# Patient Record
Sex: FEMALE | Race: WHITE | Employment: OTHER
[De-identification: names, ages, dates, MRNs, and addresses within clinical notes are randomized per-mention and may not be internally consistent; named-entity substitution may affect disease eponyms.]

---

## 2019-06-11 ENCOUNTER — RX ONLY (RX ONLY)
Age: 79
End: 2019-06-11

## 2019-06-11 ENCOUNTER — DOCTOR'S OFFICE (OUTPATIENT)
Dept: URBAN - NONMETROPOLITAN AREA CLINIC 1 | Facility: CLINIC | Age: 79
Setting detail: OPHTHALMOLOGY
End: 2019-06-11
Payer: COMMERCIAL

## 2019-06-11 DIAGNOSIS — Z79.891: ICD-10-CM

## 2019-06-11 DIAGNOSIS — H25.21: ICD-10-CM

## 2019-06-11 DIAGNOSIS — H25.042: ICD-10-CM

## 2019-06-11 PROCEDURE — 99204 OFFICE O/P NEW MOD 45 MIN: CPT | Performed by: OPHTHALMOLOGY

## 2019-06-11 PROCEDURE — 76512 OPH US DX B-SCAN: CPT | Performed by: OPHTHALMOLOGY

## 2019-06-11 PROCEDURE — 92134 CPTRZ OPH DX IMG PST SGM RTA: CPT | Performed by: OPHTHALMOLOGY

## 2019-06-11 ASSESSMENT — REFRACTION_CURRENTRX
OD_AXIS: 115
OD_VPRISM_DIRECTION: TRF
OD_SPHERE: PLANO
OS_OVR_VA: 20/
OS_OVR_VA: 20/
OD_OVR_VA: 20/
OD_OVR_VA: 20/
OS_AXIS: 102
OS_CYLINDER: -1.00
OS_ADD: +2.50
OS_SPHERE: +1.25
OD_ADD: +3.50
OS_VPRISM_DIRECTION: TRF
OD_OVR_VA: 20/
OD_CYLINDER: -1.00
OS_OVR_VA: 20/

## 2019-06-11 ASSESSMENT — REFRACTION_MANIFEST
OU_VA: 20/
OU_VA: 20/
OS_VA3: 20/
OS_VA2: 20/
OS_VA1: 20/
OD_VA2: 20/
OS_VA2: 20/
OD_VA1: 20/
OD_VA2: 20/
OS_VA1: 20/
OD_VA3: 20/
OD_VA3: 20/
OD_VA1: 20/
OS_VA3: 20/

## 2019-06-11 ASSESSMENT — DRY EYES - PHYSICIAN NOTES
OD_GENERALCOMMENTS: KSICCA
OS_GENERALCOMMENTS: KSICCA

## 2019-06-11 ASSESSMENT — SPHEQUIV_DERIVED: OS_SPHEQUIV: -0.125

## 2019-06-11 ASSESSMENT — REFRACTION_AUTOREFRACTION
OS_AXIS: 104
OS_SPHERE: +0.50
OS_CYLINDER: -1.25
OD_SPHERE: ERROR OD

## 2019-06-11 ASSESSMENT — CONFRONTATIONAL VISUAL FIELD TEST (CVF)
OD_FINDINGS: FULL
OS_FINDINGS: FULL

## 2019-06-11 ASSESSMENT — VISUAL ACUITY
OD_BCVA: 20/50-1
OS_BCVA: 20/CFX1'

## 2019-07-24 ENCOUNTER — DOCTOR'S OFFICE (OUTPATIENT)
Dept: URBAN - NONMETROPOLITAN AREA CLINIC 1 | Facility: CLINIC | Age: 79
Setting detail: OPHTHALMOLOGY
End: 2019-07-24
Payer: COMMERCIAL

## 2019-07-24 DIAGNOSIS — H25.21: ICD-10-CM

## 2019-07-24 PROCEDURE — 92136 OPHTHALMIC BIOMETRY: CPT | Performed by: OPHTHALMOLOGY

## 2019-08-01 ENCOUNTER — AMBUL SURGICAL CARE (OUTPATIENT)
Dept: URBAN - NONMETROPOLITAN AREA SURGERY 1 | Facility: SURGERY | Age: 79
Setting detail: OPHTHALMOLOGY
End: 2019-08-01
Payer: COMMERCIAL

## 2019-08-01 DIAGNOSIS — H25.21: ICD-10-CM

## 2019-08-01 DIAGNOSIS — H25.031: ICD-10-CM

## 2019-08-01 DIAGNOSIS — H25.041: ICD-10-CM

## 2019-08-01 PROCEDURE — 66982 XCAPSL CTRC RMVL CPLX WO ECP: CPT | Performed by: OPHTHALMOLOGY

## 2019-08-01 PROCEDURE — G8907 PT DOC NO EVENTS ON DISCHARG: HCPCS | Performed by: OPHTHALMOLOGY

## 2019-08-01 PROCEDURE — G8918 PT W/O PREOP ORDER IV AB PRO: HCPCS | Performed by: OPHTHALMOLOGY

## 2019-08-02 ENCOUNTER — RX ONLY (RX ONLY)
Age: 79
End: 2019-08-02

## 2019-08-02 ENCOUNTER — DOCTOR'S OFFICE (OUTPATIENT)
Dept: URBAN - NONMETROPOLITAN AREA CLINIC 1 | Facility: CLINIC | Age: 79
Setting detail: OPHTHALMOLOGY
End: 2019-08-02
Payer: COMMERCIAL

## 2019-08-02 DIAGNOSIS — Z96.1: ICD-10-CM

## 2019-08-02 DIAGNOSIS — H25.042: ICD-10-CM

## 2019-08-02 PROCEDURE — 92136 OPHTHALMIC BIOMETRY: CPT | Performed by: OPHTHALMOLOGY

## 2019-08-02 PROCEDURE — 99024 POSTOP FOLLOW-UP VISIT: CPT | Performed by: OPHTHALMOLOGY

## 2019-08-02 ASSESSMENT — REFRACTION_CURRENTRX
OD_ADD: +3.50
OD_SPHERE: PLANO
OS_ADD: +2.50
OD_OVR_VA: 20/
OD_AXIS: 115
OS_OVR_VA: 20/
OS_AXIS: 102
OS_SPHERE: +1.25
OD_OVR_VA: 20/
OD_OVR_VA: 20/
OD_CYLINDER: -1.00
OD_VPRISM_DIRECTION: TRF
OS_VPRISM_DIRECTION: TRF
OS_CYLINDER: -1.00
OS_OVR_VA: 20/
OS_OVR_VA: 20/

## 2019-08-02 ASSESSMENT — REFRACTION_MANIFEST
OS_VA3: 20/
OS_VA1: 20/
OS_VA1: 20/
OD_VA3: 20/
OU_VA: 20/
OD_VA2: 20/
OD_VA1: 20/
OD_VA3: 20/
OU_VA: 20/
OD_VA1: 20/
OD_VA2: 20/
OS_VA2: 20/
OS_VA2: 20/
OS_VA3: 20/

## 2019-08-02 ASSESSMENT — VISUAL ACUITY
OD_BCVA: 20/80-1
OS_BCVA: 20/60-2

## 2019-08-02 ASSESSMENT — REFRACTION_AUTOREFRACTION
OS_CYLINDER: -1.00
OD_AXIS: 24
OS_SPHERE: 0.00
OD_SPHERE: +0.25
OS_AXIS: 082
OD_CYLINDER: -2.50

## 2019-08-02 ASSESSMENT — SPHEQUIV_DERIVED
OS_SPHEQUIV: -0.5
OD_SPHEQUIV: -1

## 2019-08-02 ASSESSMENT — DRY EYES - PHYSICIAN NOTES
OD_GENERALCOMMENTS: KSICCA
OS_GENERALCOMMENTS: KSICCA

## 2019-08-15 ENCOUNTER — AMBUL SURGICAL CARE (OUTPATIENT)
Dept: URBAN - NONMETROPOLITAN AREA SURGERY 1 | Facility: SURGERY | Age: 79
Setting detail: OPHTHALMOLOGY
End: 2019-08-15
Payer: COMMERCIAL

## 2019-08-15 DIAGNOSIS — H25.12: ICD-10-CM

## 2019-08-15 DIAGNOSIS — H25.012: ICD-10-CM

## 2019-08-15 DIAGNOSIS — H25.042: ICD-10-CM

## 2019-08-15 DIAGNOSIS — H25.032: ICD-10-CM

## 2019-08-15 PROCEDURE — G8918 PT W/O PREOP ORDER IV AB PRO: HCPCS | Performed by: OPHTHALMOLOGY

## 2019-08-15 PROCEDURE — G8907 PT DOC NO EVENTS ON DISCHARG: HCPCS | Performed by: OPHTHALMOLOGY

## 2019-08-15 PROCEDURE — 66984 XCAPSL CTRC RMVL W/O ECP: CPT | Performed by: OPHTHALMOLOGY

## 2019-08-16 ENCOUNTER — DOCTOR'S OFFICE (OUTPATIENT)
Dept: URBAN - NONMETROPOLITAN AREA CLINIC 1 | Facility: CLINIC | Age: 79
Setting detail: OPHTHALMOLOGY
End: 2019-08-16
Payer: COMMERCIAL

## 2019-08-16 DIAGNOSIS — Z96.1: ICD-10-CM

## 2019-08-16 PROCEDURE — 99024 POSTOP FOLLOW-UP VISIT: CPT | Performed by: PHYSICIAN ASSISTANT

## 2019-08-16 ASSESSMENT — DRY EYES - PHYSICIAN NOTES
OS_GENERALCOMMENTS: KSICCA
OD_GENERALCOMMENTS: KSICCA

## 2019-08-19 ASSESSMENT — REFRACTION_AUTOREFRACTION
OS_SPHERE: +1.25
OD_AXIS: 033
OD_SPHERE: +1.00
OS_CYLINDER: -1.00
OD_CYLINDER: -1.25
OS_AXIS: 165

## 2019-08-19 ASSESSMENT — REFRACTION_MANIFEST
OD_VA3: 20/
OD_VA1: 20/
OD_VA3: 20/
OS_VA3: 20/
OD_VA1: 20/
OD_VA2: 20/
OS_VA3: 20/
OU_VA: 20/
OS_VA1: 20/
OU_VA: 20/
OD_VA2: 20/
OS_VA2: 20/
OS_VA2: 20/
OS_VA1: 20/

## 2019-08-19 ASSESSMENT — REFRACTION_CURRENTRX
OS_VPRISM_DIRECTION: TRF
OS_SPHERE: +1.25
OD_AXIS: 115
OS_CYLINDER: -1.00
OD_VPRISM_DIRECTION: TRF
OD_ADD: +3.50
OD_SPHERE: PLANO
OD_CYLINDER: -1.00
OS_OVR_VA: 20/
OD_OVR_VA: 20/
OS_AXIS: 102
OS_ADD: +2.50
OS_OVR_VA: 20/
OD_OVR_VA: 20/
OS_OVR_VA: 20/
OD_OVR_VA: 20/

## 2019-08-19 ASSESSMENT — SPHEQUIV_DERIVED
OS_SPHEQUIV: 0.75
OD_SPHEQUIV: 0.375

## 2019-08-19 ASSESSMENT — VISUAL ACUITY
OD_BCVA: 20/30-2
OS_BCVA: 20/50-2

## 2019-08-23 ENCOUNTER — DOCTOR'S OFFICE (OUTPATIENT)
Dept: URBAN - NONMETROPOLITAN AREA CLINIC 1 | Facility: CLINIC | Age: 79
Setting detail: OPHTHALMOLOGY
End: 2019-08-23
Payer: COMMERCIAL

## 2019-08-23 DIAGNOSIS — Z96.1: ICD-10-CM

## 2019-08-23 PROBLEM — H25.21 CATARACT HYPERMATURE; RIGHT EYE: Status: RESOLVED | Noted: 2019-06-11 | Resolved: 2019-08-23

## 2019-08-23 PROBLEM — H25.042 CATARACT POST SUBCAPSULAR; LEFT EYE: Status: RESOLVED | Noted: 2019-06-11 | Resolved: 2019-08-23

## 2019-08-23 PROCEDURE — 99024 POSTOP FOLLOW-UP VISIT: CPT | Performed by: OPHTHALMOLOGY

## 2019-08-23 ASSESSMENT — VISUAL ACUITY
OD_BCVA: 20/60+1
OS_BCVA: 20/50-2

## 2019-08-23 ASSESSMENT — REFRACTION_MANIFEST
OS_VA1: 20/
OD_VA1: 20/
OS_VA1: 20/
OS_VA2: 20/
OD_VA2: 20/
OD_VA2: 20/
OD_VA3: 20/
OS_VA3: 20/
OU_VA: 20/
OS_VA3: 20/
OD_VA3: 20/
OS_VA2: 20/
OU_VA: 20/
OD_VA1: 20/

## 2019-08-23 ASSESSMENT — REFRACTION_CURRENTRX
OS_OVR_VA: 20/
OS_ADD: +2.50
OD_ADD: +3.50
OS_VPRISM_DIRECTION: TRF
OD_OVR_VA: 20/
OS_AXIS: 102
OD_OVR_VA: 20/
OS_SPHERE: +1.25
OS_CYLINDER: -1.00
OD_CYLINDER: -1.00
OS_OVR_VA: 20/
OD_SPHERE: PLANO
OD_OVR_VA: 20/
OS_OVR_VA: 20/
OD_AXIS: 115
OD_VPRISM_DIRECTION: TRF

## 2019-08-23 ASSESSMENT — REFRACTION_AUTOREFRACTION
OS_SPHERE: 0.00
OD_SPHERE: 0.00
OD_CYLINDER: -2.50
OS_AXIS: 167
OS_CYLINDER: -0.75
OD_AXIS: 013

## 2019-08-23 ASSESSMENT — DRY EYES - PHYSICIAN NOTES
OS_GENERALCOMMENTS: KSICCA
OD_GENERALCOMMENTS: KSICCA

## 2019-08-23 ASSESSMENT — SPHEQUIV_DERIVED
OD_SPHEQUIV: -1.25
OS_SPHEQUIV: -0.375

## 2019-09-17 ENCOUNTER — DOCTOR'S OFFICE (OUTPATIENT)
Dept: URBAN - NONMETROPOLITAN AREA CLINIC 1 | Facility: CLINIC | Age: 79
Setting detail: OPHTHALMOLOGY
End: 2019-09-17

## 2019-09-17 ENCOUNTER — RX ONLY (RX ONLY)
Age: 79
End: 2019-09-17

## 2019-09-17 DIAGNOSIS — H52.4: ICD-10-CM

## 2019-09-17 DIAGNOSIS — Z96.1: ICD-10-CM

## 2019-09-17 PROCEDURE — 92015 DETERMINE REFRACTIVE STATE: CPT | Performed by: OPTOMETRIST

## 2019-09-17 ASSESSMENT — REFRACTION_CURRENTRX
OS_ADD: +2.50
OD_OVR_VA: 20/
OD_ADD: +3.50
OD_CYLINDER: -1.00
OD_OVR_VA: 20/
OD_VPRISM_DIRECTION: TRF
OD_OVR_VA: 20/
OD_AXIS: 115
OS_CYLINDER: -1.00
OS_OVR_VA: 20/
OS_OVR_VA: 20/
OD_SPHERE: PLANO
OS_SPHERE: +1.25
OS_AXIS: 102
OS_OVR_VA: 20/
OS_VPRISM_DIRECTION: TRF

## 2019-09-17 ASSESSMENT — SPHEQUIV_DERIVED
OS_SPHEQUIV: 0.875
OS_SPHEQUIV: 0.75
OD_SPHEQUIV: -0.25
OD_SPHEQUIV: 0.125

## 2019-09-17 ASSESSMENT — REFRACTION_MANIFEST
OU_VA: 20/
OD_ADD: +2.50
OS_VA3: 20/
OD_VA2: 20/30+2
OS_VA2: 20/25-2
OD_AXIS: 020
OS_CYLINDER: -1.50
OD_VA1: 20/
OS_VA1: 20/
OS_SPHERE: +1.50
OS_VA1: 20/25-2
OD_VA3: 20/
OS_AXIS: 160
OU_VA: 20/
OD_VA3: 20/
OS_VA2: 20/
OD_CYLINDER: -2.50
OD_SPHERE: +1.00
OD_VA2: 20/
OD_VA1: 20/30+2
OS_ADD: +2.50
OS_VA3: 20/

## 2019-09-17 ASSESSMENT — VISUAL ACUITY
OS_BCVA: 20/80+1
OD_BCVA: 20/40-2

## 2019-09-17 ASSESSMENT — REFRACTION_AUTOREFRACTION
OS_CYLINDER: -1.25
OD_AXIS: 020
OS_SPHERE: +1.50
OD_SPHERE: +1.50
OS_AXIS: 157
OD_CYLINDER: -2.75

## 2019-09-26 ENCOUNTER — OPTICAL (OUTPATIENT)
Dept: URBAN - NONMETROPOLITAN AREA CLINIC 6 | Facility: CLINIC | Age: 79
Setting detail: OPHTHALMOLOGY
End: 2019-09-26
Payer: COMMERCIAL

## 2019-09-26 DIAGNOSIS — Z96.1: ICD-10-CM

## 2019-09-26 PROCEDURE — V2744 TINT PHOTOCHROMATIC LENS/ES: HCPCS | Performed by: OPTOMETRIST

## 2019-09-26 PROCEDURE — V2750 ANTI-REFLECTIVE COATING: HCPCS | Performed by: OPTOMETRIST

## 2019-09-26 PROCEDURE — V2303 LENS SPHCY TRIFOCAL 4.0/.12-: HCPCS | Performed by: OPTOMETRIST

## 2019-09-26 PROCEDURE — V2304 LENS SPHCY TRIFOCAL 4.0/2.25: HCPCS | Performed by: OPTOMETRIST

## 2019-09-26 PROCEDURE — V2020 VISION SVCS FRAMES PURCHASES: HCPCS | Performed by: OPTOMETRIST

## 2020-06-26 ENCOUNTER — DOCTOR'S OFFICE (OUTPATIENT)
Dept: URBAN - NONMETROPOLITAN AREA CLINIC 1 | Facility: CLINIC | Age: 80
Setting detail: OPHTHALMOLOGY
End: 2020-06-26
Payer: COMMERCIAL

## 2020-06-26 DIAGNOSIS — H35.3231: ICD-10-CM

## 2020-06-26 DIAGNOSIS — Z96.1: ICD-10-CM

## 2020-06-26 DIAGNOSIS — Z79.891: ICD-10-CM

## 2020-06-26 DIAGNOSIS — M06.9: ICD-10-CM

## 2020-06-26 PROCEDURE — 92134 CPTRZ OPH DX IMG PST SGM RTA: CPT | Performed by: OPHTHALMOLOGY

## 2020-06-26 PROCEDURE — 92014 COMPRE OPH EXAM EST PT 1/>: CPT | Performed by: OPHTHALMOLOGY

## 2020-06-26 PROCEDURE — 92250 FUNDUS PHOTOGRAPHY W/I&R: CPT | Performed by: OPHTHALMOLOGY

## 2020-06-26 ASSESSMENT — REFRACTION_CURRENTRX
OS_OVR_VA: 20/
OS_ADD: +2.50
OD_AXIS: 019
OD_ADD: +2.50
OS_CYLINDER: -1.50
OD_SPHERE: +1.00
OS_SPHERE: +1.50
OS_AXIS: 161
OS_VPRISM_DIRECTION: TRF
OD_CYLINDER: -2.50
OD_OVR_VA: 20/
OD_VPRISM_DIRECTION: TRF

## 2020-06-26 ASSESSMENT — REFRACTION_MANIFEST
OS_VA1: 20/25-2
OD_SPHERE: +1.00
OS_VA2: 20/25-2
OD_AXIS: 020
OD_CYLINDER: -2.50
OS_CYLINDER: -1.50
OD_VA1: 20/30+2
OS_AXIS: 160
OD_ADD: +2.50
OD_VA2: 20/30+2
OS_SPHERE: +1.50
OS_ADD: +2.50

## 2020-06-26 ASSESSMENT — SPHEQUIV_DERIVED
OS_SPHEQUIV: 0.625
OS_SPHEQUIV: 0.75
OD_SPHEQUIV: -0.25
OD_SPHEQUIV: 0.625

## 2020-06-26 ASSESSMENT — VISUAL ACUITY
OD_BCVA: 20/50+2
OS_BCVA: 20/70+2

## 2020-06-26 ASSESSMENT — CONFRONTATIONAL VISUAL FIELD TEST (CVF)
OD_FINDINGS: FULL
OS_FINDINGS: FULL

## 2020-06-26 ASSESSMENT — DRY EYES - PHYSICIAN NOTES
OS_GENERALCOMMENTS: KSICCA
OD_GENERALCOMMENTS: KSICCA

## 2020-06-26 ASSESSMENT — REFRACTION_AUTOREFRACTION
OS_SPHERE: +1.00
OD_SPHERE: +1.25
OD_AXIS: 045
OS_AXIS: 148
OS_CYLINDER: -0.75
OD_CYLINDER: -1.25

## 2020-07-08 ENCOUNTER — DOCTOR'S OFFICE (OUTPATIENT)
Dept: URBAN - NONMETROPOLITAN AREA CLINIC 1 | Facility: CLINIC | Age: 80
Setting detail: OPHTHALMOLOGY
End: 2020-07-08
Payer: COMMERCIAL

## 2020-07-08 DIAGNOSIS — H35.3211: ICD-10-CM

## 2020-07-08 PROCEDURE — 67028 INJECTION EYE DRUG: CPT | Performed by: OPHTHALMOLOGY

## 2020-07-08 ASSESSMENT — REFRACTION_AUTOREFRACTION
OD_SPHERE: +1.25
OS_CYLINDER: -0.75
OD_CYLINDER: -1.25
OD_AXIS: 045
OS_AXIS: 148
OS_SPHERE: +1.00

## 2020-07-08 ASSESSMENT — VISUAL ACUITY
OD_BCVA: 20/50+1
OS_BCVA: 20/70+2

## 2020-07-08 ASSESSMENT — SPHEQUIV_DERIVED
OD_SPHEQUIV: 0.625
OS_SPHEQUIV: 0.625

## 2020-07-21 ENCOUNTER — DOCTOR'S OFFICE (OUTPATIENT)
Dept: URBAN - NONMETROPOLITAN AREA CLINIC 1 | Facility: CLINIC | Age: 80
Setting detail: OPHTHALMOLOGY
End: 2020-07-21
Payer: COMMERCIAL

## 2020-07-21 DIAGNOSIS — H35.3221: ICD-10-CM

## 2020-07-21 PROCEDURE — 67028 INJECTION EYE DRUG: CPT | Performed by: OPHTHALMOLOGY

## 2020-07-21 ASSESSMENT — REFRACTION_AUTOREFRACTION
OS_AXIS: 148
OD_AXIS: 045
OD_SPHERE: +1.25
OS_SPHERE: +1.00
OD_CYLINDER: -1.25
OS_CYLINDER: -0.75

## 2020-07-21 ASSESSMENT — VISUAL ACUITY
OD_BCVA: 20/50-1
OS_BCVA: 20/70+1

## 2020-07-21 ASSESSMENT — SPHEQUIV_DERIVED
OS_SPHEQUIV: 0.625
OD_SPHEQUIV: 0.625

## 2020-07-22 ENCOUNTER — DOCTOR'S OFFICE (OUTPATIENT)
Dept: URBAN - NONMETROPOLITAN AREA CLINIC 1 | Facility: CLINIC | Age: 80
Setting detail: OPHTHALMOLOGY
End: 2020-07-22
Payer: COMMERCIAL

## 2020-07-22 DIAGNOSIS — H35.3211: ICD-10-CM

## 2020-07-22 DIAGNOSIS — H35.3221: ICD-10-CM

## 2020-07-22 PROCEDURE — 99212 OFFICE O/P EST SF 10 MIN: CPT | Performed by: OPHTHALMOLOGY

## 2020-07-22 ASSESSMENT — DRY EYES - PHYSICIAN NOTES
OS_GENERALCOMMENTS: KSICCA
OD_GENERALCOMMENTS: KSICCA

## 2020-07-22 ASSESSMENT — SPHEQUIV_DERIVED
OS_SPHEQUIV: 0.625
OD_SPHEQUIV: 0.625

## 2020-07-22 ASSESSMENT — REFRACTION_AUTOREFRACTION
OS_AXIS: 148
OS_CYLINDER: -0.75
OD_AXIS: 045
OD_SPHERE: +1.25
OD_CYLINDER: -1.25
OS_SPHERE: +1.00

## 2020-07-22 ASSESSMENT — VISUAL ACUITY
OD_BCVA: 20/60-1
OS_BCVA: 20/70+1

## 2020-07-29 ENCOUNTER — DOCTOR'S OFFICE (OUTPATIENT)
Dept: URBAN - NONMETROPOLITAN AREA CLINIC 1 | Facility: CLINIC | Age: 80
Setting detail: OPHTHALMOLOGY
End: 2020-07-29
Payer: COMMERCIAL

## 2020-07-29 DIAGNOSIS — Z79.891: ICD-10-CM

## 2020-07-29 DIAGNOSIS — H35.3211: ICD-10-CM

## 2020-07-29 DIAGNOSIS — M06.9: ICD-10-CM

## 2020-07-29 DIAGNOSIS — H52.4: ICD-10-CM

## 2020-07-29 DIAGNOSIS — Z96.1: ICD-10-CM

## 2020-07-29 DIAGNOSIS — H35.3221: ICD-10-CM

## 2020-07-29 PROCEDURE — 92014 COMPRE OPH EXAM EST PT 1/>: CPT | Performed by: OPHTHALMOLOGY

## 2020-07-29 PROCEDURE — 92134 CPTRZ OPH DX IMG PST SGM RTA: CPT | Performed by: OPHTHALMOLOGY

## 2020-07-29 PROCEDURE — 92015 DETERMINE REFRACTIVE STATE: CPT | Performed by: OPHTHALMOLOGY

## 2020-07-29 PROCEDURE — 92083 EXTENDED VISUAL FIELD XM: CPT | Performed by: OPHTHALMOLOGY

## 2020-07-29 PROCEDURE — 92025 CPTRIZED CORNEAL TOPOGRAPHY: CPT | Performed by: OPHTHALMOLOGY

## 2020-07-29 ASSESSMENT — REFRACTION_MANIFEST
OS_SPHERE: +1.00
OD_VA2: 20/40-2
OD_SPHERE: +1.50
OD_CYLINDER: -2.50
OS_CYLINDER: -1.00
OD_AXIS: 045
OD_ADD: +2.75
OS_ADD: +2.75
OS_AXIS: 160
OD_VA1: 20/40-2
OS_VA2: 20/30-2
OS_VA1: 20/30-2

## 2020-07-29 ASSESSMENT — SPHEQUIV_DERIVED
OS_SPHEQUIV: 0.125
OS_SPHEQUIV: 0.5
OD_SPHEQUIV: -0.125
OD_SPHEQUIV: 0.25

## 2020-07-29 ASSESSMENT — REFRACTION_CURRENTRX
OS_OVR_VA: 20/
OD_SPHERE: +1.00
OD_VPRISM_DIRECTION: TRF
OD_OVR_VA: 20/
OS_CYLINDER: -1.50
OS_AXIS: 160
OS_VPRISM_DIRECTION: TRF
OD_AXIS: 23
OD_CYLINDER: -2.50
OS_ADD: +2.75
OS_SPHERE: +1.50
OD_ADD: +2.75

## 2020-07-29 ASSESSMENT — VISUAL ACUITY
OD_BCVA: 20/40
OS_BCVA: 20/50-2

## 2020-07-29 ASSESSMENT — REFRACTION_AUTOREFRACTION
OS_SPHERE: +0.25
OD_CYLINDER: -1.75
OS_CYLINDER: -0.25
OD_SPHERE: +0.75
OS_AXIS: 158
OD_AXIS: 38

## 2020-07-29 ASSESSMENT — DRY EYES - PHYSICIAN NOTES
OD_GENERALCOMMENTS: KSICCA
OS_GENERALCOMMENTS: KSICCA

## 2020-07-29 ASSESSMENT — CONFRONTATIONAL VISUAL FIELD TEST (CVF)
OS_FINDINGS: FULL
OD_FINDINGS: FULL

## 2020-08-19 ENCOUNTER — DOCTOR'S OFFICE (OUTPATIENT)
Dept: URBAN - NONMETROPOLITAN AREA CLINIC 1 | Facility: CLINIC | Age: 80
Setting detail: OPHTHALMOLOGY
End: 2020-08-19
Payer: COMMERCIAL

## 2020-08-19 DIAGNOSIS — H35.3231: ICD-10-CM

## 2020-08-19 DIAGNOSIS — H02.012: ICD-10-CM

## 2020-08-19 PROCEDURE — 67820 REVISE EYELASHES: CPT | Performed by: OPHTHALMOLOGY

## 2020-08-19 PROCEDURE — 99212 OFFICE O/P EST SF 10 MIN: CPT | Performed by: OPHTHALMOLOGY

## 2020-08-19 ASSESSMENT — CONFRONTATIONAL VISUAL FIELD TEST (CVF)
OS_FINDINGS: FULL
OD_FINDINGS: FULL

## 2020-08-19 ASSESSMENT — REFRACTION_CURRENTRX
OD_VPRISM_DIRECTION: TRF
OD_AXIS: 23
OD_ADD: +2.75
OS_AXIS: 160
OD_OVR_VA: 20/
OS_SPHERE: +1.50
OD_SPHERE: +1.00
OD_CYLINDER: -2.50
OS_OVR_VA: 20/
OS_CYLINDER: -1.50
OS_VPRISM_DIRECTION: TRF
OS_ADD: +2.75

## 2020-08-19 ASSESSMENT — REFRACTION_AUTOREFRACTION
OS_CYLINDER: -0.25
OS_AXIS: 158
OD_AXIS: 38
OD_CYLINDER: -1.75
OS_SPHERE: +0.25
OD_SPHERE: +0.75

## 2020-08-19 ASSESSMENT — REFRACTION_MANIFEST
OD_SPHERE: +1.50
OD_VA2: 20/40-2
OS_VA1: 20/30-2
OD_AXIS: 045
OD_CYLINDER: -2.50
OS_VA2: 20/30-2
OS_SPHERE: +1.00
OD_ADD: +2.75
OS_ADD: +2.75
OD_VA1: 20/40-2
OS_AXIS: 160
OS_CYLINDER: -1.00

## 2020-08-19 ASSESSMENT — SPHEQUIV_DERIVED
OS_SPHEQUIV: 0.125
OD_SPHEQUIV: 0.25
OD_SPHEQUIV: -0.125
OS_SPHEQUIV: 0.5

## 2020-08-19 ASSESSMENT — DRY EYES - PHYSICIAN NOTES
OD_GENERALCOMMENTS: KSICCA
OS_GENERALCOMMENTS: KSICCA

## 2020-08-19 ASSESSMENT — VISUAL ACUITY
OS_BCVA: 20/60-1
OD_BCVA: 20/40-1

## 2020-08-19 ASSESSMENT — LID EXAM ASSESSMENTS
OS_BLEPHARITIS: LLL LUL T
OD_BLEPHARITIS: RLL RUL T
OD_TRICHIASIS: RLL

## 2020-08-21 ENCOUNTER — DOCTOR'S OFFICE (OUTPATIENT)
Dept: URBAN - NONMETROPOLITAN AREA CLINIC 2 | Facility: CLINIC | Age: 80
Setting detail: OPHTHALMOLOGY
End: 2020-08-21
Payer: COMMERCIAL

## 2020-08-21 DIAGNOSIS — H35.3211: ICD-10-CM

## 2020-08-21 PROCEDURE — 67028 INJECTION EYE DRUG: CPT | Performed by: OPHTHALMOLOGY

## 2020-08-21 ASSESSMENT — REFRACTION_CURRENTRX
OD_CYLINDER: -2.50
OS_OVR_VA: 20/
OD_SPHERE: +1.00
OD_OVR_VA: 20/
OD_ADD: +2.75
OS_SPHERE: +1.50
OS_AXIS: 160
OS_ADD: +2.75
OS_CYLINDER: -1.50
OD_AXIS: 23
OD_VPRISM_DIRECTION: TRF
OS_VPRISM_DIRECTION: TRF

## 2020-08-21 ASSESSMENT — REFRACTION_MANIFEST
OD_CYLINDER: -2.50
OD_VA1: 20/40-2
OS_SPHERE: +1.00
OS_AXIS: 160
OS_VA2: 20/30-2
OS_VA1: 20/30-2
OD_AXIS: 045
OS_ADD: +2.75
OD_ADD: +2.75
OS_CYLINDER: -1.00
OD_VA2: 20/40-2
OD_SPHERE: +1.50

## 2020-08-21 ASSESSMENT — REFRACTION_AUTOREFRACTION
OS_CYLINDER: -0.25
OD_SPHERE: +0.75
OS_AXIS: 158
OD_AXIS: 38
OD_CYLINDER: -1.75
OS_SPHERE: +0.25

## 2020-08-21 ASSESSMENT — SPHEQUIV_DERIVED
OS_SPHEQUIV: 0.5
OD_SPHEQUIV: -0.125
OD_SPHEQUIV: 0.25
OS_SPHEQUIV: 0.125

## 2020-08-21 ASSESSMENT — VISUAL ACUITY
OS_BCVA: 20/70+1
OD_BCVA: 20/40-2

## 2020-08-26 ENCOUNTER — DOCTOR'S OFFICE (OUTPATIENT)
Dept: URBAN - NONMETROPOLITAN AREA CLINIC 1 | Facility: CLINIC | Age: 80
Setting detail: OPHTHALMOLOGY
End: 2020-08-26
Payer: COMMERCIAL

## 2020-08-26 DIAGNOSIS — H35.3221: ICD-10-CM

## 2020-08-26 PROCEDURE — 67028 INJECTION EYE DRUG: CPT | Performed by: OPHTHALMOLOGY

## 2020-08-26 ASSESSMENT — REFRACTION_AUTOREFRACTION
OD_CYLINDER: -1.75
OS_SPHERE: +0.25
OS_CYLINDER: -0.25
OS_AXIS: 158
OD_SPHERE: +0.75
OD_AXIS: 38

## 2020-08-26 ASSESSMENT — SPHEQUIV_DERIVED
OD_SPHEQUIV: -0.125
OS_SPHEQUIV: 0.125

## 2020-08-26 ASSESSMENT — VISUAL ACUITY
OS_BCVA: 20/70+2
OD_BCVA: 20/40-1

## 2020-10-07 ENCOUNTER — DOCTOR'S OFFICE (OUTPATIENT)
Dept: URBAN - NONMETROPOLITAN AREA CLINIC 1 | Facility: CLINIC | Age: 80
Setting detail: OPHTHALMOLOGY
End: 2020-10-07
Payer: COMMERCIAL

## 2020-10-07 DIAGNOSIS — H35.3221: ICD-10-CM

## 2020-10-07 DIAGNOSIS — H26.491: ICD-10-CM

## 2020-10-07 DIAGNOSIS — H26.492: ICD-10-CM

## 2020-10-07 DIAGNOSIS — H35.3211: ICD-10-CM

## 2020-10-07 PROCEDURE — 92014 COMPRE OPH EXAM EST PT 1/>: CPT | Performed by: OPHTHALMOLOGY

## 2020-10-07 PROCEDURE — 92134 CPTRZ OPH DX IMG PST SGM RTA: CPT | Performed by: OPHTHALMOLOGY

## 2020-10-07 ASSESSMENT — REFRACTION_AUTOREFRACTION
OS_SPHERE: +1.00
OS_AXIS: 078
OD_AXIS: 040
OD_CYLINDER: -1.00
OD_SPHERE: +1.00
OS_CYLINDER: -0.25

## 2020-10-07 ASSESSMENT — SPHEQUIV_DERIVED
OD_SPHEQUIV: 0.5
OS_SPHEQUIV: 0.875

## 2020-10-07 ASSESSMENT — DRY EYES - PHYSICIAN NOTES
OS_GENERALCOMMENTS: KSICCA
OD_GENERALCOMMENTS: KSICCA

## 2020-10-07 ASSESSMENT — CONFRONTATIONAL VISUAL FIELD TEST (CVF)
OD_FINDINGS: FULL
OS_FINDINGS: FULL

## 2020-10-07 ASSESSMENT — VISUAL ACUITY
OD_BCVA: 20/60-1
OS_BCVA: 20/80-1

## 2020-11-03 ENCOUNTER — AMBUL SURGICAL CARE (OUTPATIENT)
Dept: URBAN - NONMETROPOLITAN AREA SURGERY 1 | Facility: SURGERY | Age: 80
Setting detail: OPHTHALMOLOGY
End: 2020-11-03
Payer: COMMERCIAL

## 2020-11-03 DIAGNOSIS — H26.491: ICD-10-CM

## 2020-11-03 PROCEDURE — G8907 PT DOC NO EVENTS ON DISCHARG: HCPCS | Performed by: CLINIC/CENTER

## 2020-11-03 PROCEDURE — 66821 AFTER CATARACT LASER SURGERY: CPT | Performed by: CLINIC/CENTER

## 2020-11-03 PROCEDURE — G8918 PT W/O PREOP ORDER IV AB PRO: HCPCS | Performed by: CLINIC/CENTER

## 2020-11-03 PROCEDURE — G8918 PT W/O PREOP ORDER IV AB PRO: HCPCS | Performed by: OPHTHALMOLOGY

## 2020-11-03 PROCEDURE — 66821 AFTER CATARACT LASER SURGERY: CPT | Performed by: OPHTHALMOLOGY

## 2020-11-03 PROCEDURE — G8907 PT DOC NO EVENTS ON DISCHARG: HCPCS | Performed by: OPHTHALMOLOGY

## 2020-11-17 ENCOUNTER — AMBUL SURGICAL CARE (OUTPATIENT)
Dept: URBAN - NONMETROPOLITAN AREA SURGERY 1 | Facility: SURGERY | Age: 80
Setting detail: OPHTHALMOLOGY
End: 2020-11-17
Payer: COMMERCIAL

## 2020-11-17 DIAGNOSIS — H26.492: ICD-10-CM

## 2020-11-17 PROCEDURE — G8907 PT DOC NO EVENTS ON DISCHARG: HCPCS | Performed by: OPHTHALMOLOGY

## 2020-11-17 PROCEDURE — G8907 PT DOC NO EVENTS ON DISCHARG: HCPCS | Performed by: CLINIC/CENTER

## 2020-11-17 PROCEDURE — G8918 PT W/O PREOP ORDER IV AB PRO: HCPCS | Performed by: OPHTHALMOLOGY

## 2020-11-17 PROCEDURE — 66821 AFTER CATARACT LASER SURGERY: CPT | Performed by: CLINIC/CENTER

## 2020-11-17 PROCEDURE — G8918 PT W/O PREOP ORDER IV AB PRO: HCPCS | Performed by: CLINIC/CENTER

## 2020-11-17 PROCEDURE — 66821 AFTER CATARACT LASER SURGERY: CPT | Performed by: OPHTHALMOLOGY

## 2020-12-02 ENCOUNTER — DOCTOR'S OFFICE (OUTPATIENT)
Dept: URBAN - NONMETROPOLITAN AREA CLINIC 1 | Facility: CLINIC | Age: 80
Setting detail: OPHTHALMOLOGY
End: 2020-12-02
Payer: COMMERCIAL

## 2020-12-02 DIAGNOSIS — H52.223: ICD-10-CM

## 2020-12-02 DIAGNOSIS — H52.4: ICD-10-CM

## 2020-12-02 PROBLEM — H26.491 POSTERIOR CAPSULE OPACIFIED; RIGHT EYE, LEFT EYE: Status: RESOLVED | Noted: 2020-10-07 | Resolved: 2020-12-02

## 2020-12-02 PROBLEM — H26.492 POSTERIOR CAPSULE OPACIFIED; RIGHT EYE, LEFT EYE: Status: RESOLVED | Noted: 2020-10-07 | Resolved: 2020-12-02

## 2020-12-02 PROCEDURE — 92015 DETERMINE REFRACTIVE STATE: CPT | Performed by: OPTOMETRIST

## 2020-12-02 ASSESSMENT — VISUAL ACUITY
OS_BCVA: 20/60-2
OD_BCVA: 20/60+2

## 2020-12-02 ASSESSMENT — REFRACTION_MANIFEST
OD_VA1: 20/60-2
OD_ADD: +2.75
OS_ADD: +2.75
OS_AXIS: 140
OD_AXIS: 045
OD_SPHERE: +0.50
OD_VA2: 20/60-2
OD_CYLINDER: -0.75
OS_SPHERE: +0.75
OS_CYLINDER: -0.25
OS_VA1: 20/40-2
OU_VA: 20/40+2
OS_VA2: 20/40-2

## 2020-12-02 ASSESSMENT — SPHEQUIV_DERIVED
OS_SPHEQUIV: 0.625
OD_SPHEQUIV: 0.125
OS_SPHEQUIV: 0.625

## 2020-12-02 ASSESSMENT — REFRACTION_AUTOREFRACTION
OS_SPHERE: +0.75
OS_CYLINDER: -0.25
OS_AXIS: 141

## 2020-12-23 ENCOUNTER — DOCTOR'S OFFICE (OUTPATIENT)
Dept: URBAN - NONMETROPOLITAN AREA CLINIC 1 | Facility: CLINIC | Age: 80
Setting detail: OPHTHALMOLOGY
End: 2020-12-23
Payer: COMMERCIAL

## 2020-12-23 DIAGNOSIS — H35.3211: ICD-10-CM

## 2020-12-23 DIAGNOSIS — H35.3221: ICD-10-CM

## 2020-12-23 PROCEDURE — 99024 POSTOP FOLLOW-UP VISIT: CPT | Performed by: OPHTHALMOLOGY

## 2020-12-23 PROCEDURE — 92134 CPTRZ OPH DX IMG PST SGM RTA: CPT | Performed by: OPHTHALMOLOGY

## 2020-12-23 ASSESSMENT — REFRACTION_MANIFEST
OD_SPHERE: +0.50
OS_VA2: 20/40-2
OD_VA2: 20/60-2
OD_ADD: +2.75
OU_VA: 20/40+2
OS_AXIS: 140
OD_AXIS: 045
OD_VA1: 20/60-2
OS_VA1: 20/40-2
OD_CYLINDER: -0.75
OS_CYLINDER: -0.25
OS_ADD: +2.75
OS_SPHERE: +0.75

## 2020-12-23 ASSESSMENT — CONFRONTATIONAL VISUAL FIELD TEST (CVF)
OD_FINDINGS: FULL
OS_FINDINGS: FULL

## 2020-12-23 ASSESSMENT — REFRACTION_CURRENTRX
OS_ADD: +2.75
OD_SPHERE: +1.00
OD_OVR_VA: 20/
OD_CYLINDER: -2.50
OS_AXIS: 159
OD_VPRISM_DIRECTION: TRF
OS_SPHERE: +1.50
OS_OVR_VA: 20/
OD_AXIS: 024
OD_ADD: +2.75
OS_VPRISM_DIRECTION: TRF
OS_CYLINDER: -1.50

## 2020-12-23 ASSESSMENT — SPHEQUIV_DERIVED
OD_SPHEQUIV: 0.625
OS_SPHEQUIV: 1.125
OD_SPHEQUIV: 0.125
OS_SPHEQUIV: 0.625

## 2020-12-23 ASSESSMENT — MACULA - DRUSEN
OS_DRUSEN: 3+
OD_DRUSEN: 3+

## 2020-12-23 ASSESSMENT — DRY EYES - PHYSICIAN NOTES
OD_GENERALCOMMENTS: KSICCA
OS_GENERALCOMMENTS: KSICCA

## 2020-12-23 ASSESSMENT — REFRACTION_AUTOREFRACTION
OD_CYLINDER: -1.75
OD_AXIS: 033
OS_SPHERE: +1.25
OS_AXIS: 070
OS_CYLINDER: -0.25
OD_SPHERE: +1.50

## 2020-12-23 ASSESSMENT — VISUAL ACUITY
OS_BCVA: 20/70-1
OD_BCVA: 20/60+2

## 2020-12-23 ASSESSMENT — MACULA - RETINAL PIGMENT EPITHELIAL DETACHMENT
OD_RPED: PRESENT
OS_RPED: PRESENT

## 2021-05-08 ENCOUNTER — APPOINTMENT (EMERGENCY)
Dept: CT IMAGING | Facility: HOSPITAL | Age: 81
End: 2021-05-08
Payer: MEDICARE

## 2021-05-08 ENCOUNTER — APPOINTMENT (EMERGENCY)
Dept: RADIOLOGY | Facility: HOSPITAL | Age: 81
End: 2021-05-08
Payer: MEDICARE

## 2021-05-08 ENCOUNTER — HOSPITAL ENCOUNTER (EMERGENCY)
Facility: HOSPITAL | Age: 81
Discharge: HOME | End: 2021-05-08
Attending: EMERGENCY MEDICINE | Admitting: EMERGENCY MEDICINE
Payer: MEDICARE

## 2021-05-08 VITALS
OXYGEN SATURATION: 92 % | HEART RATE: 62 BPM | WEIGHT: 141.09 LBS | TEMPERATURE: 97.6 F | SYSTOLIC BLOOD PRESSURE: 127 MMHG | RESPIRATION RATE: 20 BRPM | DIASTOLIC BLOOD PRESSURE: 68 MMHG

## 2021-05-08 DIAGNOSIS — S60.221A CONTUSION OF RIGHT HAND, INITIAL ENCOUNTER: Primary | ICD-10-CM

## 2021-05-08 PROCEDURE — 99285 EMERGENCY DEPT VISIT HI MDM: CPT | Performed by: EMERGENCY MEDICINE

## 2021-05-08 PROCEDURE — 73130 X-RAY EXAM OF HAND: CPT

## 2021-05-08 PROCEDURE — 70450 CT HEAD/BRAIN W/O DYE: CPT

## 2021-05-08 PROCEDURE — 72125 CT NECK SPINE W/O DYE: CPT

## 2021-05-08 PROCEDURE — 99284 EMERGENCY DEPT VISIT MOD MDM: CPT

## 2021-05-08 RX ORDER — BUSPIRONE HYDROCHLORIDE 5 MG/1
5 TABLET ORAL 3 TIMES DAILY
COMMUNITY
End: 2022-07-19

## 2021-05-08 RX ORDER — ASPIRIN 325 MG
325 TABLET ORAL DAILY
COMMUNITY

## 2021-05-08 RX ORDER — FOLIC ACID 1 MG/1
TABLET ORAL DAILY
COMMUNITY

## 2021-05-08 RX ORDER — ATORVASTATIN CALCIUM 20 MG/1
20 TABLET, FILM COATED ORAL DAILY
COMMUNITY

## 2021-05-08 RX ORDER — ACETAMINOPHEN AND CODEINE PHOSPHATE 300; 30 MG/1; MG/1
1 TABLET ORAL EVERY 4 HOURS PRN
COMMUNITY
End: 2022-07-19

## 2021-05-08 RX ORDER — LANOLIN ALCOHOL/MO/W.PET/CERES
1 CREAM (GRAM) TOPICAL 2 TIMES DAILY
COMMUNITY

## 2021-05-08 RX ORDER — LISINOPRIL 10 MG/1
10 TABLET ORAL DAILY
COMMUNITY
End: 2022-08-01

## 2021-05-08 RX ORDER — MECLIZINE HCL 12.5 MG/1
TABLET ORAL 3 TIMES DAILY PRN
COMMUNITY
End: 2022-08-01

## 2021-05-08 RX ORDER — DOCUSATE SODIUM 100 MG/1
100 CAPSULE, LIQUID FILLED ORAL 2 TIMES DAILY
COMMUNITY

## 2021-05-08 RX ORDER — DULOXETIN HYDROCHLORIDE 30 MG/1
30 CAPSULE, DELAYED RELEASE ORAL DAILY
COMMUNITY

## 2021-05-09 NOTE — ED PROVIDER NOTES
Emergency Department Trauma Note  Zoltan Pham 68 y o  female MRN: [id number]  Unit/Bed#: ED 05/ED 05 Encounter: [id number]      Trauma Alert: Trauma Acuity: Trauma Evaluation  Model of Arrival:   via    Trauma Team: Current Providers  Attending Provider: Lisy Acosta MD  Registered Nurse: Loida Gleason RN  Consultants: None      History of Present Illness     Chief Complaint:   Chief Complaint   Patient presents with    Fall     slipped on water in restroom this morning at 2130, struck head, no LOC  denies pain  family made her come get checked out  HPI:  Zoltan Pham is a 68 y o  female who presents with fall  Mechanism:Details of Incident: slip on water after showering this morning Injury Date: 05/08/21 Injury Time: 0930      Patient slipped and fell on a wet floor  She struck the back of her head  She did not have any syncope or nausea or vomiting  She complains of minimal right hand pain  No other complaints or injuries        History provided by:  Patient   used: No    Fall  Mechanism of injury: fall    Injury location:  Head/neck and hand  Head/neck injury location:  Head  Hand injury location:  R hand  Incident location:  Home  Time since incident:  2 hours  Fall:     Fall occurred:  Standing    Point of impact:  Head    Entrapped after fall: no    Suspicion of alcohol use: no    Suspicion of drug use: no    Prior to arrival data:     Patient ambulatory at scene: yes      Blood loss:  None    Responsiveness at scene:  Alert    Orientation at scene:  Person, place, situation and time    Loss of consciousness: no      Amnesic to event: no      Airway condition since incident:  Stable    Breathing condition since incident:  Stable    Circulation condition since incident:  Stable    Mental status condition since incident:  Stable    Disability condition since incident:  Stable  Associated symptoms: no abdominal pain, no back pain, no chest pain, no difficulty breathing, no headaches, no hearing loss, no loss of consciousness, no nausea, no neck pain, no seizures and no vomiting      Review of Systems   Constitutional: Negative for chills and fever  HENT: Negative for ear pain, hearing loss, sore throat, trouble swallowing and voice change  Eyes: Negative for pain and discharge  Respiratory: Negative for cough, shortness of breath and wheezing  Cardiovascular: Negative for chest pain and palpitations  Gastrointestinal: Negative for abdominal pain, blood in stool, constipation, diarrhea, nausea and vomiting  Genitourinary: Negative for dysuria, flank pain, frequency and hematuria  Musculoskeletal: Negative for back pain, joint swelling, neck pain and neck stiffness  Skin: Negative for rash and wound  Neurological: Negative for dizziness, seizures, loss of consciousness, syncope, facial asymmetry and headaches  Psychiatric/Behavioral: Negative for hallucinations, self-injury and suicidal ideas  All other systems reviewed and are negative  Historical Information     Immunizations: There is no immunization history on file for this patient  History reviewed  No pertinent past medical history  History reviewed  No pertinent family history  History reviewed  No pertinent surgical history  Social History     Tobacco Use    Smoking status: Former Smoker    Smokeless tobacco: Never Used   Substance Use Topics    Alcohol use: Not Currently    Drug use: Not Currently     E-Cigarette/Vaping    E-Cigarette Use Never User      E-Cigarette/Vaping Substances    Nicotine No     THC No     CBD No     Flavoring No     Other No     Unknown No        Family History: History reviewed  No pertinent family history  Meds/Allergies   Prior to Admission Medications   Prescriptions Last Dose Informant Patient Reported? Taking?    DULoxetine (CYMBALTA) 30 mg delayed release capsule   Yes Yes   Sig: Take 30 mg by mouth daily acetaminophen-codeine (TYLENOL/CODEINE #3) 300-30 MG per tablet   Yes Yes   Sig: Take 1 tablet by mouth every 4 (four) hours as needed for moderate pain   aspirin 325 mg tablet   Yes Yes   Sig: Take 325 mg by mouth daily   atorvastatin (LIPITOR) 20 mg tablet   Yes Yes   Sig: Take 20 mg by mouth daily   busPIRone (BUSPAR) 5 mg tablet   Yes Yes   Sig: Take 5 mg by mouth 3 (three) times a day   calcium citrate-vitamin D (CITRACAL+D) 315-200 MG-UNIT per tablet   Yes Yes   Sig: Take 1 tablet by mouth 2 (two) times a day   docusate sodium (COLACE) 100 mg capsule   Yes Yes   Sig: Take 100 mg by mouth 2 (two) times a day   folic acid (FOLVITE) 1 mg tablet   Yes Yes   Sig: Take by mouth daily   lisinopril (ZESTRIL) 10 mg tablet   Yes Yes   Sig: Take 10 mg by mouth daily   meclizine (ANTIVERT) 12 5 MG tablet   Yes Yes   Sig: Take by mouth 3 (three) times a day as needed for dizziness      Facility-Administered Medications: None       No Known Allergies    PHYSICAL EXAM    PE limited by: nothing    Objective   Vitals:   First set: Temperature: 97 6 °F (36 4 °C) (05/08/21 2151)  Pulse: 69 (05/08/21 2150)  Respirations: 18 (05/08/21 2150)  Blood Pressure: 134/69 (05/08/21 2150)  SpO2: 95 % (05/08/21 2150)    Primary Survey:   (A) Airway:  Intact  (B) Breathing:  Intact  (C) Circulation: Pulses:   normal  (D) Disabliity:  GCS Total:  15  (E) Expose:  Completed    Secondary Survey: (Click on Physical Exam tab above)  Physical Exam  Vitals signs and nursing note reviewed  Constitutional:       General: She is not in acute distress  Appearance: She is well-developed  HENT:      Head: Normocephalic and atraumatic  Right Ear: External ear normal       Left Ear: External ear normal    Eyes:      Conjunctiva/sclera: Conjunctivae normal       Pupils: Pupils are equal, round, and reactive to light  Neck:      Musculoskeletal: Normal range of motion        Comments: Cervical collar in place  Cardiovascular:      Rate and Rhythm: Normal rate and regular rhythm  Heart sounds: Normal heart sounds  No murmur  Pulmonary:      Effort: Pulmonary effort is normal       Breath sounds: Normal breath sounds  No wheezing or rales  Abdominal:      General: Bowel sounds are normal  There is no distension  Palpations: Abdomen is soft  Tenderness: There is no abdominal tenderness  There is no guarding or rebound  Musculoskeletal: Normal range of motion  General: No tenderness or deformity  Comments: No shoulder, elbow, hand, wrist, hip, knee, ankle deformity or tenderness  Full range of motion in all these areas  Chest wall is nontender without crepitus  Pelvis is stable  Back is nontender  T/L spines are nontender  Skin:     General: Skin is warm and dry  Findings: No rash  Neurological:      General: No focal deficit present  Mental Status: She is alert and oriented to person, place, and time  Cranial Nerves: No cranial nerve deficit  Psychiatric:         Behavior: Behavior normal          Cervical spine cleared by clinical criteria? No (imaging required)      Invasive Devices     None                 Lab Results:   Results Reviewed     None                 Imaging Studies:   Direct to CT: Yes  XR hand 3+ views RIGHT   ED Interpretation by Shon Paulson MD (05/08 7896)   Arthritis, no fractures        TRAUMA - CT spine cervical wo contrast   Final Result by Marlys Zavala MD (05/08 7748)      No cervical spine fracture or traumatic malalignment  Workstation performed: [id number]         TRAUMA - CT head wo contrast   Final Result by Marlys Zavala MD (05/08 5637)      1  No acute intracranial abnormality  Microangiopathic changes  2   Near complete opacification and frothy secretions in the left maxillary suspicious for acute sinusitis  Clinical correlation is recommended  The study was marked in [facility] for immediate notification  Workstation performed: [id number]               Procedures  Procedures         ED Course  ED Course as of May 09 0413   Sat May 08, 2021   7159 CXR was not obtained as patient did not have any chest trauma and had normal chest examination without tenderness or crepitus  Pelvic plain film was not obtained as patient did not have any pelvic injury or trauma  No pelvic pain  Normal examination of the hips and no tenderness in the pelvic area  Patient deemed stable to proceed to any necessary CT imaging  Cervical spine was cleared following cervical spine imaging which was negative  MDM  Number of Diagnoses or Management Options  Contusion of right hand, initial encounter:      Amount and/or Complexity of Data Reviewed  Tests in the radiology section of CPT®: ordered and reviewed            Disposition  [facility] Transfer: No  Final diagnoses:   Contusion of right hand, initial encounter     Time reflects when diagnosis was documented in both MDM as applicable and the Disposition within this note     Time User Action Codes Description Comment    5/8/2021 10:58 PM King Aidan Add [O95 799L] Contusion of right hand, initial encounter       ED Disposition     ED Disposition Condition Date/Time Comment    Discharge Stable Sat May 8, 2021 10:58 PM [address] [place] [address] discharge to home/self care              Follow-up Information     Follow up With Specialties Details Why Contact Info    Courtney Cheatham, DO General Practice  As needed [place] [address]  [phone]          Discharge Medication List as of 5/8/2021 10:59 PM      CONTINUE these medications which have NOT CHANGED    Details   acetaminophen-codeine (TYLENOL/CODEINE #3) 300-30 MG per tablet Take 1 tablet by mouth every 4 (four) hours as needed for moderate pain, Historical Med      aspirin 325 mg tablet Take 325 mg by mouth daily, Historical Med      atorvastatin (LIPITOR) 20 mg tablet Take 20 mg by mouth daily, Historical Med      busPIRone (BUSPAR) 5 mg tablet Take 5 mg by mouth 3 (three) times a day, Historical Med      calcium citrate-vitamin D (CITRACAL+D) 315-200 MG-UNIT per tablet Take 1 tablet by mouth 2 (two) times a day, Historical Med      docusate sodium (COLACE) 100 mg capsule Take 100 mg by mouth 2 (two) times a day, Historical Med      DULoxetine (CYMBALTA) 30 mg delayed release capsule Take 30 mg by mouth daily, Historical Med      folic acid (FOLVITE) 1 mg tablet Take by mouth daily, Historical Med      lisinopril (ZESTRIL) 10 mg tablet Take 10 mg by mouth daily, Historical Med      meclizine (ANTIVERT) 12 5 MG tablet Take by mouth 3 (three) times a day as needed for dizziness, Historical Med           No discharge procedures on file      PDMP Review     None          ED Provider  Electronically Signed by         Danny Aleman MD  05/09/21 5847

## 2021-07-14 ENCOUNTER — DOCTOR'S OFFICE (OUTPATIENT)
Dept: URBAN - NONMETROPOLITAN AREA CLINIC 1 | Facility: CLINIC | Age: 81
Setting detail: OPHTHALMOLOGY
End: 2021-07-14
Payer: COMMERCIAL

## 2021-07-14 DIAGNOSIS — H35.3211: ICD-10-CM

## 2021-07-14 DIAGNOSIS — M06.9: ICD-10-CM

## 2021-07-14 DIAGNOSIS — Z96.1: ICD-10-CM

## 2021-07-14 DIAGNOSIS — H35.3221: ICD-10-CM

## 2021-07-14 DIAGNOSIS — Z79.891: ICD-10-CM

## 2021-07-14 PROCEDURE — 92014 COMPRE OPH EXAM EST PT 1/>: CPT | Performed by: OPHTHALMOLOGY

## 2021-07-14 PROCEDURE — 92134 CPTRZ OPH DX IMG PST SGM RTA: CPT | Performed by: OPHTHALMOLOGY

## 2021-07-14 ASSESSMENT — REFRACTION_MANIFEST
OS_AXIS: 140
OD_CYLINDER: -0.75
OD_SPHERE: +0.50
OS_VA1: 20/40-2
OU_VA: 20/40+2
OD_AXIS: 045
OS_CYLINDER: -0.25
OD_VA1: 20/60-2
OS_ADD: +2.75
OD_VA2: 20/60-2
OD_ADD: +2.75
OS_VA2: 20/40-2
OS_SPHERE: +0.75

## 2021-07-14 ASSESSMENT — SPHEQUIV_DERIVED
OD_SPHEQUIV: 0.125
OS_SPHEQUIV: 0.625
OD_SPHEQUIV: -0.625
OS_SPHEQUIV: -0.125

## 2021-07-14 ASSESSMENT — MACULA - DRUSEN
OS_DRUSEN: 3+
OD_DRUSEN: 3+

## 2021-07-14 ASSESSMENT — REFRACTION_CURRENTRX
OD_VPRISM_DIRECTION: TRF
OS_CYLINDER: -1.50
OD_CYLINDER: -2.50
OD_AXIS: 024
OD_ADD: +2.75
OS_AXIS: 159
OS_OVR_VA: 20/
OD_OVR_VA: 20/
OD_SPHERE: +1.00
OS_VPRISM_DIRECTION: TRF
OS_SPHERE: +1.50
OS_ADD: +2.75

## 2021-07-14 ASSESSMENT — CONFRONTATIONAL VISUAL FIELD TEST (CVF)
OD_FINDINGS: FULL
OS_FINDINGS: FULL

## 2021-07-14 ASSESSMENT — REFRACTION_AUTOREFRACTION
OS_AXIS: 167
OD_CYLINDER: -1.25
OD_AXIS: 042
OS_CYLINDER: -0.75
OS_SPHERE: +0.25
OD_SPHERE: 0.00

## 2021-07-14 ASSESSMENT — DRY EYES - PHYSICIAN NOTES
OS_GENERALCOMMENTS: KSICCA
OD_GENERALCOMMENTS: KSICCA

## 2021-07-14 ASSESSMENT — MACULA - RETINAL PIGMENT EPITHELIAL DETACHMENT
OD_RPED: PRESENT
OS_RPED: PRESENT

## 2021-07-14 ASSESSMENT — VISUAL ACUITY
OD_BCVA: 20/60-2
OS_BCVA: 20/200

## 2021-08-04 ENCOUNTER — DOCTOR'S OFFICE (OUTPATIENT)
Dept: URBAN - NONMETROPOLITAN AREA CLINIC 1 | Facility: CLINIC | Age: 81
Setting detail: OPHTHALMOLOGY
End: 2021-08-04
Payer: COMMERCIAL

## 2021-08-04 DIAGNOSIS — H35.3211: ICD-10-CM

## 2021-08-04 PROCEDURE — 67028 INJECTION EYE DRUG: CPT | Performed by: OPHTHALMOLOGY

## 2021-08-04 ASSESSMENT — REFRACTION_AUTOREFRACTION
OS_AXIS: 167
OD_AXIS: 042
OD_SPHERE: 0.00
OS_CYLINDER: -0.75
OD_CYLINDER: -1.25
OS_SPHERE: +0.25

## 2021-08-04 ASSESSMENT — SPHEQUIV_DERIVED
OD_SPHEQUIV: -0.625
OS_SPHEQUIV: -0.125

## 2021-08-04 ASSESSMENT — VISUAL ACUITY
OS_BCVA: 20/150-1
OD_BCVA: 20/60-2

## 2021-08-11 ENCOUNTER — DOCTOR'S OFFICE (OUTPATIENT)
Dept: URBAN - NONMETROPOLITAN AREA CLINIC 1 | Facility: CLINIC | Age: 81
Setting detail: OPHTHALMOLOGY
End: 2021-08-11
Payer: COMMERCIAL

## 2021-08-11 DIAGNOSIS — H35.3221: ICD-10-CM

## 2021-08-11 PROCEDURE — 67028 INJECTION EYE DRUG: CPT | Performed by: OPHTHALMOLOGY

## 2021-08-11 ASSESSMENT — REFRACTION_AUTOREFRACTION
OD_AXIS: 042
OS_CYLINDER: -0.75
OD_CYLINDER: -1.25
OS_AXIS: 167
OD_SPHERE: 0.00
OS_SPHERE: +0.25

## 2021-08-11 ASSESSMENT — VISUAL ACUITY
OD_BCVA: 20/60-2
OS_BCVA: 20/150-1

## 2021-08-11 ASSESSMENT — SPHEQUIV_DERIVED
OD_SPHEQUIV: -0.625
OS_SPHEQUIV: -0.125

## 2021-08-18 ENCOUNTER — DOCTOR'S OFFICE (OUTPATIENT)
Dept: URBAN - NONMETROPOLITAN AREA CLINIC 1 | Facility: CLINIC | Age: 81
Setting detail: OPHTHALMOLOGY
End: 2021-08-18
Payer: COMMERCIAL

## 2021-08-18 DIAGNOSIS — Z79.899: ICD-10-CM

## 2021-08-18 DIAGNOSIS — Z96.1: ICD-10-CM

## 2021-08-18 DIAGNOSIS — H35.3221: ICD-10-CM

## 2021-08-18 DIAGNOSIS — H35.3211: ICD-10-CM

## 2021-08-18 DIAGNOSIS — H02.012: ICD-10-CM

## 2021-08-18 PROCEDURE — 92014 COMPRE OPH EXAM EST PT 1/>: CPT | Performed by: OPHTHALMOLOGY

## 2021-08-18 PROCEDURE — 92134 CPTRZ OPH DX IMG PST SGM RTA: CPT | Performed by: OPHTHALMOLOGY

## 2021-08-18 ASSESSMENT — DRY EYES - PHYSICIAN NOTES
OS_GENERALCOMMENTS: KSICCA
OD_GENERALCOMMENTS: KSICCA

## 2021-08-18 ASSESSMENT — MACULA - RETINAL PIGMENT EPITHELIAL DETACHMENT
OS_RPED: PRESENT
OD_RPED: PRESENT

## 2021-08-18 ASSESSMENT — REFRACTION_AUTOREFRACTION
OD_SPHERE: +0.75
OS_SPHERE: +1.00
OS_AXIS: 013
OD_CYLINDER: -2.50
OS_CYLINDER: -1.50
OD_AXIS: 030

## 2021-08-18 ASSESSMENT — VISUAL ACUITY
OS_BCVA: 20/100
OD_BCVA: 20/70

## 2021-08-18 ASSESSMENT — CONFRONTATIONAL VISUAL FIELD TEST (CVF)
OS_FINDINGS: FULL
OD_FINDINGS: FULL

## 2021-08-18 ASSESSMENT — SPHEQUIV_DERIVED
OS_SPHEQUIV: 0.25
OD_SPHEQUIV: -0.5

## 2021-08-18 ASSESSMENT — MACULA - DRUSEN
OS_DRUSEN: 3+
OD_DRUSEN: 3+

## 2021-08-20 ENCOUNTER — OPTICAL OFFICE (OUTPATIENT)
Dept: URBAN - NONMETROPOLITAN AREA CLINIC 4 | Facility: CLINIC | Age: 81
Setting detail: OPHTHALMOLOGY
End: 2021-08-20

## 2021-08-20 ENCOUNTER — DOCTOR'S OFFICE (OUTPATIENT)
Dept: URBAN - NONMETROPOLITAN AREA CLINIC 1 | Facility: CLINIC | Age: 81
Setting detail: OPHTHALMOLOGY
End: 2021-08-20

## 2021-08-20 DIAGNOSIS — H52.4: ICD-10-CM

## 2021-08-20 DIAGNOSIS — H52.223: ICD-10-CM

## 2021-08-20 PROCEDURE — 92015 DETERMINE REFRACTIVE STATE: CPT | Performed by: OPTOMETRIST

## 2021-08-20 PROCEDURE — V2744 TINT PHOTOCHROMATIC LENS/ES: HCPCS | Performed by: OPTOMETRIST

## 2021-08-20 PROCEDURE — V2020 VISION SVCS FRAMES PURCHASES: HCPCS | Performed by: OPTOMETRIST

## 2021-08-20 PROCEDURE — V2203 LENS SPHCYL BIFOCAL 4.00D/.1: HCPCS | Performed by: OPTOMETRIST

## 2021-08-20 ASSESSMENT — REFRACTION_MANIFEST
OS_AXIS: 165
OD_VA1: 20/80+2
OD_VA2: 20/80+2
OS_SPHERE: +1.00
OS_CYLINDER: -1.00
OD_AXIS: 025
OS_VA2: 20/50-2
OD_ADD: +2.75
OD_CYLINDER: -1.00
OS_ADD: +2.75
OS_VA1: 20/50-2
OD_SPHERE: +0.75

## 2021-08-20 ASSESSMENT — REFRACTION_AUTOREFRACTION
OS_SPHERE: +0.75
OD_CYLINDER: -0.75
OS_CYLINDER: -1.25
OD_AXIS: 23
OS_AXIS: 163
OD_SPHERE: +1.00

## 2021-08-20 ASSESSMENT — SPHEQUIV_DERIVED
OD_SPHEQUIV: 0.625
OD_SPHEQUIV: 0.25
OS_SPHEQUIV: 0.125
OS_SPHEQUIV: 0.5

## 2021-08-20 ASSESSMENT — VISUAL ACUITY
OS_BCVA: 20/80
OD_BCVA: 20/80

## 2021-09-07 ENCOUNTER — RX ONLY (RX ONLY)
Age: 81
End: 2021-09-07

## 2021-09-07 ENCOUNTER — DOCTOR'S OFFICE (OUTPATIENT)
Dept: URBAN - NONMETROPOLITAN AREA CLINIC 1 | Facility: CLINIC | Age: 81
Setting detail: OPHTHALMOLOGY
End: 2021-09-07
Payer: COMMERCIAL

## 2021-09-07 DIAGNOSIS — H35.3211: ICD-10-CM

## 2021-09-07 PROCEDURE — 67028 INJECTION EYE DRUG: CPT | Performed by: OPHTHALMOLOGY

## 2021-09-07 ASSESSMENT — REFRACTION_MANIFEST
OD_AXIS: 025
OS_VA1: 20/50-2
OS_AXIS: 165
OD_VA1: 20/80+2
OD_CYLINDER: -1.00
OS_SPHERE: +1.00
OS_CYLINDER: -1.00
OS_ADD: +2.75
OD_ADD: +2.75
OD_VA2: 20/80+2
OS_VA2: 20/50-2
OD_SPHERE: +0.75

## 2021-09-07 ASSESSMENT — REFRACTION_AUTOREFRACTION
OD_AXIS: 23
OD_SPHERE: +1.00
OS_SPHERE: +0.75
OS_CYLINDER: -1.25
OS_AXIS: 163
OD_CYLINDER: -0.75

## 2021-09-07 ASSESSMENT — SPHEQUIV_DERIVED
OS_SPHEQUIV: 0.5
OD_SPHEQUIV: 0.25
OS_SPHEQUIV: 0.125
OD_SPHEQUIV: 0.625

## 2021-09-07 ASSESSMENT — VISUAL ACUITY
OS_BCVA: 20/150+1
OD_BCVA: 20/70+2

## 2021-09-07 ASSESSMENT — CONFRONTATIONAL VISUAL FIELD TEST (CVF)
OD_FINDINGS: FULL
OS_FINDINGS: FULL

## 2021-09-10 ENCOUNTER — RX ONLY (RX ONLY)
Age: 81
End: 2021-09-10

## 2021-09-10 ENCOUNTER — DOCTOR'S OFFICE (OUTPATIENT)
Dept: URBAN - NONMETROPOLITAN AREA CLINIC 1 | Facility: CLINIC | Age: 81
Setting detail: OPHTHALMOLOGY
End: 2021-09-10
Payer: COMMERCIAL

## 2021-09-10 DIAGNOSIS — H35.3221: ICD-10-CM

## 2021-09-10 PROCEDURE — 67028 INJECTION EYE DRUG: CPT | Performed by: OPHTHALMOLOGY

## 2021-09-10 ASSESSMENT — REFRACTION_CURRENTRX
OD_VPRISM_DIRECTION: BF
OD_ADD: +2.75
OD_AXIS: 24
OS_AXIS: 157
OS_VPRISM_DIRECTION: BF
OD_CYLINDER: -1.00
OS_SPHERE: +1.00
OS_CYLINDER: -1.00
OS_OVR_VA: 20/
OS_ADD: +2.75
OD_OVR_VA: 20/
OD_SPHERE: +0.75

## 2021-09-10 ASSESSMENT — REFRACTION_AUTOREFRACTION
OS_CYLINDER: -1.25
OS_AXIS: 163
OS_SPHERE: +0.75
OD_AXIS: 23
OD_SPHERE: +1.00
OD_CYLINDER: -0.75

## 2021-09-10 ASSESSMENT — REFRACTION_MANIFEST
OD_VA2: 20/80+2
OD_AXIS: 025
OD_CYLINDER: -1.00
OD_VA1: 20/80+2
OS_ADD: +2.75
OS_VA2: 20/50-2
OS_VA1: 20/50-2
OS_SPHERE: +1.00
OS_AXIS: 165
OS_CYLINDER: -1.00
OD_ADD: +2.75
OD_SPHERE: +0.75

## 2021-09-10 ASSESSMENT — SPHEQUIV_DERIVED
OS_SPHEQUIV: 0.5
OS_SPHEQUIV: 0.125
OD_SPHEQUIV: 0.25
OD_SPHEQUIV: 0.625

## 2021-09-10 ASSESSMENT — VISUAL ACUITY
OS_BCVA: 20/100-1
OD_BCVA: 20/70

## 2022-04-08 ENCOUNTER — DOCTOR'S OFFICE (OUTPATIENT)
Dept: URBAN - NONMETROPOLITAN AREA CLINIC 1 | Facility: CLINIC | Age: 82
Setting detail: OPHTHALMOLOGY
End: 2022-04-08
Payer: COMMERCIAL

## 2022-04-08 DIAGNOSIS — Z96.1: ICD-10-CM

## 2022-04-08 DIAGNOSIS — H35.3211: ICD-10-CM

## 2022-04-08 DIAGNOSIS — H35.3221: ICD-10-CM

## 2022-04-08 PROBLEM — H02.012 TRICHIASIS; RIGHT LOWER LID: Status: RESOLVED | Noted: 2020-08-19 | Resolved: 2022-04-08

## 2022-04-08 PROCEDURE — 92134 CPTRZ OPH DX IMG PST SGM RTA: CPT | Performed by: OPHTHALMOLOGY

## 2022-04-08 PROCEDURE — 99214 OFFICE O/P EST MOD 30 MIN: CPT | Performed by: OPHTHALMOLOGY

## 2022-04-08 ASSESSMENT — SPHEQUIV_DERIVED
OS_SPHEQUIV: 0.125
OD_SPHEQUIV: 0.625
OS_SPHEQUIV: 0.5
OD_SPHEQUIV: 0.25

## 2022-04-08 ASSESSMENT — REFRACTION_CURRENTRX
OS_OVR_VA: 20/
OD_OVR_VA: 20/
OS_AXIS: 157
OS_SPHERE: +1.00
OS_VPRISM_DIRECTION: BF
OD_SPHERE: +0.75
OS_ADD: +2.75
OD_CYLINDER: -1.00
OD_AXIS: 24
OD_ADD: +2.75
OS_CYLINDER: -1.00
OD_VPRISM_DIRECTION: BF

## 2022-04-08 ASSESSMENT — REFRACTION_MANIFEST
OS_VA2: 20/50-2
OS_AXIS: 165
OD_SPHERE: +0.75
OS_CYLINDER: -1.00
OS_ADD: +2.75
OS_VA1: 20/50-2
OD_CYLINDER: -1.00
OD_VA1: 20/80+2
OD_AXIS: 025
OD_ADD: +2.75
OD_VA2: 20/80+2
OS_SPHERE: +1.00

## 2022-04-08 ASSESSMENT — REFRACTION_AUTOREFRACTION
OS_AXIS: 163
OS_SPHERE: +0.75
OS_CYLINDER: -1.25
OD_SPHERE: +1.00
OD_AXIS: 23
OD_CYLINDER: -0.75

## 2022-04-08 ASSESSMENT — VISUAL ACUITY
OS_BCVA: 20/200-1
OD_BCVA: 20/150-1

## 2022-04-08 ASSESSMENT — CONFRONTATIONAL VISUAL FIELD TEST (CVF)
OS_FINDINGS: FULL
OD_FINDINGS: FULL

## 2022-04-08 ASSESSMENT — DRY EYES - PHYSICIAN NOTES
OS_GENERALCOMMENTS: KSICCA
OD_GENERALCOMMENTS: KSICCA

## 2022-04-14 ENCOUNTER — APPOINTMENT (EMERGENCY)
Dept: RADIOLOGY | Facility: HOSPITAL | Age: 82
End: 2022-04-14
Payer: MEDICARE

## 2022-04-14 ENCOUNTER — HOSPITAL ENCOUNTER (EMERGENCY)
Facility: HOSPITAL | Age: 82
Discharge: HOME | End: 2022-04-14
Attending: EMERGENCY MEDICINE | Admitting: EMERGENCY MEDICINE
Payer: MEDICARE

## 2022-04-14 VITALS
HEART RATE: 68 BPM | TEMPERATURE: 98.1 F | DIASTOLIC BLOOD PRESSURE: 68 MMHG | OXYGEN SATURATION: 95 % | WEIGHT: 135.8 LBS | RESPIRATION RATE: 18 BRPM | SYSTOLIC BLOOD PRESSURE: 145 MMHG

## 2022-04-14 DIAGNOSIS — S62.609A FINGER FRACTURE: Primary | ICD-10-CM

## 2022-04-14 PROCEDURE — 99283 EMERGENCY DEPT VISIT LOW MDM: CPT

## 2022-04-14 PROCEDURE — 99284 EMERGENCY DEPT VISIT MOD MDM: CPT | Performed by: PHYSICIAN ASSISTANT

## 2022-04-14 PROCEDURE — 73140 X-RAY EXAM OF FINGER(S): CPT

## 2022-04-14 RX ORDER — ACETAMINOPHEN 325 MG/1
650 TABLET ORAL ONCE
Status: COMPLETED | OUTPATIENT
Start: 2022-04-14 | End: 2022-04-14

## 2022-04-14 RX ADMIN — ACETAMINOPHEN 325MG 650 MG: 325 TABLET ORAL at 17:30

## 2022-04-14 NOTE — ED PROVIDER NOTES
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 Taking?    DULoxetine (CYMBALTA) 30 mg delayed release capsule   Yes No   Sig: Take 30 mg by mouth daily   acetaminophen-codeine (TYLENOL/CODEINE #3) 300-30 MG per tablet   Yes No   Sig: Take 1 tablet by mouth every 4 (four) hours as needed for moderate pain   aspirin 325 mg tablet   Yes No   Sig: Take 325 mg by mouth daily   atorvastatin (LIPITOR) 20 mg tablet   Yes No   Sig: Take 20 mg by mouth daily   busPIRone (BUSPAR) 5 mg tablet   Yes No   Sig: Take 5 mg by mouth 3 (three) times a day   calcium citrate-vitamin D (CITRACAL+D) 315-200 MG-UNIT per tablet   Yes No   Sig: Take 1 tablet by mouth 2 (two) times a day   docusate sodium (COLACE) 100 mg capsule   Yes No   Sig: Take 100 mg by mouth 2 (two) times a day   folic acid (FOLVITE) 1 mg tablet   Yes No   Sig: Take by mouth daily   lisinopril (ZESTRIL) 10 mg tablet   Yes No   Sig: Take 10 mg by mouth daily   meclizine (ANTIVERT) 12 5 MG tablet   Yes No   Sig: Take by mouth 3 (three) times a day as needed for dizziness      Facility-Administered Medications: None       Past Medical History:   Diagnosis Date    Arthritis     Asthma     Diabetes mellitus ([address] )     Hypertension     MI, old        Past Surgical History:   Procedure Laterality Date    BACK SURGERY      CARDIAC SURGERY      HYSTERECTOMY         History reviewed  No pertinent family history  I have reviewed and agree with the history as documented  E-Cigarette/Vaping    E-Cigarette Use Never User      E-Cigarette/Vaping Substances    Nicotine No     THC No     CBD No     Flavoring No     Other No     Unknown No      Social History     Tobacco Use    Smoking status: Former Smoker    Smokeless tobacco: Never Used   Vaping Use    Vaping Use: Never used   Substance Use Topics    Alcohol use: Not Currently    Drug use: Not Currently       Review of Systems   Constitutional: Negative  Respiratory: Negative  Cardiovascular: Negative  Genitourinary: Negative  Musculoskeletal: Positive for arthralgias  Skin: Positive for color change and wound  Neurological: Negative  All other systems reviewed and are negative  Physical Exam  Physical Exam  Vitals and nursing note reviewed  Constitutional:       General: She is not in acute distress  Appearance: Normal appearance  She is normal weight  She is not ill-appearing, toxic-appearing or diaphoretic  HENT:      Head: Normocephalic  Eyes:      Conjunctiva/sclera: Conjunctivae normal       Pupils: Pupils are equal, round, and reactive to light  Pulmonary:      Effort: Pulmonary effort is normal    Musculoskeletal:         General: Tenderness (distal right thumb) present  No swelling  Normal range of motion  Comments: Right thumb does appear deformed similar to a several other fingers, patient reports this is her baseline due to arthritis   Skin:     General: Skin is warm and dry  Capillary Refill: Capillary refill takes less than 2 seconds  Findings: Bruising (right distal thumb) present  No erythema or rash        Comments: Small abrasion at base of right thumb nail   Neurological:      General: No focal deficit present  Mental Status: She is alert and oriented to person, place, and time  Sensory: No sensory deficit  Psychiatric:         Mood and Affect: Mood normal          Behavior: Behavior normal          Vital Signs  ED Triage Vitals   Temperature Pulse Respirations Blood Pressure SpO2   04/14/22 1706 04/14/22 1706 04/14/22 1706 04/14/22 1720 04/14/22 1706   98 1 °F (36 7 °C) 68 18 145/68 95 %      Temp Source Heart Rate Source Patient Position - Orthostatic VS BP Location FiO2 (%)   04/14/22 1706 04/14/22 1706 -- -- --   Temporal Monitor         Pain Score       04/14/22 1706       3           Vitals:    04/14/22 1706 04/14/22 1720   BP:  145/68   Pulse: 68          Visual Acuity      ED Medications  Medications   acetaminophen (TYLENOL) tablet 650 mg (650 mg Oral Given 4/14/22 1730)       Diagnostic Studies  Results Reviewed     None                 XR thumb first digit-min 2 views RIGHT    (Results Pending)              Procedures  Splint application    Date/Time: 4/14/2022 5:24 PM  Performed by: Eric Langston PA-C  Authorized by: Eric Langston PA-C   Universal Protocol:  Consent given by: patient  Time out: Immediately prior to procedure a "time out" was called to verify the correct patient, procedure, equipment, support staff and site/side marked as required  Timeout called at: 4/14/2022 5:24 PM   Patient understanding: patient states understanding of the procedure being performed  Patient consent: the patient's understanding of the procedure matches consent given  Radiology Images displayed and confirmed  If images not available, report reviewed: imaging studies available  Patient identity confirmed: verbally with patient and arm band      Pre-procedure details:     Sensation:  Normal  Procedure details:     Splint type:  Finger splint, static  Post-procedure details:     Pain:  Improved    Sensation:  Normal    Patient tolerance of procedure:   Tolerated well, no immediate complications ED Course  ED Course as of 04/14/22 1812   u Apr 14, 2022   1735 ED interpretation of x-ray concerning for fracture of the proximal aspect of the distal right thumb  Splint applied               MDM  Number of Diagnoses or Management Options  Finger fracture: new and requires workup  Diagnosis management comments: 81 yo female presents to the ED for evaluation of right thumb injury, pinched in door  Vitals and medical record reviewed  Patient with pain and bruising at the distal end of right thumb  There is a small superficial abrasion at the base of the thumbnail  Area was cleaned and bandage applied  X-ray was concerning for a fracture of the proximal distal aspect of the thumb  Thumb splint was a placed  Orthopedic referral was placed  Patient was educated on symptomatic treatment at home and strict return precautions which she verbalized understanding  She agreed to this treatment plan and was discharged home  She remained clinically and hemodynamically stable for discharge  Amount and/or Complexity of Data Reviewed  Tests in the radiology section of CPT®: ordered and reviewed  Review and summarize past medical records: yes  Independent visualization of images, tracings, or specimens: yes        Disposition  Final diagnoses:   Finger fracture     Time reflects when diagnosis was documented in both MDM as applicable and the Disposition within this note     Time User Action Codes Description Comment    4/14/2022  5:25 PM Britta Brand Add [M65 725L] Finger fracture       ED Disposition     ED Disposition Condition Date/Time Comment    Discharge Stable Thu Apr 14, 2022  5:24 PM [address] [place] [address] discharge to home/self care              Follow-up Information     Follow up With Specialties Details Why [facility],  General Practice   [facility] [address]  [phone]            Discharge Medication List as of 4/14/2022  5:26 PM      CONTINUE these medications which have NOT CHANGED    Details   acetaminophen-codeine (TYLENOL/CODEINE #3) 300-30 MG per tablet Take 1 tablet by mouth every 4 (four) hours as needed for moderate pain, Historical Med      aspirin 325 mg tablet Take 325 mg by mouth daily, Historical Med      atorvastatin (LIPITOR) 20 mg tablet Take 20 mg by mouth daily, Historical Med      busPIRone (BUSPAR) 5 mg tablet Take 5 mg by mouth 3 (three) times a day, Historical Med      calcium citrate-vitamin D (CITRACAL+D) 315-200 MG-UNIT per tablet Take 1 tablet by mouth 2 (two) times a day, Historical Med      docusate sodium (COLACE) 100 mg capsule Take 100 mg by mouth 2 (two) times a day, Historical Med      DULoxetine (CYMBALTA) 30 mg delayed release capsule Take 30 mg by mouth daily, Historical Med      folic acid (FOLVITE) 1 mg tablet Take by mouth daily, Historical Med      lisinopril (ZESTRIL) 10 mg tablet Take 10 mg by mouth daily, Historical Med      meclizine (ANTIVERT) 12 5 MG tablet Take by mouth 3 (three) times a day as needed for dizziness, Historical Med                 PDMP Review     None          ED Provider  Electronically Signed by           Marie Malloy PA-C  04/14/22 9774

## 2022-04-14 NOTE — DISCHARGE INSTRUCTIONS
Please keep finger protected in splint  Please follow up with PCP  Return with new or worsening symptoms

## 2022-04-21 ENCOUNTER — DOCTOR'S OFFICE (OUTPATIENT)
Dept: URBAN - NONMETROPOLITAN AREA CLINIC 1 | Facility: CLINIC | Age: 82
Setting detail: OPHTHALMOLOGY
End: 2022-04-21
Payer: COMMERCIAL

## 2022-04-21 DIAGNOSIS — H35.3231: ICD-10-CM

## 2022-04-21 PROCEDURE — 92012 INTRM OPH EXAM EST PATIENT: CPT | Performed by: OPHTHALMOLOGY

## 2022-04-21 ASSESSMENT — CONFRONTATIONAL VISUAL FIELD TEST (CVF)
OD_FINDINGS: FULL
OS_FINDINGS: FULL

## 2022-04-21 ASSESSMENT — REFRACTION_CURRENTRX
OD_CYLINDER: -1.00
OD_ADD: +2.75
OD_AXIS: 24
OS_VPRISM_DIRECTION: BF
OD_OVR_VA: 20/
OS_AXIS: 157
OS_OVR_VA: 20/
OD_VPRISM_DIRECTION: BF
OD_SPHERE: +0.75
OS_ADD: +2.75
OS_CYLINDER: -1.00
OS_SPHERE: +1.00

## 2022-04-21 ASSESSMENT — REFRACTION_MANIFEST
OS_VA2: 20/50-2
OS_AXIS: 165
OD_CYLINDER: -1.00
OS_VA1: 20/50-2
OD_VA1: 20/80+2
OD_SPHERE: +0.75
OD_ADD: +2.75
OS_CYLINDER: -1.00
OS_SPHERE: +1.00
OD_VA2: 20/80+2
OS_ADD: +2.75
OD_AXIS: 025

## 2022-04-21 ASSESSMENT — SPHEQUIV_DERIVED
OS_SPHEQUIV: 0.5
OD_SPHEQUIV: 0.625
OS_SPHEQUIV: 0.125
OD_SPHEQUIV: 0.25

## 2022-04-21 ASSESSMENT — REFRACTION_AUTOREFRACTION
OS_AXIS: 163
OS_SPHERE: +0.75
OD_CYLINDER: -0.75
OD_SPHERE: +1.00
OS_CYLINDER: -1.25
OD_AXIS: 23

## 2022-04-21 ASSESSMENT — VISUAL ACUITY
OS_BCVA: 20/200-1
OD_BCVA: 20/150-1

## 2022-04-21 ASSESSMENT — DRY EYES - PHYSICIAN NOTES
OD_GENERALCOMMENTS: KSICCA
OS_GENERALCOMMENTS: KSICCA

## 2022-04-28 ENCOUNTER — RX ONLY (RX ONLY)
Age: 82
End: 2022-04-28

## 2022-04-28 ENCOUNTER — DOCTOR'S OFFICE (OUTPATIENT)
Dept: URBAN - NONMETROPOLITAN AREA CLINIC 1 | Facility: CLINIC | Age: 82
Setting detail: OPHTHALMOLOGY
End: 2022-04-28
Payer: COMMERCIAL

## 2022-04-28 DIAGNOSIS — H35.3211: ICD-10-CM

## 2022-04-28 DIAGNOSIS — H35.3221: ICD-10-CM

## 2022-04-28 DIAGNOSIS — H53.123: ICD-10-CM

## 2022-04-28 PROBLEM — Z96.1: Status: ACTIVE | Noted: 2019-08-02
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
## 2022-04-28 PROBLEM — H52.223 ASTIGMATISM, REGULAR; BOTH EYES: Status: ACTIVE | Noted: 2020-06-26

## 2022-04-28 PROBLEM — Z79.891 PLAQUENIL: Status: ACTIVE | Noted: 2019-06-11

## 2022-04-28 PROBLEM — M06.9 RHEUMATOID ARTHRITIS: Status: ACTIVE | Noted: 2020-06-26

## 2022-04-28 PROCEDURE — 99213 OFFICE O/P EST LOW 20 MIN: CPT | Performed by: OPHTHALMOLOGY

## 2022-04-28 PROCEDURE — 92250 FUNDUS PHOTOGRAPHY W/I&R: CPT | Performed by: OPHTHALMOLOGY

## 2022-04-28 PROCEDURE — 92235 FLUORESCEIN ANGRPH MLTIFRAME: CPT | Performed by: OPHTHALMOLOGY

## 2022-04-28 ASSESSMENT — DRY EYES - PHYSICIAN NOTES
OS_GENERALCOMMENTS: KSICCA
OD_GENERALCOMMENTS: KSICCA

## 2022-04-28 ASSESSMENT — REFRACTION_CURRENTRX
OD_OVR_VA: 20/
OS_OVR_VA: 20/
OD_VPRISM_DIRECTION: BF
OD_ADD: +2.75
OS_VPRISM_DIRECTION: BF
OS_AXIS: 157
OD_CYLINDER: -1.00
OS_ADD: +2.75
OD_SPHERE: +0.75
OS_CYLINDER: -1.00
OD_AXIS: 24
OS_SPHERE: +1.00

## 2022-04-28 ASSESSMENT — CONFRONTATIONAL VISUAL FIELD TEST (CVF)
OD_FINDINGS: FULL
OS_FINDINGS: FULL

## 2022-04-28 ASSESSMENT — REFRACTION_MANIFEST
OS_ADD: +2.75
OS_VA2: 20/50-2
OS_SPHERE: +1.00
OD_ADD: +2.75
OD_SPHERE: +0.75
OD_VA1: 20/80+2
OS_VA1: 20/50-2
OS_AXIS: 165
OD_VA2: 20/80+2
OD_CYLINDER: -1.00
OD_AXIS: 025
OS_CYLINDER: -1.00

## 2022-04-28 ASSESSMENT — REFRACTION_AUTOREFRACTION
OD_CYLINDER: -0.75
OS_AXIS: 163
OS_SPHERE: +0.75
OS_CYLINDER: -1.25
OD_SPHERE: +1.00
OD_AXIS: 23

## 2022-04-28 ASSESSMENT — SPHEQUIV_DERIVED
OD_SPHEQUIV: 0.25
OS_SPHEQUIV: 0.5
OS_SPHEQUIV: 0.125
OD_SPHEQUIV: 0.625

## 2022-04-28 ASSESSMENT — VISUAL ACUITY
OD_BCVA: 20/400
OS_BCVA: 20/400

## 2022-07-18 ENCOUNTER — APPOINTMENT (EMERGENCY)
Dept: RADIOLOGY | Facility: HOSPITAL | Age: 82
DRG: 178 | End: 2022-07-18
Payer: MEDICARE

## 2022-07-18 ENCOUNTER — HOSPITAL ENCOUNTER (INPATIENT)
Facility: HOSPITAL | Age: 82
LOS: 1 days | Discharge: HOME HEALTH | DRG: 178 | End: 2022-07-19
Attending: EMERGENCY MEDICINE | Admitting: FAMILY MEDICINE
Payer: MEDICARE

## 2022-07-18 DIAGNOSIS — R53.1 WEAKNESS: ICD-10-CM

## 2022-07-18 DIAGNOSIS — U07.1 COVID: Primary | ICD-10-CM

## 2022-07-18 PROBLEM — K21.9 GERD (GASTROESOPHAGEAL REFLUX DISEASE): Status: ACTIVE | Noted: 2022-07-18

## 2022-07-18 PROBLEM — K76.0 FATTY LIVER: Status: ACTIVE | Noted: 2022-07-18

## 2022-07-18 PROBLEM — J44.9 COPD (CHRONIC OBSTRUCTIVE PULMONARY DISEASE) (HCC): Status: ACTIVE | Noted: 2022-07-18

## 2022-07-18 PROBLEM — I10 HYPERTENSION: Status: ACTIVE | Noted: 2022-07-18

## 2022-07-18 PROBLEM — R94.31 PROLONGED Q-T INTERVAL ON ECG: Status: ACTIVE | Noted: 2022-07-18

## 2022-07-18 PROBLEM — N18.32 STAGE 3B CHRONIC KIDNEY DISEASE (HCC): Status: ACTIVE | Noted: 2022-07-18

## 2022-07-18 PROBLEM — M19.90 ARTHRITIS: Status: ACTIVE | Noted: 2022-07-18

## 2022-07-18 LAB
ALBUMIN SERPL BCP-MCNC: 3.3 G/DL (ref 3.5–5)
ALP SERPL-CCNC: 103 U/L (ref 46–116)
ALT SERPL W P-5'-P-CCNC: 35 U/L (ref 12–78)
ANION GAP SERPL CALCULATED.3IONS-SCNC: 9 MMOL/L (ref 4–13)
AST SERPL W P-5'-P-CCNC: 40 U/L (ref 5–45)
ATRIAL RATE: 60 BPM
BASOPHILS # BLD AUTO: 0.05 THOUSANDS/ΜL (ref 0–0.1)
BASOPHILS NFR BLD AUTO: 1 % (ref 0–1)
BILIRUB DIRECT SERPL-MCNC: 0.17 MG/DL (ref 0–0.2)
BILIRUB SERPL-MCNC: 0.67 MG/DL (ref 0.2–1)
BUN SERPL-MCNC: 20 MG/DL (ref 5–25)
CALCIUM SERPL-MCNC: 9 MG/DL (ref 8.3–10.1)
CHLORIDE SERPL-SCNC: 101 MMOL/L (ref 96–108)
CO2 SERPL-SCNC: 28 MMOL/L (ref 21–32)
CREAT SERPL-MCNC: 1.39 MG/DL (ref 0.6–1.3)
CRP SERPL QL: 3.8 MG/L
EOSINOPHIL # BLD AUTO: 0.07 THOUSAND/ΜL (ref 0–0.61)
EOSINOPHIL NFR BLD AUTO: 1 % (ref 0–6)
ERYTHROCYTE [DISTWIDTH] IN BLOOD BY AUTOMATED COUNT: 12.9 % (ref 11.6–15.1)
ERYTHROCYTE [SEDIMENTATION RATE] IN BLOOD: 17 MM/HOUR (ref 0–29)
FLUAV RNA RESP QL NAA+PROBE: NEGATIVE
FLUBV RNA RESP QL NAA+PROBE: NEGATIVE
GFR SERPL CREATININE-BSD FRML MDRD: 36 ML/MIN/1.73SQ M
GLUCOSE SERPL-MCNC: 108 MG/DL (ref 65–140)
HCT VFR BLD AUTO: 42.2 % (ref 34.8–46.1)
HGB BLD-MCNC: 13.2 G/DL (ref 11.5–15.4)
IMM GRANULOCYTES # BLD AUTO: 0.03 THOUSAND/UL (ref 0–0.2)
IMM GRANULOCYTES NFR BLD AUTO: 1 % (ref 0–2)
LYMPHOCYTES # BLD AUTO: 0.43 THOUSANDS/ΜL (ref 0.6–4.47)
LYMPHOCYTES NFR BLD AUTO: 8 % (ref 14–44)
MAGNESIUM SERPL-MCNC: 1.7 MG/DL (ref 1.6–2.6)
MCH RBC QN AUTO: 32.5 PG (ref 26.8–34.3)
MCHC RBC AUTO-ENTMCNC: 31.3 G/DL (ref 31.4–37.4)
MCV RBC AUTO: 104 FL (ref 82–98)
MONOCYTES # BLD AUTO: 0.47 THOUSAND/ΜL (ref 0.17–1.22)
MONOCYTES NFR BLD AUTO: 9 % (ref 4–12)
NEUTROPHILS # BLD AUTO: 4.38 THOUSANDS/ΜL (ref 1.85–7.62)
NEUTS SEG NFR BLD AUTO: 80 % (ref 43–75)
NRBC BLD AUTO-RTO: 0 /100 WBCS
P AXIS: 55 DEGREES
PLATELET # BLD AUTO: 238 THOUSANDS/UL (ref 149–390)
PMV BLD AUTO: 9.2 FL (ref 8.9–12.7)
POTASSIUM SERPL-SCNC: 4.4 MMOL/L (ref 3.5–5.3)
PR INTERVAL: 194 MS
PROT SERPL-MCNC: 6.5 G/DL (ref 6.4–8.4)
QRS AXIS: -52 DEGREES
QRSD INTERVAL: 116 MS
QT INTERVAL: 460 MS
QTC INTERVAL: 460 MS
RBC # BLD AUTO: 4.06 MILLION/UL (ref 3.81–5.12)
RSV RNA RESP QL NAA+PROBE: NEGATIVE
SARS-COV-2 RNA RESP QL NAA+PROBE: POSITIVE
SODIUM SERPL-SCNC: 138 MMOL/L (ref 135–147)
T WAVE AXIS: 111 DEGREES
TSH SERPL DL<=0.05 MIU/L-ACNC: 3.99 UIU/ML (ref 0.45–4.5)
VENTRICULAR RATE: 60 BPM
WBC # BLD AUTO: 5.43 THOUSAND/UL (ref 4.31–10.16)

## 2022-07-18 PROCEDURE — 99285 EMERGENCY DEPT VISIT HI MDM: CPT

## 2022-07-18 PROCEDURE — 71045 X-RAY EXAM CHEST 1 VIEW: CPT

## 2022-07-18 PROCEDURE — 99285 EMERGENCY DEPT VISIT HI MDM: CPT | Performed by: EMERGENCY MEDICINE

## 2022-07-18 PROCEDURE — 36415 COLL VENOUS BLD VENIPUNCTURE: CPT | Performed by: EMERGENCY MEDICINE

## 2022-07-18 PROCEDURE — 86140 C-REACTIVE PROTEIN: CPT | Performed by: FAMILY MEDICINE

## 2022-07-18 PROCEDURE — 93010 ELECTROCARDIOGRAM REPORT: CPT | Performed by: INTERNAL MEDICINE

## 2022-07-18 PROCEDURE — 85025 COMPLETE CBC W/AUTO DIFF WBC: CPT | Performed by: EMERGENCY MEDICINE

## 2022-07-18 PROCEDURE — 83735 ASSAY OF MAGNESIUM: CPT

## 2022-07-18 PROCEDURE — 80076 HEPATIC FUNCTION PANEL: CPT

## 2022-07-18 PROCEDURE — 84443 ASSAY THYROID STIM HORMONE: CPT

## 2022-07-18 PROCEDURE — 96360 HYDRATION IV INFUSION INIT: CPT

## 2022-07-18 PROCEDURE — 0241U HB NFCT DS VIR RESP RNA 4 TRGT: CPT | Performed by: EMERGENCY MEDICINE

## 2022-07-18 PROCEDURE — 85652 RBC SED RATE AUTOMATED: CPT | Performed by: FAMILY MEDICINE

## 2022-07-18 PROCEDURE — 99223 1ST HOSP IP/OBS HIGH 75: CPT | Performed by: FAMILY MEDICINE

## 2022-07-18 PROCEDURE — 80048 BASIC METABOLIC PNL TOTAL CA: CPT | Performed by: EMERGENCY MEDICINE

## 2022-07-18 PROCEDURE — 93005 ELECTROCARDIOGRAM TRACING: CPT

## 2022-07-18 RX ORDER — FOLIC ACID 1 MG/1
1 TABLET ORAL DAILY
Status: DISCONTINUED | OUTPATIENT
Start: 2022-07-19 | End: 2022-07-19 | Stop reason: HOSPADM

## 2022-07-18 RX ORDER — ACETAMINOPHEN 325 MG/1
650 TABLET ORAL ONCE
Status: COMPLETED | OUTPATIENT
Start: 2022-07-18 | End: 2022-07-18

## 2022-07-18 RX ORDER — BISOPROLOL FUMARATE 5 MG/1
2.5 TABLET, FILM COATED ORAL DAILY
Status: DISCONTINUED | OUTPATIENT
Start: 2022-07-19 | End: 2022-07-19 | Stop reason: HOSPADM

## 2022-07-18 RX ORDER — DOCUSATE SODIUM 100 MG/1
100 CAPSULE, LIQUID FILLED ORAL 2 TIMES DAILY
Status: DISCONTINUED | OUTPATIENT
Start: 2022-07-18 | End: 2022-07-19 | Stop reason: HOSPADM

## 2022-07-18 RX ORDER — ACETAMINOPHEN 325 MG/1
650 TABLET ORAL EVERY 6 HOURS PRN
Status: DISCONTINUED | OUTPATIENT
Start: 2022-07-18 | End: 2022-07-19 | Stop reason: HOSPADM

## 2022-07-18 RX ORDER — BISOPROLOL FUMARATE AND HYDROCHLOROTHIAZIDE 2.5; 6.25 MG/1; MG/1
1 TABLET ORAL DAILY
COMMUNITY

## 2022-07-18 RX ORDER — IPRATROPIUM BROMIDE AND ALBUTEROL SULFATE 2.5; .5 MG/3ML; MG/3ML
3 SOLUTION RESPIRATORY (INHALATION) EVERY 6 HOURS PRN
Status: DISCONTINUED | OUTPATIENT
Start: 2022-07-18 | End: 2022-07-19 | Stop reason: HOSPADM

## 2022-07-18 RX ORDER — HYDROXYCHLOROQUINE SULFATE 200 MG/1
200 TABLET, FILM COATED ORAL 2 TIMES DAILY WITH MEALS
COMMUNITY

## 2022-07-18 RX ORDER — OXYBUTYNIN CHLORIDE 5 MG/1
10 TABLET, EXTENDED RELEASE ORAL DAILY
Status: DISCONTINUED | OUTPATIENT
Start: 2022-07-19 | End: 2022-07-19 | Stop reason: HOSPADM

## 2022-07-18 RX ORDER — ASPIRIN 325 MG
325 TABLET ORAL DAILY
Status: DISCONTINUED | OUTPATIENT
Start: 2022-07-19 | End: 2022-07-19 | Stop reason: HOSPADM

## 2022-07-18 RX ORDER — TOLTERODINE 4 MG/1
4 CAPSULE, EXTENDED RELEASE ORAL DAILY
COMMUNITY

## 2022-07-18 RX ORDER — DEXAMETHASONE SODIUM PHOSPHATE 4 MG/ML
6 INJECTION, SOLUTION INTRA-ARTICULAR; INTRALESIONAL; INTRAMUSCULAR; INTRAVENOUS; SOFT TISSUE ONCE
Status: COMPLETED | OUTPATIENT
Start: 2022-07-18 | End: 2022-07-18

## 2022-07-18 RX ORDER — LORATADINE 10 MG/1
10 TABLET ORAL DAILY
Status: DISCONTINUED | OUTPATIENT
Start: 2022-07-19 | End: 2022-07-19 | Stop reason: HOSPADM

## 2022-07-18 RX ORDER — ATORVASTATIN CALCIUM 20 MG/1
20 TABLET, FILM COATED ORAL DAILY
Status: DISCONTINUED | OUTPATIENT
Start: 2022-07-18 | End: 2022-07-19 | Stop reason: HOSPADM

## 2022-07-18 RX ORDER — LISINOPRIL 10 MG/1
10 TABLET ORAL DAILY
Status: DISCONTINUED | OUTPATIENT
Start: 2022-07-19 | End: 2022-07-18

## 2022-07-18 RX ORDER — LANOLIN ALCOHOL/MO/W.PET/CERES
3 CREAM (GRAM) TOPICAL
Status: DISCONTINUED | OUTPATIENT
Start: 2022-07-18 | End: 2022-07-19 | Stop reason: HOSPADM

## 2022-07-18 RX ORDER — HYDROCHLOROTHIAZIDE 12.5 MG/1
6.25 TABLET ORAL DAILY
Status: DISCONTINUED | OUTPATIENT
Start: 2022-07-19 | End: 2022-07-18

## 2022-07-18 RX ORDER — LORATADINE 10 MG/1
10 TABLET ORAL DAILY
COMMUNITY

## 2022-07-18 RX ORDER — HEPARIN SODIUM 5000 [USP'U]/ML
5000 INJECTION, SOLUTION INTRAVENOUS; SUBCUTANEOUS EVERY 8 HOURS SCHEDULED
Status: DISCONTINUED | OUTPATIENT
Start: 2022-07-18 | End: 2022-07-19 | Stop reason: HOSPADM

## 2022-07-18 RX ORDER — DULOXETIN HYDROCHLORIDE 30 MG/1
30 CAPSULE, DELAYED RELEASE ORAL DAILY
Status: DISCONTINUED | OUTPATIENT
Start: 2022-07-19 | End: 2022-07-19 | Stop reason: HOSPADM

## 2022-07-18 RX ORDER — PANTOPRAZOLE SODIUM 40 MG/1
40 TABLET, DELAYED RELEASE ORAL DAILY
COMMUNITY

## 2022-07-18 RX ORDER — PANTOPRAZOLE SODIUM 40 MG/1
40 TABLET, DELAYED RELEASE ORAL DAILY
Status: DISCONTINUED | OUTPATIENT
Start: 2022-07-19 | End: 2022-07-19 | Stop reason: HOSPADM

## 2022-07-18 RX ORDER — GUAIFENESIN 600 MG/1
600 TABLET, EXTENDED RELEASE ORAL EVERY 12 HOURS SCHEDULED
Status: DISCONTINUED | OUTPATIENT
Start: 2022-07-18 | End: 2022-07-19 | Stop reason: HOSPADM

## 2022-07-18 RX ADMIN — HEPARIN SODIUM 5000 UNITS: 5000 INJECTION INTRAVENOUS; SUBCUTANEOUS at 23:01

## 2022-07-18 RX ADMIN — ACETAMINOPHEN 650 MG: 325 TABLET ORAL at 12:47

## 2022-07-18 RX ADMIN — SODIUM CHLORIDE 500 ML: 0.9 INJECTION, SOLUTION INTRAVENOUS at 11:45

## 2022-07-18 RX ADMIN — HEPARIN SODIUM 5000 UNITS: 5000 INJECTION INTRAVENOUS; SUBCUTANEOUS at 15:22

## 2022-07-18 RX ADMIN — ATORVASTATIN CALCIUM 20 MG: 20 TABLET, FILM COATED ORAL at 15:23

## 2022-07-18 RX ADMIN — GUAIFENESIN 600 MG: 600 TABLET ORAL at 23:01

## 2022-07-18 RX ADMIN — Medication 3 MG: at 23:01

## 2022-07-18 RX ADMIN — DEXAMETHASONE SODIUM PHOSPHATE 6 MG: 4 INJECTION, SOLUTION INTRAMUSCULAR; INTRAVENOUS at 13:23

## 2022-07-18 NOTE — ASSESSMENT & PLAN NOTE
· Maintained on lisinopril, combination bisoprolol-hydrochlorothiazide pill  · Creatinine is currently at baseline  · Continue medications, blood pressure well controlled  · Monitor BMP closely and adjust medications as necessary

## 2022-07-18 NOTE — ASSESSMENT & PLAN NOTE
· Patient endorses history of COPD - does not seem to be in acute exacerbation  · Uses intermittent albuterol  · Shortness of breath is at baseline  · Nebs prn for shortness of breath

## 2022-07-18 NOTE — ASSESSMENT & PLAN NOTE
Patient denies history of liver disease  In care everywhere had right upper quadrant ultrasound after having elevated transaminases on 07/19/2021  · Showed gallstones without gross gallbladder wall thickening, increased hepatic echogenicity suggesting presence of fatty infiltration with no focal hepatic lesion seen  · LFTs today are within normal limits  · Denies any abdominal pain whatsoever  · Chest x-ray today showing significant elevation of the right hemidiaphragm causing poor aeration of right lung    Follow-up with PCP

## 2022-07-18 NOTE — PLAN OF CARE
Problem: PAIN - ADULT  Goal: Verbalizes/displays adequate comfort level or baseline comfort level  Description: Interventions:  - Encourage patient to monitor pain and request assistance  - Assess pain using appropriate pain scale  - Administer analgesics based on type and severity of pain and evaluate response  - Implement non-pharmacological measures as appropriate and evaluate response  - Consider cultural and social influences on pain and pain management  - Notify physician/advanced practitioner if interventions unsuccessful or patient reports new pain  Outcome: Progressing     Problem: INFECTION - ADULT  Goal: Absence or prevention of progression during hospitalization  Description: INTERVENTIONS:  - Assess and monitor for signs and symptoms of infection  - Monitor lab/diagnostic results  - Monitor all insertion sites, i e  indwelling lines, tubes, and drains  - Monitor endotracheal if appropriate and nasal secretions for changes in amount and color  - [place] appropriate cooling/warming therapies per order  - Administer medications as ordered  - Instruct and encourage patient and family to use good hand hygiene technique  - Identify and instruct in appropriate isolation precautions for identified infection/condition  Outcome: Progressing     Problem: DISCHARGE PLANNING  Goal: Discharge to home or other facility with appropriate resources  Description: INTERVENTIONS:  - Identify barriers to discharge w/patient and caregiver  - Arrange for needed discharge resources and transportation as appropriate  - Identify discharge learning needs (meds, wound care, etc )  - Arrange for interpretive services to assist at discharge as needed  - Refer to Case Management Department for coordinating discharge planning if the patient needs post-hospital services based on physician/advanced practitioner order or complex needs related to functional status, cognitive ability, or social support system  Outcome: Progressing Problem: Knowledge Deficit  Goal: Patient/family/caregiver demonstrates understanding of disease process, treatment plan, medications, and discharge instructions  Description: Complete learning assessment and assess knowledge base    Interventions:  - Provide teaching at level of understanding  - Provide teaching via preferred learning methods  Outcome: Progressing     Problem: RESPIRATORY - ADULT  Goal: Achieves optimal ventilation and oxygenation  Description: INTERVENTIONS:  - Assess for changes in respiratory status  - Assess for changes in mentation and behavior  - Position to facilitate oxygenation and minimize respiratory effort  - Oxygen administered by appropriate delivery if ordered  - Initiate smoking cessation education as indicated  - Encourage broncho-pulmonary hygiene including cough, deep breathe, Incentive Spirometry  - Assess the need for suctioning and aspirate as needed  - Assess and instruct to report SOB or any respiratory difficulty  - Respiratory Therapy support as indicated  Outcome: Progressing

## 2022-07-18 NOTE — ED PROVIDER NOTES
History  Chief Complaint   Patient presents with    Fatigue     Pt c/o increased fatigue, chills, decreased appetite and cough for past 3 days  Pt's dtr tested covid positive 5 days ago  Denies travel/fevers/sob/n/v/d     HPI   81F w hx of DM, HTN, arthritis presenting with weakness  For the past 2-3 days, patient has been feeling very weak and fatigued  Chills, decreased appetite, cough  Daughter was dxed with COVID 5 days ago, and patient lives w her daughter  She is not vaccinated for COVID  Denies fevers, chest pain, SOB, abd pain, n/v/d, urinary symptoms  Endorses compliance w medications  Normally ambulates w a walker  Prior to Admission Medications   Prescriptions Last Dose Informant Patient Reported? Taking?    DULoxetine (CYMBALTA) 30 mg delayed release capsule   Yes No   Sig: Take 30 mg by mouth daily   acetaminophen-codeine (TYLENOL/CODEINE #3) 300-30 MG per tablet   Yes No   Sig: Take 1 tablet by mouth every 4 (four) hours as needed for moderate pain   aspirin 325 mg tablet   Yes No   Sig: Take 325 mg by mouth daily   atorvastatin (LIPITOR) 20 mg tablet   Yes No   Sig: Take 20 mg by mouth daily   busPIRone (BUSPAR) 5 mg tablet   Yes No   Sig: Take 5 mg by mouth 3 (three) times a day   calcium citrate-vitamin D (CITRACAL+D) 315-200 MG-UNIT per tablet   Yes No   Sig: Take 1 tablet by mouth 2 (two) times a day   docusate sodium (COLACE) 100 mg capsule   Yes No   Sig: Take 100 mg by mouth 2 (two) times a day   folic acid (FOLVITE) 1 mg tablet   Yes No   Sig: Take by mouth daily   lisinopril (ZESTRIL) 10 mg tablet   Yes No   Sig: Take 10 mg by mouth daily   meclizine (ANTIVERT) 12 5 MG tablet   Yes No   Sig: Take by mouth 3 (three) times a day as needed for dizziness      Facility-Administered Medications: None       Past Medical History:   Diagnosis Date    Arthritis     Asthma     Diabetes mellitus ([address] )     Hypertension     MI, old        Past Surgical History:   Procedure Laterality Date    BACK SURGERY      CARDIAC SURGERY      HYSTERECTOMY         History reviewed  No pertinent family history  I have reviewed and agree with the history as documented  E-Cigarette/Vaping    E-Cigarette Use Never User      E-Cigarette/Vaping Substances    Nicotine No     THC No     CBD No     Flavoring No     Other No     Unknown No      Social History     Tobacco Use    Smoking status: Former Smoker    Smokeless tobacco: Never Used   Vaping Use    Vaping Use: Never used   Substance Use Topics    Alcohol use: Not Currently    Drug use: Not Currently       Review of Systems   Constitutional: Positive for activity change, appetite change, chills and fatigue  Negative for fever  HENT: Negative for ear pain and sore throat  Eyes: Negative for pain and visual disturbance  Respiratory: Positive for cough  Negative for shortness of breath  Cardiovascular: Negative for chest pain and palpitations  Gastrointestinal: Negative for abdominal pain and vomiting  Genitourinary: Negative for dysuria and hematuria  Musculoskeletal: Negative for arthralgias and back pain  Skin: Negative for color change and rash  Neurological: Positive for weakness  Negative for seizures and syncope  All other systems reviewed and are negative  Physical Exam  Physical Exam  Vitals and nursing note reviewed  Constitutional:       General: She is not in acute distress  Appearance: She is well-developed  HENT:      Head: Normocephalic and atraumatic  Eyes:      Conjunctiva/sclera: Conjunctivae normal    Cardiovascular:      Rate and Rhythm: Normal rate and regular rhythm  Pulmonary:      Effort: Pulmonary effort is normal  No respiratory distress  Breath sounds: Examination of the right-lower field reveals decreased breath sounds  Examination of the left-lower field reveals decreased breath sounds  Decreased breath sounds present  Abdominal:      Palpations: Abdomen is soft  Tenderness:  There is no abdominal tenderness  Musculoskeletal:      Cervical back: Neck supple  Skin:     General: Skin is warm and dry  Neurological:      Mental Status: She is alert  Sensory: Sensation is intact  Motor: Motor function is intact  Vital Signs  ED Triage Vitals [07/18/22 1134]   Temperature Pulse Respirations Blood Pressure SpO2   98 6 °F (37 °C) 59 20 124/55 96 %      Temp Source Heart Rate Source Patient Position - Orthostatic VS BP Location FiO2 (%)   Temporal Monitor Lying Right arm --      Pain Score       No Pain           Vitals:    07/18/22 1134 07/18/22 1230 07/18/22 1300   BP: 124/55 131/63 117/58   Pulse: 59 59 59   Patient Position - Orthostatic VS: Lying Lying Lying         Visual Acuity  Not indicated  ED Medications  Medications   dexamethasone (DECADRON) injection 6 mg (has no administration in time range)   sodium chloride 0 9 % bolus 500 mL (0 mL Intravenous Stopped 7/18/22 1245)   acetaminophen (TYLENOL) tablet 650 mg (650 mg Oral Given 7/18/22 1247)       Diagnostic Studies  Results Reviewed     Procedure Component Value Units Date/Time    FLU/RSV/COVID - if FLU/RSV clinically relevant [[id number]]  (Abnormal) Collected: 07/18/22 1144    Lab Status: Final result Specimen: Nares from Nasopharyngeal Swab Updated: 07/18/22 1230     SARS-CoV-2 Positive     INFLUENZA A PCR Negative     INFLUENZA B PCR Negative     RSV PCR Negative    Narrative:      FOR PEDIATRIC PATIENTS - copy/paste COVID Guidelines URL to browser: [email or web address]    SARS-CoV-2 assay is a Nucleic Acid Amplification assay intended for the  qualitative detection of nucleic acid from SARS-CoV-2 in nasopharyngeal  swabs  Results are for the presumptive identification of SARS-CoV-2 RNA  Positive results are indicative of infection with SARS-CoV-2, the virus  causing COVID-19, but do not rule out bacterial infection or co-infection  with other viruses  Laboratories within the [place] and its  territories are required to report all positive results to the appropriate  public health authorities  Negative results do not preclude SARS-CoV-2  infection and should not be used as the sole basis for treatment or other  patient management decisions  Negative results must be combined with  clinical observations, patient history, and epidemiological information  This test has not been FDA cleared or approved  This test has been authorized by FDA under an Emergency Use Authorization  (EUA)  This test is only authorized for the duration of time the  declaration that circumstances exist justifying the authorization of the  emergency use of an in vitro diagnostic tests for detection of SARS-CoV-2  virus and/or diagnosis of COVID-19 infection under section 564(b)(1) of  the Act, 21 U  S C  757LRI-8(M)(9), unless the authorization is terminated  or revoked sooner  The test has been validated but independent review by FDA  and CLIA is pending  Test performed using [employer] Gene[employer]pert: This RT-PCR assay targets N2,  a region unique to SARS-CoV-2  A conserved region in the E-gene was chosen  for pan-Sarbecovirus detection which includes SARS-CoV-2      Basic metabolic panel [[id number]]  (Abnormal) Collected: 07/18/22 1144    Lab Status: Final result Specimen: Blood from Arm, Right Updated: 07/18/22 1203     Sodium 138 mmol/L      Potassium 4 4 mmol/L      Chloride 101 mmol/L      CO2 28 mmol/L      ANION GAP 9 mmol/L      BUN 20 mg/dL      Creatinine 1 39 mg/dL      Glucose 108 mg/dL      Calcium 9 0 mg/dL      eGFR 36 ml/min/1 73sq m     Narrative:      [place] guidelines for Chronic Kidney Disease (CKD):     Stage 1 with normal or high GFR (GFR > 90 mL/min/1 73 square meters)    Stage 2 Mild CKD (GFR = 60-89 mL/min/1 73 square meters)    Stage 3A Moderate CKD (GFR = 45-59 mL/min/1 73 square meters)    Stage 3B Moderate CKD (GFR = 30-44 mL/min/1 73 square meters)    Stage 4 Severe CKD (GFR = 15-29 mL/min/1 73 square meters)    Stage 5 End Stage CKD (GFR <15 mL/min/1 73 square meters)  Note: GFR calculation is accurate only with a steady state creatinine    CBC and differential [[id number]]  (Abnormal) Collected: 07/18/22 1144    Lab Status: Final result Specimen: Blood from Arm, Right Updated: 07/18/22 1152     WBC 5 43 Thousand/uL      RBC 4 06 Million/uL      Hemoglobin 13 2 g/dL      Hematocrit 42 2 %       fL      MCH 32 5 pg      MCHC 31 3 g/dL      RDW 12 9 %      MPV 9 2 fL      Platelets 037 Thousands/uL      nRBC 0 /100 WBCs      Neutrophils Relative 80 %      Immat GRANS % 1 %      Lymphocytes Relative 8 %      Monocytes Relative 9 %      Eosinophils Relative 1 %      Basophils Relative 1 %      Neutrophils Absolute 4 38 Thousands/µL      Immature Grans Absolute 0 03 Thousand/uL      Lymphocytes Absolute 0 43 Thousands/µL      Monocytes Absolute 0 47 Thousand/µL      Eosinophils Absolute 0 07 Thousand/µL      Basophils Absolute 0 05 Thousands/µL                  XR chest 1 view portable   Final Result by Stacey Uribe DO (07/18 1306)   Significant elevation of right hemidiaphragm with resulting poor aeration right lung         Scarring or atelectasis left lung base  No consolidation or large effusion  No significant pulmonary edema  In the setting of clinically suspected/proven COVID-19, this plain film appearance does not contain findings that raise concern for viral pneumonia such as COVID-19, but does not rule out this diagnosis                 Workstation performed: [id number]                    Procedures  ECG 12 Lead Documentation Only    Date/Time: 7/18/2022 12:42 PM  Performed by: Umm Gonzalez MD  Authorized by: Umm Gonzalez MD     Patient location:  ED  Interpretation:     Interpretation: abnormal    Rate:     ECG rate:  60    ECG rate assessment: normal    Rhythm:     Rhythm: sinus rhythm    Ectopy: Ectopy: none    QRS:     QRS axis:  Left  Conduction:     Conduction: abnormal      Abnormal conduction: non-specific intraventricular conduction delay    ST segments:     ST segments:  Non-specific        ED Course  ED Course as of 07/18/22 1321   Mon Jul 18, 2022   1240 Sodium: 138   1240 Chloride: 101   1240 Creatinine(!): 1 39  Creatinine around baseline  Per care everywhere, creatinine range last year was around 1 3-1 4    1240 SARS-COV-2(!): Positive  I informed patient she tested positive for COVID    1302 Ambulatory pulse ox 90% on room air  Patient became extremely short of breath while walking  1306 CXR  IMPRESSION:  Significant elevation of right hemidiaphragm with resulting poor aeration right lung    Scarring or atelectasis left lung base  No consolidation or large effusion  No significant pulmonary edema  1309 Case discussed with medicine team, Dr Regina Figueroa  Accepts patient for admission  SBIRT 22yo+    Flowsheet Row Most Recent Value   SBIRT (25 yo +)    In order to provide better care to our patients, we are screening all of our patients for alcohol and drug use  Would it be okay to ask you these screening questions? Yes Filed at: 07/18/2022 1137   Initial Alcohol Screen: US AUDIT-C     1  How often do you have a drink containing alcohol? 0 Filed at: 07/18/2022 1137   2  How many drinks containing alcohol do you have on a typical day you are drinking? 0 Filed at: 07/18/2022 1137   3b  FEMALE Any Age, or MALE 65+: How often do you have 4 or more drinks on one occassion? 0 Filed at: 07/18/2022 1137   Audit-C Score 0 Filed at: 07/18/2022 1137   CHAD: How many times in the past year have you    Used an illegal drug or used a prescription medication for non-medical reasons? Never Filed at: 07/18/2022 1137          MDM   81F presenting w weakness and fatigue  Exposed to daughter yovanny Woods  Given her symptoms, my suspicion is high for COVID as well   Other considerations for weakness are anemia, electrolyte abnl, RONALD, pneumonia  Labwork significant for elevated creatinine of 1 39  On chart review, the elevated creatinine is not new  Patient tested positive for COVID  Patient felt increased SOB w ambulation and SpO2 dropped to 90% on room air  Discussed findings w patient and nursing staff spoke w her daughter  Given increased weakness, borderline SpO2, and age/comorbidities, patient was admitted to medicine under Dr Dorita White  Patient also given 6mg of decadron for COVID infection and borderline SpO2  Disposition  Final diagnoses:   COVID   Weakness     Time reflects when diagnosis was documented in both MDM as applicable and the Disposition within this note     Time User Action Codes Description Comment    7/18/2022  1:07 PM Kingston Herndon Add [U07 1] COVID     7/18/2022  1:21 PM Kingston Hoots Add [R53 1] Weakness       ED Disposition     ED Disposition   Admit    Condition   Stable    Date/Time   Mon Jul 18, 2022  1:07 PM    Comment   Case was discussed with Dr Dorita White and the patient's admission status was agreed to be Admission Status: inpatient status to the service of Dr Dorita White   Follow-up Information    None         Patient's Medications   Discharge Prescriptions    No medications on file       No discharge procedures on file      PDMP Review     None          ED Provider  Electronically Signed by           Mitesh Devries MD  07/18/22 9790

## 2022-07-18 NOTE — ASSESSMENT & PLAN NOTE
Presents with generalized weakness likely secondary to COVID-19 infection  Uses walker at baseline-lives with daughter  Labs grossly within normal limits, not currently requiring any oxygen    Appreciate PT/OT consult

## 2022-07-18 NOTE — ASSESSMENT & PLAN NOTE
EKG showing normal sinus rhythm, left axis deviation, left ventricular hypertrophy with QRS widening and repolarization abnormality  · Baseline QT is available in Care everywhere  · Patient denies any palpitations or chest pains  · Admission EKG showing QTC of 460  · Check magnesium level  · Repeat EKG in a m   · Hold Plaquenil

## 2022-07-18 NOTE — ASSESSMENT & PLAN NOTE
Lab Results   Component Value Date    EGFR 36 07/18/2022    CREATININE 1 39 (H) 07/18/2022     · In care everywhere baseline creatinine in 1 1-1 3-creatinine on admission 1 39  · Continue to monitor BMP  · For now continue lisinopril and hydrochlorothiazide, adjust as needed depending on creatinine response  · Avoid hypotension, nephrotoxic agents, NSAIDs

## 2022-07-18 NOTE — H&P
[address] Ros[address] Perez  H&P- [place] 1940, 80 y o  female MRN: [id number]  Unit/Bed#: -01 Encounter: [id number]  Primary Care Provider: Dewayne Silverio DO   Date and time admitted to hospital: 7/18/2022 11:28 AM    * COVID  Assessment & Plan  Background:    Mild COVID pathway as below    Unvaccinated   COVID19- positive on 7/18   Not currently requiring oxygen - stats 95% on RA, 90% when ambulating in ED   CXR revealed " Significant elevation of right hemidiaphragm with resulting poor aeration in right lung, scarring/atelectasis of left lung base, no consolidation or large effusion, no significant pulmonary edema, does not show concern for viral pneumonia "   OOB TID; ambulation and mobilization strongly encouraged   PT/OT consult and evaluation    Supportive care      Generalized weakness  Assessment & Plan  Presents with generalized weakness likely secondary to COVID-19 infection  Uses walker at baseline-lives with daughter  Labs grossly within normal limits, not currently requiring any oxygen    Appreciate PT/OT consult    COPD (chronic obstructive pulmonary disease) ([facility] [place] [address] )  Assessment & Plan  · Patient endorses history of COPD - does not seem to be in acute exacerbation  · Uses intermittent albuterol  · Shortness of breath is at baseline  · Nebs prn for shortness of breath     Prolonged Q-T interval on ECG  Assessment & Plan  EKG showing normal sinus rhythm, left axis deviation, left ventricular hypertrophy with QRS widening and repolarization abnormality  · Baseline QT is available in Care everywhere  · Patient denies any palpitations or chest pains  · Admission EKG showing QTC of 460  · Check magnesium level  · Repeat EKG in a m   · Hold Plaquenil    Stage 3b chronic kidney disease [facility])  Assessment & Plan  Lab Results   Component Value Date    EGFR 36 07/18/2022    CREATININE 1 39 (H) 07/18/2022     · In care everywhere baseline creatinine in 1 1-1 3-creatinine on admission 1 39  · Continue to monitor BMP  · For now continue lisinopril and hydrochlorothiazide, adjust as needed depending on creatinine response  · Avoid hypotension, nephrotoxic agents, NSAIDs    Fatty liver  Assessment & Plan  Patient denies history of liver disease  In care everywhere had right upper quadrant ultrasound after having elevated transaminases on 07/19/2021  · Showed gallstones without gross gallbladder wall thickening, increased hepatic echogenicity suggesting presence of fatty infiltration with no focal hepatic lesion seen  · LFTs today are within normal limits  · Denies any abdominal pain whatsoever  · Chest x-ray today showing significant elevation of the right hemidiaphragm causing poor aeration of right lung    Follow-up with PCP    GERD (gastroesophageal reflux disease)  Assessment & Plan  Continue Protonix    Arthritis  Assessment & Plan  History of arthritis on methotrexate once a week-unsure of what day she took it last  Also on Plaquenil - holding in setting of elevated QT on EKG  Repeat EKG in morning    Hypertension  Assessment & Plan  · Maintained on lisinopril, combination bisoprolol-hydrochlorothiazide pill  · Creatinine is currently at baseline  · Continue medications, blood pressure well controlled  · Monitor BMP closely and adjust medications as necessary    VTE Pharmacologic Prophylaxis: VTE Score: 7 High Risk (Score >/= 5) - Pharmacological DVT Prophylaxis Ordered: heparin  Sequential Compression Devices Ordered  Code Status: Level 1 - Full Code discussed with patient  Discussion with family: Updated  (daughter) via phone  Anticipated Length of Stay: Patient will be admitted on an inpatient basis with an anticipated length of stay of greater than 2 midnights secondary to COVID, PT/OT evaluations      Total Time for Visit, including Counseling / Coordination of Care: 60 minutes Greater than 50% of this total time spent on direct patient counseling and coordination of care  Chief Complaint:  Weakness    History of Present Illness:  Shreyas Malik is a 80 y o  female with a PMH of arthritis, hypertension, GERD, fatty liver disease, CKD stage 3, COPD who presents with 2-3 days of feeling weak and fatigued  Had chills, decreased appetite and cough at home  Daughter was diagnosed with COVID about 5 days ago and patient lives with her  She is not vaccinated for COVID  She denies worse shortness of breath, has some shortness of breath at baseline due to COPD  Denies any fever, chest pain, abdominal pain, nausea or vomiting, diarrhea, urinary symptoms  Does have mild headache  Overall does feeling weak and unable to sleep well at night  Denies any shortness of breath at night or specific things keeping her up  Difficulty falling asleep  Review of Systems:  Review of Systems   Constitutional: Positive for activity change, appetite change, chills and fatigue  Negative for fever  HENT: Positive for congestion  Negative for rhinorrhea and sore throat  Eyes: Negative for visual disturbance  Respiratory: Positive for cough  Negative for chest tightness, shortness of breath and wheezing  Cardiovascular: Negative for chest pain, palpitations and leg swelling  Gastrointestinal: Negative for abdominal pain, constipation, diarrhea, nausea and vomiting  Genitourinary: Negative for difficulty urinating, dysuria, frequency and urgency  Musculoskeletal: Negative for arthralgias and myalgias  Skin: Negative for rash  Allergic/Immunologic: Positive for immunocompromised state  Neurological: Positive for weakness and headaches  Negative for seizures and syncope  Psychiatric/Behavioral: Positive for sleep disturbance  All other systems reviewed and are negative        Past Medical and Surgical History:   Past Medical History:   Diagnosis Date    Arthritis     Asthma     Diabetes mellitus ([facility] [address] )     Hypertension     MI, old        Past Surgical History:   Procedure Laterality Date    BACK SURGERY      CARDIAC SURGERY      HYSTERECTOMY         Meds/Allergies:  Prior to Admission medications    Medication Sig Start Date End Date Taking?  Authorizing Provider   aspirin 325 mg tablet Take 325 mg by mouth daily   Yes Historical Provider, MD   atorvastatin (LIPITOR) 20 mg tablet Take 20 mg by mouth daily   Yes Historical Provider, MD   bisoprolol-hydrochlorothiazide (Erica ) 2 5-6 25 MG per tablet Take 1 tablet by mouth daily   Yes Historical Provider, MD   calcium citrate-vitamin D (CITRACAL+D) 315-200 MG-UNIT per tablet Take 1 tablet by mouth 2 (two) times a day   Yes Historical Provider, MD   DULoxetine (CYMBALTA) 30 mg delayed release capsule Take 30 mg by mouth daily   Yes Historical Provider, MD   folic acid (FOLVITE) 1 mg tablet Take by mouth daily   Yes Historical Provider, MD   hydroxychloroquine (PLAQUENIL) 200 mg tablet Take 200 mg by mouth 2 (two) times a day with meals   Yes Historical Provider, MD   lisinopril (ZESTRIL) 10 mg tablet Take 10 mg by mouth daily   Yes Historical Provider, MD   loratadine (CLARITIN) 10 mg tablet Take 10 mg by mouth daily   Yes Historical Provider, MD   pantoprazole (PROTONIX) 40 mg tablet Take 40 mg by mouth daily   Yes Historical Provider, MD   tolterodine (DETROL LA) 4 mg 24 hr capsule Take 4 mg by mouth daily   Yes Historical Provider, MD   acetaminophen-codeine (TYLENOL with CODEINE #3) 300-30 MG per tablet Take 1 tablet by mouth every 4 (four) hours as needed for moderate pain    Historical Provider, MD   busPIRone (BUSPAR) 5 mg tablet Take 5 mg by mouth 3 (three) times a day  Patient not taking: Reported on 7/18/2022    Historical Provider, MD   docusate sodium (COLACE) 100 mg capsule Take 100 mg by mouth 2 (two) times a day    Historical Provider, MD   meclizine (ANTIVERT) 12 5 MG tablet Take by mouth 3 (three) times a day as needed for dizziness    Historical Provider, MD   methotrexate 2 5 mg tablet Take by mouth once a week    Historical Provider, MD     I have reviewed home medications with patient personally  Allergies: No Known Allergies    Social History:  Marital Status: /Civil Union   Occupation:  Retired  Patient Pre-hospital Living Situation: Home, With other family member: Daughter  Patient Pre-hospital Level of Mobility: walks with walker  Patient Pre-hospital Diet Restrictions:  No restrictions  Substance Use History:   Social History     Substance and Sexual Activity   Alcohol Use Not Currently     Social History     Tobacco Use   Smoking Status Former Smoker   Smokeless Tobacco Never Used     Social History     Substance and Sexual Activity   Drug Use Not Currently       Family History:  History reviewed  No pertinent family history  Physical Exam:     Vitals:   Blood Pressure: 117/63 (07/18/22 1502)  Pulse: 58 (07/18/22 1502)  Temperature: 98 6 °F (37 °C) (07/18/22 1502)  Temp Source: Temporal (07/18/22 1134)  Respirations: 17 (07/18/22 1502)  Height: 5' (152 4 cm) (07/18/22 1350)  Weight - Scale: 59 8 kg (131 lb 12 8 oz) (07/18/22 1336)  SpO2: 92 % (07/18/22 1502)    Physical Exam  Vitals and nursing note reviewed  Constitutional:       General: She is not in acute distress  Appearance: Normal appearance  HENT:      Head: Normocephalic and atraumatic  Nose: Congestion present  Mouth/Throat:      Mouth: Mucous membranes are moist    Eyes:      Conjunctiva/sclera: Conjunctivae normal    Cardiovascular:      Rate and Rhythm: Normal rate and regular rhythm  Pulses: Normal pulses  Heart sounds: Murmur heard  Pulmonary:      Effort: Pulmonary effort is normal  No respiratory distress  Comments: Diminished, right worse than left, lung sounds overall clear  On room air  Abdominal:      General: Bowel sounds are normal       Palpations: Abdomen is soft  Tenderness: There is no abdominal tenderness     Musculoskeletal:         General: Normal range of motion  Right lower leg: No edema  Left lower leg: No edema  Skin:     General: Skin is warm and dry  Neurological:      General: No focal deficit present  Mental Status: She is alert and oriented to person, place, and time  Psychiatric:         Mood and Affect: Mood normal          Behavior: Behavior normal           Additional Data:     Lab Results:  Results from last 7 days   Lab Units 07/18/22  1144   WBC Thousand/uL 5 43   HEMOGLOBIN g/dL 13 2   HEMATOCRIT % 42 2   PLATELETS Thousands/uL 238   NEUTROS PCT % 80*   LYMPHS PCT % 8*   MONOS PCT % 9   EOS PCT % 1     Results from last 7 days   Lab Units 07/18/22  1144   SODIUM mmol/L 138   POTASSIUM mmol/L 4 4   CHLORIDE mmol/L 101   CO2 mmol/L 28   BUN mg/dL 20   CREATININE mg/dL 1 39*   ANION GAP mmol/L 9   CALCIUM mg/dL 9 0   ALBUMIN g/dL 3 3*   TOTAL BILIRUBIN mg/dL 0 67   ALK PHOS U/L 103   ALT U/L 35   AST U/L 40   GLUCOSE RANDOM mg/dL 108                       Imaging: Reviewed radiology reports from this admission including: chest xray and Personally reviewed the following imaging: chest xray  XR chest 1 view portable   Final Result by Yoni Solis DO (07/18 1306)   Significant elevation of right hemidiaphragm with resulting poor aeration right lung         Scarring or atelectasis left lung base  No consolidation or large effusion  No significant pulmonary edema  In the setting of clinically suspected/proven COVID-19, this plain film appearance does not contain findings that raise concern for viral pneumonia such as COVID-19, but does not rule out this diagnosis  Workstation performed: [id number]             EKG and Other Studies Reviewed on Admission:   · EKG: NSR  HR Sixty, prolonged QT     ** Please Note: This note has been constructed using a voice recognition system   **

## 2022-07-18 NOTE — ED NOTES
Ambulatory pulse ox performed on pt in room  Pt ambulated with walker-pulse o2 90-91% with sob  Provider aware        Shelbie Cross RN  07/18/22 6431

## 2022-07-18 NOTE — ASSESSMENT & PLAN NOTE
Background:    Mild COVID pathway as below    Unvaccinated  [address] [place] [address]- positive on 7/18   Not currently requiring oxygen - stats 95% on RA, 90% when ambulating in ED   CXR revealed " Significant elevation of right hemidiaphragm with resulting poor aeration in right lung, scarring/atelectasis of left lung base, no consolidation or large effusion, no significant pulmonary edema, does not show concern for viral pneumonia "   OOB TID; ambulation and mobilization strongly encouraged   PT/OT consult and evaluation    Supportive care

## 2022-07-18 NOTE — ASSESSMENT & PLAN NOTE
History of arthritis on methotrexate once a week-unsure of what day she took it last  Also on Plaquenil - holding in setting of elevated QT on EKG  Repeat EKG in morning

## 2022-07-19 VITALS
DIASTOLIC BLOOD PRESSURE: 70 MMHG | RESPIRATION RATE: 18 BRPM | WEIGHT: 131.8 LBS | SYSTOLIC BLOOD PRESSURE: 120 MMHG | OXYGEN SATURATION: 95 % | BODY MASS INDEX: 25.87 KG/M2 | TEMPERATURE: 97.8 F | HEIGHT: 60 IN | HEART RATE: 52 BPM

## 2022-07-19 LAB
ALBUMIN SERPL BCP-MCNC: 3 G/DL (ref 3.5–5)
ALP SERPL-CCNC: 98 U/L (ref 46–116)
ALT SERPL W P-5'-P-CCNC: 35 U/L (ref 12–78)
ANION GAP SERPL CALCULATED.3IONS-SCNC: 8 MMOL/L (ref 4–13)
AST SERPL W P-5'-P-CCNC: 49 U/L (ref 5–45)
BASOPHILS # BLD AUTO: 0.01 THOUSANDS/ΜL (ref 0–0.1)
BASOPHILS NFR BLD AUTO: 0 % (ref 0–1)
BILIRUB SERPL-MCNC: 0.48 MG/DL (ref 0.2–1)
BUN SERPL-MCNC: 25 MG/DL (ref 5–25)
CALCIUM ALBUM COR SERPL-MCNC: 9.4 MG/DL (ref 8.3–10.1)
CALCIUM SERPL-MCNC: 8.6 MG/DL (ref 8.3–10.1)
CHLORIDE SERPL-SCNC: 102 MMOL/L (ref 96–108)
CO2 SERPL-SCNC: 26 MMOL/L (ref 21–32)
CREAT SERPL-MCNC: 1.21 MG/DL (ref 0.6–1.3)
EOSINOPHIL # BLD AUTO: 0 THOUSAND/ΜL (ref 0–0.61)
EOSINOPHIL NFR BLD AUTO: 0 % (ref 0–6)
ERYTHROCYTE [DISTWIDTH] IN BLOOD BY AUTOMATED COUNT: 12.7 % (ref 11.6–15.1)
GFR SERPL CREATININE-BSD FRML MDRD: 42 ML/MIN/1.73SQ M
GLUCOSE SERPL-MCNC: 103 MG/DL (ref 65–140)
HCT VFR BLD AUTO: 39.3 % (ref 34.8–46.1)
HGB BLD-MCNC: 12.6 G/DL (ref 11.5–15.4)
IMM GRANULOCYTES # BLD AUTO: 0.02 THOUSAND/UL (ref 0–0.2)
IMM GRANULOCYTES NFR BLD AUTO: 1 % (ref 0–2)
LYMPHOCYTES # BLD AUTO: 0.64 THOUSANDS/ΜL (ref 0.6–4.47)
LYMPHOCYTES NFR BLD AUTO: 20 % (ref 14–44)
MCH RBC QN AUTO: 32.8 PG (ref 26.8–34.3)
MCHC RBC AUTO-ENTMCNC: 32.1 G/DL (ref 31.4–37.4)
MCV RBC AUTO: 102 FL (ref 82–98)
MONOCYTES # BLD AUTO: 0.41 THOUSAND/ΜL (ref 0.17–1.22)
MONOCYTES NFR BLD AUTO: 13 % (ref 4–12)
NEUTROPHILS # BLD AUTO: 2.14 THOUSANDS/ΜL (ref 1.85–7.62)
NEUTS SEG NFR BLD AUTO: 66 % (ref 43–75)
NRBC BLD AUTO-RTO: 0 /100 WBCS
PLATELET # BLD AUTO: 215 THOUSANDS/UL (ref 149–390)
PMV BLD AUTO: 9.5 FL (ref 8.9–12.7)
POTASSIUM SERPL-SCNC: 4.3 MMOL/L (ref 3.5–5.3)
PROT SERPL-MCNC: 5.9 G/DL (ref 6.4–8.4)
RBC # BLD AUTO: 3.84 MILLION/UL (ref 3.81–5.12)
SODIUM SERPL-SCNC: 136 MMOL/L (ref 135–147)
WBC # BLD AUTO: 3.22 THOUSAND/UL (ref 4.31–10.16)

## 2022-07-19 PROCEDURE — 93005 ELECTROCARDIOGRAM TRACING: CPT

## 2022-07-19 PROCEDURE — 99239 HOSP IP/OBS DSCHRG MGMT >30: CPT

## 2022-07-19 PROCEDURE — 80053 COMPREHEN METABOLIC PANEL: CPT

## 2022-07-19 PROCEDURE — 85025 COMPLETE CBC W/AUTO DIFF WBC: CPT

## 2022-07-19 PROCEDURE — 97166 OT EVAL MOD COMPLEX 45 MIN: CPT

## 2022-07-19 PROCEDURE — 97163 PT EVAL HIGH COMPLEX 45 MIN: CPT

## 2022-07-19 RX ADMIN — OXYBUTYNIN CHLORIDE 10 MG: 5 TABLET, EXTENDED RELEASE ORAL at 08:18

## 2022-07-19 RX ADMIN — FOLIC ACID 1 MG: 1 TABLET ORAL at 08:19

## 2022-07-19 RX ADMIN — HEPARIN SODIUM 5000 UNITS: 5000 INJECTION INTRAVENOUS; SUBCUTANEOUS at 05:50

## 2022-07-19 RX ADMIN — ASPIRIN 325 MG ORAL TABLET 325 MG: 325 PILL ORAL at 08:18

## 2022-07-19 RX ADMIN — PANTOPRAZOLE SODIUM 40 MG: 40 TABLET, DELAYED RELEASE ORAL at 08:19

## 2022-07-19 RX ADMIN — LORATADINE 10 MG: 10 TABLET ORAL at 08:19

## 2022-07-19 RX ADMIN — BISOPROLOL FUMARATE 2.5 MG: 5 TABLET ORAL at 08:19

## 2022-07-19 RX ADMIN — DULOXETINE HYDROCHLORIDE 30 MG: 30 CAPSULE, DELAYED RELEASE ORAL at 08:19

## 2022-07-19 RX ADMIN — ATORVASTATIN CALCIUM 20 MG: 20 TABLET, FILM COATED ORAL at 08:19

## 2022-07-19 RX ADMIN — GUAIFENESIN 600 MG: 600 TABLET ORAL at 08:19

## 2022-07-19 RX ADMIN — DOCUSATE SODIUM 100 MG: 100 CAPSULE ORAL at 08:19

## 2022-07-19 NOTE — ASSESSMENT & PLAN NOTE
EKG showing normal sinus rhythm, left axis deviation, left ventricular hypertrophy with QRS widening and repolarization abnormality  · Baseline QT is available in Care everywhere  · Patient denies any palpitations or chest pains  · Admission EKG showing QTC of 460  · Check magnesium level  · Repeat EKG in a m  - QT CT of 411, much improved  · Hold Plaquenil

## 2022-07-19 NOTE — CASE MANAGEMENT
Case Management Assessment & Discharge Planning Note    Patient name Kev Magana  Location /-00 MRN [id number]  : 1940 Date 2022       Current Admission Date: 2022  Current Admission Diagnosis:COVID   Patient Active Problem List    Diagnosis Date Noted    COVID 2022    Hypertension 2022    COPD (chronic obstructive pulmonary disease) ([facility] [address] ) 2022    Arthritis 2022    GERD (gastroesophageal reflux disease) 2022    Fatty liver 2022    Generalized weakness 2022    Stage 3b chronic kidney disease ([facility] [address] ) 2022    Prolonged Q-T interval on ECG 2022      LOS (days): 1  Geometric Mean LOS (GMLOS) (days): 4 00  Days to GMLOS:3     OBJECTIVE:    Risk of Unplanned Readmission Score: 11 58         Current admission status: Inpatient       Preferred Pharmacy:   [place] [address] [id number] [facility] Sourav Quiroga, [address] [facility]  JENNIFER/ Thai Rodriguez [address] [place] [id number]  Phone: [phone] Fax: [phone]    Primary Care Provider: Radha Mendoza DO    Primary Insurance: MEDICARE  Secondary Insurance:     ASSESSMENT:  Agatha Conklin Proxies    There are no active Health Care Proxies on file  Advance Directives  Does patient have a [address] [place] [address] Avenue?: Yes  Does patient have Advance Directives?: Yes  Advance Directives: Living will  Primary Contact: Galilea Ng, daughter         Readmission Root Cause  30 Day Readmission: No    Patient Information  Admitted from[de-identified] Home  Mental Status: Alert  During Assessment patient was accompanied by: Not accompanied during assessment  Assessment information provided by[de-identified] Patient  Primary Caregiver: Self  Support Systems: [address] of Residence: [address] [facility]  do you live in?: [facility] entry access options   Select all that apply : Stairs  Number of steps to enter home : 4  Do the steps have railings?: No  Type of Current Residence: Apartment  Floor Level: 1  Upon entering residence, is there a bedroom on the main floor (no further steps)?: No  A bedroom is located on the following floor levels of residence (select all that apply):: 2nd Floor  Upon entering residence, is there a bathroom on the main floor (no further steps)?: No  Indicate which floors of current residence have a bathroom (select all the apply):: 2nd Floor  Number of steps to 2nd floor from main floor: 4  In the last 12 months, was there a time when you were not able to pay the mortgage or rent on time?: No  In the last 12 months, how many places have you lived?: 1  In the last 12 months, was there a time when you did not have a steady place to sleep or slept in a shelter (including now)?: No  Homeless/housing insecurity resource given?: N/A  Living Arrangements: Lives w/ Daughter  Is patient a ?: No    Activities of Daily Living Prior to Admission  Functional Status: Assistance  Completes ADLs independently?: No  Level of ADL dependence: Assistance  Ambulates independently?: No  Level of ambulatory dependence: Assistance  Does patient use assisted devices?: Yes  Assisted Devices (DME) used: Ursula Gomez  Does patient currently own DME?: Yes  What DME does the patient currently own?: Ursula Gomez  Does patient have a history of Outpatient Therapy (PT/OT)?: No  Does the patient have a history of Short-Term Rehab?: Yes ([facility])  Does patient have a history of HHC?: Yes  Does patient currently have [place] [address]?: No    Current Home Health Care  Type of Current Home Care Services: Home health aide    Patient Information Continued  Income Source: SSI/SSD  Does patient have prescription coverage?: Yes  Within the past 12 months, you worried that your food would run out before you got the money to buy more : Never true  Within the past 12 months, the food you bought just didn't last and you didn't have money to get more : Never true  Food insecurity resource given?: N/A  Does patient receive dialysis treatments?: No  Does patient have a history of substance abuse?: No  Does patient have a history of Mental Health Diagnosis?: No         Means of Transportation  Means of Transport to Appts[de-identified] Family transport  In the past 12 months, has lack of transportation kept you from medical appointments or from getting medications?: No  In the past 12 months, has lack of transportation kept you from meetings, work, or from getting things needed for daily living?: No  Was application for public transport provided?: N/A        DISCHARGE DETAILS:    Discharge planning discussed with[de-identified] patient  Freedom of Choice: Yes     CM contacted family/caregiver?: No- see comments (patient declined)  Were Treatment Team discharge recommendations reviewed with patient/caregiver?: Yes  Did patient/caregiver verbalize understanding of patient care needs?: Yes  Were patient/caregiver advised of the risks associated with not following Treatment Team discharge recommendations?: Yes         [address] [place] [address] Road         Is the patient interested in [address] at discharge?: No    DME Referral Provided  Referral made for DME?: No              Treatment Team Recommendation: Home  Discharge Destination Plan[de-identified] Home  Transport at Discharge : Family            CM contacted patient in her room via phone, baseline information was obtained  CM discussed the role of CM in helping the patient develop a discharge plan and assist the patient in carry out their plan  Patient lives at home with her daughter in first floor apartment, 4 steps inside apartment to get up to bathroom, bedrooms and living room; kitchen and dining room on first floor  Patient received Rico ramireze M,W,F 2 hours/day for bathing and cooking assistance through [employer]  Patient also indicated she has a  through [employer] who visits once every 6 months and calls her to check in on her       CM discussed with patient therapy recommendation for [address] and patient declined service indicating she previously had Heriberto Rios and still continues with her exercises at home  Patient also declined need for VN

## 2022-07-19 NOTE — ASSESSMENT & PLAN NOTE
Background:    Mild COVID pathway as below    Unvaccinated   COVID19- positive on 7/18   Not currently requiring oxygen - stats 95% on RA, 90% when ambulating in ED   CXR revealed " Significant elevation of right hemidiaphragm with resulting poor aeration in right lung, scarring/atelectasis of left lung base, no consolidation or large effusion, no significant pulmonary edema, does not show concern for viral pneumonia "   OOB TID; ambulation and mobilization strongly encouraged   PT/OT consult and evaluation -nursing states she is currently a standby to 1 assist when ambulating   o With daughter, good support at home, has visiting help 3 days a week does home PT exercises by herself   Supportive care

## 2022-07-19 NOTE — ASSESSMENT & PLAN NOTE
Presents with generalized weakness likely secondary to COVID-19 infection  Uses walker at baseline-lives with daughter  Labs grossly within normal limits, not currently requiring any oxygen    Appreciate PT/OT consult -With daughter, good support at home, has visiting help 3 days a week does home PT exercises by herself

## 2022-07-19 NOTE — DISCHARGE INSTR - AVS FIRST PAGE
Dear Yolanda Pastrana,     It was our pleasure to care for you here at [facility]   It is our hope that we were always able to exceed the expected standards for your care during your stay  You were hospitalized due to COVID infection  You were cared for by Torres Cheek PA-C under the service of Verena Holloway MD with the [facility] who covers for your primary care physician (PCP), Alison Burton DO, while you were hospitalized  If you have any questions or concerns related to this hospitalization, you may contact us at [phone]  For follow up as well as any medication refills, we recommend that you follow up with your primary care physician  A registered nurse will reach out to you by phone within a few days after your discharge to answer any additional questions that you may have after going home  However, at this time we provide for you here, the most important instructions / recommendations at discharge:     Notable Medication Adjustments -   None  Testing Required after Discharge -   None  Important follow up information -   Follow-up with your PCP  Other Instructions -   CDC is recommending quarantine for 5 days at home without contact with other people for 5 days from positive test date - you tested positive on 07/18, quarantine until 7/23  After initial 5 days of quarantine, wear mask when in public or around other people for another 5 days-until 7/28  Please review this entire after visit summary as additional general instructions including medication list, appointments, activity, diet, any pertinent wound care, and other additional recommendations from your care team that may be provided for you        Sincerely,     Torres Cheek PA-C

## 2022-07-19 NOTE — PLAN OF CARE
Problem: PAIN - ADULT  Goal: Verbalizes/displays adequate comfort level or baseline comfort level  Description: Interventions:  - Encourage patient to monitor pain and request assistance  - Assess pain using appropriate pain scale  - Administer analgesics based on type and severity of pain and evaluate response  - Implement non-pharmacological measures as appropriate and evaluate response  - Consider cultural and social influences on pain and pain management  - Notify physician/advanced practitioner if interventions unsuccessful or patient reports new pain  Outcome: Progressing     Problem: INFECTION - ADULT  Goal: Absence or prevention of progression during hospitalization  Description: INTERVENTIONS:  - Assess and monitor for signs and symptoms of infection  - Monitor lab/diagnostic results  - Monitor all insertion sites, i e  indwelling lines, tubes, and drains  - Monitor endotracheal if appropriate and nasal secretions for changes in amount and color  - [place] appropriate cooling/warming therapies per order  - Administer medications as ordered  - Instruct and encourage patient and family to use good hand hygiene technique  - Identify and instruct in appropriate isolation precautions for identified infection/condition  Outcome: Progressing     Problem: DISCHARGE PLANNING  Goal: Discharge to home or other facility with appropriate resources  Description: INTERVENTIONS:  - Identify barriers to discharge w/patient and caregiver  - Arrange for needed discharge resources and transportation as appropriate  - Identify discharge learning needs (meds, wound care, etc )  - Arrange for interpretive services to assist at discharge as needed  - Refer to Case Management Department for coordinating discharge planning if the patient needs post-hospital services based on physician/advanced practitioner order or complex needs related to functional status, cognitive ability, or social support system  Outcome: Progressing Problem: Knowledge Deficit  Goal: Patient/family/caregiver demonstrates understanding of disease process, treatment plan, medications, and discharge instructions  Description: Complete learning assessment and assess knowledge base    Interventions:  - Provide teaching at level of understanding  - Provide teaching via preferred learning methods  Outcome: Progressing     Problem: RESPIRATORY - ADULT  Goal: Achieves optimal ventilation and oxygenation  Description: INTERVENTIONS:  - Assess for changes in respiratory status  - Assess for changes in mentation and behavior  - Position to facilitate oxygenation and minimize respiratory effort  - Oxygen administered by appropriate delivery if ordered  - Initiate smoking cessation education as indicated  - Encourage broncho-pulmonary hygiene including cough, deep breathe, Incentive Spirometry  - Assess the need for suctioning and aspirate as needed  - Assess and instruct to report SOB or any respiratory difficulty  - Respiratory Therapy support as indicated  Outcome: Progressing     Problem: MOBILITY - ADULT  Goal: Maintain or return to baseline ADL function  Description: INTERVENTIONS:  -  Assess patient's ability to carry out ADLs; assess patient's baseline for ADL function and identify physical deficits which impact ability to perform ADLs (bathing, care of mouth/teeth, toileting, grooming, dressing, etc )  - Assess/evaluate cause of self-care deficits   - Assess range of motion  - Assess patient's mobility; develop plan if impaired  - Assess patient's need for assistive devices and provide as appropriate  - Encourage maximum independence but intervene and supervise when necessary  - Involve family in performance of ADLs  - Assess for home care needs following discharge   - Consider OT consult to assist with ADL evaluation and planning for discharge  - Provide patient education as appropriate  Outcome: Progressing  Goal: Maintains/Returns to pre admission functional level  Description: INTERVENTIONS:  - Perform BMAT or MOVE assessment daily    - Set and communicate daily mobility goal to care team and patient/family/caregiver  - Collaborate with rehabilitation services on mobility goals if consulted  - Perform Range of Motion - times a day  - Reposition patient every - hours    - Dangle patient - times a day  - Stand patient - times a day  - Ambulate patient - times a day  - Out of bed to chair - times a day   - Out of bed for meals - times a day  - Out of bed for toileting  - Record patient progress and toleration of activity level   Outcome: Progressing

## 2022-07-19 NOTE — PLAN OF CARE
Problem: PAIN - ADULT  Goal: Verbalizes/displays adequate comfort level or baseline comfort level  Description: Interventions:  - Encourage patient to monitor pain and request assistance  - Assess pain using appropriate pain scale  - Administer analgesics based on type and severity of pain and evaluate response  - Implement non-pharmacological measures as appropriate and evaluate response  - Consider cultural and social influences on pain and pain management  - Notify physician/advanced practitioner if interventions unsuccessful or patient reports new pain  7/19/2022 1437 by Bella Horn RN  Outcome: Adequate for Discharge  7/19/2022 0850 by Bella Horn RN  Outcome: Progressing     Problem: INFECTION - ADULT  Goal: Absence or prevention of progression during hospitalization  Description: INTERVENTIONS:  - Assess and monitor for signs and symptoms of infection  - Monitor lab/diagnostic results  - Monitor all insertion sites, i e  indwelling lines, tubes, and drains  - Monitor endotracheal if appropriate and nasal secretions for changes in amount and color  - [place] appropriate cooling/warming therapies per order  - Administer medications as ordered  - Instruct and encourage patient and family to use good hand hygiene technique  - Identify and instruct in appropriate isolation precautions for identified infection/condition  7/19/2022 1437 by Bella Horn RN  Outcome: Adequate for Discharge  7/19/2022 0850 by Bella Horn RN  Outcome: Progressing     Problem: DISCHARGE PLANNING  Goal: Discharge to home or other facility with appropriate resources  Description: INTERVENTIONS:  - Identify barriers to discharge w/patient and caregiver  - Arrange for needed discharge resources and transportation as appropriate  - Identify discharge learning needs (meds, wound care, etc )  - Arrange for interpretive services to assist at discharge as needed  - Refer to Case Management Department for coordinating discharge planning if the patient needs post-hospital services based on physician/advanced practitioner order or complex needs related to functional status, cognitive ability, or social support system  7/19/2022 1437 by Soila Cardoza RN  Outcome: Adequate for Discharge  7/19/2022 0850 by Soila Cardoza RN  Outcome: Progressing     Problem: Knowledge Deficit  Goal: Patient/family/caregiver demonstrates understanding of disease process, treatment plan, medications, and discharge instructions  Description: Complete learning assessment and assess knowledge base    Interventions:  - Provide teaching at level of understanding  - Provide teaching via preferred learning methods  7/19/2022 1437 by Soila Cardoza RN  Outcome: Adequate for Discharge  7/19/2022 0850 by Soila Cardoza RN  Outcome: Progressing     Problem: RESPIRATORY - ADULT  Goal: Achieves optimal ventilation and oxygenation  Description: INTERVENTIONS:  - Assess for changes in respiratory status  - Assess for changes in mentation and behavior  - Position to facilitate oxygenation and minimize respiratory effort  - Oxygen administered by appropriate delivery if ordered  - Initiate smoking cessation education as indicated  - Encourage broncho-pulmonary hygiene including cough, deep breathe, Incentive Spirometry  - Assess the need for suctioning and aspirate as needed  - Assess and instruct to report SOB or any respiratory difficulty  - Respiratory Therapy support as indicated  7/19/2022 1437 by Soila Cardoza RN  Outcome: Adequate for Discharge  7/19/2022 0850 by Soila Cardoza RN  Outcome: Progressing     Problem: MOBILITY - ADULT  Goal: Maintain or return to baseline ADL function  Description: INTERVENTIONS:  -  Assess patient's ability to carry out ADLs; assess patient's baseline for ADL function and identify physical deficits which impact ability to perform ADLs (bathing, care of mouth/teeth, toileting, grooming, dressing, etc )  - Assess/evaluate cause of self-care deficits   - Assess range of motion  - Assess patient's mobility; develop plan if impaired  - Assess patient's need for assistive devices and provide as appropriate  - Encourage maximum independence but intervene and supervise when necessary  - Involve family in performance of ADLs  - Assess for home care needs following discharge   - Consider OT consult to assist with ADL evaluation and planning for discharge  - Provide patient education as appropriate  7/19/2022 1437 by Bella Horn RN  Outcome: Adequate for Discharge  7/19/2022 0850 by Bella Horn RN  Outcome: Progressing  Goal: Maintains/Returns to pre admission functional level  Description: INTERVENTIONS:  - Perform BMAT or MOVE assessment daily    - Set and communicate daily mobility goal to care team and patient/family/caregiver  - Collaborate with rehabilitation services on mobility goals if consulted  - Perform Range of Motion   times a day  - Reposition patient every   hours  - Dangle patient   times a day  - Stand patient   times a day  - Ambulate patient   times a day  - Out of bed to chair   times a day   - Out of bed for meals   times a day  - Out of bed for toileting  - Record patient progress and toleration of activity level   7/19/2022 1437 by Bella Horn RN  Outcome: Adequate for Discharge  7/19/2022 0850 by Bella Horn RN  Outcome: Progressing     Problem: Potential for Falls  Goal: Patient will remain free of falls  Description: INTERVENTIONS:  - Educate patient/family on patient safety including physical limitations  - Instruct patient to call for assistance with activity   - Consult OT/PT to assist with strengthening/mobility   - Keep Call bell within reach  - Keep bed low and locked with side rails adjusted as appropriate  - Keep care items and personal belongings within reach  - Initiate and maintain comfort rounds  - Make Fall Risk Sign visible to staff  - Offer Toileting every   Hours, in advance of need  - Initiate/Maintain   alarm  - Obtain necessary fall risk management equipment:      - Apply yellow socks and bracelet for high fall risk patients  - Consider moving patient to room near nurses station  Outcome: Adequate for Discharge     Problem: PHYSICAL THERAPY ADULT  Goal: Performs mobility at highest level of function for planned discharge setting  See evaluation for individualized goals  Description: Treatment/Interventions: Functional transfer training, LE strengthening/ROM, Elevations, Therapeutic exercise, Endurance training, Patient/family training, Equipment eval/education, Bed mobility, Gait training, Compensatory technique education, Spoke to nursing, OT  Equipment Recommended: Brandy Mclain       See flowsheet documentation for full assessment, interventions and recommendations    Outcome: Adequate for Discharge

## 2022-07-19 NOTE — NURSING NOTE
Discharge instructions reviewed with patient  Patient verbalized understanding of same  Agrees to follow up with PCP as soon as possible

## 2022-07-19 NOTE — PLAN OF CARE
Problem: PHYSICAL THERAPY ADULT  Goal: Performs mobility at highest level of function for planned discharge setting  See evaluation for individualized goals  Description: Treatment/Interventions: Functional transfer training, LE strengthening/ROM, Elevations, Therapeutic exercise, Endurance training, Patient/family training, Equipment eval/education, Bed mobility, Gait training, Compensatory technique education, Spoke to nursing, OT  Equipment Recommended: Marylou Stratton       See flowsheet documentation for full assessment, interventions and recommendations  Note: Prognosis: Good  Problem List: Decreased strength, Decreased endurance, Impaired balance, Decreased coordination  Assessment: Pt is a 80 y o  female seen for PT evaluation s/p admission to [address] [place] [address] on 7/18/2022 with COVID  Order placed for PT services  Upon evaluation: Pt is presenting with impaired functional mobility due to decreased strength, decreased endurance, impaired balance, gait deviations, decreased safety awareness, impaired judgment, and fall risk requiring  supervision assistance for transfers and ambulation with RW   Pt's clinical presentation is currently unstable/unpredictable given the functional mobility deficits above coupled with fall risks as indicated by AM-PAC 6-Clicks: 79/73 as well as impaired balance, polypharmacy, impaired judgement, and decreased safety awareness and combined with medical complications of hypertension , abnormal WBCs, and need for input for mobility technique/safety  Pt's PMHx and comorbidities that may affect physical performance and progress include: COPD, CKD, DM, HTN, and asthma  Personal factors affecting pt at time of IE include: step(s) to enter environment, multi-level environment, limited home support, inability to perform IADLs, inability to perform ADLs, inability to navigate level surfaces without external assistance, and inability to navigate community distances   Pt will benefit from continued skilled PT services to address deficits as defined above and to maximize level of functional mobility to facilitate return toward PLOF and improved QOL  From PT/mobility standpoint, recommendation at time of d/c would be Home PT pending progress in order to reduce fall risk and maximize pt's functional independence and consistency with mobility in order to facilitate return to PLOF  Recommend trial with walker next 1-2 sessions and ther ex next 1-2 sessions  Barriers to Discharge: None     PT Discharge Recommendation: Home with home health rehabilitation    See flowsheet documentation for full assessment

## 2022-07-19 NOTE — ASSESSMENT & PLAN NOTE
Lab Results   Component Value Date    EGFR 42 07/19/2022    EGFR 36 07/18/2022    CREATININE 1 21 07/19/2022    CREATININE 1 39 (H) 07/18/2022     · In care everywhere baseline creatinine in 1 1-1 3-creatinine on admission 1 39 - improved  · Continue to monitor BMP  · Holding lisinopril and hydrochlorothiazide  · Avoid hypotension, nephrotoxic agents, NSAIDs

## 2022-07-19 NOTE — DISCHARGE SUMMARY
[address]  Discharge- Shreyas Malik 1940, 80 y o  female MRN: [id number]  Unit/Bed#: -01 Encounter: [id number]  Primary Care Provider: Fredy Jeffery DO   Date and time admitted to hospital: 7/18/2022 11:28 AM    * COVID  Assessment & Plan  Background:    Mild COVID pathway as below    Unvaccinated   COVID19- positive on 7/18   Not currently requiring oxygen - stats 95% on RA, 90% when ambulating in ED   CXR revealed " Significant elevation of right hemidiaphragm with resulting poor aeration in right lung, scarring/atelectasis of left lung base, no consolidation or large effusion, no significant pulmonary edema, does not show concern for viral pneumonia "   OOB TID; ambulation and mobilization strongly encouraged   PT/OT consult and evaluation -nursing states she is currently a standby to 1 assist when ambulating   o With daughter, good support at home, has visiting help 3 days a week does home PT exercises by herself   Supportive care      Generalized weakness  Assessment & Plan  Presents with generalized weakness likely secondary to COVID-19 infection  Uses walker at baseline-lives with daughter  Labs grossly within normal limits, not currently requiring any oxygen    Appreciate PT/OT consult -With daughter, good support at home, has visiting help 3 days a week does home PT exercises by herself    COPD (chronic obstructive pulmonary disease) (HCC)  Assessment & Plan  · Patient endorses history of COPD - does not seem to be in acute exacerbation  · Uses intermittent albuterol  · Shortness of breath is at baseline  · Nebs prn for shortness of breath     Prolonged Q-T interval on ECG  Assessment & Plan  EKG showing normal sinus rhythm, left axis deviation, left ventricular hypertrophy with QRS widening and repolarization abnormality  · Baseline QT is available in Care everywhere  · Patient denies any palpitations or chest pains  · Admission EKG showing QTC of 460  · Check magnesium level  · Repeat EKG in a m  - QT CT of 411, much improved  · Hold Plaquenil    Stage 3b chronic kidney disease [facility])  Assessment & Plan  Lab Results   Component Value Date    EGFR 42 07/19/2022    EGFR 36 07/18/2022    CREATININE 1 21 07/19/2022    CREATININE 1 39 (H) 07/18/2022     · In care everywhere baseline creatinine in 1 1-1 3-creatinine on admission 1 39 - improved  · Continue to monitor BMP  · Holding lisinopril and hydrochlorothiazide  · Avoid hypotension, nephrotoxic agents, NSAIDs    Fatty liver  Assessment & Plan  Patient denies history of liver disease  In care everywhere had right upper quadrant ultrasound after having elevated transaminases on 07/19/2021  · Showed gallstones without gross gallbladder wall thickening, increased hepatic echogenicity suggesting presence of fatty infiltration with no focal hepatic lesion seen  · LFTs today are within normal limits  · Denies any abdominal pain whatsoever  · Chest x-ray today showing significant elevation of the right hemidiaphragm causing poor aeration of right lung    Follow-up with PCP    GERD (gastroesophageal reflux disease)  Assessment & Plan  Continue Protonix    Arthritis  Assessment & Plan  History of arthritis on methotrexate once a week-unsure of what day she took it last  Also on Plaquenil - holding in setting of elevated QT on EKG  Repeat EKG in morning    Hypertension  Assessment & Plan  · Maintained on lisinopril, combination bisoprolol-hydrochlorothiazide pill  · Creatinine is currently at baseline  · Continue medications, blood pressure well controlled  · Monitor BMP closely and adjust medications as necessary      Medical Problems                 Discharging Physician / Practitioner: Owen Tejeda PA-C  PCP: Elizabeth Becker DO  Admission Date:   Admission Orders (From admission, onward)     Ordered        07/18/22 1309  INPATIENT ADMISSION  Once                      Discharge Date: 07/19/22    Consultations During Hospital Stay:  · None    Procedures Performed:   · None    Significant Findings / Test Results:   · COVID positive-7/18    Incidental Findings:   · None     Test Results Pending at Discharge (will require follow up): · None     Outpatient Tests Requested:  · None    Complications:  None    Reason for Admission:  COVID-19    Hospital Course:   Mariana Stern is a 80 y o  female patient with history of hypertension, COPD, arthritis who originally presented to the hospital on 7/18/2022 due to 2-3 days of feeling weak and fatigued  Had chills and decreased appetite and cough at home, daughter was positive for COVID about 5 days prior  In hospital she was initially ambulating around 90-91% on room air, now 95% when ambulating on room air, did not need oxygen supplementation at any time  Was started on mild COVID pathway, no remdesivir or steroids started  Labs grossly within normal limits  Was evaluated by PT and felt she might benefit from home PT, does already have home health care visits  Patient is agreeable to discharge  The patient, initially admitted to the hospital as inpatient, was discharged earlier than expected given the following: No oxygen requirements with COVID, PT not recommending any acute rehab due to weakness  Please see above list of diagnoses and related plan for additional information  Condition at Discharge: fair    Discharge Day Visit / Exam:   Subjective:  Seen examined on day of discharge  Endorsing feeling much better, less weak, ambulating well and does not need any oxygen denying any shortness of breath, chills, myalgias     Vitals: Blood Pressure: 120/70 (07/19/22 0654)  Pulse: (!) 52 (07/19/22 0654)  Temperature: 97 8 °F (36 6 °C) (07/19/22 0654)  Temp Source: Temporal (07/18/22 1134)  Respirations: 18 (07/19/22 0654)  Height: 5' (152 4 cm) (07/18/22 1350)  Weight - Scale: 59 8 kg (131 lb 12 8 oz) (07/18/22 1336)  SpO2: 95 % (07/19/22 0654)  Exam:   Physical Exam  Vitals and nursing note reviewed  Constitutional:       General: She is not in acute distress  Appearance: Normal appearance  HENT:      Head: Normocephalic and atraumatic  Nose: No congestion  Mouth/Throat:      Mouth: Mucous membranes are moist    Eyes:      Conjunctiva/sclera: Conjunctivae normal    Cardiovascular:      Rate and Rhythm: Normal rate and regular rhythm  Pulses: Normal pulses  Heart sounds: Normal heart sounds  No murmur heard  Pulmonary:      Effort: Pulmonary effort is normal  No respiratory distress  Breath sounds: Normal breath sounds  Abdominal:      General: Bowel sounds are normal       Palpations: Abdomen is soft  Tenderness: There is no abdominal tenderness  Musculoskeletal:         General: Normal range of motion  Right lower leg: No edema  Left lower leg: No edema  Skin:     General: Skin is warm and dry  Neurological:      Mental Status: She is alert and oriented to person, place, and time  Discussion with Family: Attempted to update  (daughter) via phone  Left voicemail  Discharge instructions/Information to patient and family:   See after visit summary for information provided to patient and family  Provisions for Follow-Up Care:  See after visit summary for information related to follow-up care and any pertinent home health orders  Disposition:   Home    Planned Readmission: None      Discharge Statement:  I spent 45  minutes discharging the patient  This time was spent on the day of discharge  I had direct contact with the patient on the day of discharge  Greater than 50% of the total time was spent examining patient, answering all patient questions, arranging and discussing plan of care with patient as well as directly providing post-discharge instructions  Additional time then spent on discharge activities      Discharge Medications:  See after visit summary for reconciled discharge medications provided to patient and/or family        **Please Note: This note may have been constructed using a voice recognition system**

## 2022-07-19 NOTE — OCCUPATIONAL THERAPY NOTE
Occupational Therapy Evaluation     Patient Name: Anabelle Al  US'S Date: 7/19/2022  Problem List  Principal Problem:    COVID  Active Problems:    Hypertension    COPD (chronic obstructive pulmonary disease) (HCC)    Arthritis    GERD (gastroesophageal reflux disease)    Fatty liver    Generalized weakness    Stage 3b chronic kidney disease (HCC)    Prolonged Q-T interval on ECG    Past Medical History  Past Medical History:   Diagnosis Date    Arthritis     Asthma     Diabetes mellitus ([address] )     Hypertension     MI, old      Past Surgical History  Past Surgical History:   Procedure Laterality Date    BACK SURGERY      CARDIAC SURGERY      HYSTERECTOMY           07/19/22 1120   Note Type   Note type Evaluation   Restrictions/Precautions   Other Precautions Airborne/isolation;Contact/isolation; Chair Alarm; Bed Alarm; Fall Risk   Pain Assessment   Pain Assessment Tool 0-10   Pain Score No Pain   Home Living   Type of Home House  (6 RONNELL R HR)   Home Layout Multi-level;Bed/bath upstairs  (4 steps to 2nd floor B HR)   Bathroom Shower/Tub Walk-in shower   Bathroom Toilet Standard   Home Equipment Walker   Additional Comments Pt reprots living at home with her daughter in a split level home  Pt ambulates with RW at [facility] Function   Level of [place] Independent with ADLs and functional mobility   Lives With Daughter   Receives Help From Family;Personal care attendant  [facility] 3day/wk 2hrs/day)   ADL Assistance Independent  (assistance with bathing from [facility])   IADLs Needs assistance   Falls in the last 6 months 0   Comments Pt reports completing ADLs and functional ambulation @ Mod I  Assistance only for bathing from [facility] aides  Pt has assistance for all IADLs and community mobility   ADL   Where Assessed Edge of bed   Grooming Assistance 5  Supervision/Setup   Grooming Deficit Verbal cueing;Supervision/safety; Increased time to complete;Standing with assistive device   UB Dressing Assistance 6 Modified independent   UB Dressing Deficit Setup   LB Dressing Assistance 5  Supervision/Setup   LB Dressing Deficit Verbal cueing;Supervision/safety; Increased time to complete; Don/doff R sock; Don/doff L sock; Thread RLE into pants; Thread LLE into pants;Pull up over hips   Toileting Assistance  5  Supervision/Setup   Toileting Deficit Verbal cueing;Supervison/safety; Increased time to complete;Grab bar use;Clothing management up;Clothing management down;Perineal hygiene   Additional Comments Pt completing ADL tasks while seated at EOB  UB Dressing @ Mod I after set-up  LB Dressing @ S inclding donning socks/pants around feet and for CM aorund waist with UB supported from RW PRN  pt then competing toileting tasks and grooming (standin at sinkside) @ S with increased time PRN, increased pericare, CM aroun waist and STS from toilet  Bed Mobility   Supine to Sit 7  Independent   Additional Comments HOB flat no bedrails   Transfers   Sit to Stand 5  Supervision   Additional items Increased time required;Verbal cues   Stand to Sit 5  Supervision   Additional items Increased time required;Verbal cues   Stand pivot 5  Supervision   Additional items Increased time required;Verbal cues   Toilet transfer 5  Supervision   Additional items Increased time required;Verbal cues;Standard toilet   Additional Comments Pt ocmpleting functional transfers with use of RW for UB support   S for STS and SPT with increased time and vc'ing for safety/technique PRN   Balance   Static Sitting Normal   Dynamic Sitting Good   Static Standing Fair +   Dynamic Standing Fair   Activity Tolerance   Activity Tolerance Patient tolerated treatment well   Medical Staff Made Aware Spoke with PT, Lurdes   Nurse Made Aware Spoke with RNZack Assessment   RUE Assessment WFL   LUE Assessment   LUE Assessment WFL   Hand Function   Gross Motor Coordination Functional   Fine Motor Coordination Functional   Sensation   Light Touch No apparent deficits Cognition   Overall Cognitive Status WFL   Arousal/Participation Alert; Responsive; Cooperative   Attention Attends with cues to redirect   Orientation Level Oriented X4   Memory Within functional limits   Following Commands Follows one step commands without difficulty   Assessment   Limitation Decreased ADL status; Decreased UE strength;Decreased Safe judgement during ADL;Decreased endurance;Decreased self-care trans;Decreased high-level ADLs   Prognosis Good   Assessment Pt is a 80 y o  female, admitted to [facility] 7/18/2022 d/t experiencing increased SOB and fatigue  Dx: [id number]-[id number]  Pt with PMHx impacting their performance during ADL tasks, including: arthritis, asthma, DM, HTN, MI  Prior to admission to the hospital Pt was performing ADLs with physical assistance  IADLs with physical assistance  Functional transfers/ambulation without physical assistance  Cognitive status was PTA was intact  OT order placed to assess Pt's ADLs, cognitive status, and performance during functional tasks in order to maximize safety and independence while making most appropriate d/c recommendations  Pt's clinical presentation is currently evolving given new onset deficits that effect Pt's occupational performance and ability to safely return to OF including decrease activity tolerance, decrease standing balance, decrease performance during ADL tasks, decrease safety awareness , decrease generalized strength, decrease activity engagement, decrease performance during functional transfers, steps to enter home and limited insight to deficits combined with medical complications of decreased skin integrity and need for input for mobility technique/safety  Personal factors affecting Pt at time of initial evaluation include: step(s) to enter environment, multi-level environment, inability to perform IADLs, inability to perform ADLs, inability to navigate community distances and limited insight into impairments   Pt will benefit from continued skilled OT services to address deficits as defined above and to maximize level independence/participation during ADLs and functional tasks to facilitate return toward PLOF and improved quality of life  From an occupational therapy standpoint, recommendation at time of d/c would be no further acute OT needs  Plan   Treatment Interventions ADL retraining;Functional transfer training;UE strengthening/ROM; Endurance training;Patient/family training;Equipment evaluation/education; Neuromuscular reeducation; Compensatory technique education;Continued evaluation; Energy conservation; Activityengagement   Goal Expiration Date 08/02/22   OT Frequency 1-2x/wk   Recommendation   OT Discharge Recommendation No rehabilitation needs   AM-PAC Daily Activity Inpatient   Lower Body Dressing 3   Bathing 3   Toileting 3   Upper Body Dressing 4   Grooming 3   Eating 4   Daily Activity Raw Score 20   Daily Activity Standardized Score (Calc for Raw Score >=11) 42 03   AM-[facility] Applied Cognition Inpatient   Following a Speech/Presentation 3   Understanding Ordinary Conversation 4   Taking Medications 3   Remembering Where Things Are Placed or Put Away 4   Remembering List of 4-5 Errands 4   Taking Care of Complicated Tasks 3   Applied Cognition Raw Score 21   Applied Cognition Standardized Score 44 3     The patient's raw score on the AM-PAC Daily Activity inpatient short form is 20, standardized score is 42 03, greater than 39 4  Patients at this level are likely to benefit from DC to home  Please refer to the recommendation of the Occupational Therapist for safe DC planning  Pt goals to be met by 8/2/2022    Pt will demonstrate ability to complete LB dressing @ Mod I in order to increase safety and independence during meaningful tasks  Pt will demonstrate ability to marta/doff socks/shoes while sitting EOB @ Mod i in order to increase safety and independence during meaningful tasks     Pt will demonstrate ability to complete toileting tasks including CM and pericare @ Mod I in order to increase safety and independence during meaningful tasks  Pt will demonstrate ability to complete EOB, chair, toilet/commode transfers @ Mod I in order to increase performance and participation during functional tasks  Pt will demonstrate ability to stand for 10+ minutes while maintaining G balance with use of RW for UB support PRN  Pt will demonstrate ability to tolerate 30-35 minute OT session with no vc'ing for deep breathing or use of energy conservation techniques in order to increase activity tolerance during functional tasks  Pt will demonstrate Good carryover of use of energy conservation/compensatory strategies during ADLs and functional tasks in order to increase safety and reduce risk for falls  Pt will demonstrate Good attention and participation in continued evaluation of functional ambulation house hold distances in order to assist with safe d/c planning  Pt will attend to continued cognitive assessments 100% of the time in order to provide most appropriate d/c recommendations  Pt will follow 100% simple 2-step commands and be A&O x4 consistently with environmental cues to increase participation in functional activities  Pt will identify 3 areas of interest/hobbies and 1 intervention on how to incorporate into daily life in order to increase interaction with environment and peers as well as increase participation in meaningful tasks  Pt will demonstrate 100% carryover of BUE HEP in order to increase BUE MS and increase performance during functional tasks upon d/c home      Roland Mg OTR/L

## 2022-07-19 NOTE — PLAN OF CARE
Problem: PAIN - ADULT  Goal: Verbalizes/displays adequate comfort level or baseline comfort level  Description: Interventions:  - Encourage patient to monitor pain and request assistance  - Assess pain using appropriate pain scale  - Administer analgesics based on type and severity of pain and evaluate response  - Implement non-pharmacological measures as appropriate and evaluate response  - Consider cultural and social influences on pain and pain management  - Notify physician/advanced practitioner if interventions unsuccessful or patient reports new pain  Outcome: Progressing     Problem: INFECTION - ADULT  Goal: Absence or prevention of progression during hospitalization  Description: INTERVENTIONS:  - Assess and monitor for signs and symptoms of infection  - Monitor lab/diagnostic results  - Monitor all insertion sites, i e  indwelling lines, tubes, and drains  - Monitor endotracheal if appropriate and nasal secretions for changes in amount and color  - Vesta appropriate cooling/warming therapies per order  - Administer medications as ordered  - Instruct and encourage patient and family to use good hand hygiene technique  - Identify and instruct in appropriate isolation precautions for identified infection/condition  Outcome: Progressing     Problem: DISCHARGE PLANNING  Goal: Discharge to home or other facility with appropriate resources  Description: INTERVENTIONS:  - Identify barriers to discharge w/patient and caregiver  - Arrange for needed discharge resources and transportation as appropriate  - Identify discharge learning needs (meds, wound care, etc )  - Arrange for interpretive services to assist at discharge as needed  - Refer to Case Management Department for coordinating discharge planning if the patient needs post-hospital services based on physician/advanced practitioner order or complex needs related to functional status, cognitive ability, or social support system  Outcome: Progressing Problem: Knowledge Deficit  Goal: Patient/family/caregiver demonstrates understanding of disease process, treatment plan, medications, and discharge instructions  Description: Complete learning assessment and assess knowledge base    Interventions:  - Provide teaching at level of understanding  - Provide teaching via preferred learning methods  Outcome: Progressing     Problem: RESPIRATORY - ADULT  Goal: Achieves optimal ventilation and oxygenation  Description: INTERVENTIONS:  - Assess for changes in respiratory status  - Assess for changes in mentation and behavior  - Position to facilitate oxygenation and minimize respiratory effort  - Oxygen administered by appropriate delivery if ordered  - Initiate smoking cessation education as indicated  - Encourage broncho-pulmonary hygiene including cough, deep breathe, Incentive Spirometry  - Assess the need for suctioning and aspirate as needed  - Assess and instruct to report SOB or any respiratory difficulty  - Respiratory Therapy support as indicated  Outcome: Progressing     Problem: MOBILITY - ADULT  Goal: Maintain or return to baseline ADL function  Description: INTERVENTIONS:  -  Assess patient's ability to carry out ADLs; assess patient's baseline for ADL function and identify physical deficits which impact ability to perform ADLs (bathing, care of mouth/teeth, toileting, grooming, dressing, etc )  - Assess/evaluate cause of self-care deficits   - Assess range of motion  - Assess patient's mobility; develop plan if impaired  - Assess patient's need for assistive devices and provide as appropriate  - Encourage maximum independence but intervene and supervise when necessary  - Involve family in performance of ADLs  - Assess for home care needs following discharge   - Consider OT consult to assist with ADL evaluation and planning for discharge  - Provide patient education as appropriate  Outcome: Progressing  Goal: Maintains/Returns to pre admission functional level  Description: INTERVENTIONS:  - Perform BMAT or MOVE assessment daily    - Set and communicate daily mobility goal to care team and patient/family/caregiver  - Collaborate with rehabilitation services on mobility goals if consulted  - Perform Range of Motion   times a day  - Reposition patient every   hours  - Dangle patient   times a day  - Stand patient   times a day  - Ambulate patient   times a day  - Out of bed to chair   times a day   - Out of bed for meals     times a day  - Out of bed for toileting  - Record patient progress and toleration of activity level   Outcome: Progressing

## 2022-07-19 NOTE — PHYSICAL THERAPY NOTE
PHYSICAL THERAPY EVALUATION  NAME:  Haleigh Langley  DATE: 07/19/22    AGE:   80 y o   Mrn:   [id number]  ADMIT DX:  Fatigue [R53 83]  Weakness [R53 1]  COVID [U07 1]    Past Medical History:   Diagnosis Date    Arthritis     Asthma     Diabetes mellitus ([address] )     Hypertension     MI, old      Length Of Stay: 1  Performed at least 2 patient identifiers during session: Name and Birthday  PHYSICAL THERAPY EVALUATION :        07/19/22 1119   Note Type   Note type Evaluation   Pain Assessment   Pain Assessment Tool 0-10   Pain Score No Pain   Restrictions/Precautions   Other Precautions Contact/isolation; Airborne/isolation; Chair Alarm; Bed Alarm; Fall Risk   Home Living   Type of Home House  (6 RONNELL RHR)   Home Layout Multi-level;Bed/bath upstairs  (4 steps)   Bathroom Shower/Tub Walk-in shower   Bathroom Toilet Standard   Home Equipment Walker  (reports RW is worn out)   Additional Comments Pt reprots living at home with her daughter in a split level home  Pt ambulates with RW at baseline, + home alone occasionally   Prior Function   Level of [place] Independent with ADLs and functional mobility   Lives With Daughter  (NICK and great gdtr)   Receives Help From Family;Personal care attendant  ([facility] aide 3day/wk 2hrs/day)   ADL Assistance Independent  (assistance with bathing from [facility])   IADLs Needs assistance   Falls in the last 6 months 0   Comments Pt reports completing ADLs and functional ambulation @ Mod I  Assistance only for bathing from [facility] aides   Pt has assistance for all IADLs and community mobility   Cognition   Overall Cognitive Status WFL   Orientation Level Oriented X4   Following Commands Follows one step commands without difficulty   RLE Assessment   RLE Assessment WFL  (4/5 strength)   LLE Assessment   LLE Assessment WFL  (4/5 strength)   Light Touch   RLE Light Touch Grossly intact   LLE Light Touch Grossly intact   Bed Mobility   Supine to Sit 7  Independent   Additional Comments HOB flat, no bedrails   Transfers   Sit to Stand 5  Supervision   Additional items Increased time required;Verbal cues   Stand to Sit 5  Supervision   Additional items Increased time required;Verbal cues   Stand pivot 5  Supervision   Additional items Increased time required;Verbal cues   Additional Comments Pt used RW for all transfers; VC for hand placement and safety   Ambulation/Elevation   Gait pattern Decreased foot clearance; Short stride; Excessively slow   Gait Assistance 5  Supervision   Additional items Verbal cues   Assistive Device Rolling walker   Distance 50' x 2 VC for safety, no LOB  Pt completed marching in place with BUE support to simulate stairs with supervision for safety x 10 reps   Balance   Static Sitting Normal   Dynamic Sitting Good   Static Standing Fair +   Dynamic Standing Fair   Ambulatory Fair   Endurance Deficit   Endurance Deficit Yes   Endurance Deficit Description fatigue   Activity Tolerance   Activity Tolerance Patient limited by fatigue   Medical Staff Made Aware OTDrake   Nurse Made Aware RN, [facility]   Assessment   Prognosis Good   Problem List Decreased strength;Decreased endurance; Impaired balance;Decreased coordination   Barriers to Discharge None   Goals   Patient Goals Go home   STG Expiration Date 08/02/22   Plan   Treatment/Interventions Functional transfer training;LE strengthening/ROM; Elevations; Therapeutic exercise; Endurance training;Patient/family training;Equipment eval/education; Bed mobility;Gait training; Compensatory technique education;Spoke to nursing;OT   PT Frequency 2-3x/wk   Recommendation   PT Discharge Recommendation Home with home health rehabilitation   Equipment Recommended [facility] Recommended Wheeled walker   Change/add to [employer]?  No   AM-PAC Basic Mobility Inpatient   Turning in Bed Without Bedrails 4   Lying on Back to Sitting on Edge of Flat Bed 4   Moving Bed to Chair 3   Standing Up From Chair 3   Walk in Room 3   Climb 3-5 Stairs 3 Basic Mobility Inpatient Raw Score 20   Basic Mobility Standardized Score 43 99   Highest Level Of Mobility   -HLM Goal 6: Walk 10 steps or more   JH-HLM Achieved 7: Walk 25 feet or more   End of Consult   Patient Position at End of Consult Bedside chair;Bed/Chair alarm activated; All needs within reach     The patient's AM-PAC Basic Mobility Inpatient Short Form Raw Score is 20    A Raw score of greater than 16 suggests the patient may benefit from discharge to home  Please also refer to the recommendation of the Physical Therapist for safe discharge planning  (Please find full objective findings from PT assessment regarding body systems outlined above)  Assessment: Pt is a 80 y o  female seen for PT evaluation s/p admission to [address] [place] [address] on 7/18/2022 with COVID  Order placed for PT services  Upon evaluation: Pt is presenting with impaired functional mobility due to decreased strength, decreased endurance, impaired balance, gait deviations, decreased safety awareness, impaired judgment, and fall risk requiring  supervision assistance for transfers and ambulation with RW   Pt's clinical presentation is currently unstable/unpredictable given the functional mobility deficits above coupled with fall risks as indicated by AM-PAC 6-Clicks: 58/16 as well as impaired balance, polypharmacy, impaired judgement, and decreased safety awareness and combined with medical complications of hypertension , abnormal WBCs, and need for input for mobility technique/safety  Pt's PMHx and comorbidities that may affect physical performance and progress include: COPD, CKD, DM, HTN, and asthma  Personal factors affecting pt at time of IE include: step(s) to enter environment, multi-level environment, limited home support, inability to perform IADLs, inability to perform ADLs, inability to navigate level surfaces without external assistance, and inability to navigate community distances   Pt will benefit from continued skilled PT services to address deficits as defined above and to maximize level of functional mobility to facilitate return toward PLOF and improved QOL  From PT/mobility standpoint, recommendation at time of d/c would be Home PT pending progress in order to reduce fall risk and maximize pt's functional independence and consistency with mobility in order to facilitate return to PLOF  Recommend trial with walker next 1-2 sessions and ther ex next 1-2 sessions  Goals: Pt will: Perform bed mobility tasks with modified I to reposition in bed and prepare for transfers  Pt will perform transfers with modified I to increase Indep in home alone environment and prepare for ambulation  Pt will ambulate with RW for >/= 150' with  modified I  to improve gait quality and promote proper use of assistive device and to access home environment  Pt will complete >/= 6 steps with with unilateral handrail with Supervision to return to home with RONNELL  Increase bilateral LE strength 1/2 grade to facilitate independent mobility and Increase all balance 1/2 grade to decrease risk for falls          Jacoby Parker, PT,DPT

## 2022-07-19 NOTE — PLAN OF CARE
Problem: OCCUPATIONAL THERAPY ADULT  Goal: Performs self-care activities at highest level of function for planned discharge setting  See evaluation for individualized goals  Description: Treatment Interventions: ADL retraining, Functional transfer training, UE strengthening/ROM, Endurance training, Patient/family training, Equipment evaluation/education, Neuromuscular reeducation, Compensatory technique education, Continued evaluation, Energy conservation, Activityengagement          See flowsheet documentation for full assessment, interventions and recommendations  Note: Limitation: Decreased ADL status, Decreased UE strength, Decreased Safe judgement during ADL, Decreased endurance, Decreased self-care trans, Decreased high-level ADLs  Prognosis: Good  Assessment: Pt is a 80 y o  female, admitted to [address] [place] [address] 7/18/2022 d/t experiencing increased SOB and fatigue  Dx: [id number]  Pt with PMHx impacting their performance during ADL tasks, including: arthritis, asthma, DM, HTN, MI  Prior to admission to the hospital Pt was performing ADLs with physical assistance  IADLs with physical assistance  Functional transfers/ambulation without physical assistance  Cognitive status was PTA was intact  OT order placed to assess Pt's ADLs, cognitive status, and performance during functional tasks in order to maximize safety and independence while making most appropriate d/c recommendations   Pt's clinical presentation is currently evolving given new onset deficits that effect Pt's occupational performance and ability to safely return to OF including decrease activity tolerance, decrease standing balance, decrease performance during ADL tasks, decrease safety awareness , decrease generalized strength, decrease activity engagement, decrease performance during functional transfers, steps to enter home and limited insight to deficits combined with medical complications of decreased skin integrity and need for input for mobility technique/safety  Personal factors affecting Pt at time of initial evaluation include: step(s) to enter environment, multi-level environment, inability to perform IADLs, inability to perform ADLs, inability to navigate community distances and limited insight into impairments  Pt will benefit from continued skilled OT services to address deficits as defined above and to maximize level independence/participation during ADLs and functional tasks to facilitate return toward PLOF and improved quality of life  From an occupational therapy standpoint, recommendation at time of d/c would be no further acute OT needs       OT Discharge Recommendation: No rehabilitation needs

## 2022-07-20 ENCOUNTER — HOSPITAL ENCOUNTER (EMERGENCY)
Facility: HOSPITAL | Age: 82
Discharge: HOME | End: 2022-07-20
Attending: STUDENT IN AN ORGANIZED HEALTH CARE EDUCATION/TRAINING PROGRAM
Payer: MEDICARE

## 2022-07-20 VITALS
HEIGHT: 60 IN | SYSTOLIC BLOOD PRESSURE: 105 MMHG | TEMPERATURE: 98.1 F | WEIGHT: 133.82 LBS | HEART RATE: 71 BPM | DIASTOLIC BLOOD PRESSURE: 55 MMHG | RESPIRATION RATE: 20 BRPM | BODY MASS INDEX: 26.27 KG/M2 | OXYGEN SATURATION: 92 %

## 2022-07-20 DIAGNOSIS — L91.8 CUTANEOUS SKIN TAGS: ICD-10-CM

## 2022-07-20 DIAGNOSIS — U07.1 COVID-19: ICD-10-CM

## 2022-07-20 DIAGNOSIS — R19.7 DIARRHEA, UNSPECIFIED TYPE: Primary | ICD-10-CM

## 2022-07-20 LAB
ANION GAP SERPL CALCULATED.3IONS-SCNC: 8 MMOL/L (ref 4–13)
ATRIAL RATE: 55 BPM
BASOPHILS # BLD AUTO: 0.02 THOUSANDS/ΜL (ref 0–0.1)
BASOPHILS NFR BLD AUTO: 0 % (ref 0–1)
BUN SERPL-MCNC: 33 MG/DL (ref 5–25)
CALCIUM SERPL-MCNC: 8.7 MG/DL (ref 8.3–10.1)
CHLORIDE SERPL-SCNC: 102 MMOL/L (ref 96–108)
CO2 SERPL-SCNC: 27 MMOL/L (ref 21–32)
CREAT SERPL-MCNC: 1.53 MG/DL (ref 0.6–1.3)
EOSINOPHIL # BLD AUTO: 0.03 THOUSAND/ΜL (ref 0–0.61)
EOSINOPHIL NFR BLD AUTO: 1 % (ref 0–6)
ERYTHROCYTE [DISTWIDTH] IN BLOOD BY AUTOMATED COUNT: 12.9 % (ref 11.6–15.1)
GFR SERPL CREATININE-BSD FRML MDRD: 32 ML/MIN/1.73SQ M
GLUCOSE SERPL-MCNC: 101 MG/DL (ref 65–140)
HCT VFR BLD AUTO: 45.3 % (ref 34.8–46.1)
HGB BLD-MCNC: 14.5 G/DL (ref 11.5–15.4)
IMM GRANULOCYTES # BLD AUTO: 0.05 THOUSAND/UL (ref 0–0.2)
IMM GRANULOCYTES NFR BLD AUTO: 1 % (ref 0–2)
LYMPHOCYTES # BLD AUTO: 0.66 THOUSANDS/ΜL (ref 0.6–4.47)
LYMPHOCYTES NFR BLD AUTO: 11 % (ref 14–44)
MAGNESIUM SERPL-MCNC: 1.9 MG/DL (ref 1.6–2.6)
MCH RBC QN AUTO: 33.2 PG (ref 26.8–34.3)
MCHC RBC AUTO-ENTMCNC: 32 G/DL (ref 31.4–37.4)
MCV RBC AUTO: 104 FL (ref 82–98)
MONOCYTES # BLD AUTO: 0.41 THOUSAND/ΜL (ref 0.17–1.22)
MONOCYTES NFR BLD AUTO: 7 % (ref 4–12)
NEUTROPHILS # BLD AUTO: 4.82 THOUSANDS/ΜL (ref 1.85–7.62)
NEUTS SEG NFR BLD AUTO: 80 % (ref 43–75)
NRBC BLD AUTO-RTO: 0 /100 WBCS
P AXIS: 49 DEGREES
PLATELET # BLD AUTO: 219 THOUSANDS/UL (ref 149–390)
PMV BLD AUTO: 9.6 FL (ref 8.9–12.7)
POTASSIUM SERPL-SCNC: 4 MMOL/L (ref 3.5–5.3)
PR INTERVAL: 206 MS
QRS AXIS: -29 DEGREES
QRSD INTERVAL: 102 MS
QT INTERVAL: 430 MS
QTC INTERVAL: 411 MS
RBC # BLD AUTO: 4.37 MILLION/UL (ref 3.81–5.12)
SODIUM SERPL-SCNC: 137 MMOL/L (ref 135–147)
T WAVE AXIS: 156 DEGREES
VENTRICULAR RATE: 55 BPM
WBC # BLD AUTO: 5.99 THOUSAND/UL (ref 4.31–10.16)

## 2022-07-20 PROCEDURE — 99284 EMERGENCY DEPT VISIT MOD MDM: CPT | Performed by: STUDENT IN AN ORGANIZED HEALTH CARE EDUCATION/TRAINING PROGRAM

## 2022-07-20 PROCEDURE — 1124F ACP DISCUSS-NO DSCNMKR DOCD: CPT | Performed by: STUDENT IN AN ORGANIZED HEALTH CARE EDUCATION/TRAINING PROGRAM

## 2022-07-20 PROCEDURE — 80048 BASIC METABOLIC PNL TOTAL CA: CPT | Performed by: STUDENT IN AN ORGANIZED HEALTH CARE EDUCATION/TRAINING PROGRAM

## 2022-07-20 PROCEDURE — 36415 COLL VENOUS BLD VENIPUNCTURE: CPT | Performed by: STUDENT IN AN ORGANIZED HEALTH CARE EDUCATION/TRAINING PROGRAM

## 2022-07-20 PROCEDURE — 85025 COMPLETE CBC W/AUTO DIFF WBC: CPT | Performed by: STUDENT IN AN ORGANIZED HEALTH CARE EDUCATION/TRAINING PROGRAM

## 2022-07-20 PROCEDURE — 99284 EMERGENCY DEPT VISIT MOD MDM: CPT

## 2022-07-20 PROCEDURE — 93010 ELECTROCARDIOGRAM REPORT: CPT | Performed by: INTERNAL MEDICINE

## 2022-07-20 PROCEDURE — 83735 ASSAY OF MAGNESIUM: CPT | Performed by: STUDENT IN AN ORGANIZED HEALTH CARE EDUCATION/TRAINING PROGRAM

## 2022-07-20 PROCEDURE — 96365 THER/PROPH/DIAG IV INF INIT: CPT

## 2022-07-20 RX ORDER — SODIUM CHLORIDE, SODIUM GLUCONATE, SODIUM ACETATE, POTASSIUM CHLORIDE, MAGNESIUM CHLORIDE, SODIUM PHOSPHATE, DIBASIC, AND POTASSIUM PHOSPHATE .53; .5; .37; .037; .03; .012; .00082 G/100ML; G/100ML; G/100ML; G/100ML; G/100ML; G/100ML; G/100ML
1000 INJECTION, SOLUTION INTRAVENOUS ONCE
Status: COMPLETED | OUTPATIENT
Start: 2022-07-20 | End: 2022-07-20

## 2022-07-20 RX ADMIN — SODIUM CHLORIDE, SODIUM GLUCONATE, SODIUM ACETATE, POTASSIUM CHLORIDE, MAGNESIUM CHLORIDE, SODIUM PHOSPHATE, DIBASIC, AND POTASSIUM PHOSPHATE 1000 ML: .53; .5; .37; .037; .03; .012; .00082 INJECTION, SOLUTION INTRAVENOUS at 06:53

## 2022-07-20 NOTE — ED CARE HANDOFF
Emergency Department Sign Out Note        Sign out and transfer of care from Dr Fanny Flaherty  See Separate Emergency Department note  The patient, Varsha Shanks, was evaluated by the previous provider for diarrhea  Workup Completed:  Initial evaluation    ED Course / Workup Pending (followup):  Pending lab work  Patient has remained asymptomatic and without complaint  Lab work now reviewed reveals mildly elevated creatinine but otherwise negative  Patient without complaint and appropriate for discharge           Results for orders placed or performed during the hospital encounter of 07/20/22   CBC and differential   Result Value Ref Range    WBC 5 99 4 31 - 10 16 Thousand/uL    RBC 4 37 3 81 - 5 12 Million/uL    Hemoglobin 14 5 11 5 - 15 4 g/dL    Hematocrit 45 3 34 8 - 46 1 %     (H) 82 - 98 fL    MCH 33 2 26 8 - 34 3 pg    MCHC 32 0 31 4 - 37 4 g/dL    RDW 12 9 11 6 - 15 1 %    MPV 9 6 8 9 - 12 7 fL    Platelets 262 982 - 151 Thousands/uL    nRBC 0 /100 WBCs    Neutrophils Relative 80 (H) 43 - 75 %    Immat GRANS % 1 0 - 2 %    Lymphocytes Relative 11 (L) 14 - 44 %    Monocytes Relative 7 4 - 12 %    Eosinophils Relative 1 0 - 6 %    Basophils Relative 0 0 - 1 %    Neutrophils Absolute 4 82 1 85 - 7 62 Thousands/µL    Immature Grans Absolute 0 05 0 00 - 0 20 Thousand/uL    Lymphocytes Absolute 0 66 0 60 - 4 47 Thousands/µL    Monocytes Absolute 0 41 0 17 - 1 22 Thousand/µL    Eosinophils Absolute 0 03 0 00 - 0 61 Thousand/µL    Basophils Absolute 0 02 0 00 - 0 10 Thousands/µL   Basic metabolic panel   Result Value Ref Range    Sodium 137 135 - 147 mmol/L    Potassium 4 0 3 5 - 5 3 mmol/L    Chloride 102 96 - 108 mmol/L    CO2 27 21 - 32 mmol/L    ANION GAP 8 4 - 13 mmol/L    BUN 33 (H) 5 - 25 mg/dL    Creatinine 1 53 (H) 0 60 - 1 30 mg/dL    Glucose 101 65 - 140 mg/dL    Calcium 8 7 8 3 - 10 1 mg/dL    eGFR 32 ml/min/1 73sq m   Magnesium   Result Value Ref Range    Magnesium 1 9 1 6 - 2 6 mg/dL ED Course as of 07/20/22 0732   Wed Jul 20, 2022   0720 Creatinine(!): 1 53  1 39 from 2 days ago  1 21 yesterday  Today's value likely due to dehydration  Patient has received fluid replacement  Appropriate for discharge at this time  Procedures  MDM        Disposition  Final diagnoses:   Diarrhea, unspecified type   COVID-19   Cutaneous skin tags     Time reflects when diagnosis was documented in both MDM as applicable and the Disposition within this note     Time User Action Codes Description Comment    7/20/2022  7:25 AM Ministerio Mills [R19 7] Diarrhea, unspecified type     7/20/2022  7:26 AM Wander Morgan [U07 1] COVID-19     7/20/2022  7:30 AM Ministerio Mills [L91 8] Cutaneous skin tags       ED Disposition     ED Disposition   Discharge    Condition   Stable    Date/Time   Wed Jul 20, 2022  7:25 AM    Comment   [address] [facility] discharge to home/self care                 Follow-up Information     Follow up With Specialties Details Why [address] [facility], DO General Practice   Elvira Botello [facility]  [phone]      [facility]   [address]  [facility]  [phone]          Patient's Medications   Discharge Prescriptions    No medications on file            ED Provider  Electronically Signed by     Mariel Tidwell MD  07/20/22 9692

## 2022-07-20 NOTE — ED PROVIDER NOTES
History  Chief Complaint   Patient presents with    Diarrhea     Pt dx covid positive 7/18 and admitted to this facility, per pt they were discharged and had several episodes of diarrhea at home, also reporting weakness       History provided by:  Patient  Diarrhea  Quality:  Copious  Severity:  Moderate  Onset quality:  Sudden  Number of episodes:  2  Duration:  6 hours  Timing:  Intermittent  Progression:  Improving  Relieved by:  None tried  Worsened by:  Nothing  Ineffective treatments:  None tried  Associated symptoms: abdominal pain    Associated symptoms: no chills, no fever, no headaches, no myalgias, no URI and no vomiting       80year old F  Presents to the ED with diarrhea  Was recently discharged from the hospital following treatment of COVID  Early this morning, the patient developed abdominal cramping and diarrhea  Denies vomiting, shortness of breath, chest pain  Expresses that she does not feel weaker than usual      Prior to Admission Medications   Prescriptions Last Dose Informant Patient Reported? Taking?    DULoxetine (CYMBALTA) 30 mg delayed release capsule   Yes No   Sig: Take 30 mg by mouth daily   aspirin 325 mg tablet   Yes No   Sig: Take 325 mg by mouth daily   atorvastatin (LIPITOR) 20 mg tablet   Yes No   Sig: Take 20 mg by mouth daily   bisoprolol-hydrochlorothiazide (ZIAC) 2 5-6 25 MG per tablet  Self Yes No   Sig: Take 1 tablet by mouth daily   calcium citrate-vitamin D (CITRACAL+D) 315-200 MG-UNIT per tablet   Yes No   Sig: Take 1 tablet by mouth 2 (two) times a day   docusate sodium (COLACE) 100 mg capsule   Yes No   Sig: Take 100 mg by mouth 2 (two) times a day   folic acid (FOLVITE) 1 mg tablet   Yes No   Sig: Take by mouth daily   hydroxychloroquine (PLAQUENIL) 200 mg tablet  Self Yes No   Sig: Take 200 mg by mouth 2 (two) times a day with meals   lisinopril (ZESTRIL) 10 mg tablet   Yes No   Sig: Take 10 mg by mouth daily   loratadine (CLARITIN) 10 mg tablet  Self Yes No   Sig: Take 10 mg by mouth daily   meclizine (ANTIVERT) 12 5 MG tablet   Yes No   Sig: Take by mouth 3 (three) times a day as needed for dizziness   methotrexate 2 5 mg tablet  Self Yes No   Sig: Take by mouth once a week   pantoprazole (PROTONIX) 40 mg tablet  Self Yes No   Sig: Take 40 mg by mouth daily   tolterodine (DETROL LA) 4 mg 24 hr capsule  Self Yes No   Sig: Take 4 mg by mouth daily      Facility-Administered Medications: None       Past Medical History:   Diagnosis Date    Arthritis     Asthma     Diabetes mellitus ([facility] [address] )     Hypertension     MI, old        Past Surgical History:   Procedure Laterality Date    BACK SURGERY      CARDIAC SURGERY      HYSTERECTOMY         History reviewed  No pertinent family history  I have reviewed and agree with the history as documented  E-Cigarette/Vaping    E-Cigarette Use Never User      E-Cigarette/Vaping Substances    Nicotine No     THC No     CBD No     Flavoring No     Other No     Unknown No      Social History     Tobacco Use    Smoking status: Former Smoker    Smokeless tobacco: Never Used   Vaping Use    Vaping Use: Never used   Substance Use Topics    Alcohol use: Not Currently    Drug use: Not Currently       Review of Systems   Constitutional: Negative for activity change, appetite change, chills and fever  HENT: Negative for congestion, rhinorrhea, sinus pressure and sinus pain  Respiratory: Negative for cough, chest tightness and shortness of breath  Cardiovascular: Negative for chest pain and palpitations  Gastrointestinal: Positive for abdominal pain, diarrhea and nausea  Negative for abdominal distention and vomiting  Genitourinary: Negative for decreased urine volume, difficulty urinating, dysuria, flank pain, frequency, pelvic pain and urgency  Musculoskeletal: Negative for back pain, myalgias, neck pain and neck stiffness  Skin: Negative for color change, pallor, rash and wound     Neurological: Negative for dizziness, syncope, weakness, light-headedness, numbness and headaches  Psychiatric/Behavioral: Positive for sleep disturbance  Negative for confusion  All other systems reviewed and are negative  Physical Exam  Physical Exam  Vitals and nursing note reviewed  Constitutional:       General: She is not in acute distress  Appearance: She is not ill-appearing or toxic-appearing  HENT:      Head: Normocephalic and atraumatic  Right Ear: External ear normal       Left Ear: External ear normal       Nose: No congestion or rhinorrhea  Mouth/Throat:      Mouth: Mucous membranes are dry  Eyes:      General:         Right eye: No discharge  Left eye: No discharge  Extraocular Movements: Extraocular movements intact  Conjunctiva/sclera: Conjunctivae normal    Cardiovascular:      Rate and Rhythm: Normal rate and regular rhythm  Pulses: Normal pulses  Heart sounds: Normal heart sounds  No murmur heard  Pulmonary:      Effort: Pulmonary effort is normal  No respiratory distress  Breath sounds: No stridor  Rales present  No wheezing or rhonchi  Comments: Bibasilar crackles  Chest:      Chest wall: No tenderness  Abdominal:      General: Abdomen is flat  Bowel sounds are normal       Palpations: Abdomen is soft  Tenderness: There is no abdominal tenderness  There is no right CVA tenderness, left CVA tenderness, guarding or rebound  Musculoskeletal:         General: No swelling or tenderness  Cervical back: Neck supple  No tenderness  Skin:     General: Skin is warm and dry  Capillary Refill: Capillary refill takes less than 2 seconds  Coloration: Skin is not jaundiced or pale  Findings: No bruising, erythema, lesion or rash  Neurological:      General: No focal deficit present  Mental Status: She is alert and oriented to person, place, and time  Mental status is at baseline  Cranial Nerves: No cranial nerve deficit  Motor: No weakness  Psychiatric:         Mood and Affect: Mood normal          Behavior: Behavior normal          Thought Content: Thought content normal          Judgment: Judgment normal          Vital Signs  ED Triage Vitals [07/20/22 0623]   Temperature Pulse Respirations Blood Pressure SpO2   98 1 °F (36 7 °C) 71 20 105/55 92 %      Temp Source Heart Rate Source Patient Position - Orthostatic VS BP Location FiO2 (%)   Temporal Monitor Sitting Left arm --      Pain Score       --           Vitals:    07/20/22 0623   BP: 105/55   Pulse: 71   Patient Position - Orthostatic VS: Sitting     ED Medications  Medications   multi-electrolyte (ISOLYTE-S PH 7 4) bolus 1,000 mL (1,000 mL Intravenous New Bag 7/20/22 0653)     Diagnostic Studies  Results Reviewed     Procedure Component Value Units Date/Time    CBC and differential [[id number]]  (Abnormal) Collected: 07/20/22 0654    Lab Status: Final result Specimen: Blood from Arm, Right Updated: 07/20/22 0658     WBC 5 99 Thousand/uL      RBC 4 37 Million/uL      Hemoglobin 14 5 g/dL      Hematocrit 45 3 %       fL      MCH 33 2 pg      MCHC 32 0 g/dL      RDW 12 9 %      MPV 9 6 fL      Platelets 196 Thousands/uL      nRBC 0 /100 WBCs      Neutrophils Relative 80 %      Immat GRANS % 1 %      Lymphocytes Relative 11 %      Monocytes Relative 7 %      Eosinophils Relative 1 %      Basophils Relative 0 %      Neutrophils Absolute 4 82 Thousands/µL      Immature Grans Absolute 0 05 Thousand/uL      Lymphocytes Absolute 0 66 Thousands/µL      Monocytes Absolute 0 41 Thousand/µL      Eosinophils Absolute 0 03 Thousand/µL      Basophils Absolute 0 02 Thousands/µL     Basic metabolic panel [[id number]] Collected: 07/20/22 0654    Lab Status: In process Specimen: Blood from Arm, Right Updated: 07/20/22 0656    Magnesium [[id number]] Collected: 07/20/22 0654    Lab Status:  In process Specimen: Blood from Arm, Right Updated: 07/20/22 0656             No orders to display Procedures  Procedures     ED Course  ED Course as of 07/20/22 0711   Wed Jul 20, 2022   26 56-year-old female  Recent admission for COVID treatment  Had 2 episodes of diarrhea with associated abdominal cramping, nausea early this morning  On exam, the patient appears dry; there is no abdominal tenderness to palpation  W/u is pending  The patient was signed out to Dr Marcy Mak  Plan discussed  The patient was stable while under my care  MDM    Disposition  Final diagnoses:   None     ED Disposition     None      Follow-up Information    None         Patient's Medications   Discharge Prescriptions    No medications on file       No discharge procedures on file      PDMP Review     None          ED Provider  Electronically Signed by           Veronica Andre DO  07/20/22 2427

## 2022-07-20 NOTE — ED NOTES
Pt's family present, IV site discontinued, to vehicle via WC, assisted into vehicle       Daniella Burris RN  07/20/22 6668

## 2022-07-20 NOTE — DISCHARGE INSTRUCTIONS
A referral has been placed on your behalf for dermatology services  You may receive a call from the dermatology department to help schedule an appointment  Alternatively, you may call to schedule an appointment with the dermatologist listed below or the dermatologist of your choice

## 2022-07-22 NOTE — PHYSICIAN ADVISOR
Current patient class: Inpatient  The patient is currently on Hospital Day: 2 at [facility]      The patient was admitted to the hospital  on 7/18/22 at  1:09 PM for the following diagnosis:  Fatigue [R53 83]  Weakness [R53 1]  COVID [U07 1]     After review of the relevant documentation, labs, vital signs and test results, this is a PROVIDER LIABLE case  In this particular case the patient was admitted to the hospital as an inpatient  The patient however failed to satisfy the 2 midnight benchmark and closer scrutiny of the case was warranted  After review of the patient presentation and relevant labs the patient was most appropriate for observation or outpatient class at the time of admission  Given that this patient has already been discharged prior to this review they become a provider liable case  Rationale is as follows: The patient is a 80 yrs   Female who presented to the ED at 7/18/2022 11:28 AM with a chief complaint of Fatigue (Pt c/o increased fatigue, chills, decreased appetite and cough for past 3 days  Pt's dtr tested covid positive 5 days ago  Denies travel/fevers/sob/n/v/d) Patient found to be covid positive  She was not hypoxic and was evaluated by PT and stable for discharge home on hospital day 2  OBS seems appropriate      The patients vitals on arrival were   ED Triage Vitals [07/18/22 1134]   Temperature Pulse Respirations Blood Pressure SpO2   98 6 °F (37 °C) 59 20 124/55 96 %      Temp Source Heart Rate Source Patient Position - Orthostatic VS BP Location FiO2 (%)   Temporal Monitor Lying Right arm --      Pain Score       No Pain           Past Medical History:   Diagnosis Date    Arthritis     Asthma     Diabetes mellitus ([facility] [address] )     Hypertension     MI, old      Past Surgical History:   Procedure Laterality Date    BACK SURGERY      CARDIAC SURGERY      HYSTERECTOMY             Consults have been placed to:   None    Vitals:    07/18/22 1502 07/18/22 2115 07/18/22 2301 07/19/22 0654   BP: 117/63  131/63 120/70   BP Location:       Pulse: 58  62 (!) 52   Resp: 17 18 18   Temp: 98 6 °F (37 °C)  97 9 °F (36 6 °C) 97 8 °F (36 6 °C)   TempSrc:       SpO2: 92% 93% 95% 95%   Weight:       Height:           Most recent labs:    Recent Labs     07/20/22  0654   WBC 5 99   HGB 14 5   HCT 45 3      K 4 0   CALCIUM 8 7   BUN 33*   CREATININE 1 53*       Scheduled Meds:  Continuous Infusions:No current facility-administered medications for this encounter  PRN Meds:      Surgical procedures (if appropriate):

## 2022-07-29 ENCOUNTER — APPOINTMENT (EMERGENCY)
Dept: CT IMAGING | Facility: HOSPITAL | Age: 82
DRG: 689 | End: 2022-07-29
Payer: MEDICARE

## 2022-07-29 ENCOUNTER — APPOINTMENT (EMERGENCY)
Dept: RADIOLOGY | Facility: HOSPITAL | Age: 82
DRG: 689 | End: 2022-07-29
Payer: MEDICARE

## 2022-07-29 ENCOUNTER — HOSPITAL ENCOUNTER (INPATIENT)
Facility: HOSPITAL | Age: 82
LOS: 2 days | Discharge: HOME HEALTH | DRG: 689 | End: 2022-08-01
Attending: STUDENT IN AN ORGANIZED HEALTH CARE EDUCATION/TRAINING PROGRAM | Admitting: FAMILY MEDICINE
Payer: MEDICARE

## 2022-07-29 DIAGNOSIS — G93.41 ACUTE METABOLIC ENCEPHALOPATHY: ICD-10-CM

## 2022-07-29 DIAGNOSIS — J20.9 ACUTE BRONCHITIS, UNSPECIFIED ORGANISM: ICD-10-CM

## 2022-07-29 DIAGNOSIS — R53.1 GENERALIZED WEAKNESS: ICD-10-CM

## 2022-07-29 DIAGNOSIS — N39.0 UTI (URINARY TRACT INFECTION): Primary | ICD-10-CM

## 2022-07-29 DIAGNOSIS — N30.00 ACUTE CYSTITIS WITHOUT HEMATURIA: ICD-10-CM

## 2022-07-29 DIAGNOSIS — J44.9 CHRONIC OBSTRUCTIVE PULMONARY DISEASE, UNSPECIFIED COPD TYPE (HCC): ICD-10-CM

## 2022-07-29 DIAGNOSIS — R26.2 AMBULATORY DYSFUNCTION: ICD-10-CM

## 2022-07-29 LAB
ALBUMIN SERPL BCP-MCNC: 3.4 G/DL (ref 3.5–5)
ALP SERPL-CCNC: 103 U/L (ref 46–116)
ALT SERPL W P-5'-P-CCNC: 35 U/L (ref 12–78)
ANION GAP SERPL CALCULATED.3IONS-SCNC: 12 MMOL/L (ref 4–13)
AST SERPL W P-5'-P-CCNC: 40 U/L (ref 5–45)
BACTERIA UR QL AUTO: ABNORMAL /HPF
BASOPHILS # BLD AUTO: 0.02 THOUSANDS/ΜL (ref 0–0.1)
BASOPHILS NFR BLD AUTO: 0 % (ref 0–1)
BILIRUB SERPL-MCNC: 0.93 MG/DL (ref 0.2–1)
BILIRUB UR QL STRIP: NEGATIVE
BUN SERPL-MCNC: 26 MG/DL (ref 5–25)
CALCIUM ALBUM COR SERPL-MCNC: 9.4 MG/DL (ref 8.3–10.1)
CALCIUM SERPL-MCNC: 8.9 MG/DL (ref 8.3–10.1)
CHLORIDE SERPL-SCNC: 98 MMOL/L (ref 96–108)
CLARITY UR: ABNORMAL
CO2 SERPL-SCNC: 25 MMOL/L (ref 21–32)
COLOR UR: YELLOW
CREAT SERPL-MCNC: 1.43 MG/DL (ref 0.6–1.3)
CRP SERPL QL: 63.3 MG/L
D DIMER PPP FEU-MCNC: 0.91 UG/ML FEU
EOSINOPHIL # BLD AUTO: 0.06 THOUSAND/ΜL (ref 0–0.61)
EOSINOPHIL NFR BLD AUTO: 1 % (ref 0–6)
ERYTHROCYTE [DISTWIDTH] IN BLOOD BY AUTOMATED COUNT: 12.9 % (ref 11.6–15.1)
GFR SERPL CREATININE-BSD FRML MDRD: 34 ML/MIN/1.73SQ M
GLUCOSE SERPL-MCNC: 96 MG/DL (ref 65–140)
GLUCOSE UR STRIP-MCNC: NEGATIVE MG/DL
HCT VFR BLD AUTO: 46.7 % (ref 34.8–46.1)
HGB BLD-MCNC: 15.2 G/DL (ref 11.5–15.4)
HGB UR QL STRIP.AUTO: ABNORMAL
IMM GRANULOCYTES # BLD AUTO: 0.02 THOUSAND/UL (ref 0–0.2)
IMM GRANULOCYTES NFR BLD AUTO: 0 % (ref 0–2)
INR PPP: 1.01 (ref 0.84–1.19)
KETONES UR STRIP-MCNC: ABNORMAL MG/DL
LACTATE SERPL-SCNC: 2 MMOL/L (ref 0.5–2)
LEUKOCYTE ESTERASE UR QL STRIP: ABNORMAL
LYMPHOCYTES # BLD AUTO: 1.14 THOUSANDS/ΜL (ref 0.6–4.47)
LYMPHOCYTES NFR BLD AUTO: 19 % (ref 14–44)
MAGNESIUM SERPL-MCNC: 1.8 MG/DL (ref 1.6–2.6)
MCH RBC QN AUTO: 32.6 PG (ref 26.8–34.3)
MCHC RBC AUTO-ENTMCNC: 32.5 G/DL (ref 31.4–37.4)
MCV RBC AUTO: 100 FL (ref 82–98)
MONOCYTES # BLD AUTO: 0.99 THOUSAND/ΜL (ref 0.17–1.22)
MONOCYTES NFR BLD AUTO: 16 % (ref 4–12)
NEUTROPHILS # BLD AUTO: 3.89 THOUSANDS/ΜL (ref 1.85–7.62)
NEUTS SEG NFR BLD AUTO: 64 % (ref 43–75)
NITRITE UR QL STRIP: POSITIVE
NON-SQ EPI CELLS URNS QL MICRO: ABNORMAL /HPF
NRBC BLD AUTO-RTO: 0 /100 WBCS
PH UR STRIP.AUTO: 6 [PH]
PLATELET # BLD AUTO: 182 THOUSANDS/UL (ref 149–390)
PMV BLD AUTO: 9.7 FL (ref 8.9–12.7)
POTASSIUM SERPL-SCNC: 4.9 MMOL/L (ref 3.5–5.3)
PROCALCITONIN SERPL-MCNC: 0.18 NG/ML
PROT SERPL-MCNC: 7.5 G/DL (ref 6.4–8.4)
PROT UR STRIP-MCNC: ABNORMAL MG/DL
PROTHROMBIN TIME: 13.4 SECONDS (ref 11.6–14.5)
RBC # BLD AUTO: 4.66 MILLION/UL (ref 3.81–5.12)
RBC #/AREA URNS AUTO: ABNORMAL /HPF
SODIUM SERPL-SCNC: 135 MMOL/L (ref 135–147)
SP GR UR STRIP.AUTO: 1.02 (ref 1–1.03)
UROBILINOGEN UR QL STRIP.AUTO: 1 E.U./DL
WBC # BLD AUTO: 6.12 THOUSAND/UL (ref 4.31–10.16)
WBC #/AREA URNS AUTO: ABNORMAL /HPF

## 2022-07-29 PROCEDURE — 82728 ASSAY OF FERRITIN: CPT | Performed by: STUDENT IN AN ORGANIZED HEALTH CARE EDUCATION/TRAINING PROGRAM

## 2022-07-29 PROCEDURE — 36415 COLL VENOUS BLD VENIPUNCTURE: CPT | Performed by: STUDENT IN AN ORGANIZED HEALTH CARE EDUCATION/TRAINING PROGRAM

## 2022-07-29 PROCEDURE — 86140 C-REACTIVE PROTEIN: CPT | Performed by: STUDENT IN AN ORGANIZED HEALTH CARE EDUCATION/TRAINING PROGRAM

## 2022-07-29 PROCEDURE — 99285 EMERGENCY DEPT VISIT HI MDM: CPT

## 2022-07-29 PROCEDURE — 99285 EMERGENCY DEPT VISIT HI MDM: CPT | Performed by: STUDENT IN AN ORGANIZED HEALTH CARE EDUCATION/TRAINING PROGRAM

## 2022-07-29 PROCEDURE — 84145 PROCALCITONIN (PCT): CPT | Performed by: STUDENT IN AN ORGANIZED HEALTH CARE EDUCATION/TRAINING PROGRAM

## 2022-07-29 PROCEDURE — 80053 COMPREHEN METABOLIC PANEL: CPT | Performed by: STUDENT IN AN ORGANIZED HEALTH CARE EDUCATION/TRAINING PROGRAM

## 2022-07-29 PROCEDURE — 87086 URINE CULTURE/COLONY COUNT: CPT | Performed by: STUDENT IN AN ORGANIZED HEALTH CARE EDUCATION/TRAINING PROGRAM

## 2022-07-29 PROCEDURE — 74177 CT ABD & PELVIS W/CONTRAST: CPT

## 2022-07-29 PROCEDURE — 96361 HYDRATE IV INFUSION ADD-ON: CPT

## 2022-07-29 PROCEDURE — 83605 ASSAY OF LACTIC ACID: CPT | Performed by: STUDENT IN AN ORGANIZED HEALTH CARE EDUCATION/TRAINING PROGRAM

## 2022-07-29 PROCEDURE — 87186 SC STD MICRODIL/AGAR DIL: CPT | Performed by: STUDENT IN AN ORGANIZED HEALTH CARE EDUCATION/TRAINING PROGRAM

## 2022-07-29 PROCEDURE — 81001 URINALYSIS AUTO W/SCOPE: CPT | Performed by: STUDENT IN AN ORGANIZED HEALTH CARE EDUCATION/TRAINING PROGRAM

## 2022-07-29 PROCEDURE — 71275 CT ANGIOGRAPHY CHEST: CPT

## 2022-07-29 PROCEDURE — 96365 THER/PROPH/DIAG IV INF INIT: CPT

## 2022-07-29 PROCEDURE — 87040 BLOOD CULTURE FOR BACTERIA: CPT | Performed by: STUDENT IN AN ORGANIZED HEALTH CARE EDUCATION/TRAINING PROGRAM

## 2022-07-29 PROCEDURE — 85610 PROTHROMBIN TIME: CPT | Performed by: STUDENT IN AN ORGANIZED HEALTH CARE EDUCATION/TRAINING PROGRAM

## 2022-07-29 PROCEDURE — 71045 X-RAY EXAM CHEST 1 VIEW: CPT

## 2022-07-29 PROCEDURE — 87077 CULTURE AEROBIC IDENTIFY: CPT | Performed by: STUDENT IN AN ORGANIZED HEALTH CARE EDUCATION/TRAINING PROGRAM

## 2022-07-29 PROCEDURE — 85379 FIBRIN DEGRADATION QUANT: CPT | Performed by: STUDENT IN AN ORGANIZED HEALTH CARE EDUCATION/TRAINING PROGRAM

## 2022-07-29 PROCEDURE — 85025 COMPLETE CBC W/AUTO DIFF WBC: CPT | Performed by: STUDENT IN AN ORGANIZED HEALTH CARE EDUCATION/TRAINING PROGRAM

## 2022-07-29 PROCEDURE — 83735 ASSAY OF MAGNESIUM: CPT | Performed by: STUDENT IN AN ORGANIZED HEALTH CARE EDUCATION/TRAINING PROGRAM

## 2022-07-29 RX ORDER — CEFTRIAXONE 1 G/50ML
1000 INJECTION, SOLUTION INTRAVENOUS ONCE
Status: COMPLETED | OUTPATIENT
Start: 2022-07-29 | End: 2022-07-29

## 2022-07-29 RX ADMIN — CEFTRIAXONE 1000 MG: 1 INJECTION, SOLUTION INTRAVENOUS at 23:09

## 2022-07-29 RX ADMIN — IOHEXOL 85 ML: 350 INJECTION, SOLUTION INTRAVENOUS at 21:49

## 2022-07-29 RX ADMIN — SODIUM CHLORIDE 1000 ML: 0.9 INJECTION, SOLUTION INTRAVENOUS at 20:49

## 2022-07-29 NOTE — Clinical Note
Case was discussed with Maura Rodriguez and the patient's admission status was agreed to be Admission Status: inpatient status to the service of Dr Michi Severino

## 2022-07-30 PROBLEM — I25.10 CORONARY ARTERY DISEASE INVOLVING NATIVE CORONARY ARTERY OF NATIVE HEART: Status: ACTIVE | Noted: 2022-07-30

## 2022-07-30 PROBLEM — N30.00 ACUTE CYSTITIS WITHOUT HEMATURIA: Status: ACTIVE | Noted: 2022-07-30

## 2022-07-30 PROBLEM — G93.41 ACUTE METABOLIC ENCEPHALOPATHY: Status: ACTIVE | Noted: 2022-07-30

## 2022-07-30 LAB
ANION GAP SERPL CALCULATED.3IONS-SCNC: 11 MMOL/L (ref 4–13)
BASOPHILS # BLD AUTO: 0.01 THOUSANDS/ΜL (ref 0–0.1)
BASOPHILS NFR BLD AUTO: 0 % (ref 0–1)
BUN SERPL-MCNC: 20 MG/DL (ref 5–25)
CALCIUM SERPL-MCNC: 8.5 MG/DL (ref 8.3–10.1)
CHLORIDE SERPL-SCNC: 101 MMOL/L (ref 96–108)
CO2 SERPL-SCNC: 24 MMOL/L (ref 21–32)
CREAT SERPL-MCNC: 1.1 MG/DL (ref 0.6–1.3)
EOSINOPHIL # BLD AUTO: 0.06 THOUSAND/ΜL (ref 0–0.61)
EOSINOPHIL NFR BLD AUTO: 1 % (ref 0–6)
ERYTHROCYTE [DISTWIDTH] IN BLOOD BY AUTOMATED COUNT: 12.9 % (ref 11.6–15.1)
FERRITIN SERPL-MCNC: 321 NG/ML (ref 8–388)
GFR SERPL CREATININE-BSD FRML MDRD: 47 ML/MIN/1.73SQ M
GLUCOSE SERPL-MCNC: 84 MG/DL (ref 65–140)
HCT VFR BLD AUTO: 42 % (ref 34.8–46.1)
HGB BLD-MCNC: 13.4 G/DL (ref 11.5–15.4)
IMM GRANULOCYTES # BLD AUTO: 0.03 THOUSAND/UL (ref 0–0.2)
IMM GRANULOCYTES NFR BLD AUTO: 1 % (ref 0–2)
LYMPHOCYTES # BLD AUTO: 1.23 THOUSANDS/ΜL (ref 0.6–4.47)
LYMPHOCYTES NFR BLD AUTO: 24 % (ref 14–44)
MCH RBC QN AUTO: 32.3 PG (ref 26.8–34.3)
MCHC RBC AUTO-ENTMCNC: 31.9 G/DL (ref 31.4–37.4)
MCV RBC AUTO: 101 FL (ref 82–98)
MONOCYTES # BLD AUTO: 0.87 THOUSAND/ΜL (ref 0.17–1.22)
MONOCYTES NFR BLD AUTO: 17 % (ref 4–12)
NEUTROPHILS # BLD AUTO: 3.02 THOUSANDS/ΜL (ref 1.85–7.62)
NEUTS SEG NFR BLD AUTO: 57 % (ref 43–75)
NRBC BLD AUTO-RTO: 0 /100 WBCS
PLATELET # BLD AUTO: 172 THOUSANDS/UL (ref 149–390)
PMV BLD AUTO: 10.1 FL (ref 8.9–12.7)
POTASSIUM SERPL-SCNC: 4.5 MMOL/L (ref 3.5–5.3)
RBC # BLD AUTO: 4.15 MILLION/UL (ref 3.81–5.12)
SODIUM SERPL-SCNC: 136 MMOL/L (ref 135–147)
WBC # BLD AUTO: 5.22 THOUSAND/UL (ref 4.31–10.16)

## 2022-07-30 PROCEDURE — 99223 1ST HOSP IP/OBS HIGH 75: CPT | Performed by: FAMILY MEDICINE

## 2022-07-30 PROCEDURE — 80048 BASIC METABOLIC PNL TOTAL CA: CPT | Performed by: NURSE PRACTITIONER

## 2022-07-30 PROCEDURE — 85025 COMPLETE CBC W/AUTO DIFF WBC: CPT | Performed by: NURSE PRACTITIONER

## 2022-07-30 RX ORDER — BISOPROLOL FUMARATE 5 MG/1
2.5 TABLET, FILM COATED ORAL DAILY
Status: DISCONTINUED | OUTPATIENT
Start: 2022-07-30 | End: 2022-08-01 | Stop reason: HOSPADM

## 2022-07-30 RX ORDER — PANTOPRAZOLE SODIUM 40 MG/1
40 TABLET, DELAYED RELEASE ORAL
Status: DISCONTINUED | OUTPATIENT
Start: 2022-07-30 | End: 2022-08-01 | Stop reason: HOSPADM

## 2022-07-30 RX ORDER — CEFTRIAXONE 1 G/50ML
1000 INJECTION, SOLUTION INTRAVENOUS EVERY 24 HOURS
Status: DISCONTINUED | OUTPATIENT
Start: 2022-07-30 | End: 2022-08-01 | Stop reason: HOSPADM

## 2022-07-30 RX ORDER — FOLIC ACID 1 MG/1
1 TABLET ORAL DAILY
Status: DISCONTINUED | OUTPATIENT
Start: 2022-07-30 | End: 2022-08-01 | Stop reason: HOSPADM

## 2022-07-30 RX ORDER — LISINOPRIL 10 MG/1
10 TABLET ORAL DAILY
Status: DISCONTINUED | OUTPATIENT
Start: 2022-07-30 | End: 2022-07-31

## 2022-07-30 RX ORDER — ACETAMINOPHEN 325 MG/1
650 TABLET ORAL EVERY 6 HOURS PRN
Status: DISCONTINUED | OUTPATIENT
Start: 2022-07-30 | End: 2022-08-01 | Stop reason: HOSPADM

## 2022-07-30 RX ORDER — DULOXETIN HYDROCHLORIDE 30 MG/1
30 CAPSULE, DELAYED RELEASE ORAL DAILY
Status: DISCONTINUED | OUTPATIENT
Start: 2022-07-30 | End: 2022-08-01 | Stop reason: HOSPADM

## 2022-07-30 RX ORDER — HEPARIN SODIUM 5000 [USP'U]/ML
5000 INJECTION, SOLUTION INTRAVENOUS; SUBCUTANEOUS EVERY 8 HOURS SCHEDULED
Status: DISCONTINUED | OUTPATIENT
Start: 2022-07-30 | End: 2022-08-01 | Stop reason: HOSPADM

## 2022-07-30 RX ORDER — ATORVASTATIN CALCIUM 20 MG/1
20 TABLET, FILM COATED ORAL
Status: DISCONTINUED | OUTPATIENT
Start: 2022-07-30 | End: 2022-08-01 | Stop reason: HOSPADM

## 2022-07-30 RX ORDER — LORATADINE 10 MG/1
10 TABLET ORAL EVERY OTHER DAY
Status: DISCONTINUED | OUTPATIENT
Start: 2022-07-30 | End: 2022-08-01 | Stop reason: HOSPADM

## 2022-07-30 RX ORDER — HYDROXYCHLOROQUINE SULFATE 200 MG/1
200 TABLET, FILM COATED ORAL 2 TIMES DAILY WITH MEALS
Status: DISCONTINUED | OUTPATIENT
Start: 2022-07-30 | End: 2022-08-01 | Stop reason: HOSPADM

## 2022-07-30 RX ORDER — ASPIRIN 325 MG
325 TABLET ORAL DAILY
Status: DISCONTINUED | OUTPATIENT
Start: 2022-07-30 | End: 2022-08-01 | Stop reason: HOSPADM

## 2022-07-30 RX ADMIN — LORATADINE 10 MG: 10 TABLET ORAL at 09:18

## 2022-07-30 RX ADMIN — HEPARIN SODIUM 5000 UNITS: 5000 INJECTION INTRAVENOUS; SUBCUTANEOUS at 06:05

## 2022-07-30 RX ADMIN — CEFTRIAXONE 1000 MG: 1 INJECTION, SOLUTION INTRAVENOUS at 22:03

## 2022-07-30 RX ADMIN — ATORVASTATIN CALCIUM 20 MG: 20 TABLET, FILM COATED ORAL at 18:33

## 2022-07-30 RX ADMIN — HYDROXYCHLOROQUINE SULFATE 200 MG: 200 TABLET, FILM COATED ORAL at 09:18

## 2022-07-30 RX ADMIN — DULOXETINE HYDROCHLORIDE 30 MG: 30 CAPSULE, DELAYED RELEASE ORAL at 09:18

## 2022-07-30 RX ADMIN — HEPARIN SODIUM 5000 UNITS: 5000 INJECTION INTRAVENOUS; SUBCUTANEOUS at 01:18

## 2022-07-30 RX ADMIN — ASPIRIN 325 MG ORAL TABLET 325 MG: 325 PILL ORAL at 09:18

## 2022-07-30 RX ADMIN — FOLIC ACID 1 MG: 1 TABLET ORAL at 09:18

## 2022-07-30 RX ADMIN — HYDROXYCHLOROQUINE SULFATE 200 MG: 200 TABLET, FILM COATED ORAL at 18:34

## 2022-07-30 RX ADMIN — PANTOPRAZOLE SODIUM 40 MG: 40 TABLET, DELAYED RELEASE ORAL at 06:05

## 2022-07-30 RX ADMIN — HEPARIN SODIUM 5000 UNITS: 5000 INJECTION INTRAVENOUS; SUBCUTANEOUS at 20:38

## 2022-07-30 RX ADMIN — BISOPROLOL FUMARATE 2.5 MG: 5 TABLET ORAL at 09:18

## 2022-07-30 RX ADMIN — LISINOPRIL 10 MG: 10 TABLET ORAL at 09:18

## 2022-07-30 RX ADMIN — HEPARIN SODIUM 5000 UNITS: 5000 INJECTION INTRAVENOUS; SUBCUTANEOUS at 13:39

## 2022-07-30 NOTE — ED PROVIDER NOTES
History  Chief Complaint   Patient presents with    Weakness - Generalized     Generalized weakness, fatigue, decreased appetite, cough, chills  Dx with covid on 7/18       History provided by:  Patient  Fatigue  Severity:  Moderate  Onset quality:  Gradual  Duration:  5 weeks  Timing:  Constant  Progression:  Worsening  Chronicity:  New  Context: recent infection    Relieved by:  Nothing  Worsened by:  Nothing  Ineffective treatments:  Drinking fluids and rest  Associated symptoms: arthralgias, cough, difficulty walking, lethargy, myalgias and shortness of breath    Associated symptoms: no abdominal pain, no chest pain, no diarrhea, no dizziness, no dysuria, no falls, no fever, no foul-smelling urine, no frequency, no headaches, no melena, no nausea, no near-syncope, no stroke symptoms, no syncope, no urgency and no vomiting       80year old F  Hx of HTN, CAD, COPD, DM  Recently had COVID 19  Unvaccinated  Presents to the ED with worsening generalized weakness, body aches, decreased appetite/oral intake, productive cough (clear/white)  Denies nausea/vomiting/diarrhea  States that she has becoming more short of breath over the last few days  Expresses subjective fevers  The patient's daughters state that the patient has been unable to ambulate on her own power which is new  Prior to Admission Medications   Prescriptions Last Dose Informant Patient Reported? Taking?    DULoxetine (CYMBALTA) 30 mg delayed release capsule   Yes No   Sig: Take 30 mg by mouth daily   aspirin 325 mg tablet   Yes No   Sig: Take 325 mg by mouth daily   atorvastatin (LIPITOR) 20 mg tablet   Yes No   Sig: Take 20 mg by mouth daily   bisoprolol-hydrochlorothiazide (ZIAC) 2 5-6 25 MG per tablet  Self Yes No   Sig: Take 1 tablet by mouth daily   calcium citrate-vitamin D (CITRACAL+D) 315-200 MG-UNIT per tablet   Yes No   Sig: Take 1 tablet by mouth 2 (two) times a day   docusate sodium (COLACE) 100 mg capsule   Yes No   Sig: Take 100 mg by mouth 2 (two) times a day   folic acid (FOLVITE) 1 mg tablet   Yes No   Sig: Take by mouth daily   hydroxychloroquine (PLAQUENIL) 200 mg tablet  Self Yes No   Sig: Take 200 mg by mouth 2 (two) times a day with meals   lisinopril (ZESTRIL) 10 mg tablet   Yes No   Sig: Take 10 mg by mouth daily   loratadine (CLARITIN) 10 mg tablet  Self Yes No   Sig: Take 10 mg by mouth daily   meclizine (ANTIVERT) 12 5 MG tablet Not Taking at Unknown time  Yes No   Sig: Take by mouth 3 (three) times a day as needed for dizziness   Patient not taking: Reported on 7/30/2022   methotrexate 2 5 mg tablet  Self Yes No   Sig: Take by mouth once a week   pantoprazole (PROTONIX) 40 mg tablet  Self Yes No   Sig: Take 40 mg by mouth daily   tolterodine (DETROL LA) 4 mg 24 hr capsule  Self Yes No   Sig: Take 4 mg by mouth daily      Facility-Administered Medications: None       Past Medical History:   Diagnosis Date    Arthritis     Asthma     Diabetes mellitus ([facility] [address] )     Hypertension     MI, old        Past Surgical History:   Procedure Laterality Date    BACK SURGERY      CARDIAC SURGERY      HYSTERECTOMY         History reviewed  No pertinent family history  I have reviewed and agree with the history as documented  E-Cigarette/Vaping    E-Cigarette Use Never User      E-Cigarette/Vaping Substances    Nicotine No     THC No     CBD No     Flavoring No     Other No     Unknown No      Social History     Tobacco Use    Smoking status: Former Smoker    Smokeless tobacco: Never Used   Vaping Use    Vaping Use: Never used   Substance Use Topics    Alcohol use: Not Currently    Drug use: Not Currently     Review of Systems   Constitutional: Positive for activity change, appetite change, chills and fatigue  Negative for fever  HENT: Negative for congestion, rhinorrhea, sinus pressure, sinus pain and sore throat  Eyes: Negative for photophobia, pain, redness, itching and visual disturbance     Respiratory: Positive for cough and shortness of breath  Negative for chest tightness and wheezing  Cardiovascular: Negative for chest pain, palpitations, leg swelling, syncope and near-syncope  Gastrointestinal: Negative for abdominal distention, abdominal pain, constipation, diarrhea, melena, nausea and vomiting  Genitourinary: Negative for decreased urine volume, difficulty urinating, dysuria, flank pain, frequency, pelvic pain and urgency  Musculoskeletal: Positive for arthralgias, gait problem and myalgias  Negative for falls, neck pain and neck stiffness  Skin: Negative for color change, pallor, rash and wound  Neurological: Negative for dizziness, syncope, light-headedness, numbness and headaches  Hematological: Does not bruise/bleed easily  Psychiatric/Behavioral: Negative for confusion  All other systems reviewed and are negative  Physical Exam  Physical Exam  Vitals and nursing note reviewed  Constitutional:       General: She is not in acute distress  Appearance: She is ill-appearing  She is not toxic-appearing  HENT:      Head: Normocephalic and atraumatic  Right Ear: External ear normal       Left Ear: External ear normal       Nose: Nose normal  No congestion or rhinorrhea  Mouth/Throat:      Mouth: Mucous membranes are dry  Pharynx: Posterior oropharyngeal erythema present  No oropharyngeal exudate  Eyes:      General: No scleral icterus  Right eye: No discharge  Left eye: No discharge  Extraocular Movements: Extraocular movements intact  Conjunctiva/sclera: Conjunctivae normal       Pupils: Pupils are equal, round, and reactive to light  Cardiovascular:      Rate and Rhythm: Normal rate and regular rhythm  Pulses: Normal pulses  Pulmonary:      Effort: No respiratory distress  Breath sounds: No stridor  Rales present  No wheezing or rhonchi  Comments: Bibasilar rales  Chest:      Chest wall: No tenderness     Abdominal:      General: Abdomen is flat  Bowel sounds are normal       Palpations: Abdomen is soft  Tenderness: There is no abdominal tenderness  There is no right CVA tenderness, left CVA tenderness, guarding or rebound  Musculoskeletal:         General: Tenderness present  No swelling or signs of injury  Cervical back: Neck supple  No tenderness  Right lower leg: No edema  Left lower leg: No edema  Legs:       Comments: Tenderness to palpation along the left calf  The left lower extremity is not red/edematous/warm  Skin:     General: Skin is warm and dry  Capillary Refill: Capillary refill takes less than 2 seconds  Coloration: Skin is not jaundiced or pale  Findings: No bruising, erythema, lesion or rash  Neurological:      General: No focal deficit present  Mental Status: She is alert and oriented to person, place, and time  Mental status is at baseline  Cranial Nerves: No cranial nerve deficit  Sensory: No sensory deficit  Motor: No weakness  Psychiatric:         Mood and Affect: Mood normal          Behavior: Behavior normal          Thought Content:  Thought content normal          Judgment: Judgment normal        Vital Signs  ED Triage Vitals [07/29/22 2022]   Temperature Pulse Respirations Blood Pressure SpO2   97 9 °F (36 6 °C) 83 20 143/79 94 %      Temp Source Heart Rate Source Patient Position - Orthostatic VS BP Location FiO2 (%)   Temporal Monitor -- Right arm --      Pain Score       6         Vitals:    07/29/22 2022 07/29/22 2300 07/29/22 2330 07/30/22 0000   BP: 143/79 148/70 170/77 130/63   Pulse: 83 74 81 76     ED Medications  Medications   sodium chloride 0 9 % bolus 1,000 mL (0 mL Intravenous Stopped 7/29/22 2316)   iohexol (OMNIPAQUE) 350 MG/ML injection (MULTI-DOSE) 85 mL (85 mL Intravenous Given 7/29/22 2149)   cefTRIAXone (ROCEPHIN) IVPB (premix in dextrose) 1,000 mg 50 mL (0 mg Intravenous Stopped 7/29/22 2349)     Diagnostic Studies  Results Reviewed     Procedure Component Value Units Date/Time    Blood culture #2 [[id number]] Collected: 07/29/22 2049    Lab Status: In process Specimen: Blood from Arm, Left Updated: 07/29/22 2315    Blood culture #1 [[id number]] Collected: 07/29/22 2049    Lab Status: In process Specimen: Blood from Arm, Right Updated: 07/29/22 2315    Urine Microscopic [[id number]]  (Abnormal) Collected: 07/29/22 2234    Lab Status: Final result Specimen: Urine, Clean Catch Updated: 07/29/22 2314     RBC, UA 0-5 /hpf      WBC, UA Innumerable /hpf      Epithelial Cells Occasional /hpf      Bacteria, UA Innumerable /hpf     Urine culture [[id number]] Collected: 07/29/22 2234    Lab Status: In process Specimen: Urine, Clean Catch Updated: 07/29/22 2314    UA w Reflex to Microscopic w Reflex to Culture [[id number]]  (Abnormal) Collected: 07/29/22 2234    Lab Status: Final result Specimen: Urine, Clean Catch Updated: 07/29/22 2302     Color, UA Yellow     Clarity, UA Cloudy     Specific [place], UA 1 020     pH, UA 6 0     Leukocytes, UA Moderate     Nitrite, UA Positive     Protein, UA Trace mg/dl      Glucose, UA Negative mg/dl      Ketones, UA 15 (1+) mg/dl      Urobilinogen, UA 1 0 E U /dl      Bilirubin, UA Negative     Occult Blood, UA Trace-Intact    Procalcitonin [[id number]]  (Normal) Collected: 07/29/22 2049    Lab Status: Final result Specimen: Blood from Arm, Right Updated: 07/29/22 2126     Procalcitonin 0 18 ng/ml     Lactic acid, plasma [[id number]]  (Normal) Collected: 07/29/22 2049    Lab Status: Final result Specimen: Blood from Arm, Right Updated: 07/29/22 2121     LACTIC ACID 2 0 mmol/L     Narrative:      Result may be elevated if tourniquet was used during collection  D-dimer, quantitative [[id number]]  (Abnormal) Collected: 07/29/22 2049    Lab Status: Final result Specimen: Blood from Arm, Right Updated: 07/29/22 2117     D-Dimer, Quant 0 91 ug/ml FEU     Narrative:       In the evaluation for possible pulmonary embolism, in the appropriate (Well's Score of 4 or less) patient, the age adjusted d-dimer cutoff for this patient can be calculated as:    Age x 0 01 (in ug/mL) for Age-adjusted D-dimer exclusion threshold for a patient over 50 years      Comprehensive metabolic panel [[id number]]  (Abnormal) Collected: 07/29/22 2049    Lab Status: Final result Specimen: Blood from Arm, Right Updated: 07/29/22 2117     Sodium 135 mmol/L      Potassium 4 9 mmol/L      Chloride 98 mmol/L      CO2 25 mmol/L      ANION GAP 12 mmol/L      BUN 26 mg/dL      Creatinine 1 43 mg/dL      Glucose 96 mg/dL      Calcium 8 9 mg/dL      Corrected Calcium 9 4 mg/dL      AST 40 U/L      ALT 35 U/L      Alkaline Phosphatase 103 U/L      Total Protein 7 5 g/dL      Albumin 3 4 g/dL      Total Bilirubin 0 93 mg/dL      eGFR 34 ml/min/1 73sq m     Narrative:      [place] guidelines for Chronic Kidney Disease (CKD):     Stage 1 with normal or high GFR (GFR > 90 mL/min/1 73 square meters)    Stage 2 Mild CKD (GFR = 60-89 mL/min/1 73 square meters)    Stage 3A Moderate CKD (GFR = 45-59 mL/min/1 73 square meters)    Stage 3B Moderate CKD (GFR = 30-44 mL/min/1 73 square meters)    Stage 4 Severe CKD (GFR = 15-29 mL/min/1 73 square meters)    Stage 5 End Stage CKD (GFR <15 mL/min/1 73 square meters)  Note: GFR calculation is accurate only with a steady state creatinine    Magnesium [[id number]]  (Normal) Collected: 07/29/22 2049    Lab Status: Final result Specimen: Blood from Arm, Right Updated: 07/29/22 2117     Magnesium 1 8 mg/dL     C-reactive protein [[id number]]  (Abnormal) Collected: 07/29/22 2049    Lab Status: Final result Specimen: Blood from Arm, Right Updated: 07/29/22 2117     CRP 63 3 mg/L     Protime-INR [[id number]]  (Normal) Collected: 07/29/22 2049    Lab Status: Final result Specimen: Blood from Arm, Right Updated: 07/29/22 2111     Protime 13 4 seconds      INR 1 01    CBC and differential [[id number]]  (Abnormal) Collected: 07/29/22 2049    Lab Status: Final result Specimen: Blood from Arm, Right Updated: 07/29/22 2057     WBC 6 12 Thousand/uL      RBC 4 66 Million/uL      Hemoglobin 15 2 g/dL      Hematocrit 46 7 %       fL      MCH 32 6 pg      MCHC 32 5 g/dL      RDW 12 9 %      MPV 9 7 fL      Platelets 244 Thousands/uL      nRBC 0 /100 WBCs      Neutrophils Relative 64 %      Immat GRANS % 0 %      Lymphocytes Relative 19 %      Monocytes Relative 16 %      Eosinophils Relative 1 %      Basophils Relative 0 %      Neutrophils Absolute 3 89 Thousands/µL      Immature Grans Absolute 0 02 Thousand/uL      Lymphocytes Absolute 1 14 Thousands/µL      Monocytes Absolute 0 99 Thousand/µL      Eosinophils Absolute 0 06 Thousand/µL      Basophils Absolute 0 02 Thousands/µL     Ferritin [[id number]] Collected: 07/29/22 2049    Lab Status: In process Specimen: Blood from Arm, Right Updated: 07/29/22 2055             CT pe study w abdomen pelvis w contrast   Final Result by Erick Kent MD (07/29 2323)      Patchy peripheral groundglass opacities are seen throughout the lungs  In the setting of clinically suspected/proven COVID-19, the above lung parenchymal findings on CT indicate intermediate confidence level for COVID-19  No pulmonary embolus is seen  Right basilar compressive atelectatic changes  Superimposed infection must be excluded clinically  Workstation performed: [id number]         XR chest 1 view portable   ED Interpretation by Elsa Guerra DO (07/29 2155)   No focal infiltrates  Possible right-sided diaphragmatic hernia             Procedures  Procedures     ED Course  ED Course as of 07/30/22 0019   Fri Jul 29, 2022   2100 No leukocytosis  Hemoglobin is within normal limits   2155 Renal function improved compared to most previous  Elevated D-dimer  Given the patient's recent COVID infection, shortness of breath, left lower extremity pain, will obtain CT PE    2307 UA infectious appearing  Will administer a dose of IV ceftriaxone  2328 CT pe study w abdomen pelvis w contrast   2329 No signs of PE; there are patchy infiltrates that correlate with recent COVID 19 infection  Sat Jul 30, 2022   0001 Patient extremely weak with ambulation  The patient required a walker and assistance  Will contact [facility] for admission  0016 Generalized weakness, ambulatory dysfunction likely multifactorial given UTI, recent COVID, decreased oral intake  Will require PT/OT consults along with other medical treatment  The patient was admitted to [facility]  MDM    Disposition  Final diagnoses:   UTI (urinary tract infection)   Generalized weakness   Ambulatory dysfunction     Time reflects when diagnosis was documented in both MDM as applicable and the Disposition within this note     Time User Action Codes Description Comment    7/30/2022 12:13 AM Erma Gary Add [N39 0] UTI (urinary tract infection)     7/30/2022 12:13 AM Erma Gary Add [R53 1] Generalized weakness     7/30/2022 12:13 AM Erma Gary Add [R26 2] Ambulatory dysfunction       ED Disposition     ED Disposition   Admit    Condition   Stable    Date/Time   Sat Jul 30, 2022 12:13 AM    Comment   Case was discussed with Maura Rodriguez and the patient's admission status was agreed to be Admission Status: inpatient status to the service of Dr Sofia Michel   Follow-up Information    None         Patient's Medications   Discharge Prescriptions    No medications on file       No discharge procedures on file      PDMP Review     None          ED Provider  Electronically Signed by           María Hadley DO  07/30/22 8624

## 2022-07-30 NOTE — ASSESSMENT & PLAN NOTE
· History CAD with CABG x4  · Denies chest pain  · Continue full-strength aspirin 325 mg and atorvastatin home regimen  · Outpatient follow-up

## 2022-07-30 NOTE — PLAN OF CARE
Problem: Potential for Falls  Goal: Patient will remain free of falls  Description: INTERVENTIONS:  - Educate patient/family on patient safety including physical limitations  - Instruct patient to call for assistance with activity   - Consult OT/PT to assist with strengthening/mobility   - Keep Call bell within reach  - Keep bed low and locked with side rails adjusted as appropriate  - Keep care items and personal belongings within reach  - Initiate and maintain comfort rounds  - Make Fall Risk Sign visible to staff  - Offer Toileting every 2 Hours, in advance of need  - Initiate/Maintain bed alarm  - Obtain necessary fall risk management equipment:   - Apply yellow socks and bracelet for high fall risk patients  - Consider moving patient to room near nurses station  Outcome: Progressing     Problem: Nutrition/Hydration-ADULT  Goal: Nutrient/Hydration intake appropriate for improving, restoring or maintaining nutritional needs  Description: Monitor and assess patient's nutrition/hydration status for malnutrition  Collaborate with interdisciplinary team and initiate plan and interventions as ordered  Monitor patient's weight and dietary intake as ordered or per policy  Utilize nutrition screening tool and intervene as necessary  Determine patient's food preferences and provide high-protein, high-caloric foods as appropriate       INTERVENTIONS:  - Monitor oral intake, urinary output, labs, and treatment plans  - Assess nutrition and hydration status and recommend course of action  - Evaluate amount of meals eaten  - Assist patient with eating if necessary   - Allow adequate time for meals  - Recommend/ encourage appropriate diets, oral nutritional supplements, and vitamin/mineral supplements  - Order, calculate, and assess calorie counts as needed  - Recommend, monitor, and adjust tube feedings and TPN/PPN based on assessed needs  - Assess need for intravenous fluids  - Provide specific nutrition/hydration education as appropriate  - Include patient/family/caregiver in decisions related to nutrition  Outcome: Progressing     Problem: MOBILITY - ADULT  Goal: Maintain or return to baseline ADL function  Description: INTERVENTIONS:  -  Assess patient's ability to carry out ADLs; assess patient's baseline for ADL function and identify physical deficits which impact ability to perform ADLs (bathing, care of mouth/teeth, toileting, grooming, dressing, etc )  - Assess/evaluate cause of self-care deficits   - Assess range of motion  - Assess patient's mobility; develop plan if impaired  - Assess patient's need for assistive devices and provide as appropriate  - Encourage maximum independence but intervene and supervise when necessary  - Involve family in performance of ADLs  - Assess for home care needs following discharge   - Consider OT consult to assist with ADL evaluation and planning for discharge  - Provide patient education as appropriate  Outcome: Progressing     Problem: PAIN - ADULT  Goal: Verbalizes/displays adequate comfort level or baseline comfort level  Description: Interventions:  - Encourage patient to monitor pain and request assistance  - Assess pain using appropriate pain scale  - Administer analgesics based on type and severity of pain and evaluate response  - Implement non-pharmacological measures as appropriate and evaluate response  - Consider cultural and social influences on pain and pain management  - Notify physician/advanced practitioner if interventions unsuccessful or patient reports new pain  Outcome: Progressing     Problem: INFECTION - ADULT  Goal: Absence or prevention of progression during hospitalization  Description: INTERVENTIONS:  - Assess and monitor for signs and symptoms of infection  - Monitor lab/diagnostic results  - Monitor all insertion sites, i e  indwelling lines, tubes, and drains  - Monitor endotracheal if appropriate and nasal secretions for changes in amount and color  - [place] appropriate cooling/warming therapies per order  - Administer medications as ordered  - Instruct and encourage patient and family to use good hand hygiene technique  - Identify and instruct in appropriate isolation precautions for identified infection/condition  Outcome: Progressing  Goal: Absence of fever/infection during neutropenic period  Description: INTERVENTIONS:  - Monitor WBC    Outcome: Progressing     Problem: SAFETY ADULT  Goal: Patient will remain free of falls  Description: INTERVENTIONS:  - Educate patient/family on patient safety including physical limitations  - Instruct patient to call for assistance with activity   - Consult OT/PT to assist with strengthening/mobility   - Keep Call bell within reach  - Keep bed low and locked with side rails adjusted as appropriate  - Keep care items and personal belongings within reach  - Initiate and maintain comfort rounds  - Make Fall Risk Sign visible to staff  - Offer Toileting every +2 Hours, in advance of need  - Initiate/Maintain bed alarm  - Obtain necessary fall risk management equipment:   - Apply yellow socks and bracelet for high fall risk patients  - Consider moving patient to room near nurses station  Outcome: Progressing  Goal: Maintain or return to baseline ADL function  Description: INTERVENTIONS:  -  Assess patient's ability to carry out ADLs; assess patient's baseline for ADL function and identify physical deficits which impact ability to perform ADLs (bathing, care of mouth/teeth, toileting, grooming, dressing, etc )  - Assess/evaluate cause of self-care deficits   - Assess range of motion  - Assess patient's mobility; develop plan if impaired  - Assess patient's need for assistive devices and provide as appropriate  - Encourage maximum independence but intervene and supervise when necessary  - Involve family in performance of ADLs  - Assess for home care needs following discharge   - Consider OT consult to assist with ADL evaluation and planning for discharge  - Provide patient education as appropriate  Outcome: Progressing     Problem: DISCHARGE PLANNING  Goal: Discharge to home or other facility with appropriate resources  Description: INTERVENTIONS:  - Identify barriers to discharge w/patient and caregiver  - Arrange for needed discharge resources and transportation as appropriate  - Identify discharge learning needs (meds, wound care, etc )  - Arrange for interpretive services to assist at discharge as needed  - Refer to Case Management Department for coordinating discharge planning if the patient needs post-hospital services based on physician/advanced practitioner order or complex needs related to functional status, cognitive ability, or social support system  Outcome: Progressing     Problem: Knowledge Deficit  Goal: Patient/family/caregiver demonstrates understanding of disease process, treatment plan, medications, and discharge instructions  Description: Complete learning assessment and assess knowledge base    Interventions:  - Provide teaching at level of understanding  - Provide teaching via preferred learning methods  Outcome: Progressing

## 2022-07-30 NOTE — ASSESSMENT & PLAN NOTE
· POA from home with daughter who reports severe weakness with inability to ambulate with walker in-home as normal, decreased oral intake over the past few days  · Found to have acute cystitis  · Empiric antibiotic therapy  · Recent COVID-19 pneumonia 7/13 with hospitalization 7/18 to 7/19  · Appreciate PT/OT/case management evaluation  · Neurological exam nonfocal

## 2022-07-30 NOTE — PLAN OF CARE
Problem: Potential for Falls  Goal: Patient will remain free of falls  Description: INTERVENTIONS:  - Educate patient/family on patient safety including physical limitations  - Instruct patient to call for assistance with activity   - Consult OT/PT to assist with strengthening/mobility   - Keep Call bell within reach  - Keep bed low and locked with side rails adjusted as appropriate  - Keep care items and personal belongings within reach  - Initiate and maintain comfort rounds  - Make Fall Risk Sign visible to staff  - Offer Toileting every 2 Hours, in advance of need  - Initiate/Maintain bed/chair alarm  - Obtain necessary fall risk management equipment:   - Apply yellow socks and bracelet for high fall risk patients  - Consider moving patient to room near nurses station  Outcome: Progressing     Problem: Nutrition/Hydration-ADULT  Goal: Nutrient/Hydration intake appropriate for improving, restoring or maintaining nutritional needs  Description: Monitor and assess patient's nutrition/hydration status for malnutrition  Collaborate with interdisciplinary team and initiate plan and interventions as ordered  Monitor patient's weight and dietary intake as ordered or per policy  Utilize nutrition screening tool and intervene as necessary  Determine patient's food preferences and provide high-protein, high-caloric foods as appropriate       INTERVENTIONS:  - Monitor oral intake, urinary output, labs, and treatment plans  - Assess nutrition and hydration status and recommend course of action  - Evaluate amount of meals eaten  - Assist patient with eating if necessary   - Allow adequate time for meals  - Recommend/ encourage appropriate diets, oral nutritional supplements, and vitamin/mineral supplements  - Order, calculate, and assess calorie counts as needed  - Recommend, monitor, and adjust tube feedings and TPN/PPN based on assessed needs  - Assess need for intravenous fluids  - Provide specific nutrition/hydration education as appropriate  - Include patient/family/caregiver in decisions related to nutrition  Outcome: Progressing     Problem: MOBILITY - ADULT  Goal: Maintain or return to baseline ADL function  Description: INTERVENTIONS:  - Educate patient/family on patient safety including physical limitations  - Instruct patient to call for assistance with activity   - Consult OT/PT to assist with strengthening/mobility   - Keep Call bell within reach  - Keep bed low and locked with side rails adjusted as appropriate  - Keep care items and personal belongings within reach  - Initiate and maintain comfort rounds  - Make Fall Risk Sign visible to staff  - Offer Toileting every 2 Hours, in advance of need  - Initiate/Maintain bed/chair alarm  - Obtain necessary fall risk management equipment:   - Apply yellow socks and bracelet for high fall risk patients  - Consider moving patient to room near nurses station  Outcome: Progressing  Goal: Maintains/Returns to pre admission functional level  Description: INTERVENTIONS:  - Perform BMAT or MOVE assessment daily    - Set and communicate daily mobility goal to care team and patient/family/caregiver  - Collaborate with rehabilitation services on mobility goals if consulted  - Perform Range of Motion 3 times a day  - Reposition patient every 2 hours    - Dangle patient 3 times a day  - Stand patient 3 times a day  - Ambulate patient 3 times a day  - Out of bed to chair 3 times a day   - Out of bed for meals 3 times a day  - Out of bed for toileting  - Record patient progress and toleration of activity level   Outcome: Progressing     Problem: PAIN - ADULT  Goal: Verbalizes/displays adequate comfort level or baseline comfort level  Description: Interventions:  - Encourage patient to monitor pain and request assistance  - Assess pain using appropriate pain scale  - Administer analgesics based on type and severity of pain and evaluate response  - Implement non-pharmacological measures as appropriate and evaluate response  - Consider cultural and social influences on pain and pain management  - Notify physician/advanced practitioner if interventions unsuccessful or patient reports new pain  Outcome: Progressing     Problem: INFECTION - ADULT  Goal: Absence or prevention of progression during hospitalization  Description: INTERVENTIONS:  - Assess and monitor for signs and symptoms of infection  - Monitor lab/diagnostic results  - Monitor all insertion sites, i e  indwelling lines, tubes, and drains  - Monitor endotracheal if appropriate and nasal secretions for changes in amount and color  - [place] appropriate cooling/warming therapies per order  - Administer medications as ordered  - Instruct and encourage patient and family to use good hand hygiene technique  - Identify and instruct in appropriate isolation precautions for identified infection/condition  Outcome: Progressing  Goal: Absence of fever/infection during neutropenic period  Description: INTERVENTIONS:  - Monitor WBC    Outcome: Progressing     Problem: SAFETY ADULT  Goal: Patient will remain free of falls  Description: INTERVENTIONS:  - Educate patient/family on patient safety including physical limitations  - Instruct patient to call for assistance with activity   - Consult OT/PT to assist with strengthening/mobility   - Keep Call bell within reach  - Keep bed low and locked with side rails adjusted as appropriate  - Keep care items and personal belongings within reach  - Initiate and maintain comfort rounds  - Make Fall Risk Sign visible to staff  - Offer Toileting every 2 Hours, in advance of need  - Initiate/Maintain bed/chair alarm  - Obtain necessary fall risk management equipment:   - Apply yellow socks and bracelet for high fall risk patients  - Consider moving patient to room near nurses station  Outcome: Progressing  Goal: Maintain or return to baseline ADL function  Description: INTERVENTIONS:  - Educate patient/family on patient safety including physical limitations  - Instruct patient to call for assistance with activity   - Consult OT/PT to assist with strengthening/mobility   - Keep Call bell within reach  - Keep bed low and locked with side rails adjusted as appropriate  - Keep care items and personal belongings within reach  - Initiate and maintain comfort rounds  - Make Fall Risk Sign visible to staff  - Offer Toileting every 2 Hours, in advance of need  - Initiate/Maintain bed/chair alarm  - Obtain necessary fall risk management equipment:   - Apply yellow socks and bracelet for high fall risk patients  - Consider moving patient to room near nurses station  Outcome: Progressing  Goal: Maintains/Returns to pre admission functional level  Description: INTERVENTIONS:  - Perform BMAT or MOVE assessment daily    - Set and communicate daily mobility goal to care team and patient/family/caregiver  - Collaborate with rehabilitation services on mobility goals if consulted  - Perform Range of Motion 3 times a day  - Reposition patient every 2 hours    - Dangle patient 3 times a day  - Stand patient 3 times a day  - Ambulate patient 3 times a day  - Out of bed to chair 3 times a day   - Out of bed for meals 3 times a day  - Out of bed for toileting  - Record patient progress and toleration of activity level   Outcome: Progressing     Problem: DISCHARGE PLANNING  Goal: Discharge to home or other facility with appropriate resources  Description: INTERVENTIONS:  - Identify barriers to discharge w/patient and caregiver  - Arrange for needed discharge resources and transportation as appropriate  - Identify discharge learning needs (meds, wound care, etc )  - Arrange for interpretive services to assist at discharge as needed  - Refer to Case Management Department for coordinating discharge planning if the patient needs post-hospital services based on physician/advanced practitioner order or complex needs related to functional status, cognitive ability, or social support system  Outcome: Progressing     Problem: Knowledge Deficit  Goal: Patient/family/caregiver demonstrates understanding of disease process, treatment plan, medications, and discharge instructions  Description: Complete learning assessment and assess knowledge base    Interventions:  - Provide teaching at level of understanding  - Provide teaching via preferred learning methods  Outcome: Progressing

## 2022-07-30 NOTE — H&P
[address]  H&P- Mayur Thomas 1940, 80 y o  female MRN: [id number]  Unit/Bed#: -[facility]ana Encounter: [id number]  Primary Care Provider: Rachele Christianson DO   Date and time admitted to hospital: 7/29/2022  8:17 PM    * Acute metabolic encephalopathy  Assessment & Plan  · POA from home with daughter who reports severe weakness with inability to ambulate with walker in-home as normal, decreased oral intake over the past few days  · Found to have acute cystitis  · Empiric antibiotic therapy  · Recent COVID-19 pneumonia 7/13 with hospitalization 7/18 to 7/19  · Appreciate PT/OT/case management evaluation  · Neurological exam nonfocal    Acute cystitis without hematuria  Assessment & Plan  · Urinalysis with innumerable pyuria/bacteriuria  · Empiric ceftriaxone  · Urine culture pending    Coronary artery disease involving native coronary artery of native heart  Assessment & Plan  · History CAD with CABG x4  · Denies chest pain  · Continue full-strength aspirin 325 mg and atorvastatin home regimen  · Outpatient follow-up    Stage 3b chronic kidney disease [facility])  Assessment & Plan  Lab Results   Component Value Date    EGFR 34 07/29/2022    EGFR 32 07/20/2022    EGFR 42 07/19/2022    CREATININE 1 43 (H) 07/29/2022    CREATININE 1 53 (H) 07/20/2022    CREATININE 1 21 07/19/2022   · Creatinine appears close to baseline of 1 21- 1 18 in Care everywhere  · Trend creatinine  · Renally dose medications  · Empiric ceftriaxone pending urine culture for significant pyuria on urinalysis    GERD (gastroesophageal reflux disease)  Assessment & Plan  · Continue PPI    Arthritis  Assessment & Plan  · Weekly methotrexate  · Plaquenil b i d   · Outpatient follow-up      Hypertension  Assessment & Plan  · Continue PTA lisinopril, bisoprolol  · HCTZ on hold  · Trend blood pressures    VTE Pharmacologic Prophylaxis: VTE Score: 5 High Risk (Score >/= 5) - Pharmacological DVT Prophylaxis Ordered: heparin  Sequential Compression Devices Ordered  Code Status: Level 1 - Full Code   Discussion with family: Updated  (daughter) at bedside  Anticipated Length of Stay: Patient will be admitted on an inpatient basis with an anticipated length of stay of greater than 2 midnights secondary to Metabolic encephalopathy  Total Time for Visit, including Counseling / Coordination of Care: 30 minutes Greater than 50% of this total time spent on direct patient counseling and coordination of care  Chief Complaint:  Weakness    History of Present Illness:  Varsha Shanks is a 80 y o  female with a PMH of COVID pneumonia 07/13/2022 recently discharged from hospital, COPD, hypertension, hyperlipidemia, rheumatoid arthritis, CABG x4, hysterectomy, MSSA bacteremia in July 2021, who presents with generalized weakness with inability to ambulate around home with walker as normally performs at baseline also complaining of lower abdominal pain, anorexia and fatigue  Found to have significant pyuria/bacteriuria on urinalysis, admitted for metabolic encephalopathy with underlying urinary tract infection without sepsis while immunocompromised on methotrexate/Plaquenil  Review of Systems:  Review of Systems   Constitutional: Positive for activity change, appetite change and fatigue  Negative for chills and fever  HENT: Negative for ear pain and sore throat  Eyes: Negative for pain and visual disturbance  Respiratory: Positive for shortness of breath  Negative for cough  Cardiovascular: Negative for chest pain and palpitations  Gastrointestinal: Positive for abdominal pain  Negative for vomiting  Genitourinary: Positive for pelvic pain  Negative for dysuria and hematuria  Musculoskeletal: Negative for arthralgias and back pain  Skin: Negative for color change and rash  Neurological: Positive for weakness  Negative for seizures and syncope  All other systems reviewed and are negative        Past Medical and Surgical History:   Past Medical History:   Diagnosis Date    Arthritis     Asthma     Diabetes mellitus ([facility] [facility] )     Hypertension     MI, old        Past Surgical History:   Procedure Laterality Date    BACK SURGERY      CARDIAC SURGERY      HYSTERECTOMY         Meds/Allergies:  Prior to Admission medications    Medication Sig Start Date End Date Taking? Authorizing Provider   aspirin 325 mg tablet Take 325 mg by mouth daily    Historical Provider, MD   atorvastatin (LIPITOR) 20 mg tablet Take 20 mg by mouth daily    Historical Provider, MD   bisoprolol-hydrochlorothiazide (Katerina [place]) 2 5-6 25 MG per tablet Take 1 tablet by mouth daily    Historical Provider, MD   calcium citrate-vitamin D (CITRACAL+D) 315-200 MG-UNIT per tablet Take 1 tablet by mouth 2 (two) times a day    Historical Provider, MD   docusate sodium (COLACE) 100 mg capsule Take 100 mg by mouth 2 (two) times a day    Historical Provider, MD   DULoxetine (CYMBALTA) 30 mg delayed release capsule Take 30 mg by mouth daily    Historical Provider, MD   folic acid (FOLVITE) 1 mg tablet Take by mouth daily    Historical Provider, MD   hydroxychloroquine (PLAQUENIL) 200 mg tablet Take 200 mg by mouth 2 (two) times a day with meals    Historical Provider, MD   lisinopril (ZESTRIL) 10 mg tablet Take 10 mg by mouth daily    Historical Provider, MD   loratadine (CLARITIN) 10 mg tablet Take 10 mg by mouth daily    Historical Provider, MD   meclizine (ANTIVERT) 12 5 MG tablet Take by mouth 3 (three) times a day as needed for dizziness  Patient not taking: Reported on 7/30/2022    Historical Provider, MD   methotrexate 2 5 mg tablet Take by mouth once a week    Historical Provider, MD   pantoprazole (PROTONIX) 40 mg tablet Take 40 mg by mouth daily    Historical Provider, MD   tolterodine (DETROL LA) 4 mg 24 hr capsule Take 4 mg by mouth daily    Historical Provider, MD     I have reviewed home medications with patient family member      Allergies: No Known Allergies    Social History:  Marital Status: /Civil Union   Occupation:  Retired  Patient Pre-hospital Living Situation: With other family member: Daughter  Patient Pre-hospital Level of Mobility: walks with walker  Patient Pre-hospital Diet Restrictions:  None  Substance Use History:   Social History     Substance and Sexual Activity   Alcohol Use Not Currently     Social History     Tobacco Use   Smoking Status Former Smoker   Smokeless Tobacco Never Used     Social History     Substance and Sexual Activity   Drug Use Not Currently       Family History:  History reviewed  No pertinent family history  Physical Exam:     Vitals:   Blood Pressure: 160/78 (07/30/22 0110)  Pulse: 70 (07/30/22 0110)  Temperature: 97 5 °F (36 4 °C) (07/30/22 0110)  Temp Source: Oral (07/30/22 0110)  Respirations: 18 (07/30/22 0110)  Height: 4' 10" (147 3 cm) (07/30/22 0110)  Weight - Scale: 55 8 kg (123 lb) (07/30/22 0110)  SpO2: 91 % (07/30/22 0110)    Physical Exam  Vitals and nursing note reviewed  Constitutional:       General: She is not in acute distress  Appearance: She is well-developed  HENT:      Head: Normocephalic and atraumatic  Mouth/Throat:      Mouth: Mucous membranes are dry  Eyes:      Conjunctiva/sclera: Conjunctivae normal    Cardiovascular:      Rate and Rhythm: Normal rate and regular rhythm  Heart sounds: No murmur heard  Pulmonary:      Effort: Pulmonary effort is normal  No respiratory distress  Breath sounds: Normal breath sounds  Abdominal:      Palpations: Abdomen is soft  Tenderness: There is no abdominal tenderness  Musculoskeletal:         General: No swelling  Normal range of motion  Cervical back: Neck supple  Comments: Generalized weakness and muscle atrophy   Skin:     General: Skin is warm and dry  Capillary Refill: Capillary refill takes less than 2 seconds  Neurological:      General: No focal deficit present        Mental Status: She is alert and oriented to person, place, and time  Psychiatric:         Mood and Affect: Mood normal          Behavior: Behavior normal         Additional Data:     Lab Results:  Results from last 7 days   Lab Units 07/29/22  2049   WBC Thousand/uL 6 12   HEMOGLOBIN g/dL 15 2   HEMATOCRIT % 46 7*   PLATELETS Thousands/uL 182   NEUTROS PCT % 64   LYMPHS PCT % 19   MONOS PCT % 16*   EOS PCT % 1     Results from last 7 days   Lab Units 07/29/22 2049   SODIUM mmol/L 135   POTASSIUM mmol/L 4 9   CHLORIDE mmol/L 98   CO2 mmol/L 25   BUN mg/dL 26*   CREATININE mg/dL 1 43*   ANION GAP mmol/L 12   CALCIUM mg/dL 8 9   ALBUMIN g/dL 3 4*   TOTAL BILIRUBIN mg/dL 0 93   ALK PHOS U/L 103   ALT U/L 35   AST U/L 40   GLUCOSE RANDOM mg/dL 96     Results from last 7 days   Lab Units 07/29/22  2049   INR  1 01             Results from last 7 days   Lab Units 07/29/22 2049   LACTIC ACID mmol/L 2 0   PROCALCITONIN ng/ml 0 18       Imaging: Reviewed radiology reports from this admission including: chest CT scan  CT pe study w abdomen pelvis w contrast   Final Result by Luis Rowe MD (07/29 2323)      Patchy peripheral groundglass opacities are seen throughout the lungs  In the setting of clinically suspected/proven COVID-19, the above lung parenchymal findings on CT indicate intermediate confidence level for COVID-19  No pulmonary embolus is seen  Right basilar compressive atelectatic changes  Superimposed infection must be excluded clinically  Workstation performed: [id number]         XR chest 1 view portable   ED Interpretation by Josy Nielson DO (07/29 2155)   No focal infiltrates  Possible right-sided diaphragmatic hernia          EKG and Other Studies Reviewed on Admission:   · EKG:  All    ** Please Note: This note has been constructed using a voice recognition system   **

## 2022-07-30 NOTE — CASE MANAGEMENT
Case Management Assessment & Discharge Planning Note    Patient name Haleigh [facility]  Location /-05 MRN [id number]  : 1940 Date 2022       Current Admission Date: 2022  Current Admission Diagnosis:Acute metabolic encephalopathy   Patient Active Problem List    Diagnosis Date Noted    Acute metabolic encephalopathy     Acute cystitis without hematuria 2022    Coronary artery disease involving native coronary artery of native heart 2022    COVID 2022    Hypertension 2022    COPD [facility] obstructive pulmonary disease) ([facility] ) 2022    Arthritis 2022    GERD (gastroesophageal reflux disease) 2022    Fatty liver 2022    Generalized weakness 2022    Stage 3b chronic kidney disease ([facility] ) 2022    Prolonged Q-T interval on ECG 2022      LOS (days): 0  Geometric Mean LOS (GMLOS) (days):   Days to GMLOS:     OBJECTIVE:  PATIENT READMITTED TO HOSPITAL  Risk of Unplanned Readmission Score: 14 13         Current admission status: Inpatient  Referral Reason:  ([address] [place] [address] Needs (VNA/DME/Infusion)    High Utilizer)    Preferred Pharmacy:   [address] [place] [address] #[id number] - [address] [facility], [address] [place] [address]  JENNIFER/ Thai Huber[address] [address]  Phone: [phone] Fax: [phone]    Primary Care Provider: Paola Basilio DO    Primary Insurance: MEDICARE  Secondary Insurance:     ASSESSMENT:  Agatha 26 Proxies    There are no active Health Care Proxies on file                   Readmission Root Cause  30 Day Readmission: Yes  Who directed you to return to the hospital?: Family  Did you understand whom to contact if you had questions or problems?: Yes  Did you get your prescriptions before you left the hospital?: Yes  Were you able to get your prescriptions filled when you left the hospital?: Yes  Did you take your medications as prescribed?: Yes  Were you able to get to your follow-up appointments?: Yes  During previous admission, was a post-acute recommendation made?: No  Patient was readmitted due to: UTI  Action Plan: TBD, patient desires to return home    Patient Information  Admitted from[de-identified] Home  Mental Status: Alert  During Assessment patient was accompanied by: Not accompanied during assessment  Assessment information provided by[de-identified] Patient  Primary Caregiver: Self  Support Systems: Daughter, Family members  [place] of Residence: [facility] do you live in[place] [facility] entry access options  Select all that apply : Stairs  Number of steps to enter home : 4  Do the steps have railings?: Yes  Type of Current Residence: Apartment  Floor Level: 1  Upon entering residence, is there a bedroom on the main floor (no further steps)?: Yes  Upon entering residence, is there a bathroom on the main floor (no further steps)?: Yes  In the last 12 months, was there a time when you were not able to pay the mortgage or rent on time?: No  In the last 12 months, how many places have you lived?: 1  In the last 12 months, was there a time when you did not have a steady place to sleep or slept in a shelter (including now)?: No  Homeless/housing insecurity resource given?: N/A  Living Arrangements: Lives w/ Daughter  Is patient a ?: No    Activities of Daily Living Prior to Admission  Functional Status: Assistance  Completes ADLs independently?: No  Level of ADL dependence: Assistance  Ambulates independently?: No  Level of ambulatory dependence: Assistance  Does patient use assisted devices?: Yes  Assisted Devices (DME) used: Brandy Bearded  Does patient currently own DME?: Yes  What DME does the patient currently own?: Brandy Bearded, Wheelchair  Does patient have a history of Outpatient Therapy (PT/OT)?: No  Does the patient have a history of Short-Term Rehab?: No  Does patient have a history of HHC?: Yes  Does patient currently have [place] [phone]?: No    Current Home Health Care  Type of Current Home Care Services:  Other (Comment) ([address])    Patient Information Continued  Income Source: SSI/SSD  Does patient have prescription coverage?: Yes  Within the past 12 months, you worried that your food would run out before you got the money to buy more : Never true  Within the past 12 months, the food you bought just didn't last and you didn't have money to get more : Never true  Food insecurity resource given?: N/A  Does patient receive dialysis treatments?: No  Does patient have a history of substance abuse?: No  Does patient have a history of Mental Health Diagnosis?: No         Means of Transportation  Means of Transport to Appts[de-identified] Family transport  In the past 12 months, has lack of transportation kept you from medical appointments or from getting medications?: No  In the past 12 months, has lack of transportation kept you from meetings, work, or from getting things needed for daily living?: No  Was application for public transport provided?: N/A        DISCHARGE DETAILS:    Discharge planning discussed with[de-identified] patient  Freedom of Choice: Yes     CM contacted family/caregiver?: No- see comments (patient declined)            CM met with patient at the bedside, baseline information was obtained  CM discussed the role of CM in helping the patient develop a discharge plan and assist the patient in carry out their plan  Patient indicated she lives with daughter, spouse and great granddaughter (age11) in a first floor apartment, 4 steps to enter  Patient indicated she ambulates with RW but says it is very old and she needs a new one  Patient indicated HHC Aide 2 hours 3 days per week (M,W,F), however, patient did not have them coming in for past two weeks as she has felt very sick and did not want to expose them  CM explained HHC was recommended last admission and patient refused  Patient indicated she still does her exercises from prior [place] [address] services  CM will follow for discharge needs

## 2022-07-30 NOTE — ASSESSMENT & PLAN NOTE
Lab Results   Component Value Date    EGFR 34 07/29/2022    EGFR 32 07/20/2022    EGFR 42 07/19/2022    CREATININE 1 43 (H) 07/29/2022    CREATININE 1 53 (H) 07/20/2022    CREATININE 1 21 07/19/2022   · Creatinine appears close to baseline of 1 21- 1 18 in Care everywhere  · Trend creatinine  · Renally dose medications  · Empiric ceftriaxone pending urine culture for significant pyuria on urinalysis

## 2022-07-31 PROBLEM — J20.9 ACUTE BRONCHITIS: Status: ACTIVE | Noted: 2022-07-31

## 2022-07-31 PROCEDURE — 97163 PT EVAL HIGH COMPLEX 45 MIN: CPT

## 2022-07-31 PROCEDURE — 99232 SBSQ HOSP IP/OBS MODERATE 35: CPT | Performed by: FAMILY MEDICINE

## 2022-07-31 PROCEDURE — 97166 OT EVAL MOD COMPLEX 45 MIN: CPT

## 2022-07-31 RX ORDER — CALCIUM CARBONATE 200(500)MG
500 TABLET,CHEWABLE ORAL DAILY PRN
Status: DISCONTINUED | OUTPATIENT
Start: 2022-07-31 | End: 2022-08-01 | Stop reason: HOSPADM

## 2022-07-31 RX ORDER — BENZONATATE 100 MG/1
100 CAPSULE ORAL 3 TIMES DAILY PRN
Status: DISCONTINUED | OUTPATIENT
Start: 2022-07-31 | End: 2022-08-01 | Stop reason: HOSPADM

## 2022-07-31 RX ORDER — MAGNESIUM HYDROXIDE/ALUMINUM HYDROXICE/SIMETHICONE 120; 1200; 1200 MG/30ML; MG/30ML; MG/30ML
30 SUSPENSION ORAL EVERY 4 HOURS PRN
Status: DISCONTINUED | OUTPATIENT
Start: 2022-07-31 | End: 2022-08-01 | Stop reason: HOSPADM

## 2022-07-31 RX ADMIN — DULOXETINE HYDROCHLORIDE 30 MG: 30 CAPSULE, DELAYED RELEASE ORAL at 09:09

## 2022-07-31 RX ADMIN — HEPARIN SODIUM 5000 UNITS: 5000 INJECTION INTRAVENOUS; SUBCUTANEOUS at 13:54

## 2022-07-31 RX ADMIN — ASPIRIN 325 MG ORAL TABLET 325 MG: 325 PILL ORAL at 09:09

## 2022-07-31 RX ADMIN — CEFTRIAXONE 1000 MG: 1 INJECTION, SOLUTION INTRAVENOUS at 22:37

## 2022-07-31 RX ADMIN — ATORVASTATIN CALCIUM 20 MG: 20 TABLET, FILM COATED ORAL at 16:08

## 2022-07-31 RX ADMIN — BISOPROLOL FUMARATE 2.5 MG: 5 TABLET ORAL at 09:10

## 2022-07-31 RX ADMIN — HYDROXYCHLOROQUINE SULFATE 200 MG: 200 TABLET, FILM COATED ORAL at 16:08

## 2022-07-31 RX ADMIN — HEPARIN SODIUM 5000 UNITS: 5000 INJECTION INTRAVENOUS; SUBCUTANEOUS at 21:21

## 2022-07-31 RX ADMIN — BENZONATATE 100 MG: 100 CAPSULE ORAL at 22:44

## 2022-07-31 RX ADMIN — LISINOPRIL 10 MG: 10 TABLET ORAL at 09:10

## 2022-07-31 RX ADMIN — HYDROXYCHLOROQUINE SULFATE 200 MG: 200 TABLET, FILM COATED ORAL at 07:33

## 2022-07-31 RX ADMIN — BENZONATATE 100 MG: 100 CAPSULE ORAL at 14:02

## 2022-07-31 RX ADMIN — ACETAMINOPHEN 650 MG: 325 TABLET ORAL at 05:23

## 2022-07-31 RX ADMIN — HEPARIN SODIUM 5000 UNITS: 5000 INJECTION INTRAVENOUS; SUBCUTANEOUS at 05:18

## 2022-07-31 RX ADMIN — FOLIC ACID 1 MG: 1 TABLET ORAL at 09:10

## 2022-07-31 RX ADMIN — PANTOPRAZOLE SODIUM 40 MG: 40 TABLET, DELAYED RELEASE ORAL at 05:19

## 2022-07-31 NOTE — OCCUPATIONAL THERAPY NOTE
Occupational Therapy Evaluation     Patient Name: Gold Dill  MKVVA'Z Date: 7/31/2022  Problem List  Principal Problem:    Acute metabolic encephalopathy  Active Problems:    Hypertension    Arthritis    GERD (gastroesophageal reflux disease)    Stage 3b chronic kidney disease (HCC)    Acute cystitis without hematuria    Coronary artery disease involving native coronary artery of native heart    Past Medical History  Past Medical History:   Diagnosis Date    Arthritis     Asthma     Diabetes mellitus ([address] )     Hypertension     MI, old      Past Surgical History  Past Surgical History:   Procedure Laterality Date    BACK SURGERY      CARDIAC SURGERY      HYSTERECTOMY           07/31/22 0846   Note Type   Note type Evaluation   Restrictions/Precautions   Other Precautions Chair Alarm; Bed Alarm; Fall Risk;Pain   Pain Assessment   Pain Assessment Tool 0-10   Pain Score 6   Pain Location/Orientation Orientation: Right;Location: Abdomen   Home Living   Type of Home Apartment  ([address])   Home Layout Two level;Bed/bath upstairs  (4 steps to 2nd floor)   Bathroom Shower/Tub Walk-in shower   Bathroom Toilet Standard   Bathroom Equipment Grab bars around toilet   [address] [facility]  (Pt reporting walking is worn and needs new one )   Additional Comments Pt reporting living in apartment with daughter, son-in-law and great granddaughter  Pt using RW at baseline  Prior Function   Level of [place] Independent with ADLs and functional mobility   Lives With Daughter  (, NICK, and great gdtr)   Receives Help From Family;Personal care attendant   ADL Assistance Needs assistance   IADLs Needs assistance   Falls in the last 6 months 0   Comments Pt has HHA 3x/wk to assist with setup for bathing  Assistance from family for completion of IADLs and transportation  ADL   LB Dressing Assistance 5  Supervision/Setup   LB Dressing Deficit Setup;Supervision/safety; Increased time to complete; Don/doff R sock; Don/doff L sock Additional Comments Pt able to doff/don socks while sitting in recliner with SUP after setup  Based on functional abilities, Pt would be able to complete UB ADLs with setup and LB ADLs with SUP-SBA for safety  Bed Mobility   Sit to Supine 5  Supervision   Additional items Increased time required;Verbal cues   Additional Comments Pt sitting in recliner prior to session so supine to sit not assessed  Pt supine in bed following session  Pt completing sit to supine bed mobility with SUP and HOB flat  Transfers   Sit to Stand   (SBA)   Additional items Assist x 1; Increased time required;Verbal cues   Stand to Sit   (SBA)   Additional items Assist x 1; Increased time required;Verbal cues   Stand pivot   (SBA)   Additional items Assist x 1; Increased time required;Verbal cues   Additional Comments Pt completing all transfers using RW with SBA for safety  Verbal cuing required for safe sequencing  Balance   Static Sitting Good   Dynamic Sitting Good   Static Standing Fair   Dynamic Standing Fair   Activity Tolerance   Activity Tolerance Patient limited by fatigue;Patient limited by pain   Medical Staff Made Aware PTLurdes   Nurse Made Aware Domitila FINLEY Assessment   RUE Assessment WFL  (4/5 MMT)   LUE Assessment   LUE Assessment WFL  (4/5 MMT)   Hand Function   Gross Motor Coordination Functional   Fine Motor Coordination Functional   Sensation   Light Touch   (Some tingling in hands )   Cognition   Overall Cognitive Status WFL   Arousal/Participation Alert; Responsive; Cooperative   Attention Attends with cues to redirect   Orientation Level Oriented X4   Memory Within functional limits   Following Commands Follows one step commands without difficulty   Assessment   Limitation Decreased ADL status; Decreased UE strength;Decreased Safe judgement during ADL;Decreased endurance;Decreased self-care trans;Decreased high-level ADLs   Prognosis Fair   Assessment Pt is a 80 y o  female, admitted to [address] [place] [address] 7/29/2022 d/t experiencing generalized weakness with inability to ambulate around home with walker as normally as well as lower abdominal pain, anorexia and fatigue  Dx: acute metabolic encephalopathy  Pt with PMHx impacting their performance during ADL tasks, including: arthritis, asthma, DM, HTN and old MI  Prior to admission to the hospital Pt was performing ADLs with physical assistance  IADLs with physical assistance  Functional transfers/ambulation without physical assistance  Cognitive status was PTA was WNL  OT order placed to assess Pt's ADLs, cognitive status, and performance during functional tasks in order to maximize safety and independence while making most appropriate d/c recommendations  Pt's clinical presentation is currently evolving given new onset deficits that effect Pt's occupational performance and ability to safely return to PLOF including decrease activity tolerance, decrease standing balance, decrease performance during ADL tasks, decrease safety awareness , decrease UB MS, decrease generalized strength, decrease activity engagement, decrease performance during functional transfers, steps to enter home and high fall risk combined with medical complications of abnormal CBC and multiple readmissions  Personal factors affecting Pt at time of initial evaluation include: step(s) to enter environment, multi-level environment, inability to perform ADLs and inability to navigate community distances  Pt will benefit from continued skilled OT services to address deficits as defined above and to maximize level independence/participation during ADLs and functional tasks to facilitate return toward PLOF and improved quality of life  From an occupational therapy standpoint, recommendation at time of d/c would be home with [address] [place] [address]  Plan   Treatment Interventions ADL retraining;Functional transfer training;UE strengthening/ROM; Endurance training;Patient/family training;Energy conservation; Activityengagement   Goal Expiration Date 08/14/22   OT Frequency 2-3x/wk   Recommendation   OT Discharge Recommendation Home with home health rehabilitation   [facility] Daily Activity Inpatient   Lower Body Dressing 3   Bathing 3   Toileting 3   Upper Body Dressing 3   Grooming 3   Eating 4   Daily Activity Raw Score 19   Daily Activity Standardized Score (Calc for Raw Score >=11) 40 22   AM-[facility] Applied Cognition Inpatient   Following a Speech/Presentation 3   Understanding Ordinary Conversation 4   Taking Medications 3   Remembering Where Things Are Placed or Put Away 3   Remembering List of 4-5 Errands 3   Taking Care of Complicated Tasks 3   Applied Cognition Raw Score 19   Applied Cognition Standardized Score 39 77     The patient's raw score on the AM-PAC Daily Activity inpatient short form is 19, standardized score is 40 22, greater than 39 4  Patients at this level are likely to benefit from DC to home  Please refer to the recommendation of the Occupational Therapist for safe DC planning  Pt goals to be met by 8/14/2022  Pt will demonstrate ability to complete grooming/hygiene tasks @ [facility] after set-up  Pt will demonstrate ability to complete supine<>sit @ [facility] in order to increase safety and independence during ADL tasks  Pt will demonstrate ability to complete UB ADLs including washing/dressing @ [facility] in order to increase performance and participation during meaningful tasks  Pt will demonstrate ability to complete LB dressing @ [facility] in order to increase safety and independence during meaningful tasks  Pt will demonstrate ability to marta/doff socks/shoes while sitting EOB @ [facility] in order to increase safety and independence during meaningful tasks  Pt will demonstrate ability to complete toileting tasks including CM and pericare @ [facility] in order to increase safety and independence during meaningful tasks    Pt will demonstrate ability to complete EOB, chair, toilet/commode transfers @ [facility] in order to increase performance and participation during functional tasks  Pt will demonstrate ability to stand for 6 minutes while maintaining F+ balance with use of RW for UB support PRN  Pt will demonstrate ability to tolerate 30-35 minute OT session with no vc'ing for deep breathing or use of energy conservation techniques in order to increase activity tolerance during functional tasks  Pt will demonstrate Good carryover of use of energy conservation/compensatory strategies during ADLs and functional tasks in order to increase safety and reduce risk for falls  Pt will demonstrate Good attention and participation in continued evaluation of functional ambulation house hold distances in order to assist with safe d/c planning  Pt will attend to continued cognitive assessments 100% of the time in order to provide most appropriate d/c recommendations  Pt will follow 100% simple 2-step commands and be A&O x4 consistently with environmental cues to increase participation in functional activities  Pt will identify 3 areas of interest/hobbies and 1 intervention on how to incorporate into daily life in order to increase interaction with environment and peers as well as increase participation in meaningful tasks  Pt will demonstrate 100% carryover of BUE HEP in order to increase BUE MS and increase performance during functional tasks upon d/c home      Merlinda Abraham, OTR/L

## 2022-07-31 NOTE — ASSESSMENT & PLAN NOTE
Patient recently had pneumonia due to COVID-19 and now appears to have some acute bronchitis with cough congestion and some shortness of breath  Continue IV Rocephin for now and observe    Repeat  CRP tomorrow

## 2022-07-31 NOTE — PROGRESS NOTES
[address]  Progress Note Preeti Henriquez 1940, 80 y o  female MRN: [id number]  Unit/Bed#: -Diana Encounter: [id number]  Primary Care Provider: Fausto Pérez DO   Date and time admitted to hospital: 7/29/2022  8:17 PM    * Acute metabolic encephalopathy  Assessment & Plan  · POA from home with daughter who reports severe weakness with inability to ambulate with walker in-home as normal, decreased oral intake over the past few days  · Found to have acute cystitis without hematuria  Encephalopathy now appears to be resolved  · Empiric antibiotic therapy with IV Rocephin until urine culture results are back  · Recent COVID-19 pneumonia 7/13 with hospitalization 7/18 to 7/19  · Appreciate PT/OT/case management evaluation for disposition as she is quite deconditioned    Acute cystitis without hematuria  Assessment & Plan  · Urinalysis with innumerable pyuria/bacteriuria  · Empiric ceftriaxone  · Urine culture pending    Acute bronchitis  Assessment & Plan  Patient recently had pneumonia due to COVID-19 and now appears to have some acute bronchitis with cough congestion and some shortness of breath  Continue IV Rocephin for now and observe  Repeat  CRP tomorrow    Coronary artery disease involving native coronary artery of native heart  Assessment & Plan  · History CAD with CABG x4  · Denies chest pain  · Continue full-strength aspirin 325 mg and atorvastatin home regimen  · Outpatient follow-up    Stage 3b chronic kidney disease [facility])  Assessment & Plan  Lab Results   Component Value Date    EGFR 47 07/30/2022    EGFR 34 07/29/2022    EGFR 32 07/20/2022    CREATININE 1 10 07/30/2022    CREATININE 1 43 (H) 07/29/2022    CREATININE 1 53 (H) 07/20/2022   · Creatinine appears close to baseline of 1 21- 1 18 in Care everywhere  · Creatinine at baseline  Avoid nephrotoxic agents and hypotension    Hold lisinopril    GERD (gastroesophageal reflux disease)  Assessment & Plan  · Continue PPI ,maalox and tums    Arthritis  Assessment & Plan  · Weekly methotrexate  · Plaquenil b i d   · Outpatient follow-up      Essential hypertension  Assessment & Plan  ·  PTA lisinopril,hctz and bisoprolol  · Blood pressure currently controlled  Continue bisoprolol alone hold lisinopril and HCTZ as blood pressure is low normal    VTE Pharmacologic Prophylaxis:   Pharmacologic: Heparin  Mechanical VTE Prophylaxis in Place: Yes    Patient Centered Rounds: I have performed bedside rounds with nursing staff today  Discussions with Specialists or Other Care Team Provider:  None    Education and Discussions with Family / Patient:  Discussed with patient bedside will update family    Time Spent for Care: 30 minutes  More than 50% of total time spent on counseling and coordination of care as described above  Current Length of Stay: 1 day(s)    Current Patient Status: Inpatient   Certification Statement: The patient will continue to require additional inpatient hospital stay due to Acute metabolic encephalopathy    Discharge Plan:  Pending progress    Code Status: Level 1 - Full Code      Subjective:   Patient denies any chest pain or shortness of breath or abdominal pain  Feeling a little better today    Objective:     Vitals:   Temp (24hrs), Av 5 °F (36 4 °C), Min:97 2 °F (36 2 °C), Max:97 9 °F (36 6 °C)    Temp:  [97 2 °F (36 2 °C)-97 9 °F (36 6 °C)] 97 4 °F (36 3 °C)  HR:  [54-63] 63  Resp:  [16-17] 17  BP: (104-129)/(52-66) 104/53  SpO2:  [96 %-100 %] 100 %  Body mass index is 25 71 kg/m²  Input and Output Summary (last 24 hours): Intake/Output Summary (Last 24 hours) at 2022 1223  Last data filed at 2022 1700  Gross per 24 hour   Intake 118 ml   Output --   Net 118 ml       Physical Exam:     Physical Exam  Vitals and nursing note reviewed  Constitutional:       Appearance: She is ill-appearing  HENT:      Head: Normocephalic and atraumatic        Right Ear: External ear normal       Left Ear: External ear normal       Nose: Nose normal       Mouth/Throat:      Pharynx: Oropharynx is clear  Eyes:      Pupils: Pupils are equal, round, and reactive to light  Cardiovascular:      Rate and Rhythm: Normal rate and regular rhythm  Heart sounds: Normal heart sounds  Pulmonary:      Effort: Pulmonary effort is normal       Breath sounds: Rhonchi present  Abdominal:      General: Bowel sounds are normal       Palpations: Abdomen is soft  Tenderness: There is no abdominal tenderness  Musculoskeletal:         General: Normal range of motion  Cervical back: Normal range of motion and neck supple  Skin:     General: Skin is warm and dry  Capillary Refill: Capillary refill takes less than 2 seconds  Neurological:      General: No focal deficit present  Mental Status: She is alert and oriented to person, place, and time  Psychiatric:         Mood and Affect: Mood normal          Additional Data:     Labs:    Results from last 7 days   Lab Units 07/30/22 0447   WBC Thousand/uL 5 22   HEMOGLOBIN g/dL 13 4   HEMATOCRIT % 42 0   PLATELETS Thousands/uL 172   NEUTROS PCT % 57   LYMPHS PCT % 24   MONOS PCT % 17*   EOS PCT % 1     Results from last 7 days   Lab Units 07/30/22 0447 07/29/22  2049   SODIUM mmol/L 136 135   POTASSIUM mmol/L 4 5 4 9   CHLORIDE mmol/L 101 98   CO2 mmol/L 24 25   BUN mg/dL 20 26*   CREATININE mg/dL 1 10 1 43*   ANION GAP mmol/L 11 12   CALCIUM mg/dL 8 5 8 9   ALBUMIN g/dL  --  3 4*   TOTAL BILIRUBIN mg/dL  --  0 93   ALK PHOS U/L  --  103   ALT U/L  --  35   AST U/L  --  40   GLUCOSE RANDOM mg/dL 84 96     Results from last 7 days   Lab Units 07/29/22  2049   INR  1 01             Results from last 7 days   Lab Units 07/29/22  2049   LACTIC ACID mmol/L 2 0   PROCALCITONIN ng/ml 0 18           * I Have Reviewed All Lab Data Listed Above  * Additional Pertinent Lab Tests Reviewed:  Edg[address] Admission Reviewed    Imaging:    Imaging Reports Reviewed Today Include:  CT chest  Imaging Personally Reviewed by Myself Includes:  CT chest    Recent Cultures (last 7 days):     Results from last 7 days   Lab Units 07/29/22 2049   BLOOD CULTURE  Received in Microbiology Lab  Culture in Progress  Received in Microbiology Lab  Culture in Progress  Last 24 Hours Medication List:   Current Facility-Administered Medications   Medication Dose Route Frequency Provider Last Rate    acetaminophen  650 mg Oral Q6H PRN Maura S Michael, CRNP      aluminum-magnesium hydroxide-simethicone  30 mL Oral Q4H PRN Magalys Epstein MD      aspirin  325 mg Oral Daily Maura S Michael, CRNP      atorvastatin  20 mg Oral Daily With Dinner Maura S Michael, CRNP      bisoprolol  2 5 mg Oral Daily Maura S Michael, CRNP      calcium carbonate  500 mg Oral Daily PRN Magalys Epstein MD      cefTRIAXone  1,000 mg Intravenous Q24H Maura S Michael, CRNP 1,000 mg (07/30/22 2203)    DULoxetine  30 mg Oral Daily Maura S Michael, CRNP      folic acid  1 mg Oral Daily Maura S Michael, CRNP      heparin (porcine)  5,000 Units Subcutaneous Q8H [address] Maura S Michael, CRNP      hydroxychloroquine  200 mg Oral BID With Meals Maura S Michael, CRNP      loratadine  10 mg Oral Every Other Day Maura S Michael, CRNP      pantoprazole  40 mg Oral Daily Before Breakfast Maura S Michael, CRNP          Today, Patient Was Seen By: Magalys Epstein MD    ** Please Note: Dictation voice to text software may have been used in the creation of this document   **

## 2022-07-31 NOTE — PLAN OF CARE
Problem: PHYSICAL THERAPY ADULT  Goal: Performs mobility at highest level of function for planned discharge setting  See evaluation for individualized goals  Description: Treatment/Interventions: Functional transfer training, LE strengthening/ROM, Elevations, Therapeutic exercise, Endurance training, Patient/family training, Equipment eval/education, Bed mobility, Compensatory technique education, Gait training, Spoke to nursing, OT  Equipment Recommended: Violetta Brooks Walker       See flowsheet documentation for full assessment, interventions and recommendations  Note: Prognosis: Good  Problem List: Decreased strength, Decreased endurance, Impaired balance, Decreased mobility, Impaired judgement, Decreased safety awareness, Pain  Assessment: Pt is a 80 y o  female seen for PT evaluation s/p admission to [address] [place] [address] on 7/29/2022 with Acute metabolic encephalopathy  Order placed for PT services  Upon evaluation: Pt is presenting with impaired functional mobility due to pain, decreased strength, decreased endurance, impaired balance, gait deviations, decreased safety awareness, impaired judgment, and fall risk requiring  supervision assistance for bed mobility and SBA assistance for transfers and ambulation with RW   Pt's clinical presentation is currently unstable/unpredictable given the functional mobility deficits above coupled with fall risks as indicated by AM-PAC 6-Clicks: 64/67 as well as impaired balance, polypharmacy, impaired judgement, and decreased safety awareness and combined with medical complications of hypertension , pain impacting overall mobility status, and need for input for mobility technique/safety  Pt's PMHx and comorbidities that may affect physical performance and progress include: COPD, CKD, DM, HTN, asthma, CAD, and RA    Personal factors affecting pt at time of IE include: step(s) to enter environment, multi-level environment, inability to perform IADLs, inability to perform ADLs, inability to navigate level surfaces without external assistance, and inability to navigate community distances  Pt will benefit from continued skilled PT services to address deficits as defined above and to maximize level of functional mobility to facilitate return toward PLOF and improved QOL  From PT/mobility standpoint, recommendation at time of d/c would be Home PT pending progress in order to reduce fall risk and maximize pt's functional independence and consistency with mobility in order to facilitate return to PLOF  Recommend trial with walker next 1-2 sessions and ther ex next 1-2 sessions  Barriers to Discharge: None     PT Discharge Recommendation: Home with home health rehabilitation    See flowsheet documentation for full assessment

## 2022-07-31 NOTE — ASSESSMENT & PLAN NOTE
·  PTA lisinopril,hctz and bisoprolol  · Blood pressure currently controlled    Continue bisoprolol alone hold lisinopril and HCTZ as blood pressure is low normal

## 2022-07-31 NOTE — PLAN OF CARE
Problem: OCCUPATIONAL THERAPY ADULT  Goal: Performs self-care activities at highest level of function for planned discharge setting  See evaluation for individualized goals  Description: Treatment Interventions: ADL retraining, Functional transfer training, UE strengthening/ROM, Endurance training, Patient/family training, Energy conservation, Activityengagement          See flowsheet documentation for full assessment, interventions and recommendations  Note: Limitation: Decreased ADL status, Decreased UE strength, Decreased Safe judgement during ADL, Decreased endurance, Decreased self-care trans, Decreased high-level ADLs  Prognosis: Fair  Assessment: Pt is a 80 y o  female, admitted to [address] [place] [address] 7/29/2022 d/t experiencing generalized weakness with inability to ambulate around home with walker as normally as well as lower abdominal pain, anorexia and fatigue  Dx: acute metabolic encephalopathy  Pt with PMHx impacting their performance during ADL tasks, including: arthritis, asthma, DM, HTN and old MI  Prior to admission to the hospital Pt was performing ADLs with physical assistance  IADLs with physical assistance  Functional transfers/ambulation without physical assistance  Cognitive status was PTA was WNL  OT order placed to assess Pt's ADLs, cognitive status, and performance during functional tasks in order to maximize safety and independence while making most appropriate d/c recommendations   Pt's clinical presentation is currently evolving given new onset deficits that effect Pt's occupational performance and ability to safely return to OF including decrease activity tolerance, decrease standing balance, decrease performance during ADL tasks, decrease safety awareness , decrease UB MS, decrease generalized strength, decrease activity engagement, decrease performance during functional transfers, steps to enter home and high fall risk combined with medical complications of abnormal CBC and multiple readmissions  Personal factors affecting Pt at time of initial evaluation include: step(s) to enter environment, multi-level environment, inability to perform ADLs and inability to navigate community distances  Pt will benefit from continued skilled OT services to address deficits as defined above and to maximize level independence/participation during ADLs and functional tasks to facilitate return toward PLOF and improved quality of life  From an occupational therapy standpoint, recommendation at time of d/c would be home with [address] [place] [address]       OT Discharge Recommendation: Home with home health rehabilitation

## 2022-07-31 NOTE — ASSESSMENT & PLAN NOTE
Lab Results   Component Value Date    EGFR 47 07/30/2022    EGFR 34 07/29/2022    EGFR 32 07/20/2022    CREATININE 1 10 07/30/2022    CREATININE 1 43 (H) 07/29/2022    CREATININE 1 53 (H) 07/20/2022   · Creatinine appears close to baseline of 1 21- 1 18 in Care everywhere  · Creatinine at baseline  Avoid nephrotoxic agents and hypotension    Hold lisinopril

## 2022-07-31 NOTE — PLAN OF CARE
Problem: Potential for Falls  Goal: Patient will remain free of falls  Description: INTERVENTIONS:  - Educate patient/family on patient safety including physical limitations  - Instruct patient to call for assistance with activity   - Consult OT/PT to assist with strengthening/mobility   - Keep Call bell within reach  - Keep bed low and locked with side rails adjusted as appropriate  - Keep care items and personal belongings within reach  - Initiate and maintain comfort rounds  - Make Fall Risk Sign visible to staff  - Offer Toileting every 2 Hours, in advance of need  - Initiate/Maintain bed/chair alarm  - Obtain necessary fall risk management equipment:   - Apply yellow socks and bracelet for high fall risk patients  - Consider moving patient to room near nurses station  Outcome: Progressing     Problem: Nutrition/Hydration-ADULT  Goal: Nutrient/Hydration intake appropriate for improving, restoring or maintaining nutritional needs  Description: Monitor and assess patient's nutrition/hydration status for malnutrition  Collaborate with interdisciplinary team and initiate plan and interventions as ordered  Monitor patient's weight and dietary intake as ordered or per policy  Utilize nutrition screening tool and intervene as necessary  Determine patient's food preferences and provide high-protein, high-caloric foods as appropriate       INTERVENTIONS:  - Monitor oral intake, urinary output, labs, and treatment plans  - Assess nutrition and hydration status and recommend course of action  - Evaluate amount of meals eaten  - Assist patient with eating if necessary   - Allow adequate time for meals  - Recommend/ encourage appropriate diets, oral nutritional supplements, and vitamin/mineral supplements  - Order, calculate, and assess calorie counts as needed  - Recommend, monitor, and adjust tube feedings and TPN/PPN based on assessed needs  - Assess need for intravenous fluids  - Provide specific nutrition/hydration education as appropriate  - Include patient/family/caregiver in decisions related to nutrition  Outcome: Progressing     Problem: MOBILITY - ADULT  Goal: Maintain or return to baseline ADL function  Description: INTERVENTIONS:  - Educate patient/family on patient safety including physical limitations  - Instruct patient to call for assistance with activity   - Consult OT/PT to assist with strengthening/mobility   - Keep Call bell within reach  - Keep bed low and locked with side rails adjusted as appropriate  - Keep care items and personal belongings within reach  - Initiate and maintain comfort rounds  - Make Fall Risk Sign visible to staff  - Offer Toileting every 2 Hours, in advance of need  - Initiate/Maintain bed/chair alarm  - Obtain necessary fall risk management equipment:   - Apply yellow socks and bracelet for high fall risk patients  - Consider moving patient to room near nurses station  Outcome: Progressing  Goal: Maintains/Returns to pre admission functional level  Description: INTERVENTIONS:  - Perform BMAT or MOVE assessment daily    - Set and communicate daily mobility goal to care team and patient/family/caregiver  - Collaborate with rehabilitation services on mobility goals if consulted  - Perform Range of Motion 3 times a day  - Reposition patient every 2 hours    - Dangle patient 3 times a day  - Stand patient 3 times a day  - Ambulate patient 3 times a day  - Out of bed to chair 3 times a day   - Out of bed for meals 3 times a day  - Out of bed for toileting  - Record patient progress and toleration of activity level   Outcome: Progressing     Problem: PAIN - ADULT  Goal: Verbalizes/displays adequate comfort level or baseline comfort level  Description: Interventions:  - Encourage patient to monitor pain and request assistance  - Assess pain using appropriate pain scale  - Administer analgesics based on type and severity of pain and evaluate response  - Implement non-pharmacological measures as appropriate and evaluate response  - Consider cultural and social influences on pain and pain management  - Notify physician/advanced practitioner if interventions unsuccessful or patient reports new pain  Outcome: Progressing     Problem: INFECTION - ADULT  Goal: Absence or prevention of progression during hospitalization  Description: INTERVENTIONS:  - Assess and monitor for signs and symptoms of infection  - Monitor lab/diagnostic results  - Monitor all insertion sites, i e  indwelling lines, tubes, and drains  - Monitor endotracheal if appropriate and nasal secretions for changes in amount and color  - [place] appropriate cooling/warming therapies per order  - Administer medications as ordered  - Instruct and encourage patient and family to use good hand hygiene technique  - Identify and instruct in appropriate isolation precautions for identified infection/condition  Outcome: Progressing  Goal: Absence of fever/infection during neutropenic period  Description: INTERVENTIONS:  - Monitor WBC    Outcome: Progressing     Problem: SAFETY ADULT  Goal: Patient will remain free of falls  Description: INTERVENTIONS:  - Educate patient/family on patient safety including physical limitations  - Instruct patient to call for assistance with activity   - Consult OT/PT to assist with strengthening/mobility   - Keep Call bell within reach  - Keep bed low and locked with side rails adjusted as appropriate  - Keep care items and personal belongings within reach  - Initiate and maintain comfort rounds  - Make Fall Risk Sign visible to staff  - Offer Toileting every 2 Hours, in advance of need  - Initiate/Maintain bed/chair alarm  - Obtain necessary fall risk management equipment:   - Apply yellow socks and bracelet for high fall risk patients  - Consider moving patient to room near nurses station  Outcome: Progressing  Goal: Maintain or return to baseline ADL function  Description: INTERVENTIONS:  - Educate patient/family on patient safety including physical limitations  - Instruct patient to call for assistance with activity   - Consult OT/PT to assist with strengthening/mobility   - Keep Call bell within reach  - Keep bed low and locked with side rails adjusted as appropriate  - Keep care items and personal belongings within reach  - Initiate and maintain comfort rounds  - Make Fall Risk Sign visible to staff  - Offer Toileting every 2 Hours, in advance of need  - Initiate/Maintain bed/chair alarm  - Obtain necessary fall risk management equipment:   - Apply yellow socks and bracelet for high fall risk patients  - Consider moving patient to room near nurses station  Outcome: Progressing  Goal: Maintains/Returns to pre admission functional level  Description: INTERVENTIONS:  - Perform BMAT or MOVE assessment daily    - Set and communicate daily mobility goal to care team and patient/family/caregiver  - Collaborate with rehabilitation services on mobility goals if consulted  - Perform Range of Motion 3 times a day  - Reposition patient every 2 hours    - Dangle patient 3 times a day  - Stand patient 3 times a day  - Ambulate patient 3 times a day  - Out of bed to chair 3 times a day   - Out of bed for meals 3 times a day  - Out of bed for toileting  - Record patient progress and toleration of activity level   Outcome: Progressing     Problem: DISCHARGE PLANNING  Goal: Discharge to home or other facility with appropriate resources  Description: INTERVENTIONS:  - Identify barriers to discharge w/patient and caregiver  - Arrange for needed discharge resources and transportation as appropriate  - Identify discharge learning needs (meds, wound care, etc )  - Arrange for interpretive services to assist at discharge as needed  - Refer to Case Management Department for coordinating discharge planning if the patient needs post-hospital services based on physician/advanced practitioner order or complex needs related to functional status, cognitive ability, or social support system  Outcome: Progressing     Problem: Knowledge Deficit  Goal: Patient/family/caregiver demonstrates understanding of disease process, treatment plan, medications, and discharge instructions  Description: Complete learning assessment and assess knowledge base    Interventions:  - Provide teaching at level of understanding  - Provide teaching via preferred learning methods  Outcome: Progressing

## 2022-07-31 NOTE — ASSESSMENT & PLAN NOTE
· POA from home with daughter who reports severe weakness with inability to ambulate with walker in-home as normal, decreased oral intake over the past few days  · Found to have acute cystitis without hematuria    Encephalopathy now appears to be resolved  · Empiric antibiotic therapy with IV Rocephin until urine culture results are back  · Recent COVID-19 pneumonia 7/13 with hospitalization 7/18 to 7/19  · Appreciate PT/OT/case management evaluation for disposition as she is quite deconditioned

## 2022-07-31 NOTE — PLAN OF CARE
Problem: Potential for Falls  Goal: Patient will remain free of falls  Description: INTERVENTIONS:  - Educate patient/family on patient safety including physical limitations  - Instruct patient to call for assistance with activity   - Consult OT/PT to assist with strengthening/mobility   - Keep Call bell within reach  - Keep bed low and locked with side rails adjusted as appropriate  - Keep care items and personal belongings within reach  - Initiate and maintain comfort rounds  - Make Fall Risk Sign visible to staff  - Offer Toileting every 2 Hours, in advance of need  - Initiate/Maintain bed/chair alarm  - Obtain necessary fall risk management equipment:   - Apply yellow socks and bracelet for high fall risk patients  - Consider moving patient to room near nurses station  Outcome: Not Progressing

## 2022-07-31 NOTE — PHYSICAL THERAPY NOTE
"[facility]  Patient Name: Kelly Astorga  : 1984  MRN: [id number]  Date of Service: 2021  Referring Provider: Radha Chaudhry     ID: [id number] y.o.  at 17w5d    Admission Diagnosis: Vaginal bleeding in pregnancy, second trimester [O46.92]    Discharge Diagnosis:   Patient Active Problem List   Diagnosis   • S/P laparoscopic sleeve gastrectomy   • Somatic dysfunction of pelvis region   • Well adult exam   • Spontaneous vaginal delivery   • Postpartum anemia   • Axillary abscess   • Arthralgia   • Elevated uric acid in blood   • Vaginal bleeding in pregnancy, second trimester       Date of Admission: 2021  7:07 AM    Date of Discharge: 2021    Discharge Condition: Stable    Discharge to: Home    Discharge Medications:      Discharge Medications      ASK your doctor about these medications      Instructions Start Date   aspirin 81 MG chewable tablet   81 mg, Oral, Daily      omeprazole 20 MG capsule  Commonly known as: priLOSEC   20 mg, Oral, Daily      PNV Prenatal Plus Multivitamin 27-1 MG tablet   1 tablet, Oral, Daily,       ZyrTEC Allergy 10 MG tablet  Generic drug: cetirizine   10 mg, Oral, Daily             Discharge Diet: Regular    Discharge Activity: Patient is to remain at pelvic rest and avoid strenuous activity or heavy lifting.    Follow up appointments: Patient already has an appointment for .  However, I have advised her to schedule a follow-up appointment with her obstetrician within the next week.    Hospital summary: Patient presented with antepartum bleeding at 17 weeks 5 days gestation.  A PDC ultrasound was ordered which has shown a small subchorionic hemorrhage which is most likely responsible for the bleeding seen.  No other specific recommendations were given.  She will be discharged to home with the above precautions.    David Garcia \"[place]\" ADITYA Dalton MD  09:17 EDT  " PHYSICAL THERAPY EVALUATION  NAME:  Michelle Bautista  DATE: 07/31/22    AGE:   80 y o   Mrn:   [id number]  ADMIT DX:  UTI (urinary tract infection) [N39 0]  Weakness generalized [R53 1]  Generalized weakness [R53 1]  Ambulatory dysfunction [R26 2]    Past Medical History:   Diagnosis Date    Arthritis     Asthma     Diabetes mellitus ([facility] [address] )     Hypertension     MI, old      Length Of Stay: 1  Performed at least 2 patient identifiers during session: Name and Birthday  PHYSICAL THERAPY EVALUATION :        07/31/22 0845   Note Type   Note type Evaluation   Pain Assessment   Pain Assessment Tool 0-10   Pain Score 6   Pain Location/Orientation Location: Abdomen;Orientation: Right   Restrictions/Precautions   Other Precautions Chair Alarm; Bed Alarm; Fall Risk;Pain   Home Living   Type of Home Apartment  ([address])   Home Layout Two level;Bed/bath upstairs  (4 steps to second floor)   Bathroom Shower/Tub Walk-in shower   Bathroom Toilet Standard   Bathroom Equipment Grab bars around toilet   [address] [facility]  (pt reports walker is worn out)   Additional Comments Pt reports living in apartment with dtr, NICK, and great gdtr, uses RW at baseline   Prior Function   Level of [place] Independent with ADLs and functional mobility   Lives With Daughter  (, NICK, and great gdtr)   Receives Help From Family;Personal care attendant   ADL Assistance Needs assistance   IADLs Needs assistance   Falls in the last 6 months 0   Comments Pt reports setup assistance with bathing, has assistance for IADLs and transportation   Cognition   Overall Cognitive Status WFL   Orientation Level Oriented X4   Following Commands Follows one step commands without difficulty   LUE Assessment   LUE Assessment WFL  (4/5 strength)   RLE Assessment   RLE Assessment WFL  (4/5 strength)   Light Touch   RLE Light Touch Grossly intact   LLE Light Touch Grossly intact   Bed Mobility   Sit to Supine 5  Supervision   Additional items Increased time required;Verbal cues   Additional Comments HOB flat, bedrails PRN, supervision for safety   Transfers   Sit to Stand   (SBA)   Additional items Increased time required;Verbal cues   Stand to Sit   (SBA)   Additional items Increased time required;Verbal cues   Stand pivot   (SBA)   Additional items Increased time required;Verbal cues   Additional Comments With RW, VC for hand placement and technique   Ambulation/Elevation   Gait pattern Improper Weight shift;Decreased foot clearance;Narrow ZACK; Excessively slow; Short stride   Gait Assistance   (SBA)   Additional items Verbal cues   Assistive Device Rolling walker   Distance 150' VC for increased step length, slow gait speed, no LOB   Stair Management Assistance   (SBA)   Additional items Verbal cues   Stair Management Technique Two rails; Foreward; Step to pattern   Number of Stairs 5   Balance   Static Sitting Good   Dynamic Sitting Fair +   Static Standing Fair   Dynamic Standing Fair -   Ambulatory Fair -   Endurance Deficit   Endurance Deficit Yes   Endurance Deficit Description fatigue, pain   Activity Tolerance   Activity Tolerance Patient limited by fatigue;Patient limited by pain   Medical Staff Made Aware OT, [address]    Nurse Made Aware Misty KING   Assessment   Prognosis Good   Problem List Decreased strength;Decreased endurance; Impaired balance;Decreased mobility; Impaired judgement;Decreased safety awareness;Pain   Barriers to Discharge None   Goals   Patient Goals Go home   STG Expiration Date 08/14/22   Plan   Treatment/Interventions Functional transfer training;LE strengthening/ROM; Elevations; Therapeutic exercise; Endurance training;Patient/family training;Equipment eval/education; Bed mobility; Compensatory technique education;Gait training;Spoke to nursing;OT   PT Frequency 2-3x/wk   Recommendation   PT Discharge Recommendation Home with home health rehabilitation   Equipment Recommended (S)  [address] [facility] Recommended Wheeled walker   Change/add to [employer]? Yes, Change Size   Walker Size Kaden (Ht <5'1")   AM-PAC Basic Mobility Inpatient   Turning in Bed Without Bedrails 4   Lying on Back to Sitting on Edge of Flat Bed 4   Moving Bed to Chair 3   Standing Up From Chair 3   Walk in Room 3   Climb 3-5 Stairs 3   Basic Mobility Inpatient Raw Score 20   Basic Mobility Standardized Score 43 99   Highest Level Of Mobility   JH-HLM Goal 6: Walk 10 steps or more   JH-HLM Achieved 7: Walk 25 feet or more   End of Consult   Patient Position at End of Consult Bedside chair;Bed/Chair alarm activated; All needs within reach     The patient's AM-PAC Basic Mobility Inpatient Short Form Raw Score is 20    A Raw score of greater than 16 suggests the patient may benefit from discharge to home  Please also refer to the recommendation of the Physical Therapist for safe discharge planning  (Please find full objective findings from PT assessment regarding body systems outlined above)  Assessment: Pt is a 80 y o  female seen for PT evaluation s/p admission to [address] [place] [address] on 7/29/2022 with Acute metabolic encephalopathy  Order placed for PT services  Upon evaluation: Pt is presenting with impaired functional mobility due to pain, decreased strength, decreased endurance, impaired balance, gait deviations, decreased safety awareness, impaired judgment, and fall risk requiring  supervision assistance for bed mobility and SBA assistance for transfers and ambulation with RW   Pt's clinical presentation is currently unstable/unpredictable given the functional mobility deficits above coupled with fall risks as indicated by -PAC 6-Clicks: 35/53 as well as impaired balance, polypharmacy, impaired judgement, and decreased safety awareness and combined with medical complications of hypertension , pain impacting overall mobility status, and need for input for mobility technique/safety    Pt's PMHx and comorbidities that may affect physical performance and progress include: COPD, CKD, DM, HTN, asthma, CAD, and RA   Personal factors affecting pt at time of IE include: step(s) to enter environment, multi-level environment, inability to perform IADLs, inability to perform ADLs, inability to navigate level surfaces without external assistance, and inability to navigate community distances  Pt will benefit from continued skilled PT services to address deficits as defined above and to maximize level of functional mobility to facilitate return toward PLOF and improved QOL  From PT/mobility standpoint, recommendation at time of d/c would be Home PT pending progress in order to reduce fall risk and maximize pt's functional independence and consistency with mobility in order to facilitate return to PLOF  Recommend trial with walker next 1-2 sessions and ther ex next 1-2 sessions  Goals: Pt will: Perform bed mobility tasks with modified I to reposition in bed and prepare for transfers  Pt will perform transfers with modified I to decrease burden of care and prepare for ambulation  Pt will ambulate with RW for >/= 250' with  modified I  to improve gait quality and promote proper use of assistive device and to access home environment  Pt will complete >/= 6 steps with with bilateral handrails with Supervision to return to home with RONNELL and return to multilevel home  Increase bilateral LE strength 1/2 grade to facilitate independent mobility and Increase all balance 1/2 grade to decrease risk for falls          Heaven Craig, PT,DPT

## 2022-08-01 VITALS
OXYGEN SATURATION: 98 % | DIASTOLIC BLOOD PRESSURE: 75 MMHG | HEIGHT: 58 IN | TEMPERATURE: 97.8 F | SYSTOLIC BLOOD PRESSURE: 161 MMHG | HEART RATE: 59 BPM | BODY MASS INDEX: 25.82 KG/M2 | WEIGHT: 123 LBS | RESPIRATION RATE: 16 BRPM

## 2022-08-01 LAB
BACTERIA UR CULT: ABNORMAL
BACTERIA UR CULT: ABNORMAL
CRP SERPL QL: 65 MG/L

## 2022-08-01 PROCEDURE — 99239 HOSP IP/OBS DSCHRG MGMT >30: CPT | Performed by: FAMILY MEDICINE

## 2022-08-01 PROCEDURE — 86140 C-REACTIVE PROTEIN: CPT | Performed by: FAMILY MEDICINE

## 2022-08-01 RX ORDER — ALBUTEROL SULFATE 90 UG/1
2 AEROSOL, METERED RESPIRATORY (INHALATION) EVERY 6 HOURS PRN
Qty: 8.5 G | Refills: 0
Start: 2022-08-01

## 2022-08-01 RX ORDER — CEFUROXIME AXETIL 500 MG/1
500 TABLET ORAL EVERY 12 HOURS SCHEDULED
Qty: 10 TABLET | Refills: 0 | Status: SHIPPED | OUTPATIENT
Start: 2022-08-01 | End: 2022-08-06

## 2022-08-01 RX ORDER — BENZONATATE 100 MG/1
100 CAPSULE ORAL 3 TIMES DAILY PRN
Qty: 20 CAPSULE | Refills: 0 | Status: SHIPPED | OUTPATIENT
Start: 2022-08-01

## 2022-08-01 RX ORDER — ACETAMINOPHEN 325 MG/1
650 TABLET ORAL EVERY 6 HOURS PRN
Refills: 0
Start: 2022-08-01

## 2022-08-01 RX ADMIN — HEPARIN SODIUM 5000 UNITS: 5000 INJECTION INTRAVENOUS; SUBCUTANEOUS at 14:16

## 2022-08-01 RX ADMIN — LORATADINE 10 MG: 10 TABLET ORAL at 08:39

## 2022-08-01 RX ADMIN — DULOXETINE HYDROCHLORIDE 30 MG: 30 CAPSULE, DELAYED RELEASE ORAL at 08:39

## 2022-08-01 RX ADMIN — FOLIC ACID 1 MG: 1 TABLET ORAL at 08:39

## 2022-08-01 RX ADMIN — HEPARIN SODIUM 5000 UNITS: 5000 INJECTION INTRAVENOUS; SUBCUTANEOUS at 05:54

## 2022-08-01 RX ADMIN — BENZONATATE 100 MG: 100 CAPSULE ORAL at 08:45

## 2022-08-01 RX ADMIN — PANTOPRAZOLE SODIUM 40 MG: 40 TABLET, DELAYED RELEASE ORAL at 05:54

## 2022-08-01 RX ADMIN — BISOPROLOL FUMARATE 2.5 MG: 5 TABLET ORAL at 08:38

## 2022-08-01 RX ADMIN — HYDROXYCHLOROQUINE SULFATE 200 MG: 200 TABLET, FILM COATED ORAL at 07:37

## 2022-08-01 RX ADMIN — ASPIRIN 325 MG ORAL TABLET 325 MG: 325 PILL ORAL at 08:39

## 2022-08-01 NOTE — ASSESSMENT & PLAN NOTE
· POA from home with daughter who reports severe weakness with inability to ambulate with walker in-home as normal, decreased oral intake over the past few days  · Found to have acute cystitis without hematuria    Encephalopathy now appears to be resolved  · Empiric antibiotic therapy with IV Rocephin and now switch to ceftin on discharge  · Recent COVID-19 pneumonia 7/13 with hospitalization 7/18 to 7/19  · Appreciate PT/OT/case management evaluation for disposition as she is quite deconditioned

## 2022-08-01 NOTE — MALNUTRITION/BMI
This medical record reflects one or more clinical indicators suggestive of malnutrition  Malnutrition Findings:   Adult Malnutrition type: Acute illness  Adult Degree of Malnutrition: Other severe protein calorie malnutrition  Malnutrition Characteristics: Inadequate energy, Weight loss                  360 Statement: Acute severe malnutrition related to decreased appetite, hx COVID as evidenced by 7 5% weight loss x 10 days, < 50% energy intake > 5 days  Treated with: Regular diet  Pt declining all supplements at this time  Recommend daily weights for nutrition monitoring  BMI Findings: Body mass index is 25 71 kg/m²  See Nutrition note dated 8/1/22 for additional details  Completed nutrition assessment is viewable in the nutrition documentation

## 2022-08-01 NOTE — PLAN OF CARE
Problem: Potential for Falls  Goal: Patient will remain free of falls  Description: INTERVENTIONS:  - Educate patient/family on patient safety including physical limitations  - Instruct patient to call for assistance with activity   - Consult OT/PT to assist with strengthening/mobility   - Keep Call bell within reach  - Keep bed low and locked with side rails adjusted as appropriate  - Keep care items and personal belongings within reach  - Initiate and maintain comfort rounds  - Make Fall Risk Sign visible to staff  - Offer Toileting every 2 Hours, in advance of need  - Initiate/Maintain bed/chair alarm  - Obtain necessary fall risk management equipment:   - Apply yellow socks and bracelet for high fall risk patients  - Consider moving patient to room near nurses station  Outcome: Adequate for Discharge     Problem: Nutrition/Hydration-ADULT  Goal: Nutrient/Hydration intake appropriate for improving, restoring or maintaining nutritional needs  Description: Monitor and assess patient's nutrition/hydration status for malnutrition  Collaborate with interdisciplinary team and initiate plan and interventions as ordered  Monitor patient's weight and dietary intake as ordered or per policy  Utilize nutrition screening tool and intervene as necessary  Determine patient's food preferences and provide high-protein, high-caloric foods as appropriate       INTERVENTIONS:  - Monitor oral intake, urinary output, labs, and treatment plans  - Assess nutrition and hydration status and recommend course of action  - Evaluate amount of meals eaten  - Assist patient with eating if necessary   - Allow adequate time for meals  - Recommend/ encourage appropriate diets, oral nutritional supplements, and vitamin/mineral supplements  - Order, calculate, and assess calorie counts as needed  - Recommend, monitor, and adjust tube feedings and TPN/PPN based on assessed needs  - Assess need for intravenous fluids  - Provide specific nutrition/hydration education as appropriate  - Include patient/family/caregiver in decisions related to nutrition  Outcome: Adequate for Discharge     Problem: MOBILITY - ADULT  Goal: Maintain or return to baseline ADL function  Description: INTERVENTIONS:  - Educate patient/family on patient safety including physical limitations  - Instruct patient to call for assistance with activity   - Consult OT/PT to assist with strengthening/mobility   - Keep Call bell within reach  - Keep bed low and locked with side rails adjusted as appropriate  - Keep care items and personal belongings within reach  - Initiate and maintain comfort rounds  - Make Fall Risk Sign visible to staff  - Offer Toileting every 2 Hours, in advance of need  - Initiate/Maintain bed/chair alarm  - Obtain necessary fall risk management equipment:   - Apply yellow socks and bracelet for high fall risk patients  - Consider moving patient to room near nurses station  Outcome: Adequate for Discharge  Goal: Maintains/Returns to pre admission functional level  Description: INTERVENTIONS:  - Perform BMAT or MOVE assessment daily    - Set and communicate daily mobility goal to care team and patient/family/caregiver  - Collaborate with rehabilitation services on mobility goals if consulted  - Perform Range of Motion   times a day  - Reposition patient every   hours  - Dangle patient   times a day  - Stand patient   times a day  - Ambulate patient   times a day  - Out of bed to chair   times a day   - Out of bed for meals    times a day  - Out of bed for toileting  - Record patient progress and toleration of activity level   Outcome: Adequate for Discharge     Problem: PAIN - ADULT  Goal: Verbalizes/displays adequate comfort level or baseline comfort level  Description: Interventions:  - Encourage patient to monitor pain and request assistance  - Assess pain using appropriate pain scale  - Administer analgesics based on type and severity of pain and evaluate response  - Implement non-pharmacological measures as appropriate and evaluate response  - Consider cultural and social influences on pain and pain management  - Notify physician/advanced practitioner if interventions unsuccessful or patient reports new pain  Outcome: Adequate for Discharge     Problem: SAFETY ADULT  Goal: Patient will remain free of falls  Description: INTERVENTIONS:  - Educate patient/family on patient safety including physical limitations  - Instruct patient to call for assistance with activity   - Consult OT/PT to assist with strengthening/mobility   - Keep Call bell within reach  - Keep bed low and locked with side rails adjusted as appropriate  - Keep care items and personal belongings within reach  - Initiate and maintain comfort rounds  - Make Fall Risk Sign visible to staff  - Offer Toileting every 2 Hours, in advance of need  - Initiate/Maintain bed/chair alarm  - Obtain necessary fall risk management equipment:   - Apply yellow socks and bracelet for high fall risk patients  - Consider moving patient to room near nurses station  Outcome: Adequate for Discharge  Goal: Maintain or return to baseline ADL function  Description: INTERVENTIONS:  - Educate patient/family on patient safety including physical limitations  - Instruct patient to call for assistance with activity   - Consult OT/PT to assist with strengthening/mobility   - Keep Call bell within reach  - Keep bed low and locked with side rails adjusted as appropriate  - Keep care items and personal belongings within reach  - Initiate and maintain comfort rounds  - Make Fall Risk Sign visible to staff  - Offer Toileting every 2 Hours, in advance of need  - Initiate/Maintain bed/chair alarm  - Obtain necessary fall risk management equipment:   - Apply yellow socks and bracelet for high fall risk patients  - Consider moving patient to room near nurses station  Outcome: Adequate for Discharge  Goal: Maintains/Returns to pre admission functional level  Description: INTERVENTIONS:  - Perform BMAT or MOVE assessment daily    - Set and communicate daily mobility goal to care team and patient/family/caregiver  - Collaborate with rehabilitation services on mobility goals if consulted  - Perform Range of Motion   times a day  - Reposition patient every   hours  - Dangle patient   times a day  - Stand patient   times a day  - Ambulate patient   times a day  - Out of bed to chair   times a day   - Out of bed for meals     times a day  - Out of bed for toileting  - Record patient progress and toleration of activity level   Outcome: Adequate for Discharge     Problem: INFECTION - ADULT  Goal: Absence or prevention of progression during hospitalization  Description: INTERVENTIONS:  - Assess and monitor for signs and symptoms of infection  - Monitor lab/diagnostic results  - Monitor all insertion sites, i e  indwelling lines, tubes, and drains  - Monitor endotracheal if appropriate and nasal secretions for changes in amount and color  - [place] appropriate cooling/warming therapies per order  - Administer medications as ordered  - Instruct and encourage patient and family to use good hand hygiene technique  - Identify and instruct in appropriate isolation precautions for identified infection/condition  Outcome: Adequate for Discharge  Goal: Absence of fever/infection during neutropenic period  Description: INTERVENTIONS:  - Monitor WBC    Outcome: Adequate for Discharge     Problem: SAFETY ADULT  Goal: Patient will remain free of falls  Description: INTERVENTIONS:  - Educate patient/family on patient safety including physical limitations  - Instruct patient to call for assistance with activity   - Consult OT/PT to assist with strengthening/mobility   - Keep Call bell within reach  - Keep bed low and locked with side rails adjusted as appropriate  - Keep care items and personal belongings within reach  - Initiate and maintain comfort rounds  - Make Fall Risk Sign visible to staff  - Offer Toileting every 2 Hours, in advance of need  - Initiate/Maintain bed/chair alarm  - Obtain necessary fall risk management equipment:   - Apply yellow socks and bracelet for high fall risk patients  - Consider moving patient to room near nurses station  Outcome: Adequate for Discharge  Goal: Maintain or return to baseline ADL function  Description: INTERVENTIONS:  - Educate patient/family on patient safety including physical limitations  - Instruct patient to call for assistance with activity   - Consult OT/PT to assist with strengthening/mobility   - Keep Call bell within reach  - Keep bed low and locked with side rails adjusted as appropriate  - Keep care items and personal belongings within reach  - Initiate and maintain comfort rounds  - Make Fall Risk Sign visible to staff  - Offer Toileting every 2 Hours, in advance of need  - Initiate/Maintain bed/chair alarm  - Obtain necessary fall risk management equipment:   - Apply yellow socks and bracelet for high fall risk patients  - Consider moving patient to room near nurses station  Outcome: Adequate for Discharge  Goal: Maintains/Returns to pre admission functional level  Description: INTERVENTIONS:  - Perform BMAT or MOVE assessment daily    - Set and communicate daily mobility goal to care team and patient/family/caregiver  - Collaborate with rehabilitation services on mobility goals if consulted  - Perform Range of Motion 3 times a day  - Reposition patient every   hours  - Dangle patient   times a day  - Stand patient   times a day  - Ambulate patient   times a day  - Out of bed to chair   times a day   - Out of bed for meals    times a day  - Out of bed for toileting  - Record patient progress and toleration of activity level   Outcome: Adequate for Discharge

## 2022-08-01 NOTE — ASSESSMENT & PLAN NOTE
· Urinalysis with innumerable pyuria/bacteriuria  · Empiric ceftriaxone and switch to ceftin on discharge  · Urine culture reviewed

## 2022-08-01 NOTE — CASE MANAGEMENT
Case Management Discharge Planning Note    Patient name Magaly Park  Location /-00 MRN [id number]  : 1940 Date 2022       Current Admission Date: 2022  Current Admission Diagnosis:Acute metabolic encephalopathy   Patient Active Problem List    Diagnosis Date Noted    Acute bronchitis 2022    Acute metabolic encephalopathy     Acute cystitis without hematuria 2022    Coronary artery disease involving native coronary artery of native heart 2022    COVID 2022    Essential hypertension 2022    COPD [facility]chronic obstructive pulmonary disease) ([facility] ) 2022    Arthritis 2022    GERD (gastroesophageal reflux disease) 2022    Fatty liver 2022    Generalized weakness 2022    Stage [facility]b chronic [facility] disease [facility] ) 2022    Prolonged Q-T interval on ECG 2022      LOS (days): 2  Geometric Mean LOS (GMLOS) (days): 3 80  Days to GMLOS:1 4     OBJECTIVE:  Risk of Unplanned Readmission Score: 14 87         Current admission status: Inpatient   Preferred Pharmacy:   [address] [place] [address] [id number] [place], [place] - [address]   [address]  [facility] [address]  Phone: [phone] Fax: [phone]    Primary Care Provider: Elizabeth Becker DO    Primary Insurance: MEDICARE  Secondary Insurance:     DISCHARGE DETAILS:           CM submitted niels Rios referrals in [address] [place] [address]  CM submitted [facility] request for RW      CM contacted PCP for follow up discharge appt, 22 at 10:00AM

## 2022-08-01 NOTE — CASE MANAGEMENT
Case Management Discharge Planning Note    Patient name Varsha Shanks  Location /-35 MRN [id number]  : 1940 Date 2022       Current Admission Date: 2022  Current Admission Diagnosis:Acute metabolic encephalopathy   Patient Active Problem List    Diagnosis Date Noted    Acute bronchitis 2022    Acute metabolic encephalopathy     Acute cystitis without hematuria 2022    Coronary artery disease involving native coronary artery of native heart 2022    COVID 2022    Essential hypertension 2022    COPD (chronic obstructive pulmonary disease) ([facility] ) 2022    Arthritis 2022    GERD (gastroesophageal reflux disease) 2022    Fatty liver 2022    Generalized weakness 2022    Stage 3b chronic kidney disease ([facility] ) 2022    Prolonged Q-T interval on ECG 2022      LOS (days): 2  Geometric Mean LOS (GMLOS) (days): 3 80  Days to GMLOS:1 2     OBJECTIVE:  Risk of Unplanned Readmission Score: 15 6         Current admission status: Inpatient   Preferred Pharmacy:   [address] [place] [address] [address] [place] [address], [address] [place] [address] [address] [address]  Phone: [phone] Fax: [address]  [address] [address]  Phone: [phone] Fax: [phone]    Primary Care Provider: Sondra Al DO    Primary Insurance: MEDICARE  Secondary Insurance:     DISCHARGE DETAILS:        AVS to [facility]

## 2022-08-01 NOTE — CASE MANAGEMENT
Case Management Discharge Planning Note    Patient name Rakan Cage  Location /-40 MRN [id number]  : 1940 Date 2022       Current Admission Date: 2022  Current Admission Diagnosis:Acute metabolic encephalopathy   Patient Active Problem List    Diagnosis Date Noted    Acute bronchitis 2022    Acute metabolic encephalopathy     Acute cystitis without hematuria 2022    Coronary artery disease involving native coronary artery of native heart 2022    COVID 2022    Essential hypertension 2022    COPD (chronic obstructive pulmonary disease) ([facility] ) 2022    Arthritis 2022    GERD (gastroesophageal reflux disease) 2022    Fatty liver 2022    Generalized weakness 2022    Stage 3b chronic kidney disease ([facility] ) 2022    Prolonged Q-T interval on ECG 2022      LOS (days): 2  Geometric Mean LOS (GMLOS) (days): 3 80  Days to GMLOS:1 3     OBJECTIVE:  Risk of Unplanned Readmission Score: 14 87         Current admission status: Inpatient   Preferred Pharmacy:   [facility] #[id number] Rach Bauerma Sofía RODRIGUEZ/ Thai Rodriguez [address] [place] [address]  Phone: [phone] Fax: [phone]    Primary Care Provider: Anamika Blanco DO    Primary Insurance: MEDICARE  Secondary Insurance:     DISCHARGE DETAILS:         CM learned patient not eligible for RW from [place] : " Lily Elmore, your patient received a walker 2018 from [facility] and is not eligible to receive a new one at this time  We can provide one and bill her " Patient daughter declined payment for new RW

## 2022-08-01 NOTE — NURSING NOTE
Discharge instructions reviewed with patient and patient's daughter  Both verbalized understanding of same  Agree to follow up with PCP

## 2022-08-01 NOTE — ASSESSMENT & PLAN NOTE
Patient recently had pneumonia due to COVID-19 and now appears to have some acute bronchitis with cough congestion and some shortness of breath    Continue antibiotics and albuterol prn and tessalon

## 2022-08-01 NOTE — DISCHARGE SUMMARY
[address]  Discharge- Beth Mantel 1940, 80 y o  female MRN: [id number]  Unit/Bed#: -01 Encounter: [id number]  Primary Care Provider: Sofia Allan DO   Date and time admitted to hospital: 7/29/2022  8:17 PM    * Acute metabolic encephalopathy  Assessment & Plan  · POA from home with daughter who reports severe weakness with inability to ambulate with walker in-home as normal, decreased oral intake over the past few days  · Found to have acute cystitis without hematuria  Encephalopathy now appears to be resolved  · Empiric antibiotic therapy with IV Rocephin and now switch to ceftin on discharge  · Recent COVID-19 pneumonia 7/13 with hospitalization 7/18 to 7/19  · Appreciate PT/OT/case management evaluation for disposition as she is quite deconditioned    Acute cystitis without hematuria  Assessment & Plan  · Urinalysis with innumerable pyuria/bacteriuria  · Empiric ceftriaxone and switch to ceftin on discharge  · Urine culture reviewed    Acute bronchitis  Assessment & Plan  Patient recently had pneumonia due to COVID-19 and now appears to have some acute bronchitis with cough congestion and some shortness of breath  Continue antibiotics and albuterol prn and tessalon    Coronary artery disease involving native coronary artery of native heart  Assessment & Plan  · History CAD with CABG x4  · Denies chest pain  · Continue full-strength aspirin 325 mg and atorvastatin home regimen  · Outpatient follow-up    Stage 3b chronic kidney disease [facility])  Assessment & Plan  Lab Results   Component Value Date    EGFR 47 07/30/2022    EGFR 34 07/29/2022    EGFR 32 07/20/2022    CREATININE 1 10 07/30/2022    CREATININE 1 43 (H) 07/29/2022    CREATININE 1 53 (H) 07/20/2022   · Creatinine appears close to baseline of 1 21- 1 18 in Care everywhere  · Creatinine at baseline  Avoid nephrotoxic agents and hypotension    Hold lisinopril    GERD (gastroesophageal reflux disease)  Assessment & Plan  · Continue PPI ,maalox and tums    Arthritis  Assessment & Plan  · Weekly methotrexate  · Plaquenil b i d   · Outpatient follow-up      Essential hypertension  Assessment & Plan  ·  PTA lisinopril,hctz and bisoprolol  · Blood pressure currently controlled  Discharging Physician / Practitioner: Gege Shetty MD  PCP: Nick Mcgee DO  Admission Date:   Admission Orders (From admission, onward)     Ordered        07/30/22 0014  INPATIENT ADMISSION  Once                      Discharge Date: 08/01/22    Medical Problems             Resolved Problems  Date Reviewed: 8/1/2022   None                 Consultations During Hospital Stay:  · Pt/ot    Procedures Performed:   · none    Significant Findings / Test Results:   XR chest 1 view portable    Result Date: 7/30/2022  Impression: No acute cardiopulmonary disease  Workstation performed: [id number]     CT pe study w abdomen pelvis w contrast    Result Date: 7/29/2022  Impression: Patchy peripheral groundglass opacities are seen throughout the lungs  In the setting of clinically suspected/proven COVID-19, the above lung parenchymal findings on CT indicate intermediate confidence level for COVID-19  No pulmonary embolus is seen  Right basilar compressive atelectatic changes  Superimposed infection must be excluded clinically   Workstation performed: [id number]     Incidental Findings:   · none    Test Results Pending at Discharge (will require follow up):   · none     Outpatient Tests Requested:  · none    Complications:  none    Reason for Admission:  Acute metabolic encephalopathy    Hospital Course:     Roderick Malave is a 80 y o  female patient who originally presented to the hospital on 7/29/2022 due to acute metabolic encephalopathy, generalized weakness and acute cystitis without hematuria present on admission along with acute bronchitis superimposed on recent COVID-19 viral pneumonia    Patient was treated with antibiotics and seen by Physical therapy and Occupational therapy and recommended home with home health to which she was initially reluctant but did eventually agree  Initially treated with IV Rocephin and now transition to oral Ceftin  Urine cultures positive for pansensitive E coli  Blood cultures are negative  Clinically improving  Patient is quite deconditioned and strongly encouraged to comply with rehab      Please see above list of diagnoses and related plan for additional information  Condition at Discharge: fair     Discharge Day Visit / Exam:     Subjective:  Patient denies any chest   She states she is starting to feel better  Agrees to start rehab at home  Vitals: Blood Pressure: 161/75 (08/01/22 0552)  Pulse: 59 (08/01/22 0552)  Temperature: 97 8 °F (36 6 °C) (08/01/22 0552)  Temp Source: Oral (07/30/22 0110)  Respirations: 16 (08/01/22 0552)  Height: 4' 10" (147 3 cm) (08/01/22 1334)  Weight - Scale: 55 8 kg (123 lb) (07/30/22 0110)  SpO2: 98 % (08/01/22 0552)  Exam:   Physical Exam  Vitals and nursing note reviewed  Constitutional:       Appearance: Normal appearance  Comments: Frail elderly female   HENT:      Head: Normocephalic and atraumatic  Right Ear: External ear normal       Left Ear: External ear normal       Nose: Nose normal       Mouth/Throat:      Pharynx: Oropharynx is clear  Cardiovascular:      Rate and Rhythm: Normal rate and regular rhythm  Heart sounds: Normal heart sounds  Pulmonary:      Effort: Pulmonary effort is normal       Comments: Moderate air entry bilaterally mild coarse breath sounds on the bases  Abdominal:      General: Bowel sounds are normal       Palpations: Abdomen is soft  Tenderness: There is no abdominal tenderness  Musculoskeletal:         General: Normal range of motion  Cervical back: Normal range of motion and neck supple  Skin:     General: Skin is warm and dry  Capillary Refill: Capillary refill takes less than 2 seconds  Neurological:      General: No focal deficit present  Mental Status: She is alert and oriented to person, place, and time  Psychiatric:         Mood and Affect: Mood normal          Discussion with Family:  Discussed with daughter    Discharge instructions/Information to patient and family:   See after visit summary for information provided to patient and family  Provisions for Follow-Up Care:  See after visit summary for information related to follow-up care and any pertinent home health orders  Disposition:     Home with VNA Services (Reminder: Complete face to face encounter)    For Discharges to [facility] SNF:   · Not Applicable to this Patient - Not Applicable to this Patient    Planned Readmission: none     Discharge Statement:  I spent 35 minutes discharging the patient  This time was spent on the day of discharge  I had direct contact with the patient on the day of discharge  Greater than 50% of the total time was spent examining patient, answering all patient questions, arranging and discussing plan of care with patient as well as directly providing post-discharge instructions  Additional time then spent on discharge activities  Discharge Medications:  See after visit summary for reconciled discharge medications provided to patient and family        ** Please Note: This note has been constructed using a voice recognition system **

## 2022-08-01 NOTE — PLAN OF CARE
Problem: Potential for Falls  Goal: Patient will remain free of falls  Description: INTERVENTIONS:  - Educate patient/family on patient safety including physical limitations  - Instruct patient to call for assistance with activity   - Consult OT/PT to assist with strengthening/mobility   - Keep Call bell within reach  - Keep bed low and locked with side rails adjusted as appropriate  - Keep care items and personal belongings within reach  - Initiate and maintain comfort rounds  - Make Fall Risk Sign visible to staff  - Offer Toileting every 2 Hours, in advance of need  - Initiate/Maintain bed/chair alarm  - Obtain necessary fall risk management equipment:   - Apply yellow socks and bracelet for high fall risk patients  - Consider moving patient to room near nurses station  Outcome: Progressing     Problem: Nutrition/Hydration-ADULT  Goal: Nutrient/Hydration intake appropriate for improving, restoring or maintaining nutritional needs  Description: Monitor and assess patient's nutrition/hydration status for malnutrition  Collaborate with interdisciplinary team and initiate plan and interventions as ordered  Monitor patient's weight and dietary intake as ordered or per policy  Utilize nutrition screening tool and intervene as necessary  Determine patient's food preferences and provide high-protein, high-caloric foods as appropriate       INTERVENTIONS:  - Monitor oral intake, urinary output, labs, and treatment plans  - Assess nutrition and hydration status and recommend course of action  - Evaluate amount of meals eaten  - Assist patient with eating if necessary   - Allow adequate time for meals  - Recommend/ encourage appropriate diets, oral nutritional supplements, and vitamin/mineral supplements  - Order, calculate, and assess calorie counts as needed  - Recommend, monitor, and adjust tube feedings and TPN/PPN based on assessed needs  - Assess need for intravenous fluids  - Provide specific nutrition/hydration education as appropriate  - Include patient/family/caregiver in decisions related to nutrition  Outcome: Progressing     Problem: MOBILITY - ADULT  Goal: Maintain or return to baseline ADL function  Description: INTERVENTIONS:  - Educate patient/family on patient safety including physical limitations  - Instruct patient to call for assistance with activity   - Consult OT/PT to assist with strengthening/mobility   - Keep Call bell within reach  - Keep bed low and locked with side rails adjusted as appropriate  - Keep care items and personal belongings within reach  - Initiate and maintain comfort rounds  - Make Fall Risk Sign visible to staff  - Offer Toileting every 2 Hours, in advance of need  - Initiate/Maintain bed/chair alarm  - Obtain necessary fall risk management equipment:   - Apply yellow socks and bracelet for high fall risk patients  - Consider moving patient to room near nurses station  Outcome: Progressing  Goal: Maintains/Returns to pre admission functional level  Description: INTERVENTIONS:  - Perform BMAT or MOVE assessment daily    - Set and communicate daily mobility goal to care team and patient/family/caregiver     - Collaborate with rehabilitation services on mobility goals if consulted  - Out of bed for toileting  - Record patient progress and toleration of activity level   Outcome: Progressing     Problem: PAIN - ADULT  Goal: Verbalizes/displays adequate comfort level or baseline comfort level  Description: Interventions:  - Encourage patient to monitor pain and request assistance  - Assess pain using appropriate pain scale  - Administer analgesics based on type and severity of pain and evaluate response  - Implement non-pharmacological measures as appropriate and evaluate response  - Consider cultural and social influences on pain and pain management  - Notify physician/advanced practitioner if interventions unsuccessful or patient reports new pain  Outcome: Progressing     Problem: INFECTION - ADULT  Goal: Absence or prevention of progression during hospitalization  Description: INTERVENTIONS:  - Assess and monitor for signs and symptoms of infection  - Monitor lab/diagnostic results  - Monitor all insertion sites, i e  indwelling lines, tubes, and drains  - Monitor endotracheal if appropriate and nasal secretions for changes in amount and color  - [place] appropriate cooling/warming therapies per order  - Administer medications as ordered  - Instruct and encourage patient and family to use good hand hygiene technique  - Identify and instruct in appropriate isolation precautions for identified infection/condition  Outcome: Progressing  Goal: Absence of fever/infection during neutropenic period  Description: INTERVENTIONS:  - Monitor WBC    Outcome: Progressing     Problem: SAFETY ADULT  Goal: Patient will remain free of falls  Description: INTERVENTIONS:  - Educate patient/family on patient safety including physical limitations  - Instruct patient to call for assistance with activity   - Consult OT/PT to assist with strengthening/mobility   - Keep Call bell within reach  - Keep bed low and locked with side rails adjusted as appropriate  - Keep care items and personal belongings within reach  - Initiate and maintain comfort rounds  - Make Fall Risk Sign visible to staff  - Offer Toileting every 2 Hours, in advance of need  - Initiate/Maintain bed/chair alarm  - Obtain necessary fall risk management equipment:   - Apply yellow socks and bracelet for high fall risk patients  - Consider moving patient to room near nurses station  Outcome: Progressing  Goal: Maintain or return to baseline ADL function  Description: INTERVENTIONS:  - Educate patient/family on patient safety including physical limitations  - Instruct patient to call for assistance with activity   - Consult OT/PT to assist with strengthening/mobility   - Keep Call bell within reach  - Keep bed low and locked with side rails adjusted as appropriate  - Keep care items and personal belongings within reach  - Initiate and maintain comfort rounds  - Make Fall Risk Sign visible to staff  - Offer Toileting every 2 Hours, in advance of need  - Initiate/Maintain bed/chair alarm  - Obtain necessary fall risk management equipment:   - Apply yellow socks and bracelet for high fall risk patients  - Consider moving patient to room near nurses station  Outcome: Progressing  Goal: Maintains/Returns to pre admission functional level  Description: INTERVENTIONS:  - Perform BMAT or MOVE assessment daily    - Set and communicate daily mobility goal to care team and patient/family/caregiver  - Collaborate with rehabilitation services on mobility goals if consulted  - Out of bed for toileting  - Record patient progress and toleration of activity level   Outcome: Progressing     Problem: DISCHARGE PLANNING  Goal: Discharge to home or other facility with appropriate resources  Description: INTERVENTIONS:  - Identify barriers to discharge w/patient and caregiver  - Arrange for needed discharge resources and transportation as appropriate  - Identify discharge learning needs (meds, wound care, etc )  - Arrange for interpretive services to assist at discharge as needed  - Refer to Case Management Department for coordinating discharge planning if the patient needs post-hospital services based on physician/advanced practitioner order or complex needs related to functional status, cognitive ability, or social support system  Outcome: Progressing     Problem: Knowledge Deficit  Goal: Patient/family/caregiver demonstrates understanding of disease process, treatment plan, medications, and discharge instructions  Description: Complete learning assessment and assess knowledge base    Interventions:  - Provide teaching at level of understanding  - Provide teaching via preferred learning methods  Outcome: Progressing

## 2022-08-01 NOTE — CASE MANAGEMENT
Case Management Discharge Planning Note    Patient name Eric Shukla  Location /-57 MRN [id number]  : 1940 Date 2022       Current Admission Date: 2022  Current Admission Diagnosis:Acute metabolic encephalopathy   Patient Active Problem List    Diagnosis Date Noted    Acute bronchitis 2022    Acute metabolic encephalopathy     Acute cystitis without hematuria 2022    Coronary artery disease involving native coronary artery of native heart 2022    COVID 2022    Essential hypertension 2022    [facility]    Arthritis 2022    GERD (gastroesophageal reflux disease) 2022    Fatty liver 2022    Generalized weakness 2022    Stage 3b chronic kidney disease ([facility] ) 2022    Prolonged Q-T interval on ECG 2022      LOS (days): 2  Geometric Mean LOS (GMLOS) (days): 3 80  Days to GMLOS:1 3     OBJECTIVE:  Risk of Unplanned Readmission Score: 14 91         Current admission status: Inpatient   Preferred Pharmacy:   [address] [place] [address] #[id number] [facility]/ Thai Rodriguez [address] [place] [id number]  Phone: [phone] Fax: [phone]    Primary Care Provider: Emmy Mcdowell DO    Primary Insurance: MEDICARE  Secondary Insurance:     DISCHARGE DETAILS:    Discharge planning discussed with[de-identified] patient     Comments - Freedom of Choice: [address] [address]  CM contacted family/caregiver?: No- see comments (patient declined)                  [address]         Is the patient interested in [facility] at discharge?: Yes  [address] requested[de-identified] Physical Therapy, Occupational Therapy, [address] Name[de-identified] Other ([address])  [address] [place] [address] Provider[de-identified] PCP  Home Health Services Needed[de-identified] Evaluate Functional Status and Safety, Other (comment) (Medication Management)  Homebound Criteria Met[de-identified] Uses an Assist Device (i e  cane, walker, etc)    DME Referral Provided  Referral made for DME?: Yes  DME referral completed for the following items[de-identified] Baylee Thompson  DME Supplier Name[de-identified] [employer]    Other Referral/Resources/Interventions Provided:  Interventions: [facility]  Referral Comments: [facility]         Treatment Team Recommendation: Home with [address]  Discharge Destination Plan[de-identified] Home with [place] at Discharge : Family            CM met with patient to discuss therapy recommendation for [facility]  Patient relucttant but agreeable  CM reviewed available providers  Patient chose [address]      CM updated providers in [address] for [facility]

## 2022-08-04 LAB
BACTERIA BLD CULT: NORMAL
BACTERIA BLD CULT: NORMAL

## 2022-10-11 PROBLEM — N30.00 ACUTE CYSTITIS WITHOUT HEMATURIA: Status: RESOLVED | Noted: 2022-07-30 | Resolved: 2022-10-11

## 2022-12-29 ENCOUNTER — APPOINTMENT (EMERGENCY)
Dept: RADIOLOGY | Facility: HOSPITAL | Age: 82
End: 2022-12-29

## 2022-12-29 ENCOUNTER — HOSPITAL ENCOUNTER (EMERGENCY)
Facility: HOSPITAL | Age: 82
Discharge: HOME | End: 2022-12-29
Attending: EMERGENCY MEDICINE

## 2022-12-29 VITALS
BODY MASS INDEX: 25.92 KG/M2 | OXYGEN SATURATION: 96 % | WEIGHT: 124 LBS | RESPIRATION RATE: 16 BRPM | SYSTOLIC BLOOD PRESSURE: 160 MMHG | DIASTOLIC BLOOD PRESSURE: 79 MMHG | HEART RATE: 83 BPM | TEMPERATURE: 98.2 F

## 2022-12-29 DIAGNOSIS — J06.9 VIRAL URI WITH COUGH: Primary | ICD-10-CM

## 2022-12-29 DIAGNOSIS — N39.0 UTI (URINARY TRACT INFECTION): ICD-10-CM

## 2022-12-29 LAB
ALBUMIN SERPL BCP-MCNC: 3 G/DL (ref 3.5–5)
ALP SERPL-CCNC: 93 U/L (ref 46–116)
ALT SERPL W P-5'-P-CCNC: 27 U/L (ref 12–78)
ANION GAP SERPL CALCULATED.3IONS-SCNC: 10 MMOL/L (ref 4–13)
AST SERPL W P-5'-P-CCNC: 32 U/L (ref 5–45)
BACTERIA UR QL AUTO: ABNORMAL /HPF
BASOPHILS # BLD AUTO: 0.06 THOUSANDS/ÂΜL (ref 0–0.1)
BASOPHILS NFR BLD AUTO: 1 % (ref 0–1)
BILIRUB SERPL-MCNC: 1.19 MG/DL (ref 0.2–1)
BILIRUB UR QL STRIP: ABNORMAL
BUN SERPL-MCNC: 22 MG/DL (ref 5–25)
CALCIUM ALBUM COR SERPL-MCNC: 10 MG/DL (ref 8.3–10.1)
CALCIUM SERPL-MCNC: 9.2 MG/DL (ref 8.3–10.1)
CHLORIDE SERPL-SCNC: 103 MMOL/L (ref 96–108)
CLARITY UR: ABNORMAL
CO2 SERPL-SCNC: 27 MMOL/L (ref 21–32)
COLOR UR: YELLOW
CREAT SERPL-MCNC: 1.5 MG/DL (ref 0.6–1.3)
EOSINOPHIL # BLD AUTO: 0.01 THOUSAND/ÂΜL (ref 0–0.61)
EOSINOPHIL NFR BLD AUTO: 0 % (ref 0–6)
ERYTHROCYTE [DISTWIDTH] IN BLOOD BY AUTOMATED COUNT: 12.6 % (ref 11.6–15.1)
FLUAV RNA RESP QL NAA+PROBE: NEGATIVE
FLUBV RNA RESP QL NAA+PROBE: NEGATIVE
GFR SERPL CREATININE-BSD FRML MDRD: 32 ML/MIN/1.73SQ M
GLUCOSE SERPL-MCNC: 100 MG/DL (ref 65–140)
GLUCOSE UR STRIP-MCNC: NEGATIVE MG/DL
HCT VFR BLD AUTO: 40.2 % (ref 34.8–46.1)
HGB BLD-MCNC: 13 G/DL (ref 11.5–15.4)
HGB UR QL STRIP.AUTO: NEGATIVE
IMM GRANULOCYTES # BLD AUTO: 0.06 THOUSAND/UL (ref 0–0.2)
IMM GRANULOCYTES NFR BLD AUTO: 1 % (ref 0–2)
KETONES UR STRIP-MCNC: ABNORMAL MG/DL
LEUKOCYTE ESTERASE UR QL STRIP: ABNORMAL
LIPASE SERPL-CCNC: 104 U/L (ref 73–393)
LYMPHOCYTES # BLD AUTO: 1.3 THOUSANDS/ÂΜL (ref 0.6–4.47)
LYMPHOCYTES NFR BLD AUTO: 11 % (ref 14–44)
MCH RBC QN AUTO: 33.9 PG (ref 26.8–34.3)
MCHC RBC AUTO-ENTMCNC: 32.3 G/DL (ref 31.4–37.4)
MCV RBC AUTO: 105 FL (ref 82–98)
MONOCYTES # BLD AUTO: 1.39 THOUSAND/ÂΜL (ref 0.17–1.22)
MONOCYTES NFR BLD AUTO: 12 % (ref 4–12)
NEUTROPHILS # BLD AUTO: 8.75 THOUSANDS/ÂΜL (ref 1.85–7.62)
NEUTS SEG NFR BLD AUTO: 75 % (ref 43–75)
NITRITE UR QL STRIP: POSITIVE
NON-SQ EPI CELLS URNS QL MICRO: ABNORMAL /HPF
NRBC BLD AUTO-RTO: 0 /100 WBCS
PH UR STRIP.AUTO: 7 [PH]
PLATELET # BLD AUTO: 265 THOUSANDS/UL (ref 149–390)
PMV BLD AUTO: 9.5 FL (ref 8.9–12.7)
POTASSIUM SERPL-SCNC: 4.8 MMOL/L (ref 3.5–5.3)
PROT SERPL-MCNC: 6.7 G/DL (ref 6.4–8.4)
PROT UR STRIP-MCNC: ABNORMAL MG/DL
RBC # BLD AUTO: 3.84 MILLION/UL (ref 3.81–5.12)
RBC #/AREA URNS AUTO: ABNORMAL /HPF
RSV RNA RESP QL NAA+PROBE: NEGATIVE
SARS-COV-2 RNA RESP QL NAA+PROBE: NEGATIVE
SODIUM SERPL-SCNC: 140 MMOL/L (ref 135–147)
SP GR UR STRIP.AUTO: 1.02 (ref 1–1.03)
UROBILINOGEN UR QL STRIP.AUTO: 4 E.U./DL
WBC # BLD AUTO: 11.57 THOUSAND/UL (ref 4.31–10.16)
WBC #/AREA URNS AUTO: ABNORMAL /HPF

## 2022-12-29 RX ORDER — CEPHALEXIN 250 MG/1
500 CAPSULE ORAL ONCE
Status: COMPLETED | OUTPATIENT
Start: 2022-12-29 | End: 2022-12-29

## 2022-12-29 RX ORDER — CEPHALEXIN 500 MG/1
500 CAPSULE ORAL EVERY 12 HOURS SCHEDULED
Qty: 10 CAPSULE | Refills: 0 | Status: SHIPPED | OUTPATIENT
Start: 2022-12-29 | End: 2023-01-03

## 2022-12-29 RX ADMIN — CEPHALEXIN 500 MG: 250 CAPSULE ORAL at 18:07

## 2022-12-29 NOTE — ED PROVIDER NOTES
History  No chief complaint on file  History provided by:  Patient and relative   used: No    Flu Symptoms  Presenting symptoms: cough, fatigue, fever (subjective), headache, myalgias, nausea and shortness of breath    Presenting symptoms: no diarrhea, no sore throat and no vomiting    Severity:  Moderate  Onset quality:  Gradual  Duration:  4 days  Progression:  Unchanged  Chronicity:  New  Relieved by:  Nothing  Worsened by:  Nothing  Ineffective treatments:  None tried  Associated symptoms: chills and decreased appetite    Associated symptoms: no ear pain, no neck stiffness and no syncope        Prior to Admission Medications   Prescriptions Last Dose Informant Patient Reported? Taking?    DULoxetine (CYMBALTA) 30 mg delayed release capsule   Yes No   Sig: Take 30 mg by mouth daily   acetaminophen (TYLENOL) 325 mg tablet   No No   Sig: Take 2 tablets (650 mg total) by mouth every 6 (six) hours as needed for mild pain, headaches or fever   albuterol (ProAir HFA) 90 mcg/act inhaler   No No   Sig: Inhale 2 puffs every 6 (six) hours as needed for wheezing   aspirin 325 mg tablet   Yes No   Sig: Take 325 mg by mouth daily   atorvastatin (LIPITOR) 20 mg tablet   Yes No   Sig: Take 20 mg by mouth daily   benzonatate (TESSALON PERLES) 100 mg capsule   No No   Sig: Take 1 capsule (100 mg total) by mouth 3 (three) times a day as needed for cough   bisoprolol-hydrochlorothiazide (ZIAC) 2 5-6 25 MG per tablet   Yes No   Sig: Take 1 tablet by mouth daily   calcium citrate-vitamin D (CITRACAL+D) 315-200 MG-UNIT per tablet   Yes No   Sig: Take 1 tablet by mouth 2 (two) times a day   docusate sodium (COLACE) 100 mg capsule   Yes No   Sig: Take 100 mg by mouth 2 (two) times a day   folic acid (FOLVITE) 1 mg tablet   Yes No   Sig: Take by mouth daily   hydroxychloroquine (PLAQUENIL) 200 mg tablet   Yes No   Sig: Take 200 mg by mouth 2 (two) times a day with meals   loratadine (CLARITIN) 10 mg tablet   Yes No   Sig: Take 10 mg by mouth daily   methotrexate 2 5 mg tablet   Yes No   Sig: Take by mouth once a week   pantoprazole (PROTONIX) 40 mg tablet   Yes No   Sig: Take 40 mg by mouth daily   tolterodine (DETROL LA) 4 mg 24 hr capsule   Yes No   Sig: Take 4 mg by mouth daily      Facility-Administered Medications: None       Past Medical History:   Diagnosis Date   • Arthritis    • Asthma    • Diabetes mellitus ([facility] )    • Hypertension    • MI, old        Past Surgical History:   Procedure Laterality Date   • BACK SURGERY     • CARDIAC SURGERY     • HYSTERECTOMY         History reviewed  No pertinent family history  I have reviewed and agree with the history as documented  E-Cigarette/Vaping   • E-Cigarette Use Never User      E-Cigarette/Vaping Substances   • Nicotine No    • THC No    • CBD No    • Flavoring No    • Other No    • Unknown No      Social History     Tobacco Use   • Smoking status: Former   • Smokeless tobacco: Never   Vaping Use   • Vaping Use: Never used   Substance Use Topics   • Alcohol use: Not Currently   • Drug use: Not Currently       Review of Systems   Constitutional: Positive for chills, decreased appetite, fatigue and fever (subjective)  HENT: Negative for ear pain, hearing loss, sore throat, trouble swallowing and voice change  Eyes: Negative for pain and discharge  Respiratory: Positive for cough and shortness of breath  Negative for wheezing  Cardiovascular: Negative for chest pain and palpitations  Gastrointestinal: Positive for nausea  Negative for abdominal pain, blood in stool, constipation, diarrhea and vomiting  Genitourinary: Negative for dysuria, flank pain, frequency and hematuria  Musculoskeletal: Positive for myalgias  Negative for joint swelling, neck pain and neck stiffness  Skin: Negative for rash and wound  Neurological: Positive for headaches  Negative for dizziness, seizures, syncope and facial asymmetry     Psychiatric/Behavioral: Negative for hallucinations, self-injury and suicidal ideas  All other systems reviewed and are negative  Physical Exam  Physical Exam  Vitals and nursing note reviewed  Constitutional:       General: She is not in acute distress  Appearance: She is well-developed  HENT:      Head: Normocephalic and atraumatic  Right Ear: External ear normal       Left Ear: External ear normal    Eyes:      General: No scleral icterus  Right eye: No discharge  Left eye: No discharge  Extraocular Movements: Extraocular movements intact  Conjunctiva/sclera: Conjunctivae normal    Cardiovascular:      Rate and Rhythm: Normal rate and regular rhythm  Heart sounds: Normal heart sounds  No murmur heard  Pulmonary:      Effort: Pulmonary effort is normal       Breath sounds: Normal breath sounds  No wheezing or rales  Abdominal:      General: Bowel sounds are normal  There is no distension  Palpations: Abdomen is soft  Tenderness: There is no abdominal tenderness  There is no guarding or rebound  Musculoskeletal:         General: No deformity  Normal range of motion  Cervical back: Normal range of motion and neck supple  Skin:     General: Skin is warm and dry  Findings: No rash  Neurological:      General: No focal deficit present  Mental Status: She is alert and oriented to person, place, and time  Cranial Nerves: No cranial nerve deficit  Psychiatric:         Mood and Affect: Mood normal          Behavior: Behavior normal          Thought Content:  Thought content normal          Judgment: Judgment normal          Vital Signs  ED Triage Vitals   Temperature Pulse Respirations Blood Pressure SpO2   12/29/22 1520 12/29/22 1520 12/29/22 1520 12/29/22 1520 12/29/22 1520   98 2 °F (36 8 °C) 94 20 121/64 90 %      Temp Source Heart Rate Source Patient Position - Orthostatic VS BP Location FiO2 (%)   12/29/22 1520 12/29/22 1530 12/29/22 1520 12/29/22 1520 -- Temporal Monitor Lying Right arm       Pain Score       12/29/22 1520       No Pain           Vitals:    12/29/22 1520 12/29/22 1530 12/29/22 1615   BP: 121/64 124/80 129/70   Pulse: 94 88 84   Patient Position - Orthostatic VS: Lying Lying Lying         Visual Acuity      ED Medications  Medications   cephalexin (KEFLEX) capsule 500 mg (has no administration in time range)       Diagnostic Studies  Results Reviewed     Procedure Component Value Units Date/Time    Urine Microscopic [[id number]]  (Abnormal) Collected: 12/29/22 1722    Lab Status: Final result Specimen: Urine, Clean Catch Updated: 12/29/22 1743     RBC, UA None Seen /hpf      WBC, UA 30-50 /hpf      Epithelial Cells Occasional /hpf      Bacteria, UA Moderate /hpf     Urine culture [[id number]] Collected: 12/29/22 1722    Lab Status: In process Specimen: Urine, Clean Catch Updated: 12/29/22 1743    UA w Reflex to Microscopic w Reflex to Culture [[id number]]  (Abnormal) Collected: 12/29/22 1722    Lab Status: Final result Specimen: Urine, Clean Catch Updated: 12/29/22 1732     Color, UA Yellow     Clarity, UA Cloudy     Specific Gravity, UA 1 025     pH, UA 7 0     Leukocytes, UA Moderate     Nitrite, UA Positive     Protein, UA 30 (1+) mg/dl      Glucose, UA Negative mg/dl      Ketones, UA Trace mg/dl      Urobilinogen, UA 4 0 E U /dl      Bilirubin, UA Small     Occult Blood, UA Negative    COVID19, Influ[facility]/B, [facility] [[id number]]  (Normal) Collected: 12/29/22 1557    Lab Status: Final result Specimen: Nares from Nasopharyngeal Swab Updated: 12/29/22 1649     SARS-CoV-2 Negative     INFLUENZA A PCR Negative     INFLUENZA B PCR Negative     RSV PCR Negative    Narrative:      FOR PEDIATRIC PATIENTS - copy/paste COVID Guidelines URL to browser: [email or web address]  [email or web address]    SARS-CoV-2 assay is a Nucleic Acid Amplification assay intended for the  qualitative detection of nucleic acid from SARS-CoV-2 in nasopharyngeal  swabs  Results are for the presumptive identification of SARS-CoV-2 RNA  Positive results are indicative of infection with SARS-CoV-2, the virus  causing COVID-19, but do not rule out bacterial infection or co-infection  with other viruses  Laboratories within the [facility] and its  territories are required to report all positive results to the appropriate  public health authorities  Negative results do not preclude SARS-CoV-2  infection and should not be used as the sole basis for treatment or other  patient management decisions  Negative results must be combined with  clinical observations, patient history, and epidemiological information  This test has not been FDA cleared or approved  This test has been authorized by FDA under an Emergency Use Authorization  (EUA)  This test is only authorized for the duration of time the  declaration that circumstances exist justifying the authorization of the  emergency use of an in vitro diagnostic tests for detection of SARS-CoV-2  virus and/or diagnosis of COVID-19 infection under section 564(b)(1) of  the Act, 21 U  S C  711JXA-7(Z)(2), unless the authorization is terminated  or revoked sooner  The test has been validated but independent review by FDA  and CLIA is pending  Test performed using Unified Color GeneXpert: This RT-PCR assay targets N2,  a region unique to SARS-CoV-2  A conserved region in the E-gene was chosen  for pan-Sarbecovirus detection which includes SARS-CoV-2  According to CMS-2020-01-R, this platform meets the definition of high-throughput technology      Comprehensive metabolic panel [[id number]]  (Abnormal) Collected: 12/29/22 1557    Lab Status: Final result Specimen: Blood from Arm, Right Updated: 12/29/22 1627     Sodium 140 mmol/L      Potassium 4 8 mmol/L      Chloride 103 mmol/L      CO2 27 mmol/L      ANION GAP 10 mmol/L      BUN 22 mg/dL      Creatinine 1 50 mg/dL      Glucose 100 mg/dL      Calcium 9 2 mg/dL      Corrected Calcium 10 0 mg/dL      AST 32 U/L      ALT 27 U/L      Alkaline Phosphatase 93 U/L      Total Protein 6 7 g/dL      Albumin 3 0 g/dL      Total Bilirubin 1 19 mg/dL      eGFR 32 ml/min/1 73sq m     Narrative:      [place] guidelines for Chronic Kidney Disease (CKD):   •  Stage 1 with normal or high GFR (GFR > 90 mL/min/1 73 square meters)  •  Stage 2 Mild CKD (GFR = 60-89 mL/min/1 73 square meters)  •  Stage 3A Moderate CKD (GFR = 45-59 mL/min/1 73 square meters)  •  Stage 3B Moderate CKD (GFR = 30-44 mL/min/1 73 square meters)  •  Stage 4 Severe CKD (GFR = 15-29 mL/min/1 73 square meters)  •  Stage 5 End Stage CKD (GFR <15 mL/min/1 73 square meters)  Note: GFR calculation is accurate only with a steady state creatinine    Lipase [[id number]]  (Normal) Collected: 12/29/22 1557    Lab Status: Final result Specimen: Blood from Arm, Right Updated: 12/29/22 1627     Lipase 104 u/L     CBC and differential [[id number]]  (Abnormal) Collected: 12/29/22 1557    Lab Status: Final result Specimen: Blood from Arm, Right Updated: 12/29/22 1613     WBC 11 57 Thousand/uL      RBC 3 84 Million/uL      Hemoglobin 13 0 g/dL      Hematocrit 40 2 %       fL      MCH 33 9 pg      MCHC 32 3 g/dL      RDW 12 6 %      MPV 9 5 fL      Platelets 559 Thousands/uL      nRBC 0 /100 WBCs      Neutrophils Relative 75 %      Immat GRANS % 1 %      Lymphocytes Relative 11 %      Monocytes Relative 12 %      Eosinophils Relative 0 %      Basophils Relative 1 %      Neutrophils Absolute 8 75 Thousands/µL      Immature Grans Absolute 0 06 Thousand/uL      Lymphocytes Absolute 1 30 Thousands/µL      Monocytes Absolute 1 39 Thousand/µL      Eosinophils Absolute 0 01 Thousand/µL      Basophils Absolute 0 06 Thousands/µL                  XR chest pa & lateral   Final Result by Tawanna Loaiza MD (12/29 1610)      No acute cardiopulmonary disease                    Workstation performed: [id number] Procedures  Procedures         ED Course                                             MDM  Number of Diagnoses or Management Options     Amount and/or Complexity of Data Reviewed  Clinical lab tests: ordered and reviewed  Tests in the radiology section of CPT®: ordered and reviewed  Decide to obtain previous medical records or to obtain history from someone other than the patient: yes  Review and summarize past medical records: yes  Independent visualization of images, tracings, or specimens: yes        Disposition  Final diagnoses:   Viral URI with cough   UTI (urinary tract infection)     Time reflects when diagnosis was documented in both MDM as applicable and the Disposition within this note     Time User Action Codes Description Comment    12/29/2022  5:48 PM Wander Morgan [J06 9] Viral URI with cough     12/29/2022  5:48 PM Wander Morgan Add [N39 0] UTI (urinary tract infection)       ED Disposition     ED Disposition   Discharge    Condition   Stable    Date/Time   Thu Dec 29, 2022  5:48 PM    Comment   Josse Mcleod discharge to home/self care  Follow-up Information     Follow up With Specialties Details Why Contact Info    [facility], DO General Practice In 2 days  [address]  [phone]            Patient's Medications   Discharge Prescriptions    CEPHALEXIN (KEFLEX) 500 MG CAPSULE    Take 1 capsule (500 mg total) by mouth every 12 (twelve) hours for 5 days       Start Date: 12/29/2022End Date: 1/3/2023       Order Dose: 500 mg       Quantity: 10 capsule    Refills: 0       No discharge procedures on file      PDMP Review     None          ED Provider  Electronically Signed by           Ariadne Brizuela MD  12/29/22 1934

## 2022-12-31 LAB
BACTERIA UR CULT: ABNORMAL
BACTERIA UR CULT: ABNORMAL

## 2023-02-19 ENCOUNTER — APPOINTMENT (EMERGENCY)
Dept: CT IMAGING | Facility: HOSPITAL | Age: 83
End: 2023-02-19

## 2023-02-19 ENCOUNTER — HOSPITAL ENCOUNTER (INPATIENT)
Facility: HOSPITAL | Age: 83
LOS: 4 days | Discharge: HOME HEALTH | End: 2023-02-23
Attending: EMERGENCY MEDICINE | Admitting: FAMILY MEDICINE

## 2023-02-19 ENCOUNTER — APPOINTMENT (EMERGENCY)
Dept: RADIOLOGY | Facility: HOSPITAL | Age: 83
End: 2023-02-19

## 2023-02-19 DIAGNOSIS — R63.0 LOSS OF APPETITE: ICD-10-CM

## 2023-02-19 DIAGNOSIS — N12 PYELONEPHRITIS: Primary | ICD-10-CM

## 2023-02-19 DIAGNOSIS — R32 URINARY INCONTINENCE, UNSPECIFIED TYPE: ICD-10-CM

## 2023-02-19 DIAGNOSIS — J44.9 CHRONIC OBSTRUCTIVE PULMONARY DISEASE, UNSPECIFIED COPD TYPE (HCC): ICD-10-CM

## 2023-02-19 DIAGNOSIS — I25.10 CORONARY ARTERY DISEASE INVOLVING NATIVE CORONARY ARTERY OF NATIVE HEART WITHOUT ANGINA PECTORIS: ICD-10-CM

## 2023-02-19 DIAGNOSIS — I10 ESSENTIAL HYPERTENSION: ICD-10-CM

## 2023-02-19 DIAGNOSIS — G93.40 ENCEPHALOPATHY: ICD-10-CM

## 2023-02-19 PROBLEM — K80.20 CALCULUS OF GALLBLADDER WITHOUT CHOLECYSTITIS WITHOUT OBSTRUCTION: Status: ACTIVE | Noted: 2023-02-19

## 2023-02-19 PROBLEM — I5A NONISCHEMIC NONTRAUMATIC MYOCARDIAL INJURY: Status: ACTIVE | Noted: 2023-02-19

## 2023-02-19 LAB
2HR DELTA HS TROPONIN: -20 NG/L
4HR DELTA HS TROPONIN: -26 NG/L
ALBUMIN SERPL BCP-MCNC: 3.5 G/DL (ref 3.5–5)
ALP SERPL-CCNC: 82 U/L (ref 34–104)
ALT SERPL W P-5'-P-CCNC: 17 U/L (ref 7–52)
ANION GAP SERPL CALCULATED.3IONS-SCNC: 8 MMOL/L (ref 4–13)
APTT PPP: 31 SECONDS (ref 23–37)
AST SERPL W P-5'-P-CCNC: 24 U/L (ref 13–39)
ATRIAL RATE: 68 BPM
BACTERIA UR QL AUTO: ABNORMAL /HPF
BASOPHILS # BLD AUTO: 0.03 THOUSANDS/ÂΜL (ref 0–0.1)
BASOPHILS NFR BLD AUTO: 0 % (ref 0–1)
BILIRUB SERPL-MCNC: 1.51 MG/DL (ref 0.2–1)
BILIRUB UR QL STRIP: ABNORMAL
BUN SERPL-MCNC: 26 MG/DL (ref 5–25)
CALCIUM SERPL-MCNC: 9.3 MG/DL (ref 8.4–10.2)
CARDIAC TROPONIN I PNL SERPL HS: 125 NG/L
CARDIAC TROPONIN I PNL SERPL HS: 131 NG/L
CARDIAC TROPONIN I PNL SERPL HS: 151 NG/L
CHLORIDE SERPL-SCNC: 100 MMOL/L (ref 96–108)
CLARITY UR: ABNORMAL
CO2 SERPL-SCNC: 28 MMOL/L (ref 21–32)
COLOR UR: YELLOW
CREAT SERPL-MCNC: 1.49 MG/DL (ref 0.6–1.3)
EOSINOPHIL # BLD AUTO: 0 THOUSAND/ÂΜL (ref 0–0.61)
EOSINOPHIL NFR BLD AUTO: 0 % (ref 0–6)
ERYTHROCYTE [DISTWIDTH] IN BLOOD BY AUTOMATED COUNT: 13.6 % (ref 11.6–15.1)
FLUAV RNA RESP QL NAA+PROBE: NEGATIVE
FLUBV RNA RESP QL NAA+PROBE: NEGATIVE
GFR SERPL CREATININE-BSD FRML MDRD: 32 ML/MIN/1.73SQ M
GLUCOSE SERPL-MCNC: 88 MG/DL (ref 65–140)
GLUCOSE UR STRIP-MCNC: NEGATIVE MG/DL
HCT VFR BLD AUTO: 37.8 % (ref 34.8–46.1)
HGB BLD-MCNC: 12.1 G/DL (ref 11.5–15.4)
HGB UR QL STRIP.AUTO: ABNORMAL
IMM GRANULOCYTES # BLD AUTO: 0.06 THOUSAND/UL (ref 0–0.2)
IMM GRANULOCYTES NFR BLD AUTO: 1 % (ref 0–2)
INR PPP: 1.16 (ref 0.84–1.19)
KETONES UR STRIP-MCNC: ABNORMAL MG/DL
LACTATE SERPL-SCNC: 1.2 MMOL/L (ref 0.5–2)
LEUKOCYTE ESTERASE UR QL STRIP: ABNORMAL
LIPASE SERPL-CCNC: 25 U/L (ref 11–82)
LYMPHOCYTES # BLD AUTO: 0.74 THOUSANDS/ÂΜL (ref 0.6–4.47)
LYMPHOCYTES NFR BLD AUTO: 7 % (ref 14–44)
MCH RBC QN AUTO: 33.4 PG (ref 26.8–34.3)
MCHC RBC AUTO-ENTMCNC: 32 G/DL (ref 31.4–37.4)
MCV RBC AUTO: 104 FL (ref 82–98)
MONOCYTES # BLD AUTO: 0.66 THOUSAND/ÂΜL (ref 0.17–1.22)
MONOCYTES NFR BLD AUTO: 6 % (ref 4–12)
NEUTROPHILS # BLD AUTO: 9.34 THOUSANDS/ÂΜL (ref 1.85–7.62)
NEUTS SEG NFR BLD AUTO: 86 % (ref 43–75)
NITRITE UR QL STRIP: POSITIVE
NON-SQ EPI CELLS URNS QL MICRO: ABNORMAL /HPF
NRBC BLD AUTO-RTO: 0 /100 WBCS
OTHER STN SPEC: ABNORMAL
P AXIS: 57 DEGREES
PH UR STRIP.AUTO: 6 [PH]
PLATELET # BLD AUTO: 212 THOUSANDS/UL (ref 149–390)
PMV BLD AUTO: 9.5 FL (ref 8.9–12.7)
POTASSIUM SERPL-SCNC: 4.6 MMOL/L (ref 3.5–5.3)
PR INTERVAL: 200 MS
PROT SERPL-MCNC: 6.8 G/DL (ref 6.4–8.4)
PROT UR STRIP-MCNC: ABNORMAL MG/DL
PROTHROMBIN TIME: 15 SECONDS (ref 11.6–14.5)
QRS AXIS: -66 DEGREES
QRSD INTERVAL: 128 MS
QT INTERVAL: 430 MS
QTC INTERVAL: 457 MS
RBC # BLD AUTO: 3.62 MILLION/UL (ref 3.81–5.12)
RBC #/AREA URNS AUTO: ABNORMAL /HPF
RSV RNA RESP QL NAA+PROBE: NEGATIVE
SARS-COV-2 RNA RESP QL NAA+PROBE: NEGATIVE
SODIUM SERPL-SCNC: 136 MMOL/L (ref 135–147)
SP GR UR STRIP.AUTO: >=1.03 (ref 1–1.03)
T WAVE AXIS: 105 DEGREES
UROBILINOGEN UR QL STRIP.AUTO: 1 E.U./DL
VENTRICULAR RATE: 68 BPM
WBC # BLD AUTO: 10.83 THOUSAND/UL (ref 4.31–10.16)
WBC #/AREA URNS AUTO: ABNORMAL /HPF

## 2023-02-19 RX ORDER — ATORVASTATIN CALCIUM 20 MG/1
20 TABLET, FILM COATED ORAL DAILY
Status: DISCONTINUED | OUTPATIENT
Start: 2023-02-20 | End: 2023-02-23 | Stop reason: HOSPADM

## 2023-02-19 RX ORDER — ACETAMINOPHEN 325 MG/1
650 TABLET ORAL EVERY 6 HOURS PRN
Status: DISCONTINUED | OUTPATIENT
Start: 2023-02-19 | End: 2023-02-23 | Stop reason: HOSPADM

## 2023-02-19 RX ORDER — ASPIRIN 81 MG/1
81 TABLET ORAL DAILY
Status: DISCONTINUED | OUTPATIENT
Start: 2023-02-20 | End: 2023-02-23 | Stop reason: HOSPADM

## 2023-02-19 RX ORDER — ALBUTEROL SULFATE 90 UG/1
2 AEROSOL, METERED RESPIRATORY (INHALATION) EVERY 6 HOURS PRN
Status: DISCONTINUED | OUTPATIENT
Start: 2023-02-19 | End: 2023-02-23 | Stop reason: HOSPADM

## 2023-02-19 RX ORDER — PANTOPRAZOLE SODIUM 40 MG/1
40 TABLET, DELAYED RELEASE ORAL DAILY
Status: DISCONTINUED | OUTPATIENT
Start: 2023-02-20 | End: 2023-02-23 | Stop reason: HOSPADM

## 2023-02-19 RX ORDER — SODIUM CHLORIDE 9 MG/ML
75 INJECTION, SOLUTION INTRAVENOUS CONTINUOUS
Status: DISCONTINUED | OUTPATIENT
Start: 2023-02-19 | End: 2023-02-22

## 2023-02-19 RX ORDER — HYDROXYCHLOROQUINE SULFATE 200 MG/1
200 TABLET, FILM COATED ORAL 2 TIMES DAILY WITH MEALS
Status: DISCONTINUED | OUTPATIENT
Start: 2023-02-19 | End: 2023-02-23 | Stop reason: HOSPADM

## 2023-02-19 RX ORDER — CEFTRIAXONE 1 G/50ML
1000 INJECTION, SOLUTION INTRAVENOUS EVERY 24 HOURS
Status: DISCONTINUED | OUTPATIENT
Start: 2023-02-20 | End: 2023-02-22

## 2023-02-19 RX ORDER — DOCUSATE SODIUM 100 MG/1
100 CAPSULE, LIQUID FILLED ORAL 2 TIMES DAILY
Status: DISCONTINUED | OUTPATIENT
Start: 2023-02-19 | End: 2023-02-23 | Stop reason: HOSPADM

## 2023-02-19 RX ORDER — FOLIC ACID 1 MG/1
1 TABLET ORAL DAILY
Status: DISCONTINUED | OUTPATIENT
Start: 2023-02-20 | End: 2023-02-23 | Stop reason: HOSPADM

## 2023-02-19 RX ORDER — DULOXETIN HYDROCHLORIDE 30 MG/1
30 CAPSULE, DELAYED RELEASE ORAL DAILY
Status: DISCONTINUED | OUTPATIENT
Start: 2023-02-20 | End: 2023-02-23 | Stop reason: HOSPADM

## 2023-02-19 RX ORDER — LORATADINE 10 MG/1
10 TABLET ORAL DAILY
Status: DISCONTINUED | OUTPATIENT
Start: 2023-02-20 | End: 2023-02-23 | Stop reason: HOSPADM

## 2023-02-19 RX ORDER — LISINOPRIL 10 MG/1
10 TABLET ORAL DAILY
COMMUNITY

## 2023-02-19 RX ORDER — CEFTRIAXONE 1 G/50ML
1000 INJECTION, SOLUTION INTRAVENOUS ONCE
Status: COMPLETED | OUTPATIENT
Start: 2023-02-19 | End: 2023-02-19

## 2023-02-19 RX ORDER — MIRTAZAPINE 15 MG/1
7.5 TABLET, FILM COATED ORAL
Status: DISCONTINUED | OUTPATIENT
Start: 2023-02-19 | End: 2023-02-23 | Stop reason: HOSPADM

## 2023-02-19 RX ORDER — ONDANSETRON 2 MG/ML
4 INJECTION INTRAMUSCULAR; INTRAVENOUS EVERY 6 HOURS PRN
Status: DISCONTINUED | OUTPATIENT
Start: 2023-02-19 | End: 2023-02-23 | Stop reason: HOSPADM

## 2023-02-19 RX ORDER — ENOXAPARIN SODIUM 100 MG/ML
40 INJECTION SUBCUTANEOUS DAILY
Status: DISCONTINUED | OUTPATIENT
Start: 2023-02-20 | End: 2023-02-20 | Stop reason: DRUGHIGH

## 2023-02-19 RX ORDER — OXYBUTYNIN CHLORIDE 5 MG/1
5 TABLET, EXTENDED RELEASE ORAL DAILY
Status: DISCONTINUED | OUTPATIENT
Start: 2023-02-20 | End: 2023-02-19

## 2023-02-19 RX ADMIN — ACETAMINOPHEN 325MG 650 MG: 325 TABLET ORAL at 19:58

## 2023-02-19 RX ADMIN — CEFTRIAXONE 1000 MG: 1 INJECTION, SOLUTION INTRAVENOUS at 15:52

## 2023-02-19 RX ADMIN — SODIUM CHLORIDE 1000 ML: 0.9 INJECTION, SOLUTION INTRAVENOUS at 13:44

## 2023-02-19 RX ADMIN — HYDROXYCHLOROQUINE SULFATE 200 MG: 200 TABLET, FILM COATED ORAL at 18:14

## 2023-02-19 RX ADMIN — DOCUSATE SODIUM 100 MG: 100 CAPSULE ORAL at 18:14

## 2023-02-19 RX ADMIN — MIRTAZAPINE 7.5 MG: 15 TABLET, FILM COATED ORAL at 22:06

## 2023-02-19 RX ADMIN — SODIUM CHLORIDE 125 ML/HR: 0.9 INJECTION, SOLUTION INTRAVENOUS at 18:14

## 2023-02-19 NOTE — PLAN OF CARE
Problem: MOBILITY - ADULT  Goal: Maintain or return to baseline ADL function  Description: INTERVENTIONS:  -  Assess patient's ability to carry out ADLs; assess patient's baseline for ADL function and identify physical deficits which impact ability to perform ADLs (bathing, care of mouth/teeth, toileting, grooming, dressing, etc )  - Assess/evaluate cause of self-care deficits   - Assess range of motion  - Assess patient's mobility; develop plan if impaired  - Assess patient's need for assistive devices and provide as appropriate  - Encourage maximum independence but intervene and supervise when necessary  - Involve family in performance of ADLs  - Assess for home care needs following discharge   - Consider OT consult to assist with ADL evaluation and planning for discharge  - Provide patient education as appropriate  Outcome: Progressing  Goal: Maintains/Returns to pre admission functional level  Description: INTERVENTIONS:  - Perform BMAT or MOVE assessment daily    - Set and communicate daily mobility goal to care team and patient/family/caregiver     - Collaborate with rehabilitation services on mobility goals if consulted  - Record patient progress and toleration of activity level   Outcome: Progressing     Problem: PAIN - ADULT  Goal: Verbalizes/displays adequate comfort level or baseline comfort level  Description: Interventions:  - Encourage patient to monitor pain and request assistance  - Assess pain using appropriate pain scale  - Administer analgesics based on type and severity of pain and evaluate response  - Implement non-pharmacological measures as appropriate and evaluate response  - Consider cultural and social influences on pain and pain management  - Notify physician/advanced practitioner if interventions unsuccessful or patient reports new pain  Outcome: Progressing     Problem: INFECTION - ADULT  Goal: Absence or prevention of progression during hospitalization  Description: INTERVENTIONS:  - Assess and monitor for signs and symptoms of infection  - Monitor lab/diagnostic results  - Monitor all insertion sites, i e  indwelling lines, tubes, and drains  - Monitor endotracheal if appropriate and nasal secretions for changes in amount and color  - [place] appropriate cooling/warming therapies per order  - Administer medications as ordered  - Instruct and encourage patient and family to use good hand hygiene technique  - Identify and instruct in appropriate isolation precautions for identified infection/condition  Outcome: Progressing     Problem: SAFETY ADULT  Goal: Maintain or return to baseline ADL function  Description: INTERVENTIONS:  -  Assess patient's ability to carry out ADLs; assess patient's baseline for ADL function and identify physical deficits which impact ability to perform ADLs (bathing, care of mouth/teeth, toileting, grooming, dressing, etc )  - Assess/evaluate cause of self-care deficits   - Assess range of motion  - Assess patient's mobility; develop plan if impaired  - Assess patient's need for assistive devices and provide as appropriate  - Encourage maximum independence but intervene and supervise when necessary  - Involve family in performance of ADLs  - Assess for home care needs following discharge   - Consider OT consult to assist with ADL evaluation and planning for discharge  - Provide patient education as appropriate  Outcome: Progressing  Goal: Maintains/Returns to pre admission functional level  Description: INTERVENTIONS:  - Perform BMAT or MOVE assessment daily    - Set and communicate daily mobility goal to care team and patient/family/caregiver     - Collaborate with rehabilitation services on mobility goals if consulted  - Dangle patient 3 times a day  - Stand patient 3 times a day  - Ambulate patient 3 times a day  - Out of bed to chair 3 times a day   - Out of bed for meals 3 times a day  - Out of bed for toileting  - Record patient progress and toleration of activity level   Outcome: Progressing  Goal: Patient will remain free of falls  Description: INTERVENTIONS:  - Educate patient/family on patient safety including physical limitations  - Instruct patient to call for assistance with activity   - Consult OT/PT to assist with strengthening/mobility   - Keep Call bell within reach  - Keep bed low and locked with side rails adjusted as appropriate  - Keep care items and personal belongings within reach  - Initiate and maintain comfort rounds  - Make Fall Risk Sign visible to staff  - Offer Toileting every 2 Hours, in advance of need  - Initiate/Maintain bed alarm  - Obtain necessary fall risk management equipment  - Apply yellow socks and bracelet for high fall risk patients  - Consider moving patient to room near nurses station  Outcome: Progressing     Problem: DISCHARGE PLANNING  Goal: Discharge to home or other facility with appropriate resources  Description: INTERVENTIONS:  - Identify barriers to discharge w/patient and caregiver  - Arrange for needed discharge resources and transportation as appropriate  - Identify discharge learning needs (meds, wound care, etc )  - Arrange for interpretive services to assist at discharge as needed  - Refer to Case Management Department for coordinating discharge planning if the patient needs post-hospital services based on physician/advanced practitioner order or complex needs related to functional status, cognitive ability, or social support system  Outcome: Progressing     Problem: Knowledge Deficit  Goal: Patient/family/caregiver demonstrates understanding of disease process, treatment plan, medications, and discharge instructions  Description: Complete learning assessment and assess knowledge base    Interventions:  - Provide teaching at level of understanding  - Provide teaching via preferred learning methods  Outcome: Progressing     Problem: NEUROSENSORY - ADULT  Goal: Achieves stable or improved neurological status  Description: INTERVENTIONS  - Monitor and report changes in neurological status  - Monitor vital signs such as temperature, blood pressure, glucose, and any other labs ordered   - Initiate measures to prevent increased intracranial pressure  - Monitor for seizure activity and implement precautions if appropriate      Outcome: Progressing

## 2023-02-19 NOTE — RESPIRATORY THERAPY NOTE
RT Protocol Note  Bledsoe Cage [de-identified] y o  female MRN: [id number]  Unit/Bed#: -01 Encounter: [id number]    Assessment    Principal Problem:    Acute metabolic encephalopathy  Active Problems:    COPD (chronic obstructive pulmonary disease) (HCC)    Stage 3b chronic kidney disease (HCC)    Pyelonephritis    Nonischemic nontraumatic myocardial injury    Urinary incontinence    Calculus of gallbladder without cholecystitis without obstruction    Loss of appetite      Home Pulmonary Medications:  PRN albuterol       Past Medical History:   Diagnosis Date    Arthritis     Asthma     Diabetes mellitus ([address] )     Hypertension     MI, old      Social History     Socioeconomic History    Marital status: /Civil Union     Spouse name: None    Number of children: None    Years of education: None    Highest education level: None   Occupational History    None   Tobacco Use    Smoking status: Former    Smokeless tobacco: Never   Vaping Use    Vaping Use: Never used   Substance and Sexual Activity    Alcohol use: Not Currently    Drug use: Not Currently    Sexual activity: None   Other Topics Concern    None   Social History Narrative    None     Social Determinants of Health     Financial Resource Strain: Not on file   Food Insecurity: No Food Insecurity    Worried About Running Out of Food in the Last Year: Never true    Ran Out of Food in the Last Year: Never true   Transportation Needs: No Transportation Needs    Lack of Transportation (Medical): No    Lack of Transportation (Non-Medical):  No   Physical Activity: Not on file   Stress: Not on file   Social Connections: Not on file   Intimate Partner Violence: Not on file   Housing Stability: Low Risk     Unable to Pay for Housing in the Last Year: No    Number of Places Lived in the Last Year: 1    Unstable Housing in the Last Year: No       Subjective         Objective    Physical Exam:   Assessment Type: Assess only  General Appearance: Awake  Respiratory Pattern: Normal  Chest Assessment: Chest expansion symmetrical  Bilateral Breath Sounds: Clear  Cough: Non-productive  O2 Device: (P) RA    Vitals:  Blood pressure 123/63, pulse 70, temperature 98 2 °F (36 8 °C), resp  rate 16, weight 55 6 kg (122 lb 9 2 oz), SpO2 96 %  Imaging and other studies: I have personally reviewed pertinent reports  O2 Device: (P) RA     Plan    Respiratory Plan: No distress/Pulmonary history, Home Bronchodilator Patient pathway        Resp Comments: Pt admit for encephalopathy  No SOB, COPD hx however no pft to confirm  PRN ALbuterol clear bs

## 2023-02-19 NOTE — H&P
[address]  H&P- Haleigh Korin 1940, [de-identified] y o  female MRN: [id number]  Unit/Bed#: -[place] Encounter: [id number]  Primary Care Provider: Ibis Patel DO   Date and time admitted to hospital: 2/19/2023 12:52 PM    Pyelonephritis  Assessment & Plan  CT abdomen and pelvis showed:Mild asymmetric left perinephric edema/stranding, new from prior study  Correlate clinically for infection   No hydronephrosis   Evaluation of the renal parenchyma is limited without IV contrast  Mild left renal atrophy with cortical   scarring/lobulation, multiple cortical cysts and/or calyceal diverticula as well as scattered which could be dystrophic or represent nonobstructing calculi       Continue IV antibiotic, IV hydration, retention protocol  Follow labs    * Acute metabolic encephalopathy  Assessment & Plan  As per daughter, last night patient woke up in the middle of the night and yelling and patient was found in the corner of the room and patient thought she was in the food-as per daughter, right next to her [place] tree/  Secondary to pyelonephritis  CT head negative  COVID-negative  Continue underlying treatment for pyelonephritis  Continue to monitor    Loss of appetite  Assessment & Plan  Multifactorial-hiatal hernia, elevated diaphragm, cholelithiasis  We will add Remeron and monitor    Calculus of gallbladder without cholecystitis without obstruction  Assessment & Plan  Asymptomatic  Conservative management    Urinary incontinence  Assessment & Plan  Patient had history of uterine prolapse and since then she is having urinary incontinence  Patient takes Detrol at home-we will hold it while patient is getting treatment for pyelonephritis    Nonischemic nontraumatic myocardial injury  Assessment & Plan  Troponin is trending up  No significant EKG change  Patient remained asymptomatic  Secondary to infection most likely  Continue to monitor  Echocardiogram    Stage 3b chronic kidney disease [facility])  Assessment & Plan  Lab Results   Component Value Date    EGFR 32 02/19/2023    EGFR 32 12/29/2022    EGFR 47 07/30/2022    CREATININE 1 49 (H) 02/19/2023    CREATININE 1 50 (H) 12/29/2022    CREATININE 1 10 07/30/2022   Home blood pressure medication remain on hold  Patient is receiving IV hydration  Retention protocol    COPD (chronic obstructive pulmonary disease) ([facility] [address] )  Assessment & Plan  Per patient, she feels dyspneic on exertion  Patient does have hiatal hernia and elevated diaphragm, which can also contribute  Follow echocardiogram    VTE Pharmacologic Prophylaxis: VTE Score: 7 High Risk (Score >/= 5) - Pharmacological DVT Prophylaxis Ordered: enoxaparin (Lovenox)  Sequential Compression Devices Ordered  Code Status: Level 3 - DNAR and DNI as per patient  Discussion with family: Updated  (daughter) at bedside  Anticipated Length of Stay: Patient will be admitted on an inpatient basis with an anticipated length of stay of greater than 2 midnights secondary to To monitor above conditions  Total Time Spent on Date of Encounter in care of patient: 15 minutes This time was spent on one or more of the following: performing physical exam; counseling and coordination of care; obtaining or reviewing history; documenting in the medical record; reviewing/ordering tests, medications or procedures; communicating with other healthcare professionals and discussing with patient's family/caregivers  Chief Complaint: Confusion    History of Present Illness:  Beth Slaughter is a [de-identified] y o  female with a PMH of femur fracture, low back fracture, hypertension who presents with confusion  As per daughter, patient lives with them, and last night she woke up and calling her 's name  When daughter came down patient was found close to the corner of room in between [place] tree and dresser  And patient was thinking that she was in the wound since [place] right next to her  Denies any nausea, vomiting, diarrhea  But as per daughter, whenever patient gets urine infection, she gets more confused and behave like this  Patient also reported she is having lower extremities pain intermittently after had surgery in both leg from fracture  Also has urinary incontinence since she had history of uterine prolapse  As per daughter, patient also has mild short of breath and has loss of appetite  Review of Systems:  Review of Systems   Constitutional: Positive for activity change and appetite change  Negative for chills, diaphoresis, fatigue, fever and unexpected weight change  HENT: Negative for congestion, dental problem, drooling, rhinorrhea, sinus pressure, sinus pain and sneezing  Respiratory: Negative for apnea, choking, chest tightness, shortness of breath and stridor  Cardiovascular: Negative for chest pain and leg swelling  Gastrointestinal: Negative for abdominal distention, abdominal pain, blood in stool, constipation and diarrhea  Genitourinary: Positive for dysuria  Negative for difficulty urinating, dyspareunia, enuresis and flank pain  Musculoskeletal: Negative for arthralgias, back pain, neck pain and neck stiffness  Skin: Negative for color change, pallor and rash  Neurological: Negative for dizziness, seizures, facial asymmetry, speech difficulty, light-headedness and numbness  Psychiatric/Behavioral: Positive for confusion  Negative for agitation, behavioral problems, decreased concentration, dysphoric mood and hallucinations  The patient is not hyperactive  All other systems reviewed and are negative        Past Medical and Surgical History:   Past Medical History:   Diagnosis Date   • Arthritis    • Asthma    • Diabetes mellitus ([facility] [address] )    • Hypertension    • MI, old        Past Surgical History:   Procedure Laterality Date   • BACK SURGERY     • CARDIAC SURGERY     • HYSTERECTOMY         Meds/Allergies:  Prior to Admission medications Medication Sig Start Date End Date Taking? Authorizing Provider   acetaminophen (TYLENOL) 325 mg tablet Take 2 tablets (650 mg total) by mouth every 6 (six) hours as needed for mild pain, headaches or fever 8/1/22   Blu Nguyen MD   albuterol (ProAir HFA) 90 mcg/act inhaler Inhale 2 puffs every 6 (six) hours as needed for wheezing 8/1/22   Blu Nguyen MD   aspirin 325 mg tablet Take 325 mg by mouth daily    Historical Provider, MD   atorvastatin (LIPITOR) 20 mg tablet Take 20 mg by mouth daily    Historical Provider, MD   benzonatate (TESSALON PERLES) 100 mg capsule Take 1 capsule (100 mg total) by mouth 3 (three) times a day as needed for cough 8/1/22   Blu Nguyen MD   bisoprolol-hydrochlorothiazide (Lawerance Umanzor) 2 5-6 25 MG per tablet Take 1 tablet by mouth daily    Historical Provider, MD   calcium citrate-vitamin D (CITRACAL+D) 315-200 MG-UNIT per tablet Take 1 tablet by mouth 2 (two) times a day    Historical Provider, MD   docusate sodium (COLACE) 100 mg capsule Take 100 mg by mouth 2 (two) times a day    Historical Provider, MD   DULoxetine (CYMBALTA) 30 mg delayed release capsule Take 30 mg by mouth daily    Historical Provider, MD   folic acid (FOLVITE) 1 mg tablet Take by mouth daily    Historical Provider, MD   hydroxychloroquine (PLAQUENIL) 200 mg tablet Take 200 mg by mouth 2 (two) times a day with meals    Historical Provider, MD   loratadine (CLARITIN) 10 mg tablet Take 10 mg by mouth daily    Historical Provider, MD   methotrexate 2 5 mg tablet Take by mouth once a week    Historical Provider, MD   pantoprazole (PROTONIX) 40 mg tablet Take 40 mg by mouth daily    Historical Provider, MD   tolterodine (DETROL LA) 4 mg 24 hr capsule Take 4 mg by mouth daily    Historical Provider, MD     I have reviewed home medications with patient personally      Allergies: No Known Allergies    Social History:  Marital Status: /Civil Union   Occupation: Unknown  Patient Pre-hospital Living Situation: Home  Patient Pre-hospital Level of Mobility: walks  Patient Pre-hospital Diet Restrictions: Cardiac diet  Substance Use History:   Social History     Substance and Sexual Activity   Alcohol Use Not Currently     Social History     Tobacco Use   Smoking Status Former   Smokeless Tobacco Never     Social History     Substance and Sexual Activity   Drug Use Not Currently       Family History:  Noncontributory    Physical Exam:     Vitals:   Blood Pressure: 123/63 (02/19/23 1644)  Pulse: 70 (02/19/23 1644)  Temperature: 98 2 °F (36 8 °C) (02/19/23 1644)  Temp Source: Temporal (02/19/23 1259)  Respirations: 16 (02/19/23 1445)  Weight - Scale: 55 6 kg (122 lb 9 2 oz) (02/19/23 1259)  SpO2: 96 % (02/19/23 1644)    Physical Exam  Vitals and nursing note reviewed  Constitutional:       Appearance: Normal appearance  She is not ill-appearing or diaphoretic  HENT:      Head: Normocephalic  Nose: Nose normal  No congestion  Mouth/Throat:      Mouth: Mucous membranes are moist       Pharynx: Oropharynx is clear  No oropharyngeal exudate  Eyes:      General: No scleral icterus  Left eye: No discharge  Extraocular Movements: Extraocular movements intact  Conjunctiva/sclera: Conjunctivae normal       Pupils: Pupils are equal, round, and reactive to light  Neck:      Vascular: No carotid bruit  Cardiovascular:      Rate and Rhythm: Normal rate  Heart sounds: No murmur heard  No friction rub  No gallop  Pulmonary:      Effort: Pulmonary effort is normal  No respiratory distress  Breath sounds: No stridor  No wheezing or rhonchi  Abdominal:      General: Abdomen is flat  Bowel sounds are normal  There is no distension  Palpations: There is no mass  Tenderness: There is no abdominal tenderness  Hernia: No hernia is present  Musculoskeletal:         General: No swelling, tenderness or deformity  Normal range of motion  Cervical back: Normal range of motion   No rigidity or tenderness  Lymphadenopathy:      Cervical: No cervical adenopathy  Skin:     General: Skin is warm  Capillary Refill: Capillary refill takes less than 2 seconds  Coloration: Skin is not jaundiced or pale  Findings: No bruising or erythema  Neurological:      General: No focal deficit present  Mental Status: She is alert and oriented to person, place, and time  Cranial Nerves: No cranial nerve deficit  Sensory: No sensory deficit  Motor: No weakness  Coordination: Coordination normal    Psychiatric:         Mood and Affect: Mood normal          Additional Data:     Lab Results:  Results from last 7 days   Lab Units 02/19/23  1344   WBC Thousand/uL 10 83*   HEMOGLOBIN g/dL 12 1   HEMATOCRIT % 37 8   PLATELETS Thousands/uL 212   NEUTROS PCT % 86*   LYMPHS PCT % 7*   MONOS PCT % 6   EOS PCT % 0     Results from last 7 days   Lab Units 02/19/23  1344   SODIUM mmol/L 136   POTASSIUM mmol/L 4 6   CHLORIDE mmol/L 100   CO2 mmol/L 28   BUN mg/dL 26*   CREATININE mg/dL 1 49*   ANION GAP mmol/L 8   CALCIUM mg/dL 9 3   ALBUMIN g/dL 3 5   TOTAL BILIRUBIN mg/dL 1 51*   ALK PHOS U/L 82   ALT U/L 17   AST U/L 24   GLUCOSE RANDOM mg/dL 88     Results from last 7 days   Lab Units 02/19/23  1344   INR  1 16             Results from last 7 days   Lab Units 02/19/23  1344   LACTIC ACID mmol/L 1 2       Lines/Drains:  Invasive Devices     Peripheral Intravenous Line  Duration           Peripheral IV 02/19/23 Left Antecubital <1 day                    Imaging: Reviewed radiology reports from this admission including: abdominal/pelvic CT and CT head  CT abdomen pelvis wo contrast   Final Result by Miranda Robert DO (02/19 0257)      1  Mild asymmetric left perinephric edema/stranding, new from prior study  Correlate clinically for infection  No hydronephrosis    Evaluation of the renal parenchyma is limited without IV contrast  Mild left renal atrophy with cortical scarring/lobulation, multiple cortical cysts and/or calyceal diverticula as well as scattered which could be dystrophic or represent nonobstructing calculi  2  Cholelithiasis  3  Elevation of the right diaphragm, large hiatal hernia and mild ascending colon diverticulosis  Limited study without IV or oral contrast       Workstation performed: [id number]         CT head without contrast   Final Result by Jose Rafael Schaefer DO (02/19 1530)      No acute intracranial abnormality  Chronic microangiopathic changes  Workstation performed: [id number]         XR chest 1 view portable   ED Interpretation by Lam Bethea MD (02/19 1323)   Elevated right hemidiaphragm  Appears unchanged from previous chest x-ray  EKG and Other Studies Reviewed on Admission:   · EKG: No ischemic changes  ** Please Note: This note has been constructed using a voice recognition system   **

## 2023-02-19 NOTE — ASSESSMENT & PLAN NOTE
CT abdomen and pelvis showed:Mild asymmetric left perinephric edema/stranding, new from prior study  Correlate clinically for infection   No hydronephrosis   Evaluation of the renal parenchyma is limited without IV contrast  Mild left renal atrophy with cortical   scarring/lobulation, multiple cortical cysts and/or calyceal diverticula as well as scattered which could be dystrophic or represent nonobstructing calculi       Continue IV antibiotic, IV hydration, retention protocol  Follow labs

## 2023-02-19 NOTE — ASSESSMENT & PLAN NOTE
As per daughter, last night patient woke up in the middle of the night and yelling and patient was found in the corner of the room and patient thought she was in the food-as per daughter, right next to her [place] tree/  Secondary to pyelonephritis  CT head negative  COVID-negative  Continue underlying treatment for pyelonephritis  Continue to monitor

## 2023-02-19 NOTE — ASSESSMENT & PLAN NOTE
Per patient, she feels dyspneic on exertion  Patient does have hiatal hernia and elevated diaphragm, which can also contribute  Follow echocardiogram

## 2023-02-19 NOTE — ED PROVIDER NOTES
History  Chief Complaint   Patient presents with   • Possible UTI     Pt presented to this ED with daughter c/o increased confusion, urinary frequency, and decreased oral intake since yesterday  Pt's dtr states pt presents with same sx when she gets UTI's  Hx multiple uti's   Denies travel/sob/fevers/cough/n/v/d     Brought in by daughter  Decreased appetite over the last 1 to 2 days  Had an episode of confusion last night  Complains of lower abdominal pain  Slight congestion and a cough  Daughter states in the past she has had UTIs with similar presentation  No fevers or chills  History provided by:  Patient and relative   used: No    Fatigue  Severity:  Mild  Onset quality:  Gradual  Duration:  1 day  Timing:  Constant  Progression:  Worsening  Chronicity:  Recurrent  Context: not allergies, not increased activity and not stress    Relieved by:  Nothing  Worsened by:  Nothing  Ineffective treatments:  None tried  Associated symptoms: anorexia, cough and frequency    Associated symptoms: no abdominal pain, no chest pain, no diarrhea, no dizziness, no dysuria, no falls, no fever, no foul-smelling urine, no headaches, no loss of consciousness, no nausea, no seizures, no shortness of breath, no syncope and no vomiting        Prior to Admission Medications   Prescriptions Last Dose Informant Patient Reported? Taking?    DULoxetine (CYMBALTA) 30 mg delayed release capsule   Yes No   Sig: Take 30 mg by mouth daily   acetaminophen (TYLENOL) 325 mg tablet   No No   Sig: Take 2 tablets (650 mg total) by mouth every 6 (six) hours as needed for mild pain, headaches or fever   albuterol (ProAir HFA) 90 mcg/act inhaler   No No   Sig: Inhale 2 puffs every 6 (six) hours as needed for wheezing   aspirin 325 mg tablet   Yes No   Sig: Take 325 mg by mouth daily   atorvastatin (LIPITOR) 20 mg tablet   Yes No   Sig: Take 20 mg by mouth daily   benzonatate (TESSALON PERLES) 100 mg capsule   No No   Sig: Take 1 capsule (100 mg total) by mouth 3 (three) times a day as needed for cough   bisoprolol-hydrochlorothiazide (ZIAC) 2 5-6 25 MG per tablet   Yes No   Sig: Take 1 tablet by mouth daily   calcium citrate-vitamin D (CITRACAL+D) 315-200 MG-UNIT per tablet   Yes No   Sig: Take 1 tablet by mouth 2 (two) times a day   docusate sodium (COLACE) 100 mg capsule   Yes No   Sig: Take 100 mg by mouth 2 (two) times a day   folic acid (FOLVITE) 1 mg tablet   Yes No   Sig: Take by mouth daily   hydroxychloroquine (PLAQUENIL) 200 mg tablet   Yes No   Sig: Take 200 mg by mouth 2 (two) times a day with meals   loratadine (CLARITIN) 10 mg tablet   Yes No   Sig: Take 10 mg by mouth daily   methotrexate 2 5 mg tablet   Yes No   Sig: Take by mouth once a week   pantoprazole (PROTONIX) 40 mg tablet   Yes No   Sig: Take 40 mg by mouth daily   tolterodine (DETROL LA) 4 mg 24 hr capsule   Yes No   Sig: Take 4 mg by mouth daily      Facility-Administered Medications: None       Past Medical History:   Diagnosis Date   • Arthritis    • Asthma    • Diabetes mellitus (HCC)    • Hypertension    • MI, old        Past Surgical History:   Procedure Laterality Date   • BACK SURGERY     • CARDIAC SURGERY     • HYSTERECTOMY         History reviewed  No pertinent family history  I have reviewed and agree with the history as documented  E-Cigarette/Vaping   • E-Cigarette Use Never User      E-Cigarette/Vaping Substances   • Nicotine No    • THC No    • CBD No    • Flavoring No    • Other No    • Unknown No      Social History     Tobacco Use   • Smoking status: Former   • Smokeless tobacco: Never   Vaping Use   • Vaping Use: Never used   Substance Use Topics   • Alcohol use: Not Currently   • Drug use: Not Currently       Review of Systems   Constitutional: Positive for fatigue  Negative for chills and fever  HENT: Positive for congestion  Negative for ear pain, hearing loss, sore throat, trouble swallowing and voice change      Eyes: Negative for pain and discharge  Respiratory: Positive for cough  Negative for shortness of breath and wheezing  Cardiovascular: Negative for chest pain, palpitations and syncope  Gastrointestinal: Positive for anorexia  Negative for abdominal pain, blood in stool, constipation, diarrhea, nausea and vomiting  Genitourinary: Positive for frequency  Negative for dysuria, flank pain and hematuria  Musculoskeletal: Negative for falls, joint swelling, neck pain and neck stiffness  Skin: Negative for rash and wound  Neurological: Positive for weakness  Negative for dizziness, seizures, loss of consciousness, syncope, facial asymmetry and headaches  Psychiatric/Behavioral: Negative for hallucinations, self-injury and suicidal ideas  All other systems reviewed and are negative  Physical Exam  Physical Exam  Vitals and nursing note reviewed  Constitutional:       General: She is not in acute distress  Appearance: She is well-developed  HENT:      Head: Normocephalic and atraumatic  Right Ear: External ear normal       Left Ear: External ear normal    Eyes:      General: No scleral icterus  Right eye: No discharge  Left eye: No discharge  Extraocular Movements: Extraocular movements intact  Conjunctiva/sclera: Conjunctivae normal    Cardiovascular:      Rate and Rhythm: Normal rate and regular rhythm  Heart sounds: Normal heart sounds  No murmur heard  Pulmonary:      Effort: Pulmonary effort is normal       Breath sounds: Normal breath sounds  No wheezing or rales  Abdominal:      General: Bowel sounds are normal  There is no distension  Palpations: Abdomen is soft  Tenderness: There is no abdominal tenderness  There is no guarding or rebound  Musculoskeletal:         General: No deformity  Normal range of motion  Cervical back: Normal range of motion and neck supple  Skin:     General: Skin is warm and dry  Findings: No rash  Neurological:      General: No focal deficit present  Mental Status: She is alert and oriented to person, place, and time  Cranial Nerves: No cranial nerve deficit  Psychiatric:         Mood and Affect: Mood normal          Behavior: Behavior normal          Thought Content:  Thought content normal          Judgment: Judgment normal          Vital Signs  ED Triage Vitals [02/19/23 1259]   Temperature Pulse Respirations Blood Pressure SpO2   97 7 °F (36 5 °C) 75 18 111/61 94 %      Temp Source Heart Rate Source Patient Position - Orthostatic VS BP Location FiO2 (%)   Temporal Monitor Lying Right arm --      Pain Score       No Pain           Vitals:    02/19/23 1330 02/19/23 1345 02/19/23 1400 02/19/23 1445   BP: 115/62 113/57 122/63 138/62   Pulse: 74 70 68 68   Patient Position - Orthostatic VS:   Lying Lying         Visual Acuity      ED Medications  Medications   cefTRIAXone (ROCEPHIN) IVPB (premix in dextrose) 1,000 mg 50 mL (1,000 mg Intravenous New Bag 2/19/23 1552)   sodium chloride 0 9 % bolus 1,000 mL (0 mL Intravenous Stopped 2/19/23 1444)       Diagnostic Studies  Results Reviewed     Procedure Component Value Units Date/Time    HS Troponin I 2hr [[id number]]  (Abnormal) Collected: 02/19/23 1515    Lab Status: Final result Specimen: Blood from Arm, Left Updated: 02/19/23 1550     hs TnI 2hr 131 ng/L      Delta 2hr hsTnI -20 ng/L     HS Troponin I 4hr [[id number]]     Lab Status: No result Specimen: Blood     Urine Microscopic [[id number]]  (Abnormal) Collected: 02/19/23 1414    Lab Status: Final result Specimen: Urine, Clean Catch Updated: 02/19/23 1458     RBC, UA 2-4 /hpf      WBC, UA 30-50 /hpf      Epithelial Cells Occasional /hpf      Bacteria, UA Moderate /hpf      OTHER OBSERVATIONS Renal Tubule Epithelial Cells Present  Transitional Epithelial Cells    UA w Reflex to Microscopic w Reflex to Culture [[id number]]  (Abnormal) Collected: 02/19/23 1414    Lab Status: Final result Specimen: Urine, Clean Catch Updated: 02/19/23 1444     Color, UA Yellow     Clarity, UA Cloudy     Specific Gravity, UA >=1 030     pH, UA 6 0     Leukocytes, UA Moderate     Nitrite, UA Positive     Protein, UA 30 (1+) mg/dl      Glucose, UA Negative mg/dl      Ketones, UA Trace mg/dl      Urobilinogen, UA 1 0 E U /dl      Bilirubin, UA Small     Occult Blood, UA Trace-lysed    FLU/RSV/COVID - if FLU/RSV clinically relevant [[id number]]  (Normal) Collected: 02/19/23 1344    Lab Status: Final result Specimen: Nares from Nose Updated: 02/19/23 1434     SARS-CoV-2 Negative     INFLUENZA A PCR Negative     INFLUENZA B PCR Negative     RSV PCR Negative    Narrative:      FOR PEDIATRIC PATIENTS - copy/paste COVID Guidelines URL to browser: [email or web address]  TV Volume Wizard Appx    SARS-CoV-2 assay is a Nucleic Acid Amplification assay intended for the  qualitative detection of nucleic acid from SARS-CoV-2 in nasopharyngeal  swabs  Results are for the presumptive identification of SARS-CoV-2 RNA  Positive results are indicative of infection with SARS-CoV-2, the virus  causing COVID-19, but do not rule out bacterial infection or co-infection  with other viruses  Laboratories within the [place] and its  territories are required to report all positive results to the appropriate  public health authorities  Negative results do not preclude SARS-CoV-2  infection and should not be used as the sole basis for treatment or other  patient management decisions  Negative results must be combined with  clinical observations, patient history, and epidemiological information  This test has not been FDA cleared or approved  This test has been authorized by FDA under an Emergency Use Authorization  (EUA)   This test is only authorized for the duration of time the  declaration that circumstances exist justifying the authorization of the  emergency use of an in vitro diagnostic tests for detection of SARS-CoV-2  virus and/or diagnosis of COVID-19 infection under section 564(b)(1) of  the Act, 21 U  S C  120RTG-6(L)(6), unless the authorization is terminated  or revoked sooner  The test has been validated but independent review by FDA  and CLIA is pending  Test performed using [employer] GeneXpert: This RT-PCR assay targets N2,  a region unique to SARS-CoV-2  A conserved region in the E-gene was chosen  for pan-Sarbecovirus detection which includes SARS-CoV-2  According to CMS-2020-01-R, this platform meets the definition of high-throughput technology  HS Troponin 0hr (reflex protocol) [[id number]]  (Abnormal) Collected: 02/19/23 1344    Lab Status: Final result Specimen: Blood from Arm, Left Updated: 02/19/23 1428     hs TnI 0hr 151 ng/L     Lactic acid [[id number]]  (Normal) Collected: 02/19/23 1344    Lab Status: Final result Specimen: Blood from Arm, Left Updated: 02/19/23 1421     LACTIC ACID 1 2 mmol/L     Narrative:      Result may be elevated if tourniquet was used during collection      Comprehensive metabolic panel [[id number]]  (Abnormal) Collected: 02/19/23 1344    Lab Status: Final result Specimen: Blood from Arm, Left Updated: 02/19/23 1421     Sodium 136 mmol/L      Potassium 4 6 mmol/L      Chloride 100 mmol/L      CO2 28 mmol/L      ANION GAP 8 mmol/L      BUN 26 mg/dL      Creatinine 1 49 mg/dL      Glucose 88 mg/dL      Calcium 9 3 mg/dL      AST 24 U/L      ALT 17 U/L      Alkaline Phosphatase 82 U/L      Total Protein 6 8 g/dL      Albumin 3 5 g/dL      Total Bilirubin 1 51 mg/dL      eGFR 32 ml/min/1 73sq m     Narrative:      [facility] guidelines for Chronic Kidney Disease (CKD):   •  Stage 1 with normal or high GFR (GFR > 90 mL/min/1 73 square meters)  •  Stage 2 Mild CKD (GFR = 60-89 mL/min/1 73 square meters)  •  Stage 3A Moderate CKD (GFR = 45-59 mL/min/1 73 square meters)  •  Stage 3B Moderate CKD (GFR = 30-44 mL/min/1 73 square meters)  •  Stage 4 Severe CKD (GFR = 15-29 mL/min/1 73 square meters)  •  Stage 5 End Stage CKD (GFR <15 mL/min/1 73 square meters)  Note: GFR calculation is accurate only with a steady state creatinine    Lipase [[id number]]  (Normal) Collected: 02/19/23 1344    Lab Status: Final result Specimen: Blood from Arm, Left Updated: 02/19/23 1421     Lipase 25 u/L     Protime-INR [[id number]]  (Abnormal) Collected: 02/19/23 1344    Lab Status: Final result Specimen: Blood from Arm, Left Updated: 02/19/23 1420     Protime 15 0 seconds      INR 1 16    APTT [[id number]]  (Normal) Collected: 02/19/23 1344    Lab Status: Final result Specimen: Blood from Arm, Left Updated: 02/19/23 1420     PTT 31 seconds     CBC and differential [[id number]]  (Abnormal) Collected: 02/19/23 1344    Lab Status: Final result Specimen: Blood from Arm, Left Updated: 02/19/23 1359     WBC 10 83 Thousand/uL      RBC 3 62 Million/uL      Hemoglobin 12 1 g/dL      Hematocrit 37 8 %       fL      MCH 33 4 pg      MCHC 32 0 g/dL      RDW 13 6 %      MPV 9 5 fL      Platelets 772 Thousands/uL      nRBC 0 /100 WBCs      Neutrophils Relative 86 %      Immat GRANS % 1 %      Lymphocytes Relative 7 %      Monocytes Relative 6 %      Eosinophils Relative 0 %      Basophils Relative 0 %      Neutrophils Absolute 9 34 Thousands/µL      Immature Grans Absolute 0 06 Thousand/uL      Lymphocytes Absolute 0 74 Thousands/µL      Monocytes Absolute 0 66 Thousand/µL      Eosinophils Absolute 0 00 Thousand/µL      Basophils Absolute 0 03 Thousands/µL     Blood culture #2 [[id number]] Collected: 02/19/23 1344    Lab Status: In process Specimen: Blood from Arm, Left Updated: 02/19/23 1357    Blood culture #1 [[id number]] Collected: 02/19/23 1344    Lab Status: In process Specimen: Blood from Arm, Right Updated: 02/19/23 1356                 CT abdomen pelvis wo contrast   Final Result by Devendra Escobedo DO (02/19 1557)      1   Mild asymmetric left perinephric edema/stranding, new from prior study  Correlate clinically for infection  No hydronephrosis  Evaluation of the renal parenchyma is limited without IV contrast  Mild left renal atrophy with cortical    scarring/lobulation, multiple cortical cysts and/or calyceal diverticula as well as scattered which could be dystrophic or represent nonobstructing calculi  2  Cholelithiasis  3  Elevation of the right diaphragm, large hiatal hernia and mild ascending colon diverticulosis  Limited study without IV or oral contrast       Workstation performed: [id number]         CT head without contrast   Final Result by Army Sicard, DO (02/19 1530)      No acute intracranial abnormality  Chronic microangiopathic changes  Workstation performed: [id number]         XR chest 1 view portable   ED Interpretation by Vishnu Corbett MD (02/19 1323)   Elevated right hemidiaphragm  Appears unchanged from previous chest x-ray  Procedures  ECG 12 Lead Documentation Only    Date/Time: 2/19/2023 1:06 PM  Performed by: Vishnu Corbett MD  Authorized by: Vishnu Corbett MD     ECG reviewed by me, the ED Provider: yes    Patient location:  ED  Previous ECG:     Previous ECG:  Compared to current    Similarity:  No change  Rate:     ECG rate:  70  Rhythm:     Rhythm: sinus rhythm    Ectopy:     Ectopy: none    QRS:     QRS axis:  Normal             ED Course  ED Course as of 02/19/23 1608   Sun Feb 19, 2023   1421 Creatinine(!): 1 49  Baseline   1557 EKG is unchanged from previous  First troponin was 151  Second troponin was 131  No chest pain at this time  Discussed with cardiology  Nothing to do from their standpoint  Will refer to cardiology  1607 CAT scan with mild perinephric stranding and edema  Possible pyelonephritis clinically  Patient is by herself during the daytime  Daughter is there at night    When the daughter found her this morning she was in the corner by a fake tree and the patient thought she was outside  Daughter was not sure she would be safe by herself at home during the daytime when no one is around  Patient has been having some episodes of confusion and behavior changes over the last several months  Medical Decision Making  Amount and/or Complexity of Data Reviewed  Independent Historian: caregiver  External Data Reviewed: labs, radiology, ECG and notes  Labs: ordered  Decision-making details documented in ED Course  Radiology: ordered and independent interpretation performed  Decision-making details documented in ED Course  ECG/medicine tests: ordered and independent interpretation performed  Decision-making details documented in ED Course  Risk  OTC drugs  Prescription drug management  Decision regarding hospitalization  Disposition  Final diagnoses:   Pyelonephritis   Encephalopathy     Time reflects when diagnosis was documented in both MDM as applicable and the Disposition within this note     Time User Action Codes Description Comment    2/19/2023  4:06 PM Lance Gamboa Add [N12] Pyelonephritis     2/19/2023  4:06 PM Radha Null Add [G93 40] Encephalopathy       ED Disposition     ED Disposition   Admit    Condition   Stable    Date/Time   Sun Feb 19, 2023  4:07 PM    Comment   Case was discussed with Dr Bri Beck and the patient's admission status was agreed to be  to the service of Dr Bri Lozano    None         Patient's Medications   Discharge Prescriptions    No medications on file       No discharge procedures on file      PDMP Review     None          ED Provider  Electronically Signed by           John Corbin MD  02/19/23 8699

## 2023-02-19 NOTE — ASSESSMENT & PLAN NOTE
Troponin is trending up  No significant EKG change  Patient remained asymptomatic  Secondary to infection most likely  Continue to monitor  Echocardiogram

## 2023-02-19 NOTE — ASSESSMENT & PLAN NOTE
Lab Results   Component Value Date    EGFR 32 02/19/2023    EGFR 32 12/29/2022    EGFR 47 07/30/2022    CREATININE 1 49 (H) 02/19/2023    CREATININE 1 50 (H) 12/29/2022    CREATININE 1 10 07/30/2022   Home blood pressure medication remain on hold  Patient is receiving IV hydration  Retention protocol

## 2023-02-19 NOTE — ASSESSMENT & PLAN NOTE
Patient had history of uterine prolapse and since then she is having urinary incontinence  Patient takes Detrol at home-we will hold it while patient is getting treatment for pyelonephritis

## 2023-02-20 ENCOUNTER — APPOINTMENT (INPATIENT)
Dept: NON INVASIVE DIAGNOSTICS | Facility: HOSPITAL | Age: 83
End: 2023-02-20

## 2023-02-20 LAB
ALBUMIN SERPL BCP-MCNC: 3.4 G/DL (ref 3.5–5)
ALP SERPL-CCNC: 86 U/L (ref 34–104)
ALT SERPL W P-5'-P-CCNC: 17 U/L (ref 7–52)
ANION GAP SERPL CALCULATED.3IONS-SCNC: 7 MMOL/L (ref 4–13)
AORTIC ROOT: 3.1 CM
APICAL FOUR CHAMBER EJECTION FRACTION: 67 %
ASCENDING AORTA: 3.5 CM
AST SERPL W P-5'-P-CCNC: 24 U/L (ref 13–39)
BASOPHILS # BLD AUTO: 0.02 THOUSANDS/ÂΜL (ref 0–0.1)
BASOPHILS NFR BLD AUTO: 0 % (ref 0–1)
BILIRUB SERPL-MCNC: 1.04 MG/DL (ref 0.2–1)
BUN SERPL-MCNC: 24 MG/DL (ref 5–25)
CALCIUM ALBUM COR SERPL-MCNC: 9.4 MG/DL (ref 8.3–10.1)
CALCIUM SERPL-MCNC: 8.9 MG/DL (ref 8.4–10.2)
CHLORIDE SERPL-SCNC: 107 MMOL/L (ref 96–108)
CO2 SERPL-SCNC: 26 MMOL/L (ref 21–32)
CREAT SERPL-MCNC: 1.32 MG/DL (ref 0.6–1.3)
DOP CALC LVOT AREA: 3.8 CM2
DOP CALC LVOT DIAMETER: 2.2 CM
E WAVE DECELERATION TIME: 209 MS
EOSINOPHIL # BLD AUTO: 0.02 THOUSAND/ÂΜL (ref 0–0.61)
EOSINOPHIL NFR BLD AUTO: 0 % (ref 0–6)
ERYTHROCYTE [DISTWIDTH] IN BLOOD BY AUTOMATED COUNT: 13.7 % (ref 11.6–15.1)
FRACTIONAL SHORTENING: 23 (ref 28–44)
GFR SERPL CREATININE-BSD FRML MDRD: 38 ML/MIN/1.73SQ M
GLUCOSE SERPL-MCNC: 85 MG/DL (ref 65–140)
HCT VFR BLD AUTO: 35.9 % (ref 34.8–46.1)
HGB BLD-MCNC: 11.4 G/DL (ref 11.5–15.4)
IMM GRANULOCYTES # BLD AUTO: 0.04 THOUSAND/UL (ref 0–0.2)
IMM GRANULOCYTES NFR BLD AUTO: 0 % (ref 0–2)
INTERVENTRICULAR SEPTUM IN DIASTOLE (PARASTERNAL SHORT AXIS VIEW): 1.6 CM
INTERVENTRICULAR SEPTUM: 1.6 CM (ref 0.6–1.1)
LAAS-AP2: 10.4 CM2
LAAS-AP4: 17.9 CM2
LEFT ATRIUM SIZE: 3.2 CM
LEFT INTERNAL DIMENSION IN SYSTOLE: 2.4 CM (ref 2.1–4)
LEFT VENTRICLE DIASTOLIC VOLUME (MOD BIPLANE): 60 ML
LEFT VENTRICLE SYSTOLIC VOLUME (MOD BIPLANE): 18 ML
LEFT VENTRICULAR INTERNAL DIMENSION IN DIASTOLE: 3.1 CM (ref 3.5–6)
LEFT VENTRICULAR POSTERIOR WALL IN END DIASTOLE: 1.3 CM
LEFT VENTRICULAR STROKE VOLUME: 17 ML
LV EF: 71 %
LVSV (TEICH): 17 ML
LYMPHOCYTES # BLD AUTO: 0.45 THOUSANDS/ÂΜL (ref 0.6–4.47)
LYMPHOCYTES NFR BLD AUTO: 5 % (ref 14–44)
MCH RBC QN AUTO: 33.4 PG (ref 26.8–34.3)
MCHC RBC AUTO-ENTMCNC: 31.8 G/DL (ref 31.4–37.4)
MCV RBC AUTO: 105 FL (ref 82–98)
MONOCYTES # BLD AUTO: 0.58 THOUSAND/ÂΜL (ref 0.17–1.22)
MONOCYTES NFR BLD AUTO: 6 % (ref 4–12)
MV E'TISSUE VEL-LAT: 5 CM/S
MV E'TISSUE VEL-SEP: 5 CM/S
MV PEAK A VEL: 0.88 M/S
MV PEAK E VEL: 61 CM/S
MV STENOSIS PRESSURE HALF TIME: 61 MS
MV VALVE AREA P 1/2 METHOD: 3.61
NEUTROPHILS # BLD AUTO: 7.95 THOUSANDS/ÂΜL (ref 1.85–7.62)
NEUTS SEG NFR BLD AUTO: 89 % (ref 43–75)
NRBC BLD AUTO-RTO: 0 /100 WBCS
PLATELET # BLD AUTO: 193 THOUSANDS/UL (ref 149–390)
PMV BLD AUTO: 9.3 FL (ref 8.9–12.7)
POTASSIUM SERPL-SCNC: 4.6 MMOL/L (ref 3.5–5.3)
PROT SERPL-MCNC: 6.5 G/DL (ref 6.4–8.4)
PV PEAK GRADIENT: 4 MMHG
RBC # BLD AUTO: 3.41 MILLION/UL (ref 3.81–5.12)
RIGHT ATRIUM AREA SYSTOLE A4C: 10.6 CM2
RIGHT VENTRICLE ID DIMENSION: 3.2 CM
SL CV LEFT ATRIUM LENGTH A2C: 4.7 CM
SL CV PED ECHO LEFT VENTRICLE DIASTOLIC VOLUME (MOD BIPLANE) 2D: 38 ML
SL CV PED ECHO LEFT VENTRICLE SYSTOLIC VOLUME (MOD BIPLANE) 2D: 21 ML
SODIUM SERPL-SCNC: 140 MMOL/L (ref 135–147)
TR MAX PG: 13 MMHG
TR PEAK VELOCITY: 1.8 M/S
TRICUSPID VALVE PEAK REGURGITATION VELOCITY: 1.82 M/S
WBC # BLD AUTO: 9.06 THOUSAND/UL (ref 4.31–10.16)

## 2023-02-20 RX ORDER — BISOPROLOL FUMARATE 5 MG/1
2.5 TABLET, FILM COATED ORAL DAILY
Status: DISCONTINUED | OUTPATIENT
Start: 2023-02-20 | End: 2023-02-23 | Stop reason: HOSPADM

## 2023-02-20 RX ORDER — ENOXAPARIN SODIUM 100 MG/ML
30 INJECTION SUBCUTANEOUS
Status: DISCONTINUED | OUTPATIENT
Start: 2023-02-21 | End: 2023-02-23 | Stop reason: HOSPADM

## 2023-02-20 RX ADMIN — ACETAMINOPHEN 325MG 650 MG: 325 TABLET ORAL at 15:59

## 2023-02-20 RX ADMIN — CEFTRIAXONE 1000 MG: 1 INJECTION, SOLUTION INTRAVENOUS at 15:59

## 2023-02-20 RX ADMIN — FOLIC ACID 1 MG: 1 TABLET ORAL at 07:56

## 2023-02-20 RX ADMIN — SODIUM CHLORIDE 125 ML/HR: 0.9 INJECTION, SOLUTION INTRAVENOUS at 19:47

## 2023-02-20 RX ADMIN — DULOXETINE HYDROCHLORIDE 30 MG: 30 CAPSULE, DELAYED RELEASE ORAL at 07:56

## 2023-02-20 RX ADMIN — MIRTAZAPINE 7.5 MG: 15 TABLET, FILM COATED ORAL at 22:18

## 2023-02-20 RX ADMIN — ASPIRIN 81 MG: 81 TABLET, COATED ORAL at 07:56

## 2023-02-20 RX ADMIN — SODIUM CHLORIDE 125 ML/HR: 0.9 INJECTION, SOLUTION INTRAVENOUS at 02:25

## 2023-02-20 RX ADMIN — ATORVASTATIN CALCIUM 20 MG: 20 TABLET, FILM COATED ORAL at 15:58

## 2023-02-20 RX ADMIN — LORATADINE 10 MG: 10 TABLET ORAL at 07:56

## 2023-02-20 RX ADMIN — DOCUSATE SODIUM 100 MG: 100 CAPSULE ORAL at 07:56

## 2023-02-20 RX ADMIN — HYDROXYCHLOROQUINE SULFATE 200 MG: 200 TABLET, FILM COATED ORAL at 16:22

## 2023-02-20 RX ADMIN — BISOPROLOL FUMARATE 2.5 MG: 5 TABLET ORAL at 15:58

## 2023-02-20 RX ADMIN — PANTOPRAZOLE SODIUM 40 MG: 40 TABLET, DELAYED RELEASE ORAL at 07:56

## 2023-02-20 RX ADMIN — HYDROXYCHLOROQUINE SULFATE 200 MG: 200 TABLET, FILM COATED ORAL at 07:56

## 2023-02-20 RX ADMIN — DOCUSATE SODIUM 100 MG: 100 CAPSULE ORAL at 16:22

## 2023-02-20 RX ADMIN — ENOXAPARIN SODIUM 40 MG: 40 INJECTION SUBCUTANEOUS at 07:57

## 2023-02-20 RX ADMIN — SODIUM CHLORIDE 125 ML/HR: 0.9 INJECTION, SOLUTION INTRAVENOUS at 11:00

## 2023-02-20 NOTE — ASSESSMENT & PLAN NOTE
Lab Results   Component Value Date    EGFR 38 02/20/2023    EGFR 32 02/19/2023    EGFR 32 12/29/2022    CREATININE 1 32 (H) 02/20/2023    CREATININE 1 49 (H) 02/19/2023    CREATININE 1 50 (H) 12/29/2022   · Home blood pressure medication is bisoprolol-HCTZ; baseline creatinine 1 3-1 45  · Hold HCTZ due to pyelonephritis, continue bisoprolol  · Patient is receiving IV hydration  · Retention protocol  · Avoid nephrotoxic medications and hypotension

## 2023-02-20 NOTE — ASSESSMENT & PLAN NOTE
· As per daughter, last night patient woke up in the middle of the night and yelling and patient was found in the corner of the room and patient thought she was in the food-as per daughter, right next to her [place] tree  Typically with these symptoms when having infection     · Secondary to pyelonephritis  · CT head negative  · COVID-negative  · 1/2 blood cultures positive for gram negative rods, pending second blood culture to result  · Continue underlying treatment for pyelonephritis  · Mental status improved since usage of antibiotics  · PT/OT: HHPT v no needs  · Continue to monitor  · Frequent reorientation  · Appears to be back at baseline mental status, monitor

## 2023-02-20 NOTE — PHYSICAL THERAPY NOTE
PHYSICAL THERAPY EVALUATION  NAME:  Aniceto Bird  DATE: 02/20/23    AGE:   [de-identified] y o  Mrn:   [id number]  ADMIT DX:  Pyelonephritis [N12]  Encephalopathy [G93 40]    Past Medical History:   Diagnosis Date    Arthritis     Asthma     Diabetes mellitus ([address] )     Hypertension     MI, old      Length Of Stay: 1  Performed at least 2 patient identifiers during session: Name and Birthday  PHYSICAL THERAPY EVALUATION :        02/20/23 1022   Note Type   Note type Evaluation   Pain Assessment   Pain Assessment Tool 0-10   Pain Score No Pain   Restrictions/Precautions   Other Precautions Chair Alarm; Bed Alarm; Fall Risk   Home Living   Type of Home Apartment  (5[address]6 RONNE[address])   Home Layout One level;Performs ADLs on one level; Able to live on main level with bedroom/bathroom   Bathroom Shower/Tub Walk-in shower   Bathroom Toilet Standard   Bathroom Equipment Grab bars in [facility] Walker;Cane   Additional Comments Pt reports living with family in 1 story apartment with 5-6 RONNELL, uses either RW or SPC PRN   Prior Function   Level of [place] Independent with ADLs; Needs assistance with IADLS; Independent with functional mobility   Lives With Daughter  (dtr, NICK, and granddaughter)   Receives Help From Family   IADLs Family/Friend/Other provides transportation   Falls in the last 6 months 0   Comments Pt reports completing ADLs and mobility at I, has assistance for IADLS and transportation   Cognition   Overall Cognitive Status WFL   Orientation Level Oriented X4   Following Commands Follows one step commands without difficulty   RLE Assessment   RLE Assessment WFL  (4/5 strength)   LLE Assessment   LLE Assessment WFL  (4/5 strength)   Light Touch   RLE Light Touch Grossly intact   LLE Light Touch Grossly intact   Bed Mobility   Additional Comments Pt seated OOB in recliner at start and end of session; bed mobility not assessed this session   Transfers   Sit to Stand 5  Supervision   Additional items Increased time required;Verbal cues   Stand to Sit 5  Supervision   Additional items Increased time required;Verbal cues   Stand pivot   (SBA)   Additional items Increased time required;Verbal cues   Additional Comments with RW, supervision for STS and SPT with SBA VC for hand placement as pt initially attempting to pull from RW   Ambulation/Elevation   Gait pattern Improper Weight shift;Narrow ZACK; Decreased foot clearance; Short stride; Excessively slow   Gait Assistance   (SBA)   Additional items Verbal cues   Assistive Device Rolling walker   Distance 150' x 2 with RW, VC for obstacle negotiation as pt with several near collisions of RW with objects in room, limited by fatigue   Stair Management Assistance   (SBA)   Additional items Verbal cues   Stair Management Technique Two rails; Alternating pattern; Foreward   Number of Stairs 4   Balance   Static Sitting Normal   Dynamic Sitting Good   Static Standing Fair +   Dynamic Standing Fair   Ambulatory Fair -   Endurance Deficit   Endurance Deficit Yes   Endurance Deficit Description fatigue   Activity Tolerance   Activity Tolerance Patient limited by fatigue   Nurse Made Aware Alva KING   Assessment   Prognosis Good   Problem List Decreased strength;Decreased endurance; Impaired balance;Decreased mobility; Decreased safety awareness; Impaired vision   Barriers to Discharge None   Goals   Patient Goals Go home   STG Expiration Date 03/06/23   Plan   Treatment/Interventions Functional transfer training;LE strengthening/ROM; Elevations; Therapeutic exercise; Endurance training;Patient/family training;Equipment eval/education; Bed mobility;Gait training; Compensatory technique education;Spoke to nursing   PT Frequency 1-2x/wk   Recommendation   PT Discharge Recommendation   (HHPT v no needs)   Equipment Recommended   (pt has RW)   AM-PAC Basic Mobility Inpatient   Turning in Flat Bed Without Bedrails 4   Lying on Back to Sitting on Edge of Flat Bed Without Bedrails 4   Moving Bed to Chair 3   Standing Up From Chair Using Arms 3   Walk in Room 3   Climb 3-5 Stairs With Railing 3   Basic Mobility Inpatient Raw Score 20   Basic Mobility Standardized Score 43 99   Highest Level Of Mobility   -HLM Goal 6: Walk 10 steps or more   JH-HLM Achieved 8: Walk 250 feet ot more   End of Consult   Patient Position at End of Consult Bedside chair;Bed/Chair alarm activated; All needs within reach     The patient's AM-PAC Basic Mobility Inpatient Short Form Raw Score is 20    A Raw score of greater than 16 suggests the patient may benefit from discharge to home  Please also refer to the recommendation of the Physical Therapist for safe discharge planning  (Please find full objective findings from PT assessment regarding body systems outlined above)  Assessment: Pt is a [de-identified] y o  female seen for PT evaluation s/p admission to [address] [place] [address] on 2/19/2023 with Acute metabolic encephalopathy  Order placed for PT services  Upon evaluation: Pt is presenting with impaired functional mobility due to decreased strength, decreased endurance, impaired balance, gait deviations, decreased safety awareness, visual impairment, and fall risk requiring  supervision assistance for transfers and SBA assistance for transfers and ambulation with RW   Pt's clinical presentation is currently unstable/unpredictable given the functional mobility deficits above coupled with fall risks as indicated by AM-PAC 6-Clicks: 66/32 as well as impaired balance, polypharmacy, decreased safety awareness, and altered vision and combined with medical complications of hypertension , abnormal renal lab values, abnormal H&H, and need for input for mobility technique/safety  Pt's PMHx and comorbidities that may affect physical performance and progress include: asthma, CAD, COPD, CKD, DM, HTN, and limited vision   Personal factors affecting pt at time of IE include: step(s) to enter environment, advanced age, inability to perform IADLs, inability to navigate level surfaces without external assistance, and inability to navigate community distances  Pt will benefit from continued skilled PT services to address deficits as defined above and to maximize level of functional mobility to facilitate return toward PLOF and improved QOL  From PT/mobility standpoint, recommendation at time of d/c would be  home PT v no needs  pending progress in order to reduce fall risk and maximize pt's functional independence and consistency with mobility in order to facilitate return to PLOF  Recommend trial with walker next 1-2 sessions and ther ex next 1-2 sessions  Goals: Pt will: Perform bed mobility tasks with modified I to reposition in bed and prepare for transfers  Pt will perform transfers with modified I to increase Indep in home environment and prepare for ambulation  Pt will ambulate with LRAD for >/= 400' with  modified I  to improve gait quality and promote proper use of assistive device and to access home environment  Pt will complete >/= 6 steps with with bilateral handrails with Supervision to return to home with RONNELL  Increase bilateral LE strength 1/2 grade to facilitate independent mobility and Increase all balance 1/2 grade to decrease risk for falls          Lambert Ortiz, PT,DPT

## 2023-02-20 NOTE — ASSESSMENT & PLAN NOTE
Lab Results   Component Value Date    EGFR 38 02/20/2023    EGFR 32 02/19/2023    EGFR 32 12/29/2022    CREATININE 1 32 (H) 02/20/2023    CREATININE 1 49 (H) 02/19/2023    CREATININE 1 50 (H) 12/29/2022   · Home blood pressure medication remain on hold; baseline creatinine 1 3-1 45  · Patient is receiving IV hydration  · Retention protocol  · Avoid nephrotoxic medications and hypotension

## 2023-02-20 NOTE — ASSESSMENT & PLAN NOTE
· CT abdomen and pelvis showed: Mild asymmetric left perinephric edema/stranding, new from prior study  Correlate clinically for infection   No hydronephrosis   Evaluation of the renal parenchyma is limited without IV contrast  Mild left renal atrophy with cortical scarring/lobulation, multiple cortical cysts and/or calyceal diverticula as well as scattered which could be dystrophic or represent nonobstructing calculi     · UA: moderate leukocytes and positive nitrites; microscopic: 30-50 WBC and moderate bacteria  · Urine culture pending  · Continue IV ceftriaxone, IV hydration, retention protocol  · Follow labs

## 2023-02-20 NOTE — ASSESSMENT & PLAN NOTE
· As per daughter, last night patient woke up in the middle of the night and yelling and patient was found in the corner of the room and patient thought she was in the food-as per daughter, right next to her [place] tree  Typically with these symptoms when having infection  · Secondary to pyelonephritis  · CT head negative  · COVID-negative  · 1/2 blood cultures positive for gram negative rods, second blood culture with no growth at 24 hours, possible contaminant     · Continue underlying treatment for pyelonephritis  · Mental status improved since usage of antibiotics  · PT/OT: home with home health  · Frequent reorientation  · Appears to be back at baseline mental status, monitor

## 2023-02-20 NOTE — PROGRESS NOTES
[address]  Progress Note Steve Mckeon 1940, [de-identified] y o  female MRN: [id number]  Unit/Bed#: -01 Encounter: [id number]  Primary Care Provider: Rosy Matute DO   Date and time admitted to hospital: 2/19/2023 12:52 PM    * Acute metabolic encephalopathy  Assessment & Plan  · As per daughter, last night patient woke up in the middle of the night and yelling and patient was found in the corner of the room and patient thought she was in the food-as per daughter, right next to her [place] tree  Typically with these symptoms when having infection  · Secondary to pyelonephritis  · CT head negative  · COVID-negative  · 1/2 blood cultures positive for gram negative rods, pending second blood culture to result  · Continue underlying treatment for pyelonephritis  · Mental status improved since usage of antibiotics  · PT/OT: HHPT v no needs  · Continue to monitor  · Frequent reorientation  · Appears to be back at baseline mental status, monitor    Pyelonephritis  Assessment & Plan  · CT abdomen and pelvis showed: Mild asymmetric left perinephric edema/stranding, new from prior study  Correlate clinically for infection   No hydronephrosis   Evaluation of the renal parenchyma is limited without IV contrast  Mild left renal atrophy with cortical scarring/lobulation, multiple cortical cysts and/or calyceal diverticula as well as scattered which could be dystrophic or represent nonobstructing calculi  · UA: moderate leukocytes and positive nitrites; microscopic: 30-50 WBC and moderate bacteria  · Urine culture pending  · Continue IV ceftriaxone, IV hydration, retention protocol  · Follow labs    Nonischemic nontraumatic myocardial injury  Assessment & Plan  · Troponin is trending up  · No significant EKG change  · Patient remained asymptomatic  · Secondary to infection most likely  · Troponin improved from 2 to 4 hours    · Continue to monitor  · Echo: Estimated ejection fraction 60 to 65%  No regional wall motion abnormality identified  Increased end-diastolic pressures estimated with mild diastolic relaxation abnormality  Loss of appetite  Assessment & Plan  · Multifactorial-hiatal hernia, elevated diaphragm, cholelithiasis, acute infection  · We will add Remeron and monitor    Calculus of gallbladder without cholecystitis without obstruction  Assessment & Plan  · Asymptomatic  · Conservative management    Urinary incontinence  Assessment & Plan  · Patient had history of uterine prolapse and since then she is having urinary incontinence  · Patient takes Detrol at home-we will hold it while patient is getting treatment for pyelonephritis  · Urinary retention protocol    Stage 3b chronic kidney disease [facility])  Assessment & Plan  Lab Results   Component Value Date    EGFR 38 02/20/2023    EGFR 32 02/19/2023    EGFR 32 12/29/2022    CREATININE 1 32 (H) 02/20/2023    CREATININE 1 49 (H) 02/19/2023    CREATININE 1 50 (H) 12/29/2022   · Home blood pressure medication is bisoprolol-HCTZ; baseline creatinine 1 3-1 45  · Hold HCTZ due to pyelonephritis, continue bisoprolol  · Patient is receiving IV hydration  · Retention protocol  · Avoid nephrotoxic medications and hypotension    COPD (chronic obstructive pulmonary disease) ([facility] [address] )  Assessment & Plan  · Per patient, she feels dyspneic on exertion  · With history of COPD and was prescribed Duo-Nebs prn outpatient by PCP  · Patient does have hiatal hernia and elevated diaphragm, which can also contribute  · Echocardiogram with results above  · Continue with albuterol prn, respiratory protocol  VTE Pharmacologic Prophylaxis: VTE Score: 7 High Risk (Score >/= 5) - Pharmacological DVT Prophylaxis Ordered: enoxaparin (Lovenox)  Sequential Compression Devices Ordered  Patient Centered Rounds: I performed bedside rounds with nursing staff today    Discussions with Specialists or Other Care Team Provider: Nursing, case management    Education and Discussions with Family / Patient: Updated  (daughter) via phone  Total Time Spent on Date of Encounter in care of patient: 45 minutes This time was spent on one or more of the following: performing physical exam; counseling and coordination of care; obtaining or reviewing history; documenting in the medical record; reviewing/ordering tests, medications or procedures; communicating with other healthcare professionals and discussing with patient's family/caregivers  Current Length of Stay: 1 day(s)  Current Patient Status: Inpatient   Certification Statement: The patient will continue to require additional inpatient hospital stay due to acute metabolic encephalopathy, pyelonephritis  Discharge Plan: Anticipate discharge in 48-72 hrs to discharge location to be determined pending rehab evaluations  Code Status: Level 3 - DNAR and DNI    Subjective:   Patient seen and examined at bedside this afternoon  She is doing well today  Admits to having some suprapubic pain and pain in the lower back  Denies dysuria  Does admit to having chills  She is alert and oriented this afternoon  Denies fever, chest pain, SOB, nausea, vomiting, diarrhea, constipation  Objective:     Vitals:   Temp (24hrs), Av 5 °F (36 9 °C), Min:98 2 °F (36 8 °C), Max:99 1 °F (37 3 °C)    Temp:  [98 2 °F (36 8 °C)-99 1 °F (37 3 °C)] 99 1 °F (37 3 °C)  HR:  [68-79] 79  Resp:  [15-17] 17  BP: (110-145)/(62-67) 145/67  SpO2:  [93 %-96 %] 95 %  Body mass index is 24 61 kg/m²  Input and Output Summary (last 24 hours): Intake/Output Summary (Last 24 hours) at 2023 1441  Last data filed at 2023 1155  Gross per 24 hour   Intake 2090 ml   Output 1255 ml   Net 835 ml       Physical Exam:   Physical Exam  Vitals reviewed  Constitutional:       General: She is not in acute distress  Appearance: She is normal weight  She is not ill-appearing or toxic-appearing  HENT:      Head: Normocephalic and atraumatic  Nose: Nose normal       Mouth/Throat:      Mouth: Mucous membranes are moist    Eyes:      Pupils: Pupils are equal, round, and reactive to light  Cardiovascular:      Rate and Rhythm: Normal rate and regular rhythm  Heart sounds: Normal heart sounds  Pulmonary:      Effort: Pulmonary effort is normal  No respiratory distress  Breath sounds: Normal breath sounds  No stridor  No wheezing  Abdominal:      General: Bowel sounds are normal  There is no distension  Palpations: Abdomen is soft  There is no mass  Tenderness: There is abdominal tenderness (suprapubic pain)  Musculoskeletal:         General: Tenderness (lower back tenderness) present  Normal range of motion  Right lower leg: No edema  Left lower leg: No edema  Skin:     General: Skin is warm and dry  Neurological:      General: No focal deficit present  Mental Status: She is alert and oriented to person, place, and time     Psychiatric:         Mood and Affect: Mood normal          Behavior: Behavior normal           Additional Data:     Labs:  Results from last 7 days   Lab Units 02/20/23  0451   WBC Thousand/uL 9 06   HEMOGLOBIN g/dL 11 4*   HEMATOCRIT % 35 9   PLATELETS Thousands/uL 193   NEUTROS PCT % 89*   LYMPHS PCT % 5*   MONOS PCT % 6   EOS PCT % 0     Results from last 7 days   Lab Units 02/20/23  0451   SODIUM mmol/L 140   POTASSIUM mmol/L 4 6   CHLORIDE mmol/L 107   CO2 mmol/L 26   BUN mg/dL 24   CREATININE mg/dL 1 32*   ANION GAP mmol/L 7   CALCIUM mg/dL 8 9   ALBUMIN g/dL 3 4*   TOTAL BILIRUBIN mg/dL 1 04*   ALK PHOS U/L 86   ALT U/L 17   AST U/L 24   GLUCOSE RANDOM mg/dL 85     Results from last 7 days   Lab Units 02/19/23  1344   INR  1 16             Results from last 7 days   Lab Units 02/19/23  1344   LACTIC ACID mmol/L 1 2       Lines/Drains:  Invasive Devices     Peripheral Intravenous Line  Duration           Peripheral IV 02/19/23 Left Antecubital 1 day                      Imaging: Reviewed radiology reports from this admission including: chest xray, abdominal/pelvic CT and CT head    Recent Cultures (last 7 days):   Results from last 7 days   Lab Units 02/19/23  1344   BLOOD CULTURE  Received in Microbiology Lab  Culture in Progress  GRAM STAIN RESULT  Gram negative rods*       Last 24 Hours Medication List:   Current Facility-Administered Medications   Medication Dose Route Frequency Provider Last Rate   • acetaminophen  650 mg Oral Q6H PRN Sadie Mansfield MD     • albuterol  2 puff Inhalation Q6H PRN Sadie Mansfield MD     • aspirin  81 mg Oral Daily Sadie Mansfield MD     • atorvastatin  20 mg Oral Daily Sadie Mansfield MD     • bisoprolol  2 5 mg Oral Daily Rachna Deal PA-C     • cefTRIAXone  1,000 mg Intravenous Q24H Sadie Mansfield MD     • docusate sodium  100 mg Oral BID Sadie Mansfield MD     • DULoxetine  30 mg Oral Daily Sadie Mansfield MD     • [START ON 2/21/2023] enoxaparin  30 mg Subcutaneous Q24H [address] Estuardoaaron Hdz MD     • folic acid  1 mg Oral Daily Sadie Mansfield MD     • hydroxychloroquine  200 mg Oral BID With Meals Sadie Mansfield MD     • loratadine  10 mg Oral Daily Sadie Mansfield MD     • mirtazapine  7 5 mg Oral HS Sadie Mansfield MD     • ondansetron  4 mg Intravenous Q6H PRN Sadie Mansfield MD     • pantoprazole  40 mg Oral Daily Sadie Mansfield MD     • sodium chloride  125 mL/hr Intravenous Continuous Sadie Mansfield  mL/hr (02/20/23 1100)        Today, Patient Was Seen By: Kailash Gaspar PA-C    **Please Note: This note may have been constructed using a voice recognition system  **

## 2023-02-20 NOTE — CASE MANAGEMENT
Case Management Assessment & Discharge Planning Note    Patient name Anabelle Al  Location /-01 MRN [id number]  : 1940 Date 2023       Current Admission Date: 2023  Current Admission Diagnosis:Acute metabolic encephalopathy   Patient Active Problem List    Diagnosis Date Noted   • Pyelonephritis 2023   • Nonischemic nontraumatic myocardial injury 2023   • Urinary incontinence 2023   • Calculus of gallbladder without cholecystitis without obstruction 2023   • Loss of appetite 2023   • Acute bronchitis 2022   • Acute metabolic encephalopathy 15/29/5919   • Coronary artery disease involving native coronary artery of native heart 2022   • COVID 2022   • Essential hypertension 2022   • [facility]ch[facility] ) 2022   • Arthritis 2022   • GERD (gastroesophageal reflux disease) 2022   • Fatty liver 2022   • Generalized weakness 2022   • Stage 3b chronic kidney disease ([facility] ) 2022   • Prolonged Q-T interval on ECG 2022      LOS (days): 1  Geometric Mean LOS (GMLOS) (days):   Days to GMLOS:     OBJECTIVE:    Risk of Unplanned Readmission Score: 15 5     CM met with patient at the bedside and called pt's daughter,baseline information  was obtained  CM discussed the role of CM in helping the patient develop a discharge plan and assist the patient in carrying out their plan        Current admission status: Inpatient  Referral Reason: Other (Post Acute Placement (specify)    Medications)    Preferred Pharmacy:   [address] [place] [address] #[id number] - [id number] [place], [place] Sofía RODRIGUEZ/ Thai Rodriguez [address]  [facility] Farhat Elam 1  Phone: [phone] Fax: [phone]    [facility] [address] [address]  Phone: [phone] Fax: [phone]    [facility]/pharmacy #[id number] Puneet LOFTON   [place] [address] [address] [facility] [address]  Phone: [phone] Fax: [phone]    Primary Care Provider: Marilu Beck DO    Primary Insurance: MEDICARE  Secondary Insurance:     ASSESSMENT:  Agatha Conklin Proxies    There are no active Health Care Proxies on file  Advance Directives  Does patient have a [address] [facility] [address]?: Yes  Does patient have Advance Directives?: Yes  Advance Directives: Living will         Readmission Root Cause  30 Day Readmission: No    Patient Information  Admitted from[de-identified] Home  Mental Status: Alert  During Assessment patient was accompanied by: Not accompanied during assessment  Assessment information provided by[de-identified] Patient  Primary Caregiver: Family  Caregiver's Name[de-identified] Leigh Bravo (daughter)  Caregiver's Relationship to Patient[de-identified] Family Member  Caregiver's Telephone Number[de-identified] [phone]  Support Systems: Daughter, Family members  [place] of Residence: [facility] do you live in?: [facility] entry access options   Select all that apply : Ramp  Type of Current Residence: Bi-level  Upon entering residence, is there a bedroom on the main floor (no further steps)?: No  A bedroom is located on the following floor levels of residence (select all that apply):: 2nd Floor  Upon entering residence, is there a bathroom on the main floor (no further steps)?: No  Indicate which floors of current residence have a bathroom (select all the apply):: 2nd Floor  Number of steps to 2nd floor from main floor: 4  In the last 12 months, was there a time when you were not able to pay the mortgage or rent on time?: No  In the last 12 months, how many places have you lived?: 1  In the last 12 months, was there a time when you did not have a steady place to sleep or slept in a shelter (including now)?: No  Living Arrangements: Lives w/ Daughter  Is patient a ?: No    Activities of Daily Living Prior to Admission  Functional Status: Independent  Completes ADLs independently?: Yes  Ambulates independently?: Yes  Does patient use assisted devices?: Yes  Assisted Devices (DME) used: [employer] Nation  Does patient currently own DME?: Yes  What DME does the patient currently own?: Gali Mancia  Does patient have a history of Outpatient Therapy (PT/OT)?: No  Does the patient have a history of Short-Term Rehab?: Yes  Does patient have a history of HHC?: Yes  Does patient currently have [facility]?: Yes    Current Home Health Care  Type of Current Home Care Services: Home health aide  Current Home Health Follow-Up Provider[de-identified] PCP    Patient Information Continued  Income Source: SSI/SSD  Does patient have prescription coverage?: Yes  Within the past 12 months, you worried that your food would run out before you got the money to buy more : Never true  Within the past 12 months, the food you bought just didn't last and you didn't have money to get more : Never true  Food insecurity resource given?: No  Does patient receive dialysis treatments?: No  Does patient have a history of substance abuse?: No  Does patient have a history of Mental Health Diagnosis?: No         Means of Transportation  Means of Transport to Appts[de-identified] Family transport  In the past 12 months, has lack of transportation kept you from medical appointments or from getting medications?: No  In the past 12 months, has lack of transportation kept you from meetings, work, or from getting things needed for daily living?: No        DISCHARGE DETAILS:    Discharge planning discussed with[de-identified] patient  Freedom of Choice: Yes  Comments - Freedom of Choice: pt states she hsa HHA but has no preference for SN,PT,OT  CM contacted family/caregiver?: Yes  Were Treatment Team discharge recommendations reviewed with patient/caregiver?: Yes  Did patient/caregiver verbalize understanding of patient care needs?: Yes  Were patient/caregiver advised of the risks associated with not following Treatment Team discharge recommendations?: Yes         [facility] Is the patient interested in [address] at discharge?: Yes  [address] requested[de-identified] Nursing, [address], Physical Therapy, [facility] Provider[de-identified] PCP  Home Health Services Needed[de-identified] Evaluate Functional Status and Safety, Gait/ADL Training, Strengthening/Theraputic Exercises to Improve Function  Homebound Criteria Met[de-identified] Uses an Assist Device (i e  cane, walker, etc)         Other Referral/Resources/Interventions Provided:  Interventions: [facility]         Treatment Team Recommendation: Home with [address] [employer]  Discharge Destination Plan[de-identified] Home with [address] [employer]         CM met with pt and called pt's daughter to discuss choice foe HHC post d/c  Pt was reluctant but open to blanket referral for [address]  Pt expressed frustration with HHA's being inconsistent with attendance  CM discussed need for SN, PT and OT with increased HHA for pt with pt's daughter  Pt's daughter expressed concern with pt not being compliant with PT/OT  CM recommended contacting [facility] to review HHA's and increase services of possible  Pt'd daughter agreed with recommendation  [place] referral placed in [address] [place] [address] Road

## 2023-02-20 NOTE — ASSESSMENT & PLAN NOTE
· Per patient, she feels dyspneic on exertion  · With history of COPD and was prescribed Duo-Nebs prn outpatient by PCP  · Patient does have hiatal hernia and elevated diaphragm, which can also contribute  · Echocardiogram with results above  · Continue with albuterol prn, respiratory protocol

## 2023-02-20 NOTE — PLAN OF CARE
Problem: MOBILITY - ADULT  Goal: Maintain or return to baseline ADL function  Description: INTERVENTIONS:  -  Assess patient's ability to carry out ADLs; assess patient's baseline for ADL function and identify physical deficits which impact ability to perform ADLs (bathing, care of mouth/teeth, toileting, grooming, dressing, etc )  - Assess/evaluate cause of self-care deficits   - Assess range of motion  - Assess patient's mobility; develop plan if impaired  - Assess patient's need for assistive devices and provide as appropriate  - Encourage maximum independence but intervene and supervise when necessary  - Involve family in performance of ADLs  - Assess for home care needs following discharge   - Consider OT consult to assist with ADL evaluation and planning for discharge  - Provide patient education as appropriate  Outcome: Progressing  Goal: Maintains/Returns to pre admission functional level  Description: INTERVENTIONS:  - Perform BMAT or MOVE assessment daily    - Set and communicate daily mobility goal to care team and patient/family/caregiver  - Collaborate with rehabilitation services on mobility goals if consulted  - Perform Range of Motion 2 times a day  - Reposition patient every 2  Problem: PAIN - ADULT  Goal: Verbalizes/displays adequate comfort level or baseline comfort level  Description: Interventions:  - Encourage patient to monitor pain and request assistance  - Assess pain using appropriate pain scale  - Administer analgesics based on type and severity of pain and evaluate response  - Implement non-pharmacological measures as appropriate and evaluate response  - Consider cultural and social influences on pain and pain management  - Notify physician/advanced practitioner if interventions unsuccessful or patient reports new pain  Outcome: Progressing    hours    - Dangle patient 2 times a day  - Stand patient 2 times a day  - Ambulate patient 2 times a day  - Out of bed to chair 2 times a day - Out of bed for meals 2 times a day  - Out of bed for toileting  - Record patient progress and toleration of activity level   Outcome: Progressing

## 2023-02-20 NOTE — ASSESSMENT & PLAN NOTE
· Per patient, she feels dyspneic on exertion  · With history of COPD and was prescribed Duo-Nebs prn outpatient by PCP  · Patient does have hiatal hernia and elevated diaphragm, which can also contribute  · Echocardiogram with results above  · Was placed on oxygen overnight due to pulse ox showing 79%, placed on 2L NC  Seen and examined and patient is off oxygen and saturating around 97%  · Continue with albuterol prn, respiratory protocol     · Monitor

## 2023-02-20 NOTE — ASSESSMENT & PLAN NOTE
· Multifactorial-hiatal hernia, elevated diaphragm, cholelithiasis, acute infection  · We will add Remeron and monitor

## 2023-02-20 NOTE — ASSESSMENT & PLAN NOTE
· Patient had history of uterine prolapse and since then she is having urinary incontinence  · Patient takes Detrol at home-we will hold it while patient is getting treatment for pyelonephritis  · Urinary retention protocol

## 2023-02-20 NOTE — ASSESSMENT & PLAN NOTE
· Troponin is trending up  · No significant EKG change  · Patient remained asymptomatic  · Secondary to infection most likely  · Troponin improved from 2 to 4 hours  · Continue to monitor  · Echo: Estimated ejection fraction 60 to 65%  No regional wall motion abnormality identified  Increased end-diastolic pressures estimated with mild diastolic relaxation abnormality

## 2023-02-20 NOTE — PLAN OF CARE
Problem: PHYSICAL THERAPY ADULT  Goal: Performs mobility at highest level of function for planned discharge setting  See evaluation for individualized goals  Description: Treatment/Interventions: Functional transfer training, LE strengthening/ROM, Elevations, Therapeutic exercise, Endurance training, Patient/family training, Equipment eval/education, Bed mobility, Gait training, Compensatory technique education, Spoke to nursing  Equipment Recommended:  (pt has RW)       See flowsheet documentation for full assessment, interventions and recommendations  Note: Prognosis: Good  Problem List: Decreased strength, Decreased endurance, Impaired balance, Decreased mobility, Decreased safety awareness, Impaired vision  Assessment: Pt is a [de-identified] y o  female seen for PT evaluation s/p admission to [address] [place] [address] on 2/19/2023 with Acute metabolic encephalopathy  Order placed for PT services  Upon evaluation: Pt is presenting with impaired functional mobility due to decreased strength, decreased endurance, impaired balance, gait deviations, decreased safety awareness, visual impairment, and fall risk requiring  supervision assistance for transfers and SBA assistance for transfers and ambulation with RW   Pt's clinical presentation is currently unstable/unpredictable given the functional mobility deficits above coupled with fall risks as indicated by AM-PAC 6-Clicks: 04/12 as well as impaired balance, polypharmacy, decreased safety awareness, and altered vision and combined with medical complications of hypertension , abnormal renal lab values, abnormal H&H, and need for input for mobility technique/safety  Pt's PMHx and comorbidities that may affect physical performance and progress include: asthma, CAD, COPD, CKD, DM, HTN, and limited vision   Personal factors affecting pt at time of IE include: step(s) to enter environment, advanced age, inability to perform IADLs, inability to navigate level surfaces without external assistance, and inability to navigate community distances  Pt will benefit from continued skilled PT services to address deficits as defined above and to maximize level of functional mobility to facilitate return toward PLOF and improved QOL  From PT/mobility standpoint, recommendation at time of d/c would be  home PT v no needs  pending progress in order to reduce fall risk and maximize pt's functional independence and consistency with mobility in order to facilitate return to PLOF  Recommend trial with walker next 1-2 sessions and ther ex next 1-2 sessions  Barriers to Discharge: None     PT Discharge Recommendation:  (HHPT v no needs)    See flowsheet documentation for full assessment

## 2023-02-20 NOTE — ASSESSMENT & PLAN NOTE
· Multifactorial-hiatal hernia, elevated diaphragm, cholelithiasis, acute infection  · We will add Remeron and monitor  · Appetite slightly improved, monitor

## 2023-02-21 LAB
ANION GAP SERPL CALCULATED.3IONS-SCNC: 2 MMOL/L (ref 4–13)
BUN SERPL-MCNC: 22 MG/DL (ref 5–25)
CALCIUM SERPL-MCNC: 8.7 MG/DL (ref 8.4–10.2)
CARDIAC TROPONIN I PNL SERPL HS: 90 NG/L (ref 8–18)
CHLORIDE SERPL-SCNC: 112 MMOL/L (ref 96–108)
CO2 SERPL-SCNC: 26 MMOL/L (ref 21–32)
CREAT SERPL-MCNC: 1.28 MG/DL (ref 0.6–1.3)
ERYTHROCYTE [DISTWIDTH] IN BLOOD BY AUTOMATED COUNT: 13.9 % (ref 11.6–15.1)
GFR SERPL CREATININE-BSD FRML MDRD: 39 ML/MIN/1.73SQ M
GLUCOSE SERPL-MCNC: 95 MG/DL (ref 65–140)
HCT VFR BLD AUTO: 34.4 % (ref 34.8–46.1)
HGB BLD-MCNC: 10.8 G/DL (ref 11.5–15.4)
MCH RBC QN AUTO: 33.6 PG (ref 26.8–34.3)
MCHC RBC AUTO-ENTMCNC: 31.4 G/DL (ref 31.4–37.4)
MCV RBC AUTO: 107 FL (ref 82–98)
PLATELET # BLD AUTO: 201 THOUSANDS/UL (ref 149–390)
PMV BLD AUTO: 9.1 FL (ref 8.9–12.7)
POTASSIUM SERPL-SCNC: 4.4 MMOL/L (ref 3.5–5.3)
PROCALCITONIN SERPL-MCNC: 0.49 NG/ML
RBC # BLD AUTO: 3.21 MILLION/UL (ref 3.81–5.12)
SODIUM SERPL-SCNC: 140 MMOL/L (ref 135–147)
WBC # BLD AUTO: 5.32 THOUSAND/UL (ref 4.31–10.16)

## 2023-02-21 RX ADMIN — DULOXETINE HYDROCHLORIDE 30 MG: 30 CAPSULE, DELAYED RELEASE ORAL at 09:55

## 2023-02-21 RX ADMIN — HYDROXYCHLOROQUINE SULFATE 200 MG: 200 TABLET, FILM COATED ORAL at 09:55

## 2023-02-21 RX ADMIN — ASPIRIN 81 MG: 81 TABLET, COATED ORAL at 09:55

## 2023-02-21 RX ADMIN — LORATADINE 10 MG: 10 TABLET ORAL at 09:55

## 2023-02-21 RX ADMIN — BISOPROLOL FUMARATE 2.5 MG: 5 TABLET ORAL at 09:55

## 2023-02-21 RX ADMIN — ATORVASTATIN CALCIUM 20 MG: 20 TABLET, FILM COATED ORAL at 17:29

## 2023-02-21 RX ADMIN — FOLIC ACID 1 MG: 1 TABLET ORAL at 09:55

## 2023-02-21 RX ADMIN — PANTOPRAZOLE SODIUM 40 MG: 40 TABLET, DELAYED RELEASE ORAL at 09:55

## 2023-02-21 RX ADMIN — CEFTRIAXONE 1000 MG: 1 INJECTION, SOLUTION INTRAVENOUS at 17:30

## 2023-02-21 RX ADMIN — MIRTAZAPINE 7.5 MG: 15 TABLET, FILM COATED ORAL at 21:14

## 2023-02-21 RX ADMIN — DOCUSATE SODIUM 100 MG: 100 CAPSULE ORAL at 09:56

## 2023-02-21 RX ADMIN — SODIUM CHLORIDE 75 ML/HR: 0.9 INJECTION, SOLUTION INTRAVENOUS at 23:10

## 2023-02-21 RX ADMIN — HYDROXYCHLOROQUINE SULFATE 200 MG: 200 TABLET, FILM COATED ORAL at 17:29

## 2023-02-21 RX ADMIN — ENOXAPARIN SODIUM 30 MG: 30 INJECTION SUBCUTANEOUS at 09:55

## 2023-02-21 RX ADMIN — SODIUM CHLORIDE 125 ML/HR: 0.9 INJECTION, SOLUTION INTRAVENOUS at 11:25

## 2023-02-21 NOTE — PROGRESS NOTES
Physician notified that pt had oxygen saturation of 79% while sleeping and oxygen at 2L NC initiated

## 2023-02-21 NOTE — PLAN OF CARE
Problem: MOBILITY - ADULT  Goal: Maintain or return to baseline ADL function  Description: INTERVENTIONS:  -  Assess patient's ability to carry out ADLs; assess patient's baseline for ADL function and identify physical deficits which impact ability to perform ADLs (bathing, care of mouth/teeth, toileting, grooming, dressing, etc )  - Assess/evaluate cause of self-care deficits   - Assess range of motion  - Assess patient's mobility; develop plan if impaired  - Assess patient's need for assistive devices and provide as appropriate  - Encourage maximum independence but intervene and supervise when necessary  - Involve family in performance of ADLs  - Assess for home care needs following discharge   - Consider OT consult to assist with ADL evaluation and planning for discharge  - Provide patient education as appropriate  Outcome: Progressing  Goal: Maintains/Returns to pre admission functional level  Description: INTERVENTIONS:  - Perform BMAT or MOVE assessment daily    - Set and communicate daily mobility goal to care team and patient/family/caregiver  - Collaborate with rehabilitation services on mobility goals if consulted  - Perform Range of Motion 3 times a day  - Reposition patient every 2 hours    - Dangle patient 2 times a day  - Stand patient 2 times a day  - Ambulate patient 2 times a day  - Out of bed to chair 2 times a day   - Out of bed for meals 3 times a day  - Out of bed for toileting  - Record patient progress and toleration of activity level   Outcome: Progressing     Problem: PAIN - ADULT  Goal: Verbalizes/displays adequate comfort level or baseline comfort level  Description: Interventions:  - Encourage patient to monitor pain and request assistance  - Assess pain using appropriate pain scale  - Administer analgesics based on type and severity of pain and evaluate response  - Implement non-pharmacological measures as appropriate and evaluate response  - Consider cultural and social influences on pain and pain management  - Notify physician/advanced practitioner if interventions unsuccessful or patient reports new pain  Outcome: Progressing     Problem: INFECTION - ADULT  Goal: Absence or prevention of progression during hospitalization  Description: INTERVENTIONS:  - Assess and monitor for signs and symptoms of infection  - Monitor lab/diagnostic results  - Monitor all insertion sites, i e  indwelling lines, tubes, and drains  - Monitor endotracheal if appropriate and nasal secretions for changes in amount and color  - Farner appropriate cooling/warming therapies per order  - Administer medications as ordered  - Instruct and encourage patient and family to use good hand hygiene technique  - Identify and instruct in appropriate isolation precautions for identified infection/condition  Outcome: Progressing     Problem: SAFETY ADULT  Goal: Maintain or return to baseline ADL function  Description: INTERVENTIONS:  -  Assess patient's ability to carry out ADLs; assess patient's baseline for ADL function and identify physical deficits which impact ability to perform ADLs (bathing, care of mouth/teeth, toileting, grooming, dressing, etc )  - Assess/evaluate cause of self-care deficits   - Assess range of motion  - Assess patient's mobility; develop plan if impaired  - Assess patient's need for assistive devices and provide as appropriate  - Encourage maximum independence but intervene and supervise when necessary  - Involve family in performance of ADLs  - Assess for home care needs following discharge   - Consider OT consult to assist with ADL evaluation and planning for discharge  - Provide patient education as appropriate  Outcome: Progressing  Goal: Patient will remain free of falls  Description: INTERVENTIONS:  - Educate patient/family on patient safety including physical limitations  - Instruct patient to call for assistance with activity   - Consult OT/PT to assist with strengthening/mobility   - Keep Call bell within reach  - Keep bed low and locked with side rails adjusted as appropriate  - Keep care items and personal belongings within reach  - Initiate and maintain comfort rounds  - Make Fall Risk Sign visible to staff  - Offer Toileting every 2 Hours, in advance of need  - Initiate/Maintain bed/chair alarm  - Apply yellow socks and bracelet for high fall risk patients  - Consider moving patient to room near nurses station  Outcome: Progressing     Problem: DISCHARGE PLANNING  Goal: Discharge to home or other facility with appropriate resources  Description: INTERVENTIONS:  - Identify barriers to discharge w/patient and caregiver  - Arrange for needed discharge resources and transportation as appropriate  - Identify discharge learning needs (meds, wound care, etc )  - Arrange for interpretive services to assist at discharge as needed  - Refer to Case Management Department for coordinating discharge planning if the patient needs post-hospital services based on physician/advanced practitioner order or complex needs related to functional status, cognitive ability, or social support system  Outcome: Progressing     Problem: Knowledge Deficit  Goal: Patient/family/caregiver demonstrates understanding of disease process, treatment plan, medications, and discharge instructions  Description: Complete learning assessment and assess knowledge base    Interventions:  - Provide teaching at level of understanding  - Provide teaching via preferred learning methods  Outcome: Progressing     Problem: NEUROSENSORY - ADULT  Goal: Achieves stable or improved neurological status  Description: INTERVENTIONS  - Monitor and report changes in neurological status  - Monitor vital signs such as temperature, blood pressure, glucose, and any other labs ordered   - Initiate measures to prevent increased intracranial pressure  - Monitor for seizure activity and implement precautions if appropriate      Outcome: Progressing     Problem: Prexisting or High Potential for Compromised Skin Integrity  Goal: Skin integrity is maintained or improved  Description: INTERVENTIONS:  - Identify patients at risk for skin breakdown  - Assess and monitor skin integrity  - Assess and monitor nutrition and hydration status  - Monitor labs   - Assess for incontinence   - Turn and reposition patient  - Assist with mobility/ambulation  - Relieve pressure over bony prominences  - Avoid friction and shearing  - Provide appropriate hygiene as needed including keeping skin clean and dry  - Evaluate need for skin moisturizer/barrier cream  - Collaborate with interdisciplinary team   - Patient/family teaching  - Consider wound care consult   Outcome: Progressing

## 2023-02-21 NOTE — PROGRESS NOTES
[address]  Progress Note Gennaro Cart 1940, [de-identified] y o  female MRN: [id number]  Unit/Bed#: -01 Encounter: [id number]  Primary Care Provider: Nessa Sullivan DO   Date and time admitted to hospital: 2/19/2023 12:52 PM    * Acute metabolic encephalopathy  Assessment & Plan  · As per daughter, last night patient woke up in the middle of the night and yelling and patient was found in the corner of the room and patient thought she was in the food-as per daughter, right next to her Gayatri tree  Typically with these symptoms when having infection  · Secondary to pyelonephritis  · CT head negative  · COVID-negative  · 1/2 blood cultures positive for gram negative rods, second blood culture with no growth at 24 hours, possible contaminant  · Continue underlying treatment for pyelonephritis  · Mental status improved since usage of antibiotics  · PT/OT: home with home health  · Frequent reorientation  · Appears to be back at baseline mental status, monitor    Pyelonephritis  Assessment & Plan  · CT abdomen and pelvis showed: Mild asymmetric left perinephric edema/stranding, new from prior study  Correlate clinically for infection   No hydronephrosis   Evaluation of the renal parenchyma is limited without IV contrast  Mild left renal atrophy with cortical scarring/lobulation, multiple cortical cysts and/or calyceal diverticula as well as scattered which could be dystrophic or represent nonobstructing calculi  · UA: moderate leukocytes and positive nitrites; microscopic: 30-50 WBC and moderate bacteria  · Urine culture pending  · Continue IV ceftriaxone, IV hydration, retention protocol  · Follow labs    Nonischemic nontraumatic myocardial injury  Assessment & Plan  · Troponin is trending up  · No significant EKG change  · Patient remained asymptomatic  · Secondary to infection most likely  · Troponin improved from 2 to 4 hours    · Continue to monitor  · Echo: Estimated ejection fraction 60 to 65%  No regional wall motion abnormality identified  Increased end-diastolic pressures estimated with mild diastolic relaxation abnormality  · Troponin continued to trend down    Loss of appetite  Assessment & Plan  · Multifactorial-hiatal hernia, elevated diaphragm, cholelithiasis, acute infection  · We will add Remeron and monitor  · Appetite slightly improved, monitor  Calculus of gallbladder without cholecystitis without obstruction  Assessment & Plan  · Asymptomatic  · Conservative management    Urinary incontinence  Assessment & Plan  · Patient had history of uterine prolapse and since then she is having urinary incontinence  · Patient takes Detrol at home-we will hold it while patient is getting treatment for pyelonephritis  · Urinary retention protocol    Stage 3b chronic kidney disease [facility])  Assessment & Plan  Lab Results   Component Value Date    EGFR 38 02/20/2023    EGFR 32 02/19/2023    EGFR 32 12/29/2022    CREATININE 1 32 (H) 02/20/2023    CREATININE 1 49 (H) 02/19/2023    CREATININE 1 50 (H) 12/29/2022   · Home blood pressure medication is bisoprolol-HCTZ; baseline creatinine 1 3-1 45  · Hold HCTZ due to pyelonephritis, continue bisoprolol  · Patient is receiving IV hydration  · Retention protocol  · Avoid nephrotoxic medications and hypotension    COPD (chronic obstructive pulmonary disease) ([facility] [address] )  Assessment & Plan  · Per patient, she feels dyspneic on exertion  · With history of COPD and was prescribed Duo-Nebs prn outpatient by PCP  · Patient does have hiatal hernia and elevated diaphragm, which can also contribute  · Echocardiogram with results above  · Was placed on oxygen overnight due to pulse ox showing 79%, placed on 2L NC  Seen and examined and patient is off oxygen and saturating around 97%  · Continue with albuterol prn, respiratory protocol     · Monitor    VTE Pharmacologic Prophylaxis: VTE Score: 7 High Risk (Score >/= 5) - Pharmacological DVT Prophylaxis Ordered: enoxaparin (Lovenox)  Sequential Compression Devices Ordered  Patient Centered Rounds: I performed bedside rounds with nursing staff today  Discussions with Specialists or Other Care Team Provider: Nursing and case management    Education and Discussions with Family / Patient: Updated  (daughter) via phone  Total Time Spent on Date of Encounter in care of patient: 45 minutes This time was spent on one or more of the following: performing physical exam; counseling and coordination of care; obtaining or reviewing history; documenting in the medical record; reviewing/ordering tests, medications or procedures; communicating with other healthcare professionals and discussing with patient's family/caregivers  Current Length of Stay: 2 day(s)  Current Patient Status: Inpatient   Certification Statement: The patient will continue to require additional inpatient hospital stay due to pyelonephritis, repeat blood cultures  Discharge Plan: Anticipate discharge in 48 hrs to home with home services  Code Status: Level 3 - DNAR and DNI    Subjective:   Patient seen and examined at bedside this morning  She is feeling well and offers no acute complaints  Does have a slight cough  Endorses that she was placed on oxygen overnight, but was denying shortness of breath at that time  States she always has SOB with ambulation and this has not worsened  Only with mild suprapubic pain  Currently saturating well on room air  Denies dysuria, nausea, vomiting, diarrhea, constipation, abdominal pain  Objective:     Vitals:   Temp (24hrs), Av 6 °F (36 4 °C), Min:97 °F (36 1 °C), Max:98 2 °F (36 8 °C)    Temp:  [97 °F (36 1 °C)-98 2 °F (36 8 °C)] 98 2 °F (36 8 °C)  HR:  [51-75] 59  Resp:  [16-20] 16  BP: (108-143)/(59-77) 140/62  SpO2:  [94 %-98 %] 98 %  Body mass index is 24 97 kg/m²  Input and Output Summary (last 24 hours):      Intake/Output Summary (Last 24 hours) at 2023 1015 N[address] Reddywillem filed at 2/21/2023 1311  Gross per 24 hour   Intake 3910 41 ml   Output 1100 ml   Net 2810 41 ml       Physical Exam:   Physical Exam  Vitals reviewed  Constitutional:       General: She is not in acute distress  Appearance: She is normal weight  She is not ill-appearing or toxic-appearing  HENT:      Head: Normocephalic and atraumatic  Nose: Nose normal       Mouth/Throat:      Mouth: Mucous membranes are moist    Eyes:      Pupils: Pupils are equal, round, and reactive to light  Cardiovascular:      Rate and Rhythm: Normal rate and regular rhythm  Heart sounds: Normal heart sounds  Pulmonary:      Effort: Pulmonary effort is normal  No respiratory distress  Breath sounds: Normal breath sounds  No wheezing  Comments: Lungs CTA bilaterally, with a cough  Abdominal:      General: Bowel sounds are normal  There is no distension  Palpations: Abdomen is soft  There is no mass  Tenderness: There is abdominal tenderness  Comments: Mild suprapubic tenderness on palpation   Musculoskeletal:         General: Normal range of motion  Right lower leg: No edema  Left lower leg: No edema  Skin:     General: Skin is warm and dry  Neurological:      General: No focal deficit present  Mental Status: She is alert and oriented to person, place, and time     Psychiatric:         Mood and Affect: Mood normal          Behavior: Behavior normal           Additional Data:     Labs:  Results from last 7 days   Lab Units 02/21/23  0501 02/20/23  0451   WBC Thousand/uL 5 32 9 06   HEMOGLOBIN g/dL 10 8* 11 4*   HEMATOCRIT % 34 4* 35 9   PLATELETS Thousands/uL 201 193   NEUTROS PCT %  --  89*   LYMPHS PCT %  --  5*   MONOS PCT %  --  6   EOS PCT %  --  0     Results from last 7 days   Lab Units 02/21/23  0501 02/20/23  0451   SODIUM mmol/L 140 140   POTASSIUM mmol/L 4 4 4 6   CHLORIDE mmol/L 112* 107   CO2 mmol/L 26 26   BUN mg/dL 22 24   CREATININE mg/dL 1 28 1 32*   ANION GAP mmol/L 2* 7   CALCIUM mg/dL 8 7 8 9   ALBUMIN g/dL  --  3 4*   TOTAL BILIRUBIN mg/dL  --  1 04*   ALK PHOS U/L  --  86   ALT U/L  --  17   AST U/L  --  24   GLUCOSE RANDOM mg/dL 95 85     Results from last 7 days   Lab Units 02/19/23  1344   INR  1 16             Results from last 7 days   Lab Units 02/21/23  0501 02/19/23  1344   LACTIC ACID mmol/L  --  1 2   PROCALCITONIN ng/ml 0 49*  --        Lines/Drains:  Invasive Devices     Peripheral Intravenous Line  Duration           Peripheral IV 02/19/23 Left Antecubital 2 days                      Imaging: No pertinent imaging reviewed  Recent Cultures (last 7 days):   Results from last 7 days   Lab Units 02/20/23  1555 02/19/23  1344   BLOOD CULTURE  Received in Microbiology Lab  Culture in Progress  Received in Microbiology Lab  Culture in Progress  Escherichia coli*  No Growth at 24 hrs     GRAM STAIN RESULT   --  Gram negative rods*       Last 24 Hours Medication List:   Current Facility-Administered Medications   Medication Dose Route Frequency Provider Last Rate   • acetaminophen  650 mg Oral Q6H PRN Barrie Campa MD     • albuterol  2 puff Inhalation Q6H PRN Barrie Campa MD     • aspirin  81 mg Oral Daily Barrie Campa MD     • atorvastatin  20 mg Oral Daily Barrie Campa MD     • bisoprolol  2 5 mg Oral Daily Jaimie Deal PA-C     • cefTRIAXone  1,000 mg Intravenous Q24H Barrie Campa MD 1,000 mg (02/20/23 1559)   • docusate sodium  100 mg Oral BID Barrie Campa MD     • DULoxetine  30 mg Oral Daily Barrie Campa MD     • enoxaparin  30 mg Subcutaneous Q24H [address] Estuardo Hdz MD     • folic acid  1 mg Oral Daily Barrie Campa MD     • hydroxychloroquine  200 mg Oral BID With Meals Barrie Campa MD     • loratadine  10 mg Oral Daily Barrie Campa MD     • mirtazapine  7 5 mg Oral HS Barrie Campa MD     • ondansetron  4 mg Intravenous Q6H PRN Barrie Campa MD     • pantoprazole 40 mg Oral Daily Estuardo Hdz MD     • sodium chloride  75 mL/hr Intravenous Continuous Nohemy Emerson PA-C 75 mL/hr (02/21/23 1311)        Today, Patient Was Seen By: Richa Willis PA-C    **Please Note: This note may have been constructed using a voice recognition system  **

## 2023-02-21 NOTE — OCCUPATIONAL THERAPY NOTE
Occupational Therapy Evaluation     Patient Name: Carola Bumpers  QMQDS'E Date: 2/21/2023  Problem List  Principal Problem:    Acute metabolic encephalopathy  Active Problems:    COPD (chronic obstructive pulmonary disease) (HCC)    Stage 3b chronic kidney disease (HCC)    Pyelonephritis    Nonischemic nontraumatic myocardial injury    Urinary incontinence    Calculus of gallbladder without cholecystitis without obstruction    Loss of appetite    Past Medical History  Past Medical History:   Diagnosis Date    Arthritis     Asthma     Diabetes mellitus ([address] )     Hypertension     MI, old      Past Surgical History  Past Surgical History:   Procedure Laterality Date    BACK SURGERY      CARDIAC SURGERY      HYSTERECTOMY          02/21/23 0900   Note Type   Note type Evaluation   Pain Assessment   Pain Assessment Tool 0-10   Pain Score No Pain   Restrictions/Precautions   Other Precautions Chair Alarm; Bed Alarm;O2;Fall Risk;Multiple lines  (3L -> RA)   Home Living   Type of Home Apartment  ([address] [facility])   Home Layout One level   Bathroom Shower/Tub Walk-in shower   Bathroom Toilet Standard   Bathroom Equipment Grab bars in [facility] Walker;Cane;Wheelchair-manual   Additional Comments Pt reports living in an apartment with her daughter and family  RW use at baseline, W/C for community distances  Prior Function   Level of [place] Independent with ADLs; Independent with functional mobility; Needs assistance with IADLS   Lives With Family  (dtr, NICK, and granddaughter)   Receives Help From Family;Home health   IADLs Family/Friend/Other provides transportation; Family/Friend/Other provides meals; Family/Friend/Other provides medication management   Falls in the last 6 months 0   Comments HHA 3x/day for bathing   Subjective   Subjective "I don't know what happened I was so confused, it was like there was webs in my head"   ADL   UB Dressing Assistance 5  Supervision/Setup   UB Dressing Deficit Setup LB Dressing Assistance 5  Supervision/Setup   LB Dressing Deficit Setup; Requires assistive device for steadying;Supervision/safety;Verbal cueing; Increased time to complete   Additional Comments UB/LB ADLs @ S/set-up  Pt able to don socks while seated at EOB provided set-up  See tx assessment for greater detail regarding ADL performance  Bed Mobility   Supine to Sit 5  Supervision   Additional items Increased time required   Additional Comments Pt supine in bed at beginning of session on 3lpm  Pt not on O2 at baseline, levels decreased last night while sleeping  Trialed on RA  Supine to sit @ S  Transfers   Sit to Stand 5  Supervision   Additional items Increased time required;Verbal cues   Stand to Sit 5  Supervision   Additional items Increased time required;Verbal cues   Stand pivot 5  Supervision   Additional items Increased time required;Verbal cues   Additional Comments STS from EOB to RW @ S  SPT to recliner chair @ S  See tx assessment for greater detail regarding functional mobility  Balance   Static Sitting Normal   Dynamic Sitting Good   Static Standing Fair +   Dynamic Standing Fair   Activity Tolerance   Activity Tolerance Patient limited by fatigue   RUE Assessment   RUE Assessment WFL   LUE Assessment   LUE Assessment WFL   Hand Function   Gross Motor Coordination Functional   Fine Motor Coordination Functional   Cognition   Overall Cognitive Status WFL   Arousal/Participation Alert; Responsive; Cooperative   Attention Within functional limits   Orientation Level Oriented X4   Memory Within functional limits   Following Commands Follows all commands and directions without difficulty   Comments Pt required cues for redirection to task   Assessment   Limitation Decreased ADL status; Decreased Safe judgement during ADL;Decreased endurance;Decreased self-care trans;Decreased high-level ADLs   Prognosis Good   Assessment Pt is a [de-identified] y o  female, admitted to [address] [place] [address] 2/19/2023 d/t experiencing increased confusion  Dx: acute metabolic encephalopathy  Pt with PMHx impacting their performance during ADL tasks, including: arthritis, asthma, DM, hypertension  Prior to admission to the hospital Pt was performing ADLs without physical assistance  IADLs with physical assistance  Functional transfers/ambulation without physical assistance  Cognitive status was PTA was Intact  OT order placed to assess Pt's ADLs, cognitive status, and performance during functional tasks in order to maximize safety and independence while making most appropriate d/c recommendations  Pt's clinical presentation is currently evolving given new onset deficits that effect Pt's occupational performance and ability to safely return to PLOF including decrease activity tolerance, decrease standing balance, decrease performance during ADL tasks, decrease safety awareness  and decrease performance during functional transfers combined with medical complications of abnormal renal lab values, abnormal H&H, abnormal CBC, low SpO2 values, new onset O2 use and need for input for mobility technique/safety  Pt on 3lpm at beginning of session d/t desaturation over night, trialed on RA throughout session and maintained O2 sats > 95%  Personal factors affecting Pt at time of initial evaluation include: step(s) to enter environment, advanced age, limited insight into impairments and questionable non-compliance  Pt will benefit from continued skilled OT services to address deficits as defined above and to maximize level independence/participation during ADLs and functional tasks to facilitate return toward PLOF and improved quality of life  From an occupational therapy standpoint, recommendation at time of d/c would be [address] [place] [address] services  Plan   Treatment Interventions ADL retraining;Visual perceptual retraining;Functional transfer training;UE strengthening/ROM; Endurance training;Cognitive reorientation;Equipment evaluation/education;Patient/family training;Neuromuscular reeducation; Fine motor coordination activities; Compensatory technique education;UE splinting;Continued evaluation;Cardiac education; Energy conservation; Activityengagement   Goal Expiration Date 03/07/23   OT Treatment Day 1   OT Frequency 1-2x/wk   Recommendation   OT Discharge Recommendation Home with home health rehabilitation   [facility] Daily Activity Inpatient   Lower Body Dressing 3   Bathing 3   Toileting 3   Upper Body Dressing 3   Grooming 3   Eating 4   Daily Activity Raw Score 19   Daily Activity Standardized Score (Calc for Raw Score >=11) 40 22   [facility] Applied [facility]   Following a Speech/Presentation 3   Understanding Ordinary Conversation 4   Taking Medications 3   Remembering Where Things Are Placed or Put Away 3   Remembering List of 4-5 Errands 3   Taking Care of Complicated Tasks 3   Applied Cognition Raw Score 19   Applied Cognition Standardized Score 39 77   Additional Treatment Session   Start Time 0915   End Time 5602   Treatment Assessment Pt completed OT tx session #1 focused on ADL performance and functional mobility  Pt alert and agreeable to participate  Pt completing functional mobility to/from bathroom with RW @ close S  Toilet transfer and toileting @ S  Pt able to complete grooming task of washing hands at sink @ S  Pt able to complete functional mobility in hallway with RW @ close S  Pt maintained Good O2 sats > 95% on RA throughout, left on RA at end of session  Pt seated OOB in chair with chair alarm intact, call bell within reach and all needs met  The patient's raw score on the AM-PAC Daily Activity Inpatient Short Form is 19  A raw score of greater than or equal to 19 suggests the patient may benefit from discharge to home  Please refer to the recommendation of the Occupational Therapist for safe discharge planning  Pt goals to be met by 3/7/23    Pt will demonstrate ability to complete grooming/hygiene tasks @ Mod I after set-up    Pt will demonstrate ability to complete supine<>sit @ Mod I in order to increase safety and independence during ADL tasks  Pt will demonstrate ability to complete UB ADLs including washing/dressing @ Mod I in order to increase performance and participation during meaningful tasks  Pt will demonstrate ability to complete LB dressing @ Mod I in order to increase safety and independence during meaningful tasks  Pt will demonstrate ability to marta/doff socks/shoes while sitting EOB @ Mod I in order to increase safety and independence during meaningful tasks  Pt will demonstrate ability to complete toileting tasks including CM and pericare @ Mod I in order to increase safety and independence during meaningful tasks  Pt will demonstrate ability to complete EOB, chair, toilet/commode transfers @ Mod I in order to increase performance and participation during functional tasks  Pt will demonstrate ability to stand for 5-8 minutes while maintaining Good balance with use of RW for UB support PRN  Pt will demonstrate ability to tolerate 30-35 minute OT session with no vc'ing for deep breathing or use of energy conservation techniques in order to increase activity tolerance during functional tasks  Pt will demonstrate Good carryover of use of energy conservation/compensatory strategies during ADLs and functional tasks in order to increase safety and reduce risk for falls  Pt will demonstrate Good attention and participation in continued evaluation of functional ambulation house hold distances in order to assist with safe d/c planning  Pt will attend to continued cognitive assessments 100% of the time in order to provide most appropriate d/c recommendations  Pt will identify 3 areas of interest/hobbies and 1 intervention on how to incorporate into daily life in order to increase interaction with environment and peers as well as increase participation in meaningful tasks     Pt will demonstrate 100% carryover of BUE HEP in order to increase BUE MS and increase performance during functional tasks upon d/c home      Edna Douglas OTR/L

## 2023-02-21 NOTE — CASE MANAGEMENT
Case Management Discharge Planning Note    Patient name Zoltan Rank  Location /-01 MRN [id number]  : 1940 Date 2023       Current Admission Date: 2023  Current Admission Diagnosis:Acute metabolic encephalopathy   Patient Active Problem List    Diagnosis Date Noted   • Pyelonephritis 2023   • Nonischemic nontraumatic myocardial injury 2023   • Urinary incontinence 2023   • Calculus of gallbladder without cholecystitis without obstruction 2023   • Loss of appetite 2023   • Acute bronchitis 2022   • Acute metabolic encephalopathy    • Coronary artery disease involving native coronary artery of native heart 2022   • COVID 2022   • Essential hypertension 2022   • COPD [facility]ch[facility] obstructive [facility]) ([facility] ) 2022   • Arthritis 2022   • GERD (gastroesophageal reflux disease) 2022   • Fatty liver 2022   • Generalized weakness 2022   • Stage 3b chronic kidney disease ([facility] [address] ) 2022   • Prolonged Q-T interval on ECG 2022      LOS (days): 2  Geometric Mean LOS (GMLOS) (days): 3 80  Days to GMLOS:1 9     OBJECTIVE:  Risk of Unplanned Readmission Score: 15 37         Current admission status: Inpatient   Preferred Pharmacy:   [id number] [address] [address]  Phone: [phone] Fax: 397 [address] [address]  Phone: [phone] Fax: [phone] 2002 [address] [address]  Phone: [phone] Fax: [phone]    Primary Care Provider: Manuel Lyons DO    Primary Insurance: MEDICARE  Secondary Insurance:     DISCHARGE DETAILS:     CM called pt's daughter to review [facility] referral  Awaiting call back

## 2023-02-21 NOTE — PLAN OF CARE
Problem: MOBILITY - ADULT  Goal: Maintain or return to baseline ADL function  Description: INTERVENTIONS:  -  Assess patient's ability to carry out ADLs; assess patient's baseline for ADL function and identify physical deficits which impact ability to perform ADLs (bathing, care of mouth/teeth, toileting, grooming, dressing, etc )  - Assess/evaluate cause of self-care deficits   - Assess range of motion  - Assess patient's mobility; develop plan if impaired  - Assess patient's need for assistive devices and provide as appropriate  - Encourage maximum independence but intervene and supervise when necessary  - Involve family in performance of ADLs  - Assess for home care needs following discharge   - Consider OT consult to assist with ADL evaluation and planning for discharge  - Provide patient education as appropriate  Outcome: Progressing  Goal: Maintains/Returns to pre admission functional level  Description: INTERVENTIONS:  - Perform BMAT or MOVE assessment daily    - Set and communicate daily mobility goal to care team and patient/family/caregiver     - Collaborate with rehabilitation services on mobility goals if consulted  - Out of bed for toileting  - Record patient progress and toleration of activity level   Outcome: Progressing     Problem: PAIN - ADULT  Goal: Verbalizes/displays adequate comfort level or baseline comfort level  Description: Interventions:  - Encourage patient to monitor pain and request assistance  - Assess pain using appropriate pain scale  - Administer analgesics based on type and severity of pain and evaluate response  - Implement non-pharmacological measures as appropriate and evaluate response  - Consider cultural and social influences on pain and pain management  - Notify physician/advanced practitioner if interventions unsuccessful or patient reports new pain  Outcome: Progressing     Problem: INFECTION - ADULT  Goal: Absence or prevention of progression during hospitalization  Description: INTERVENTIONS:  - Assess and monitor for signs and symptoms of infection  - Monitor lab/diagnostic results  - Monitor all insertion sites, i e  indwelling lines, tubes, and drains  - Monitor endotracheal if appropriate and nasal secretions for changes in amount and color  - Ward appropriate cooling/warming therapies per order  - Administer medications as ordered  - Instruct and encourage patient and family to use good hand hygiene technique  - Identify and instruct in appropriate isolation precautions for identified infection/condition  Outcome: Progressing     Problem: SAFETY ADULT  Goal: Maintain or return to baseline ADL function  Description: INTERVENTIONS:  -  Assess patient's ability to carry out ADLs; assess patient's baseline for ADL function and identify physical deficits which impact ability to perform ADLs (bathing, care of mouth/teeth, toileting, grooming, dressing, etc )  - Assess/evaluate cause of self-care deficits   - Assess range of motion  - Assess patient's mobility; develop plan if impaired  - Assess patient's need for assistive devices and provide as appropriate  - Encourage maximum independence but intervene and supervise when necessary  - Involve family in performance of ADLs  - Assess for home care needs following discharge   - Consider OT consult to assist with ADL evaluation and planning for discharge  - Provide patient education as appropriate  Outcome: Progressing  Goal: Maintains/Returns to pre admission functional level  Description: INTERVENTIONS:  - Perform BMAT or MOVE assessment daily    - Set and communicate daily mobility goal to care team and patient/family/caregiver     - Collaborate with rehabilitation services on mobility goals if consulted  - Out of bed for toileting  - Record patient progress and toleration of activity level   Outcome: Progressing  Goal: Patient will remain free of falls  Description: INTERVENTIONS:  - Educate patient/family on patient safety including physical limitations  - Instruct patient to call for assistance with activity   - Consult OT/PT to assist with strengthening/mobility   - Keep Call bell within reach  - Keep bed low and locked with side rails adjusted as appropriate  - Keep care items and personal belongings within reach  - Initiate and maintain comfort rounds  - Make Fall Risk Sign visible to staff  - Apply yellow socks and bracelet for high fall risk patients  - Consider moving patient to room near nurses station  Outcome: Progressing     Problem: DISCHARGE PLANNING  Goal: Discharge to home or other facility with appropriate resources  Description: INTERVENTIONS:  - Identify barriers to discharge w/patient and caregiver  - Arrange for needed discharge resources and transportation as appropriate  - Identify discharge learning needs (meds, wound care, etc )  - Arrange for interpretive services to assist at discharge as needed  - Refer to Case Management Department for coordinating discharge planning if the patient needs post-hospital services based on physician/advanced practitioner order or complex needs related to functional status, cognitive ability, or social support system  Outcome: Progressing     Problem: Knowledge Deficit  Goal: Patient/family/caregiver demonstrates understanding of disease process, treatment plan, medications, and discharge instructions  Description: Complete learning assessment and assess knowledge base    Interventions:  - Provide teaching at level of understanding  - Provide teaching via preferred learning methods  Outcome: Progressing     Problem: NEUROSENSORY - ADULT  Goal: Achieves stable or improved neurological status  Description: INTERVENTIONS  - Monitor and report changes in neurological status  - Monitor vital signs such as temperature, blood pressure, glucose, and any other labs ordered   - Initiate measures to prevent increased intracranial pressure  - Monitor for seizure activity and implement precautions if appropriate      Outcome: Progressing     Problem: Prexisting or High Potential for Compromised Skin Integrity  Goal: Skin integrity is maintained or improved  Description: INTERVENTIONS:  - Identify patients at risk for skin breakdown  - Assess and monitor skin integrity  - Assess and monitor nutrition and hydration status  - Monitor labs   - Assess for incontinence   - Turn and reposition patient  - Assist with mobility/ambulation  - Relieve pressure over bony prominences  - Avoid friction and shearing  - Provide appropriate hygiene as needed including keeping skin clean and dry  - Evaluate need for skin moisturizer/barrier cream  - Collaborate with interdisciplinary team   - Patient/family teaching  - Consider wound care consult   Outcome: Progressing

## 2023-02-21 NOTE — PLAN OF CARE
Problem: OCCUPATIONAL THERAPY ADULT  Goal: Performs self-care activities at highest level of function for planned discharge setting  See evaluation for individualized goals  Description: Treatment Interventions: ADL retraining, Visual perceptual retraining, Functional transfer training, UE strengthening/ROM, Endurance training, Cognitive reorientation, Equipment evaluation/education, Patient/family training, Neuromuscular reeducation, Fine motor coordination activities, Compensatory technique education, UE splinting, Continued evaluation, Cardiac education, Energy conservation, Activityengagement          See flowsheet documentation for full assessment, interventions and recommendations  Note: Limitation: Decreased ADL status, Decreased Safe judgement during ADL, Decreased endurance, Decreased self-care trans, Decreased high-level ADLs  Prognosis: Good  Assessment: Pt is a [de-identified] y o  female, admitted to [address] [place] [address] 2/19/2023 d/t experiencing increased confusion  Dx: acute metabolic encephalopathy  Pt with PMHx impacting their performance during ADL tasks, including: arthritis, asthma, DM, hypertension  Prior to admission to the hospital Pt was performing ADLs without physical assistance  IADLs with physical assistance  Functional transfers/ambulation without physical assistance  Cognitive status was PTA was Intact  OT order placed to assess Pt's ADLs, cognitive status, and performance during functional tasks in order to maximize safety and independence while making most appropriate d/c recommendations   Pt's clinical presentation is currently evolving given new onset deficits that effect Pt's occupational performance and ability to safely return to [facility] including decrease activity tolerance, decrease standing balance, decrease performance during ADL tasks, decrease safety awareness  and decrease performance during functional transfers combined with medical complications of abnormal renal lab values, abnormal H&H, abnormal CBC, low SpO2 values, new onset O2 use and need for input for mobility technique/safety  Pt on 3lpm at beginning of session d/t desaturation over night, trialed on RA throughout session and maintained O2 sats > 95%  Personal factors affecting Pt at time of initial evaluation include: step(s) to enter environment, advanced age, limited insight into impairments and questionable non-compliance  Pt will benefit from continued skilled OT services to address deficits as defined above and to maximize level independence/participation during ADLs and functional tasks to facilitate return toward PLOF and improved quality of life  From an occupational therapy standpoint, recommendation at time of d/c would be [address] [place] [address] services       OT Discharge Recommendation: Home with home health rehabilitation

## 2023-02-22 PROBLEM — G93.41 ACUTE METABOLIC ENCEPHALOPATHY: Status: RESOLVED | Noted: 2022-07-30 | Resolved: 2023-02-22

## 2023-02-22 LAB
ANION GAP SERPL CALCULATED.3IONS-SCNC: 2 MMOL/L (ref 4–13)
BACTERIA BLD CULT: ABNORMAL
BACTERIA UR CULT: ABNORMAL
BUN SERPL-MCNC: 19 MG/DL (ref 5–25)
CALCIUM SERPL-MCNC: 8.5 MG/DL (ref 8.4–10.2)
CHLORIDE SERPL-SCNC: 113 MMOL/L (ref 96–108)
CO2 SERPL-SCNC: 27 MMOL/L (ref 21–32)
CREAT SERPL-MCNC: 1.06 MG/DL (ref 0.6–1.3)
E COLI DNA BLD POS QL NAA+NON-PROBE: DETECTED
ERYTHROCYTE [DISTWIDTH] IN BLOOD BY AUTOMATED COUNT: 14 % (ref 11.6–15.1)
GFR SERPL CREATININE-BSD FRML MDRD: 49 ML/MIN/1.73SQ M
GLUCOSE SERPL-MCNC: 96 MG/DL (ref 65–140)
GRAM STN SPEC: ABNORMAL
HCT VFR BLD AUTO: 34 % (ref 34.8–46.1)
HGB BLD-MCNC: 10.5 G/DL (ref 11.5–15.4)
MAGNESIUM SERPL-MCNC: 1.6 MG/DL (ref 1.9–2.7)
MCH RBC QN AUTO: 33.2 PG (ref 26.8–34.3)
MCHC RBC AUTO-ENTMCNC: 30.9 G/DL (ref 31.4–37.4)
MCV RBC AUTO: 108 FL (ref 82–98)
PLATELET # BLD AUTO: 223 THOUSANDS/UL (ref 149–390)
PMV BLD AUTO: 9.4 FL (ref 8.9–12.7)
POTASSIUM SERPL-SCNC: 4.5 MMOL/L (ref 3.5–5.3)
PROCALCITONIN SERPL-MCNC: 0.36 NG/ML
RBC # BLD AUTO: 3.16 MILLION/UL (ref 3.81–5.12)
SODIUM SERPL-SCNC: 142 MMOL/L (ref 135–147)
WBC # BLD AUTO: 4.75 THOUSAND/UL (ref 4.31–10.16)

## 2023-02-22 RX ORDER — CEFDINIR 300 MG/1
300 CAPSULE ORAL EVERY 12 HOURS SCHEDULED
Status: DISCONTINUED | OUTPATIENT
Start: 2023-02-23 | End: 2023-02-23

## 2023-02-22 RX ORDER — MAGNESIUM SULFATE HEPTAHYDRATE 40 MG/ML
4 INJECTION, SOLUTION INTRAVENOUS ONCE
Status: COMPLETED | OUTPATIENT
Start: 2023-02-22 | End: 2023-02-22

## 2023-02-22 RX ADMIN — LORATADINE 10 MG: 10 TABLET ORAL at 08:34

## 2023-02-22 RX ADMIN — MIRTAZAPINE 7.5 MG: 15 TABLET, FILM COATED ORAL at 21:41

## 2023-02-22 RX ADMIN — MAGNESIUM SULFATE HEPTAHYDRATE 4 G: 40 INJECTION, SOLUTION INTRAVENOUS at 08:38

## 2023-02-22 RX ADMIN — SODIUM CHLORIDE 75 ML/HR: 0.9 INJECTION, SOLUTION INTRAVENOUS at 15:39

## 2023-02-22 RX ADMIN — BISOPROLOL FUMARATE 2.5 MG: 5 TABLET ORAL at 08:33

## 2023-02-22 RX ADMIN — CEFTRIAXONE 1000 MG: 1 INJECTION, SOLUTION INTRAVENOUS at 15:41

## 2023-02-22 RX ADMIN — FOLIC ACID 1 MG: 1 TABLET ORAL at 08:34

## 2023-02-22 RX ADMIN — DULOXETINE HYDROCHLORIDE 30 MG: 30 CAPSULE, DELAYED RELEASE ORAL at 08:34

## 2023-02-22 RX ADMIN — ENOXAPARIN SODIUM 30 MG: 30 INJECTION SUBCUTANEOUS at 08:33

## 2023-02-22 RX ADMIN — PANTOPRAZOLE SODIUM 40 MG: 40 TABLET, DELAYED RELEASE ORAL at 08:34

## 2023-02-22 RX ADMIN — HYDROXYCHLOROQUINE SULFATE 200 MG: 200 TABLET, FILM COATED ORAL at 08:34

## 2023-02-22 RX ADMIN — ATORVASTATIN CALCIUM 20 MG: 20 TABLET, FILM COATED ORAL at 15:39

## 2023-02-22 RX ADMIN — HYDROXYCHLOROQUINE SULFATE 200 MG: 200 TABLET, FILM COATED ORAL at 15:42

## 2023-02-22 RX ADMIN — ACETAMINOPHEN 325MG 650 MG: 325 TABLET ORAL at 04:43

## 2023-02-22 RX ADMIN — DOCUSATE SODIUM 100 MG: 100 CAPSULE ORAL at 17:39

## 2023-02-22 RX ADMIN — DOCUSATE SODIUM 100 MG: 100 CAPSULE ORAL at 08:34

## 2023-02-22 RX ADMIN — ASPIRIN 81 MG: 81 TABLET, COATED ORAL at 08:33

## 2023-02-22 NOTE — ASSESSMENT & PLAN NOTE
· CT abdomen and pelvis showed: Mild asymmetric left perinephric edema/stranding, new from prior study  Correlate clinically for infection   No hydronephrosis   Evaluation of the renal parenchyma is limited without IV contrast  Mild left renal atrophy with cortical scarring/lobulation, multiple cortical cysts and/or calyceal diverticula as well as scattered which could be dystrophic or represent nonobstructing calculi  · Daughter states that patient often will put a diaper on in the morning and wear for hours on and that this is her third UTI in a few months due to this  Educated patient on hygiene and diaper cessation  · Started on IV ceftriaxone, will adjust pending urine culture  · UA: moderate leukocytes and positive nitrites; microscopic: 30-50 WBC and moderate bacteria  · Urine culture with gram negative rods - final culture with e coli     · Spoke with ID and they recommend to continue cefpodoxime 200 mg for 6 more days to conclude the 10 day course of antibiotics

## 2023-02-22 NOTE — PLAN OF CARE
Problem: MOBILITY - ADULT  Goal: Maintain or return to baseline ADL function  Description: INTERVENTIONS:  -  Assess patient's ability to carry out ADLs; assess patient's baseline for ADL function and identify physical deficits which impact ability to perform ADLs (bathing, care of mouth/teeth, toileting, grooming, dressing, etc )  - Assess/evaluate cause of self-care deficits   - Assess range of motion  - Assess patient's mobility; develop plan if impaired  - Assess patient's need for assistive devices and provide as appropriate  - Encourage maximum independence but intervene and supervise when necessary  - Involve family in performance of ADLs  - Assess for home care needs following discharge   - Consider OT consult to assist with ADL evaluation and planning for discharge  - Provide patient education as appropriate  Outcome: Progressing  Goal: Maintains/Returns to pre admission functional level  Description: INTERVENTIONS:  - Perform BMAT or MOVE assessment daily    - Set and communicate daily mobility goal to care team and patient/family/caregiver     - Collaborate with rehabilitation services on mobility goals if consulted  - Out of bed for toileting  - Record patient progress and toleration of activity level   Outcome: Progressing     Problem: PAIN - ADULT  Goal: Verbalizes/displays adequate comfort level or baseline comfort level  Description: Interventions:  - Encourage patient to monitor pain and request assistance  - Assess pain using appropriate pain scale  - Administer analgesics based on type and severity of pain and evaluate response  - Implement non-pharmacological measures as appropriate and evaluate response  - Consider cultural and social influences on pain and pain management  - Notify physician/advanced practitioner if interventions unsuccessful or patient reports new pain  Outcome: Progressing     Problem: INFECTION - ADULT  Goal: Absence or prevention of progression during hospitalization  Description: INTERVENTIONS:  - Assess and monitor for signs and symptoms of infection  - Monitor lab/diagnostic results  - Monitor all insertion sites, i e  indwelling lines, tubes, and drains  - Monitor endotracheal if appropriate and nasal secretions for changes in amount and color  - [place] appropriate cooling/warming therapies per order  - Administer medications as ordered  - Instruct and encourage patient and family to use good hand hygiene technique  - Identify and instruct in appropriate isolation precautions for identified infection/condition  Outcome: Progressing     Problem: SAFETY ADULT  Goal: Maintain or return to baseline ADL function  Description: INTERVENTIONS:  -  Assess patient's ability to carry out ADLs; assess patient's baseline for ADL function and identify physical deficits which impact ability to perform ADLs (bathing, care of mouth/teeth, toileting, grooming, dressing, etc )  - Assess/evaluate cause of self-care deficits   - Assess range of motion  - Assess patient's mobility; develop plan if impaired  - Assess patient's need for assistive devices and provide as appropriate  - Encourage maximum independence but intervene and supervise when necessary  - Involve family in performance of ADLs  - Assess for home care needs following discharge   - Consider OT consult to assist with ADL evaluation and planning for discharge  - Provide patient education as appropriate  Outcome: Progressing  Goal: Maintains/Returns to pre admission functional level  Description: INTERVENTIONS:  - Perform BMAT or MOVE assessment daily    - Set and communicate daily mobility goal to care team and patient/family/caregiver     - Collaborate with rehabilitation services on mobility goals if consulted  - Out of bed for toileting  - Record patient progress and toleration of activity level   Outcome: Progressing  Goal: Patient will remain free of falls  Description: INTERVENTIONS:  - Educate patient/family on patient safety including physical limitations  - Instruct patient to call for assistance with activity   - Consult OT/PT to assist with strengthening/mobility   - Keep Call bell within reach  - Keep bed low and locked with side rails adjusted as appropriate  - Keep care items and personal belongings within reach  - Initiate and maintain comfort rounds  - Make Fall Risk Sign visible to staff  - Apply yellow socks and bracelet for high fall risk patients  - Consider moving patient to room near nurses station  Outcome: Progressing     Problem: DISCHARGE PLANNING  Goal: Discharge to home or other facility with appropriate resources  Description: INTERVENTIONS:  - Identify barriers to discharge w/patient and caregiver  - Arrange for needed discharge resources and transportation as appropriate  - Identify discharge learning needs (meds, wound care, etc )  - Arrange for interpretive services to assist at discharge as needed  - Refer to Case Management Department for coordinating discharge planning if the patient needs post-hospital services based on physician/advanced practitioner order or complex needs related to functional status, cognitive ability, or social support system  Outcome: Progressing     Problem: Knowledge Deficit  Goal: Patient/family/caregiver demonstrates understanding of disease process, treatment plan, medications, and discharge instructions  Description: Complete learning assessment and assess knowledge base    Interventions:  - Provide teaching at level of understanding  - Provide teaching via preferred learning methods  Outcome: Progressing     Problem: NEUROSENSORY - ADULT  Goal: Achieves stable or improved neurological status  Description: INTERVENTIONS  - Monitor and report changes in neurological status  - Monitor vital signs such as temperature, blood pressure, glucose, and any other labs ordered   - Initiate measures to prevent increased intracranial pressure  - Monitor for seizure activity and implement precautions if appropriate      Outcome: Progressing     Problem: Prexisting or High Potential for Compromised Skin Integrity  Goal: Skin integrity is maintained or improved  Description: INTERVENTIONS:  - Identify patients at risk for skin breakdown  - Assess and monitor skin integrity  - Assess and monitor nutrition and hydration status  - Monitor labs   - Assess for incontinence   - Turn and reposition patient  - Assist with mobility/ambulation  - Relieve pressure over bony prominences  - Avoid friction and shearing  - Provide appropriate hygiene as needed including keeping skin clean and dry  - Evaluate need for skin moisturizer/barrier cream  - Collaborate with interdisciplinary team   - Patient/family teaching  - Consider wound care consult   Outcome: Progressing

## 2023-02-22 NOTE — ASSESSMENT & PLAN NOTE
Lab Results   Component Value Date    EGFR 49 02/22/2023    EGFR 39 02/21/2023    EGFR 38 02/20/2023    CREATININE 1 06 02/22/2023    CREATININE 1 28 02/21/2023    CREATININE 1 32 (H) 02/20/2023   · Home blood pressure medication is bisoprolol-HCTZ; baseline creatinine 1 3-1 45  · Hold HCTZ due to pyelonephritis, continue bisoprolol  · Patient is receiving IV hydration  · Retention protocol  · Avoid nephrotoxic medications and hypotension

## 2023-02-22 NOTE — PROGRESS NOTES
[address]  Progress Note Porfirio Hubbard 1940, [de-identified] y o  female MRN: [id number]  Unit/Bed#: -01 Encounter: [id number]  Primary Care Provider: Janene Pierre DO   Date and time admitted to hospital: 2/19/2023 12:52 PM    * Acute metabolic encephalopathy  Assessment & Plan  · As per daughter, last night patient woke up in the middle of the night and yelling and patient was found in the corner of the room and patient thought she was in the food-as per daughter, right next to her [place] tree  Typically with these symptoms when having infection  · Secondary to pyelonephritis  · CT head negative  · COVID-negative  · 1/2 blood cultures positive for gram negative rods, second blood culture with no growth at 24 hours, possible contaminant  · Continue underlying treatment for pyelonephritis  · Mental status improved since usage of antibiotics  · PT/OT: home with home health  · Frequent reorientation  · Appears to be back at baseline mental status, monitor  Resolved  Pyelonephritis  Assessment & Plan  · CT abdomen and pelvis showed: Mild asymmetric left perinephric edema/stranding, new from prior study  Correlate clinically for infection   No hydronephrosis   Evaluation of the renal parenchyma is limited without IV contrast  Mild left renal atrophy with cortical scarring/lobulation, multiple cortical cysts and/or calyceal diverticula as well as scattered which could be dystrophic or represent nonobstructing calculi     · UA: moderate leukocytes and positive nitrites; microscopic: 30-50 WBC and moderate bacteria  · Urine culture with gram negative rods, pending final culture  · Continue IV ceftriaxone, IV hydration, retention protocol  · Follow labs    Nonischemic nontraumatic myocardial injury  Assessment & Plan  · Troponin is trending up  · No significant EKG change  · Patient remained asymptomatic  · Secondary to infection most likely  · Troponin improved from 2 to 4 hours   · Continue to monitor  · Echo: Estimated ejection fraction 60 to 65%  No regional wall motion abnormality identified  Increased end-diastolic pressures estimated with mild diastolic relaxation abnormality  · Troponin continued to trend down    COPD (chronic obstructive pulmonary disease) ([facility])  Assessment & Plan  · Per patient, she feels dyspneic on exertion  · With history of COPD and was prescribed Duo-Nebs prn outpatient by PCP  · Patient does have hiatal hernia and elevated diaphragm, which can also contribute  · Echocardiogram with results above  · Was placed on oxygen overnight due to pulse ox showing 79%, placed on 2L NC  Seen and examined and patient is off oxygen and saturating around 97%  · Still on room air  · Will do overnight oxygen study to determine oxygen requirements at night  · Continue with albuterol prn, respiratory protocol  · Monitor    Loss of appetite  Assessment & Plan  · Multifactorial-hiatal hernia, elevated diaphragm, cholelithiasis, acute infection  · We will add Remeron and monitor  · Appetite slightly improved, monitor       Calculus of gallbladder without cholecystitis without obstruction  Assessment & Plan  · Asymptomatic  · Conservative management    Urinary incontinence  Assessment & Plan  · Patient had history of uterine prolapse and since then she is having urinary incontinence  · Patient takes Detrol at home-we will hold it while patient is getting treatment for pyelonephritis  · Urinary retention protocol    Stage 3b chronic kidney disease [facility])  Assessment & Plan  Lab Results   Component Value Date    EGFR 49 02/22/2023    EGFR 39 02/21/2023    EGFR 38 02/20/2023    CREATININE 1 06 02/22/2023    CREATININE 1 28 02/21/2023    CREATININE 1 32 (H) 02/20/2023   · Home blood pressure medication is bisoprolol-HCTZ; baseline creatinine 1 3-1 45  · Hold HCTZ due to pyelonephritis, continue bisoprolol  · Patient is receiving IV hydration  · Retention protocol  · Avoid nephrotoxic medications and hypotension    VTE Pharmacologic Prophylaxis: VTE Score: 7 High Risk (Score >/= 5) - Pharmacological DVT Prophylaxis Ordered: enoxaparin (Lovenox)  Sequential Compression Devices Ordered  Patient Centered Rounds: I performed bedside rounds with nursing staff today  Discussions with Specialists or Other Care Team Provider: Nursing and case management    Education and Discussions with Family / Patient: Updated  (daughter) via phone  Total Time Spent on Date of Encounter in care of patient: 35 minutes This time was spent on one or more of the following: performing physical exam; counseling and coordination of care; obtaining or reviewing history; documenting in the medical record; reviewing/ordering tests, medications or procedures; communicating with other healthcare professionals and discussing with patient's family/caregivers  Current Length of Stay: 3 day(s)  Current Patient Status: Inpatient   Certification Statement: The patient will continue to require additional inpatient hospital stay due to pyelonephritis, overnight oxygen study  Discharge Plan: Anticipate discharge tomorrow to home with home services  Code Status: Level 3 - DNAR and DNI    Subjective:   Patient seen and examined at bedside this morning  She offers no acute complaints  She said that she has a difficult time sleeping and feels that she has a hard time breathing at night sometimes  Denies any dysuria, nausea, vomiting, diarrhea, belly pain, fever, chills, chest pain, shortness of breath  She is currently on 2 L nasal cannula laying in bed but she is saturating around 97 to 99%  States that she feels short of breath when she ambulates occasionally but states that this is nothing new      Objective:     Vitals:   Temp (24hrs), Av 1 °F (36 7 °C), Min:97 9 °F (36 6 °C), Max:98 4 °F (36 9 °C)    Temp:  [97 9 °F (36 6 °C)-98 4 °F (36 9 °C)] 97 9 °F (36 6 °C)  HR:  [59-67] 59  Resp: [17-18] 17  BP: (156-164)/(73-89) 164/86  SpO2:  [95 %-98 %] 98 %  Body mass index is 25 7 kg/m²  Input and Output Summary (last 24 hours): Intake/Output Summary (Last 24 hours) at 2/22/2023 1350  Last data filed at 2/22/2023 7429  Gross per 24 hour   Intake 420 ml   Output 875 ml   Net -455 ml       Physical Exam:   Physical Exam  Vitals reviewed  Constitutional:       General: She is not in acute distress  Appearance: She is not ill-appearing or toxic-appearing  HENT:      Head: Normocephalic and atraumatic  Nose: Nose normal       Mouth/Throat:      Mouth: Mucous membranes are moist    Eyes:      Pupils: Pupils are equal, round, and reactive to light  Cardiovascular:      Rate and Rhythm: Normal rate and regular rhythm  Heart sounds: Normal heart sounds  Pulmonary:      Breath sounds: Normal breath sounds  Comments: On 2 L nasal cannula saturating around 97 to 99%  Still with an occasional cough  Abdominal:      General: Bowel sounds are normal  There is no distension  Palpations: Abdomen is soft  There is no mass  Tenderness: There is no abdominal tenderness  Musculoskeletal:         General: Normal range of motion  Right lower leg: No edema  Left lower leg: No edema  Skin:     General: Skin is warm and dry  Neurological:      General: No focal deficit present  Mental Status: She is alert and oriented to person, place, and time  Psychiatric:         Mood and Affect: Mood normal          Behavior: Behavior normal           Additional Data:     Labs:  Results from last 7 days   Lab Units 02/22/23  0439 02/21/23  0501 02/20/23  0451   WBC Thousand/uL 4 75   < > 9 06   HEMOGLOBIN g/dL 10 5*   < > 11 4*   HEMATOCRIT % 34 0*   < > 35 9   PLATELETS Thousands/uL 223   < > 193   NEUTROS PCT %  --   --  89*   LYMPHS PCT %  --   --  5*   MONOS PCT %  --   --  6   EOS PCT %  --   --  0    < > = values in this interval not displayed       Results from last 7 days   Lab Units 02/22/23  0439 02/21/23  0501 02/20/23  0451   SODIUM mmol/L 142   < > 140   POTASSIUM mmol/L 4 5   < > 4 6   CHLORIDE mmol/L 113*   < > 107   CO2 mmol/L 27   < > 26   BUN mg/dL 19   < > 24   CREATININE mg/dL 1 06   < > 1 32*   ANION GAP mmol/L 2*   < > 7   CALCIUM mg/dL 8 5   < > 8 9   ALBUMIN g/dL  --   --  3 4*   TOTAL BILIRUBIN mg/dL  --   --  1 04*   ALK PHOS U/L  --   --  86   ALT U/L  --   --  17   AST U/L  --   --  24   GLUCOSE RANDOM mg/dL 96   < > 85    < > = values in this interval not displayed  Results from last 7 days   Lab Units 02/19/23  1344   INR  1 16             Results from last 7 days   Lab Units 02/22/23  0439 02/21/23  0501 02/19/23  1344   LACTIC ACID mmol/L  --   --  1 2   PROCALCITONIN ng/ml 0 36* 0 49*  --        Lines/Drains:  Invasive Devices     Peripheral Intravenous Line  Duration           Peripheral IV 02/19/23 Left Antecubital 3 days                      Imaging: No pertinent imaging reviewed  Recent Cultures (last 7 days):   Results from last 7 days   Lab Units 02/20/23  1555 02/19/23  1414 02/19/23  1344   BLOOD CULTURE  No Growth at 24 hrs  No Growth at 24 hrs   --  Escherichia coli*  No Growth at 48 hrs     GRAM STAIN RESULT   --   --  Gram negative rods*   URINE CULTURE   --  >100,000 cfu/ml Gram Negative Henrique*  --        Last 24 Hours Medication List:   Current Facility-Administered Medications   Medication Dose Route Frequency Provider Last Rate   • acetaminophen  650 mg Oral Q6H PRN Aram Amezquita MD     • albuterol  2 puff Inhalation Q6H PRN Aram Amezquita MD     • aspirin  81 mg Oral Daily Aram Amezquita MD     • atorvastatin  20 mg Oral Daily Aram Amezquita MD     • bisoprolol  2 5 mg Oral Daily Cristal Reis PA-C     • cefTRIAXone  1,000 mg Intravenous Q24H Aram Amezquita MD 1,000 mg (02/21/23 1730)   • docusate sodium  100 mg Oral BID Aram Amezquita MD     • DULoxetine  30 mg Oral Daily Aram Amezquita MD     • enoxaparin  30 mg Subcutaneous Q24H [facility] Estuardo Hdz MD     • folic acid  1 mg Oral Daily Argenis Ramachandran MD     • hydroxychloroquine  200 mg Oral BID With Meals Argenis Ramachandran MD     • loratadine  10 mg Oral Daily Argenis Ramachandran MD     • mirtazapine  7 5 mg Oral HS Argenis Ramachandran MD     • ondansetron  4 mg Intravenous Q6H PRN Argenis Ramachandran MD     • pantoprazole  40 mg Oral Daily Argenis Ramachandran MD     • sodium chloride  75 mL/hr Intravenous Continuous Bettina Yo PA-C 75 mL/hr (02/21/23 9896)        Today, Patient Was Seen By: Bettina Yo PA-C    **Please Note: This note may have been constructed using a voice recognition system  **

## 2023-02-22 NOTE — ASSESSMENT & PLAN NOTE
· Per patient, she feels dyspneic on exertion  · With history of COPD and was prescribed Duo-Nebs prn outpatient by PCP  · Patient does have hiatal hernia and elevated diaphragm, which can also contribute  · Echocardiogram with results above  · Was placed on oxygen overnight due to pulse ox showing 79%, placed on 2L NC  Seen and examined and patient is off oxygen and saturating around 97%  · Still on room air  · Will do overnight oxygen study to determine oxygen requirements at night  · Continue with albuterol prn, respiratory protocol     · Monitor

## 2023-02-22 NOTE — ASSESSMENT & PLAN NOTE
· Troponin is trending up  · No significant EKG change  · Patient remained asymptomatic  · Secondary to infection most likely  · Troponin improved from 2 to 4 hours  · Continue to monitor  · Echo: Estimated ejection fraction 60 to 65%  No regional wall motion abnormality identified  Increased end-diastolic pressures estimated with mild diastolic relaxation abnormality    · Troponin continued to trend down

## 2023-02-22 NOTE — ASSESSMENT & PLAN NOTE
· As per daughter, last night patient woke up in the middle of the night and yelling and patient was found in the corner of the room and patient thought she was in the food-as per daughter, right next to her [place] tree  Typically with these symptoms when having infection  · Secondary to pyelonephritis  · CT head negative  · COVID-negative  · 1/2 blood cultures positive for gram negative rods, second blood culture with no growth at 24 hours, possible contaminant  · Continue underlying treatment for pyelonephritis  · Mental status improved since usage of antibiotics  · PT/OT: home with home health  · Frequent reorientation  · Appears to be back at baseline mental status, monitor  Resolved

## 2023-02-22 NOTE — CASE MANAGEMENT
Case Management Discharge Planning Note    Patient name Magaly Park  Location /-01 MRN [id number]  : 1940 Date 2023       Current Admission Date: 2023  Current Admission Diagnosis:Acute metabolic encephalopathy   Patient Active Problem List    Diagnosis Date Noted   • Pyelonephritis 2023   • Nonischemic nontraumatic myocardial injury 2023   • Urinary incontinence 2023   • Calculus of gallbladder without cholecystitis without obstruction 2023   • Loss of appetite 2023   • Acute bronchitis 2022   • Acute metabolic encephalopathy    • Coronary artery disease involving native coronary artery of native heart 2022   • COVID 2022   • Essential hypertension 2022   • [facility] 2022   • Arthritis 2022   • GERD (gastroesophageal reflux disease) 2022   • Fatty liver 2022   • Generalized weakness 2022   • Stage 3b chronic kidney disease ([facility] ) 2022   • Prolonged Q-T interval on ECG 2022      LOS (days): 3  Geometric Mean LOS (GMLOS) (days): 3 80  Days to GMLOS:0 8     OBJECTIVE:  Risk of Unplanned Readmission Score: 15 63         Current admission status: Inpatient   Preferred Pharmacy:   [address] [place] [address] #[id number] [address] [address]  Phone: [phone] Fax: [address] [address]  Phone: [phone] Fax: [phone]    [id number] [address]  Phone: [phone] Fax: [phone]    Primary Care Provider: Janene Pierre DO    Primary Insurance: MEDICARE  Secondary Insurance:     DISCHARGE DETAILS:     CM spoke with pt's daughter via phone and [facility]   Pt's daughter to research preference and call back with choice  CM also discussed pt's request for a new nebulizer  Pt' s daughter stated that the nebulizer that pt had was fairly new  CM informed her that a new nebulizer expense would not be covered by medicare

## 2023-02-22 NOTE — ASSESSMENT & PLAN NOTE
· CT abdomen and pelvis showed: Mild asymmetric left perinephric edema/stranding, new from prior study  Correlate clinically for infection   No hydronephrosis   Evaluation of the renal parenchyma is limited without IV contrast  Mild left renal atrophy with cortical scarring/lobulation, multiple cortical cysts and/or calyceal diverticula as well as scattered which could be dystrophic or represent nonobstructing calculi     · UA: moderate leukocytes and positive nitrites; microscopic: 30-50 WBC and moderate bacteria  · Urine culture with gram negative rods, pending final culture  · Continue IV ceftriaxone, IV hydration, retention protocol  · Follow labs

## 2023-02-23 VITALS
DIASTOLIC BLOOD PRESSURE: 83 MMHG | TEMPERATURE: 97.3 F | HEIGHT: 60 IN | HEART RATE: 66 BPM | RESPIRATION RATE: 18 BRPM | OXYGEN SATURATION: 96 % | BODY MASS INDEX: 25.93 KG/M2 | SYSTOLIC BLOOD PRESSURE: 160 MMHG | WEIGHT: 132.06 LBS

## 2023-02-23 PROBLEM — J44.9 CHRONIC OBSTRUCTIVE PULMONARY DISEASE, UNSPECIFIED COPD TYPE (HCC): Status: ACTIVE | Noted: 2023-02-23

## 2023-02-23 PROBLEM — B37.0 ORAL THRUSH: Status: ACTIVE | Noted: 2023-02-23

## 2023-02-23 PROBLEM — J44.9 CHRONIC OBSTRUCTIVE PULMONARY DISEASE, UNSPECIFIED COPD TYPE (HCC): Status: RESOLVED | Noted: 2023-02-23 | Resolved: 2023-02-23

## 2023-02-23 LAB
ANION GAP SERPL CALCULATED.3IONS-SCNC: 6 MMOL/L (ref 4–13)
BUN SERPL-MCNC: 15 MG/DL (ref 5–25)
CALCIUM SERPL-MCNC: 8.6 MG/DL (ref 8.4–10.2)
CHLORIDE SERPL-SCNC: 113 MMOL/L (ref 96–108)
CO2 SERPL-SCNC: 25 MMOL/L (ref 21–32)
CREAT SERPL-MCNC: 1.05 MG/DL (ref 0.6–1.3)
DME PARACHUTE DELIVERY DATE ACTUAL: NORMAL
DME PARACHUTE DELIVERY DATE EXPECTED: NORMAL
DME PARACHUTE DELIVERY DATE REQUESTED: NORMAL
DME PARACHUTE DELIVERY NOTE: NORMAL
DME PARACHUTE ITEM DESCRIPTION: NORMAL
DME PARACHUTE ORDER STATUS: NORMAL
DME PARACHUTE SUPPLIER NAME: NORMAL
DME PARACHUTE SUPPLIER PHONE: NORMAL
GFR SERPL CREATININE-BSD FRML MDRD: 50 ML/MIN/1.73SQ M
GLUCOSE SERPL-MCNC: 96 MG/DL (ref 65–140)
MAGNESIUM SERPL-MCNC: 2.4 MG/DL (ref 1.9–2.7)
POTASSIUM SERPL-SCNC: 3.8 MMOL/L (ref 3.5–5.3)
PROCALCITONIN SERPL-MCNC: 0.28 NG/ML
SODIUM SERPL-SCNC: 144 MMOL/L (ref 135–147)

## 2023-02-23 RX ORDER — CEFPODOXIME PROXETIL 200 MG/1
TABLET, FILM COATED ORAL
Qty: 13 TABLET | Refills: 0 | Status: SHIPPED | OUTPATIENT
Start: 2023-02-23 | End: 2023-03-01

## 2023-02-23 RX ORDER — CEFPODOXIME PROXETIL 200 MG/1
200 TABLET, FILM COATED ORAL 2 TIMES DAILY WITH MEALS
Status: DISCONTINUED | OUTPATIENT
Start: 2023-02-23 | End: 2023-02-23 | Stop reason: HOSPADM

## 2023-02-23 RX ADMIN — ENOXAPARIN SODIUM 30 MG: 30 INJECTION SUBCUTANEOUS at 08:39

## 2023-02-23 RX ADMIN — LORATADINE 10 MG: 10 TABLET ORAL at 08:40

## 2023-02-23 RX ADMIN — HYDROXYCHLOROQUINE SULFATE 200 MG: 200 TABLET, FILM COATED ORAL at 08:40

## 2023-02-23 RX ADMIN — DULOXETINE HYDROCHLORIDE 30 MG: 30 CAPSULE, DELAYED RELEASE ORAL at 08:40

## 2023-02-23 RX ADMIN — ASPIRIN 81 MG: 81 TABLET, COATED ORAL at 08:39

## 2023-02-23 RX ADMIN — FOLIC ACID 1 MG: 1 TABLET ORAL at 08:41

## 2023-02-23 RX ADMIN — PANTOPRAZOLE SODIUM 40 MG: 40 TABLET, DELAYED RELEASE ORAL at 08:40

## 2023-02-23 RX ADMIN — BISOPROLOL FUMARATE 2.5 MG: 5 TABLET ORAL at 08:39

## 2023-02-23 RX ADMIN — CEFPODOXIME PROXETIL 200 MG: 200 TABLET, FILM COATED ORAL at 10:34

## 2023-02-23 RX ADMIN — CEFDINIR 300 MG: 300 CAPSULE ORAL at 08:39

## 2023-02-23 NOTE — ASSESSMENT & PLAN NOTE
· Per patient, she feels dyspneic on exertion  · With history of COPD and was prescribed Duo-Nebs prn outpatient by PCP  · Patient does have hiatal hernia and elevated diaphragm, which can also contribute  · Echocardiogram with results above  · Was placed on oxygen overnight due to pulse ox showing 79%, placed on 2L NC  Seen and examined and patient is off oxygen and saturating around 97%  · Still on room air     · Overnight oxygen study showing patient needs 2 L at bedtime  · Continue with albuterol prn

## 2023-02-23 NOTE — ASSESSMENT & PLAN NOTE
· As per daughter, last night patient woke up in the middle of the night and yelling and patient was found in the corner of the room and patient thought she was in the food-as per daughter, right next to her [place] tree  Typically with these symptoms when having infection  · Secondary to pyelonephritis  · CT head negative  · COVID-negative  · 1/2 blood cultures positive for gram negative rods, second blood culture with no growth at 24 hours, possible contaminant  · Continue underlying treatment for pyelonephritis  · Mental status improved since usage of antibiotics  · PT/OT: home with home health  · Appears to be back at baseline mental status, monitor  Resolved

## 2023-02-23 NOTE — ASSESSMENT & PLAN NOTE
· Multifactorial-hiatal hernia, elevated diaphragm, cholelithiasis, acute infection  · Appetite continued to improve

## 2023-02-23 NOTE — CASE MANAGEMENT
Case Management Discharge Planning Note    Patient name Ismael Schilder  Location /-01 MRN [id number]  : 1940 Date 2023       Current Admission Date: 2023  Current Admission Diagnosis:Pyelonephritis   Patient Active Problem List    Diagnosis Date Noted   • Pyelonephritis 2023   • Nonischemic nontraumatic myocardial injury 2023   • Urinary incontinence 2023   • Calculus of gallbladder without cholecystitis without obstruction 2023   • Loss of appetite 2023   • Acute bronchitis 2022   • Coronary artery disease involving native coronary artery of native heart 2022   • COVID 2022   • Essential hypertension 2022   • COPD (chronic obstructive pulmonary disease) ([facility] ) 2022   • Arthritis 2022   • GERD (gastroesophageal reflux disease) 2022   • Fatty liver 2022   • Generalized weakness 2022   • Stage 3b chronic kidney disease ([facility] ) 2022   • Prolonged Q-T interval on ECG 2022      LOS (days): 4  Geometric Mean LOS (GMLOS) (days): 3 80  Days to GMLOS:0     OBJECTIVE:  Risk of Unplanned Readmission Score: 15 62         Current admission status: Inpatient   Preferred Pharmacy:   [address] [place] [address] #[id number] [address] [address]  Phone: [phone] Fax: [address] [address]  Phone: [phone] Fax: [phone] [address] [address]  Phone: [phone] Fax: [phone]    Primary Care Provider: Clmeent Spencer DO    Primary Insurance: MEDICARE  Secondary Insurance:     DISCHARGE DETAILS:     CM spoke with pt's daughter and she chose [facility] for services in the home  Vernon Isaac was reserved in [address]   Based on the overnight O2 study, DME O2 concentrator was ordered through [employer] for 2L O2 during sleep  Pt's daughter's contact number was given to set up appointments  CM was unaware of discharge today  CM spoke to patient at the bedside, reviewed DC IMM with patient and informed that patient can stay an additional 4 hours for reconsidering appealing the discharge as the medicare rights were review on the day of discharge  Pt verbalized understanding and feels ready to go home and does not intend to stay 4 hours to reconsider

## 2023-02-23 NOTE — ASSESSMENT & PLAN NOTE
· Patient states that she gets recurrent thrush  · Likely due to poor oral hygiene  · States that her tongue feels big and hurts when she rubs it on the roof of her mouth   Denies sore throat  · Continue nystatin suspension for 14 days

## 2023-02-23 NOTE — ASSESSMENT & PLAN NOTE
Lab Results   Component Value Date    EGFR 50 02/23/2023    EGFR 49 02/22/2023    EGFR 39 02/21/2023    CREATININE 1 05 02/23/2023    CREATININE 1 06 02/22/2023    CREATININE 1 28 02/21/2023   · Home blood pressure medication is bisoprolol-HCTZ; baseline creatinine 1 3-1 45  · Continue lisinopril and bisoprolol-HCTZ on discharge

## 2023-02-23 NOTE — ASSESSMENT & PLAN NOTE
· Patient states that she has a history of heart attack with 4 stents placed    · Patient states that she used to follow-up with [facility] cardiology, but has not in a few years  · Ambulatory referral to cardiology placed on discharge

## 2023-02-23 NOTE — DISCHARGE INSTR - AVS FIRST PAGE
You admitted to the hospital due to an infection of your bladder that spread to your kidneys  You were treated with IV antibiotics  It is important to continue antibiotics 2 times a day for the next 6 days  If symptoms worsen or new symptoms arise, come back to the hospital    Follow up with cardiology, urology and PCP within a week  Continue cefpodoxime 200mg two times daily for 6 days  Take one pill tonight when you get home to finish today's doses  Use 2L oxygen at night when you go to bed  Use nystatin solution four times a day for 14 days for thrush  Be sure to swish the medication in your mouth and then spit it out  Do not wear diapers, as prolonged usage can cause recurrent infections to occur

## 2023-02-23 NOTE — ASSESSMENT & PLAN NOTE
· Troponin elevated on admission  · No significant EKG change  · Patient remained asymptomatic  · Secondary to infection most likely  · Troponin improved from 2 to 4 hours  · Continue to monitor  · Echo: Estimated ejection fraction 60 to 65%  No regional wall motion abnormality identified  Increased end-diastolic pressures estimated with mild diastolic relaxation abnormality    · Troponin continued to trend down

## 2023-02-23 NOTE — PLAN OF CARE
Problem: MOBILITY - ADULT  Goal: Maintain or return to baseline ADL function  Description: INTERVENTIONS:  -  Assess patient's ability to carry out ADLs; assess patient's baseline for ADL function and identify physical deficits which impact ability to perform ADLs (bathing, care of mouth/teeth, toileting, grooming, dressing, etc )  - Assess/evaluate cause of self-care deficits   - Assess range of motion  - Assess patient's mobility; develop plan if impaired  - Assess patient's need for assistive devices and provide as appropriate  - Encourage maximum independence but intervene and supervise when necessary  - Involve family in performance of ADLs  - Assess for home care needs following discharge   - Consider OT consult to assist with ADL evaluation and planning for discharge  - Provide patient education as appropriate  Outcome: Progressing  Goal: Maintains/Returns to pre admission functional level  Description: INTERVENTIONS:  - Perform BMAT or MOVE assessment daily    - Set and communicate daily mobility goal to care team and patient/family/caregiver     - Collaborate with rehabilitation services on mobility goals if consulted    - Out of bed for toileting  - Record patient progress and toleration of activity level   Outcome: Progressing     Problem: PAIN - ADULT  Goal: Verbalizes/displays adequate comfort level or baseline comfort level  Description: Interventions:  - Encourage patient to monitor pain and request assistance  - Assess pain using appropriate pain scale  - Administer analgesics based on type and severity of pain and evaluate response  - Implement non-pharmacological measures as appropriate and evaluate response  - Consider cultural and social influences on pain and pain management  - Notify physician/advanced practitioner if interventions unsuccessful or patient reports new pain  Outcome: Progressing     Problem: INFECTION - ADULT  Goal: Absence or prevention of progression during hospitalization  Description: INTERVENTIONS:  - Assess and monitor for signs and symptoms of infection  - Monitor lab/diagnostic results  - Monitor all insertion sites, i e  indwelling lines, tubes, and drains  - Monitor endotracheal if appropriate and nasal secretions for changes in amount and color  - [place] appropriate cooling/warming therapies per order  - Administer medications as ordered  - Instruct and encourage patient and family to use good hand hygiene technique  - Identify and instruct in appropriate isolation precautions for identified infection/condition  Outcome: Progressing     Problem: SAFETY ADULT  Goal: Maintain or return to baseline ADL function  Description: INTERVENTIONS:  -  Assess patient's ability to carry out ADLs; assess patient's baseline for ADL function and identify physical deficits which impact ability to perform ADLs (bathing, care of mouth/teeth, toileting, grooming, dressing, etc )  - Assess/evaluate cause of self-care deficits   - Assess range of motion  - Assess patient's mobility; develop plan if impaired  - Assess patient's need for assistive devices and provide as appropriate  - Encourage maximum independence but intervene and supervise when necessary  - Involve family in performance of ADLs  - Assess for home care needs following discharge   - Consider OT consult to assist with ADL evaluation and planning for discharge  - Provide patient education as appropriate  Outcome: Progressing  Goal: Maintains/Returns to pre admission functional level  Description: INTERVENTIONS:  - Perform BMAT or MOVE assessment daily    - Set and communicate daily mobility goal to care team and patient/family/caregiver     - Collaborate with rehabilitation services on mobility goals if consulted    - Out of bed for toileting  - Record patient progress and toleration of activity level   Outcome: Progressing  Goal: Patient will remain free of falls  Description: INTERVENTIONS:  - Educate patient/family on patient safety including physical limitations  - Instruct patient to call for assistance with activity   - Consult OT/PT to assist with strengthening/mobility   - Keep Call bell within reach  - Keep bed low and locked with side rails adjusted as appropriate  - Keep care items and personal belongings within reach  - Initiate and maintain comfort rounds  - Make Fall Risk Sign visible to staff    - Apply yellow socks and bracelet for high fall risk patients  - Consider moving patient to room near nurses station  Outcome: Progressing     Problem: DISCHARGE PLANNING  Goal: Discharge to home or other facility with appropriate resources  Description: INTERVENTIONS:  - Identify barriers to discharge w/patient and caregiver  - Arrange for needed discharge resources and transportation as appropriate  - Identify discharge learning needs (meds, wound care, etc )  - Arrange for interpretive services to assist at discharge as needed  - Refer to Case Management Department for coordinating discharge planning if the patient needs post-hospital services based on physician/advanced practitioner order or complex needs related to functional status, cognitive ability, or social support system  Outcome: Progressing     Problem: Knowledge Deficit  Goal: Patient/family/caregiver demonstrates understanding of disease process, treatment plan, medications, and discharge instructions  Description: Complete learning assessment and assess knowledge base    Interventions:  - Provide teaching at level of understanding  - Provide teaching via preferred learning methods  Outcome: Progressing     Problem: NEUROSENSORY - ADULT  Goal: Achieves stable or improved neurological status  Description: INTERVENTIONS  - Monitor and report changes in neurological status  - Monitor vital signs such as temperature, blood pressure, glucose, and any other labs ordered   - Initiate measures to prevent increased intracranial pressure  - Monitor for seizure activity and implement precautions if appropriate      Outcome: Progressing     Problem: Prexisting or High Potential for Compromised Skin Integrity  Goal: Skin integrity is maintained or improved  Description: INTERVENTIONS:  - Identify patients at risk for skin breakdown  - Assess and monitor skin integrity  - Assess and monitor nutrition and hydration status  - Monitor labs   - Assess for incontinence   - Turn and reposition patient  - Assist with mobility/ambulation  - Relieve pressure over bony prominences  - Avoid friction and shearing  - Provide appropriate hygiene as needed including keeping skin clean and dry  - Evaluate need for skin moisturizer/barrier cream  - Collaborate with interdisciplinary team   - Patient/family teaching  - Consider wound care consult   Outcome: Progressing

## 2023-02-23 NOTE — PLAN OF CARE
Problem: MOBILITY - ADULT  Goal: Maintain or return to baseline ADL function  Description: INTERVENTIONS:  -  Assess patient's ability to carry out ADLs; assess patient's baseline for ADL function and identify physical deficits which impact ability to perform ADLs (bathing, care of mouth/teeth, toileting, grooming, dressing, etc )  - Assess/evaluate cause of self-care deficits   - Assess range of motion  - Assess patient's mobility; develop plan if impaired  - Assess patient's need for assistive devices and provide as appropriate  - Encourage maximum independence but intervene and supervise when necessary  - Involve family in performance of ADLs  - Assess for home care needs following discharge   - Consider OT consult to assist with ADL evaluation and planning for discharge  - Provide patient education as appropriate  Outcome: Progressing  Goal: Maintains/Returns to pre admission functional level  Description: INTERVENTIONS:  - Perform BMAT or MOVE assessment daily    - Set and communicate daily mobility goal to care team and patient/family/caregiver     - Collaborate with rehabilitation services on mobility goals if consulted  - Stand patient 3 times a day  - Ambulate patient 3 times a day  - Out of bed to chair 3 times a day   - Out of bed for meals 3 times a day  - Out of bed for toileting  - Record patient progress and toleration of activity level   Outcome: Progressing     Problem: PAIN - ADULT  Goal: Verbalizes/displays adequate comfort level or baseline comfort level  Description: Interventions:  - Encourage patient to monitor pain and request assistance  - Assess pain using appropriate pain scale  - Administer analgesics based on type and severity of pain and evaluate response  - Implement non-pharmacological measures as appropriate and evaluate response  - Consider cultural and social influences on pain and pain management  - Notify physician/advanced practitioner if interventions unsuccessful or patient reports new pain  Outcome: Progressing     Problem: INFECTION - ADULT  Goal: Absence or prevention of progression during hospitalization  Description: INTERVENTIONS:  - Assess and monitor for signs and symptoms of infection  - Monitor lab/diagnostic results  - Monitor all insertion sites, i e  indwelling lines, tubes, and drains  - Monitor endotracheal if appropriate and nasal secretions for changes in amount and color  - [place] appropriate cooling/warming therapies per order  - Administer medications as ordered  - Instruct and encourage patient and family to use good hand hygiene technique  - Identify and instruct in appropriate isolation precautions for identified infection/condition  Outcome: Progressing     Problem: SAFETY ADULT  Goal: Maintain or return to baseline ADL function  Description: INTERVENTIONS:  -  Assess patient's ability to carry out ADLs; assess patient's baseline for ADL function and identify physical deficits which impact ability to perform ADLs (bathing, care of mouth/teeth, toileting, grooming, dressing, etc )  - Assess/evaluate cause of self-care deficits   - Assess range of motion  - Assess patient's mobility; develop plan if impaired  - Assess patient's need for assistive devices and provide as appropriate  - Encourage maximum independence but intervene and supervise when necessary  - Involve family in performance of ADLs  - Assess for home care needs following discharge   - Consider OT consult to assist with ADL evaluation and planning for discharge  - Provide patient education as appropriate  Outcome: Progressing  Goal: Maintains/Returns to pre admission functional level  Description: INTERVENTIONS:  - Perform BMAT or MOVE assessment daily    - Set and communicate daily mobility goal to care team and patient/family/caregiver     - Collaborate with rehabilitation services on mobility goals if consulted  - Ambulate patient 3 times a day  - Out of bed to chair 3 times a day   - Out of bed for meals 3 times a day  - Out of bed for toileting  - Record patient progress and toleration of activity level   Outcome: Progressing  Goal: Patient will remain free of falls  Description: INTERVENTIONS:  - Educate patient/family on patient safety including physical limitations  - Instruct patient to call for assistance with activity   - Consult OT/PT to assist with strengthening/mobility   - Keep Call bell within reach  - Keep bed low and locked with side rails adjusted as appropriate  - Keep care items and personal belongings within reach  - Initiate and maintain comfort rounds  - Make Fall Risk Sign visible to staff  - Offer Toileting every 2 Hours, in advance of need  - Initiate/Maintain bed alarm  - Apply yellow socks and bracelet for high fall risk patients  - Consider moving patient to room near nurses station  Outcome: Progressing     Problem: DISCHARGE PLANNING  Goal: Discharge to home or other facility with appropriate resources  Description: INTERVENTIONS:  - Identify barriers to discharge w/patient and caregiver  - Arrange for needed discharge resources and transportation as appropriate  - Identify discharge learning needs (meds, wound care, etc )  - Arrange for interpretive services to assist at discharge as needed  - Refer to Case Management Department for coordinating discharge planning if the patient needs post-hospital services based on physician/advanced practitioner order or complex needs related to functional status, cognitive ability, or social support system  Outcome: Progressing     Problem: Knowledge Deficit  Goal: Patient/family/caregiver demonstrates understanding of disease process, treatment plan, medications, and discharge instructions  Description: Complete learning assessment and assess knowledge base    Interventions:  - Provide teaching at level of understanding  - Provide teaching via preferred learning methods  Outcome: Progressing     Problem: NEUROSENSORY - ADULT  Goal: Achieves stable or improved neurological status  Description: INTERVENTIONS  - Monitor and report changes in neurological status  - Monitor vital signs such as temperature, blood pressure, glucose, and any other labs ordered   - Initiate measures to prevent increased intracranial pressure  - Monitor for seizure activity and implement precautions if appropriate      Outcome: Progressing     Problem: Prexisting or High Potential for Compromised Skin Integrity  Goal: Skin integrity is maintained or improved  Description: INTERVENTIONS:  - Identify patients at risk for skin breakdown  - Assess and monitor skin integrity  - Assess and monitor nutrition and hydration status  - Monitor labs   - Assess for incontinence   - Turn and reposition patient  - Assist with mobility/ambulation  - Relieve pressure over bony prominences  - Avoid friction and shearing  - Provide appropriate hygiene as needed including keeping skin clean and dry  - Evaluate need for skin moisturizer/barrier cream  - Collaborate with interdisciplinary team   - Patient/family teaching  - Consider wound care consult   Outcome: Progressing

## 2023-02-23 NOTE — ASSESSMENT & PLAN NOTE
· Patient had history of uterine prolapse and since then she is having urinary incontinence  · Continue home Detrol  · Urology referral placed on discharge

## 2023-02-23 NOTE — CASE MANAGEMENT
Case Management Discharge Planning Note    Patient name [facility]  Location /-01 MRN [id number]  : 1940 Date 2023       Current Admission Date: 2023  Current Admission Diagnosis:Pyelonephritis   Patient Active Problem List    Diagnosis Date Noted   • Oral thrush 2023   • Pyelonephritis 2023   • Nonischemic nontraumatic myocardial injury 2023   • Urinary incontinence 2023   • Calculus of gallbladder without cholecystitis without obstruction 2023   • Loss of appetite 2023   • Acute bronchitis 2022   • Coronary artery disease involving native coronary artery of native heart 2022   • COVID 2022   • Essential hypertension 2022   • [facility] 2022   • Arthritis 2022   • GERD (gastroesophageal reflux disease) 2022   • Fatty liver 2022   • Generalized weakness 2022   • Stage 3b chronic kidney disease ([facility] ) 2022   • Prolonged Q-T interval on ECG 2022      LOS (days): 4  Geometric Mean LOS (GMLOS) (days): 3 80  Days to GMLOS:-0 1     OBJECTIVE:  Risk of Unplanned Readmission Score: 16         Current admission status: Inpatient   Preferred Pharmacy:   [address] [place] [address] #[id number] [address] [address]  Phone: [phone] Fax: 750 [address] [address]  Phone: [phone] Fax: [phone]    [id number] [address]  Phone: [phone] Fax: [phone]    Primary Care Provider: Ibis Patel DO    Primary Insurance: MEDICARE  Secondary Insurance:     DISCHARGE DETAILS:     AVS sent to [facility]

## 2023-02-23 NOTE — DISCHARGE SUMMARY
[address]  Discharge- Mariana Stern 1940, [de-identified] y o  female MRN: [id number]  Unit/Bed#: -01 Encounter: [id number]  Primary Care Provider: Mitch Seals DO   Date and time admitted to hospital: 2/19/2023 12:52 PM    * Pyelonephritis  Assessment & Plan  · CT abdomen and pelvis showed: Mild asymmetric left perinephric edema/stranding, new from prior study  Correlate clinically for infection   No hydronephrosis   Evaluation of the renal parenchyma is limited without IV contrast  Mild left renal atrophy with cortical scarring/lobulation, multiple cortical cysts and/or calyceal diverticula as well as scattered which could be dystrophic or represent nonobstructing calculi  · Daughter states that patient often will put a diaper on in the morning and wear for hours on and that this is her third UTI in a few months due to this  Educated patient on hygiene and diaper cessation  · Started on IV ceftriaxone, will adjust pending urine culture  · UA: moderate leukocytes and positive nitrites; microscopic: 30-50 WBC and moderate bacteria  · Urine culture with gram negative rods - final culture with e coli  · Spoke with ID and they recommend to continue cefpodoxime 200 mg for 6 more days to conclude the 10 day course of antibiotics    Oral thrush  Assessment & Plan  · Patient states that she gets recurrent thrush  · Likely due to poor oral hygiene  · States that her tongue feels big and hurts when she rubs it on the roof of her mouth   Denies sore throat  · Continue nystatin suspension for 14 days    Loss of appetite  Assessment & Plan  · Multifactorial-hiatal hernia, elevated diaphragm, cholelithiasis, acute infection  · Appetite continued to improve    Calculus of gallbladder without cholecystitis without obstruction  Assessment & Plan  · Asymptomatic  · Conservative management    Urinary incontinence  Assessment & Plan  · Patient had history of uterine prolapse and since then she is having urinary incontinence  · Continue home Detrol  · Urology referral placed on discharge    Nonischemic nontraumatic myocardial injury  Assessment & Plan  · Troponin elevated on admission  · No significant EKG change  · Patient remained asymptomatic  · Secondary to infection most likely  · Troponin improved from 2 to 4 hours  · Continue to monitor  · Echo: Estimated ejection fraction 60 to 65%  No regional wall motion abnormality identified  Increased end-diastolic pressures estimated with mild diastolic relaxation abnormality  · Troponin continued to trend down    Coronary artery disease involving native coronary artery of native heart  Assessment & Plan  · Patient states that she has a history of heart attack with 4 stents placed  · Patient states that she used to follow-up with [facility] cardiology, but has not in a few years  · Ambulatory referral to cardiology placed on discharge    Stage 3b chronic kidney disease [facility])  Assessment & Plan  Lab Results   Component Value Date    EGFR 50 02/23/2023    EGFR 49 02/22/2023    EGFR 39 02/21/2023    CREATININE 1 05 02/23/2023    CREATININE 1 06 02/22/2023    CREATININE 1 28 02/21/2023   · Home blood pressure medication is bisoprolol-HCTZ; baseline creatinine 1 3-1 45  · Continue lisinopril and bisoprolol-HCTZ on discharge    COPD (chronic obstructive pulmonary disease) (HCC)  Assessment & Plan  · Per patient, she feels dyspneic on exertion  · With history of COPD and was prescribed Duo-Nebs prn outpatient by PCP  · Patient does have hiatal hernia and elevated diaphragm, which can also contribute  · Echocardiogram with results above  · Was placed on oxygen overnight due to pulse ox showing 79%, placed on 2L NC  Seen and examined and patient is off oxygen and saturating around 97%  · Still on room air     · Overnight oxygen study showing patient needs 2 L at bedtime  · Continue with albuterol prn    Acute metabolic encephalopathy-resolved as of 2/22/2023  Assessment & Plan  · As per daughter, last night patient woke up in the middle of the night and yelling and patient was found in the corner of the room and patient thought she was in the food-as per daughter, right next to her [place] tree  Typically with these symptoms when having infection  · Secondary to pyelonephritis  · CT head negative  · COVID-negative  · 1/2 blood cultures positive for gram negative rods, second blood culture with no growth at 24 hours, possible contaminant  · Continue underlying treatment for pyelonephritis  · Mental status improved since usage of antibiotics  · PT/OT: home with home health  · Appears to be back at baseline mental status, monitor  Resolved  Medical Problems     Resolved Problems  Date Reviewed: 2/23/2023          Resolved    Acute metabolic encephalopathy 9/33/7203     Resolved by  Marcos Carballo PA-C        Discharging Physician / Practitioner: Chi Walker PA-C  PCP: Ibis Patel DO  Admission Date:   Admission Orders (From admission, onward)     Ordered        02/19/23 [address]  Once                      Discharge Date: 02/23/23    Consultations During Hospital Stay:  · Case management    Procedures Performed:   · None    Significant Findings / Test Results:   · Chest x-ray negative  · CT head showing no acute intracranial abnormalities  Chronic microangiopathic changes  · CT abdomen and pelvis showing mild asymmetric left perinephric edema, new from prior study  No hydronephrosis  Mild left renal atrophy with cortical scarring, multiple cortical cysts and calyceal diverticula as well as scattered which could be dystrophic or represent nonobstructing calculi  Cholelithiasis also seen  Elevation of the right diaphragm, large hiatal hernia and mild ascending colon diverticulosis    Incidental Findings:   · None    Test Results Pending at Discharge (will require follow up):    · None     Outpatient Tests Requested:  · None    Complications: None    Reason for Admission: Acute pyelonephritis and acute metabolic encephalopathy    Hospital Course:   Lalit Olson is a [de-identified] y o  female patient who originally presented to the hospital on 2/19/2023 due to altered mental status  Patient was found to have acute pyelonephritis and was treated with IV antibiotics  This resulted in improvement of symptoms, including mentation  Patient is to continue oral antibiotics on discharge  Patient was also found to have poor kidney function on admission  Patient was treated with IV hydration which resulted in improvement of creatinine  Patient also recommended to follow-up with multiple providers on discharge  Please see above list of diagnoses and related plan for additional information  Condition at Discharge: good    Discharge Day Visit / Exam:   Subjective: Patient states that she is feeling well today  Denies any abdominal pain, dysuria, nausea, vomiting, confusion, chest pain, shortness of breath on discharge  Patient states that she would like to follow-up with cardiology due to extensive cardiac history  Patient also states that she feels like she is starting to get thrush, which she often gets  Vitals: Blood Pressure: 160/83 (02/23/23 0637)  Pulse: 66 (02/23/23 0637)  Temperature: (!) 97 3 °F (36 3 °C) (02/23/23 0637)  Temp Source: Temporal (02/21/23 2313)  Respirations: 18 (02/23/23 0985)  Height: 5' (152 4 cm) (02/20/23 0936)  Weight - Scale: 59 9 kg (132 lb 0 9 oz) (02/23/23 0600)  SpO2: 96 % (02/23/23 0840)  Exam:   Physical Exam  Vitals reviewed  Constitutional:       General: She is not in acute distress  Appearance: Normal appearance  She is normal weight  She is not ill-appearing  HENT:      Head: Normocephalic and atraumatic  Nose: Nose normal       Mouth/Throat:      Mouth: Mucous membranes are moist       Pharynx: Oropharynx is clear        Comments: Patchiness seen on tongue  Eyes: Extraocular Movements: Extraocular movements intact  Conjunctiva/sclera: Conjunctivae normal    Cardiovascular:      Rate and Rhythm: Normal rate and regular rhythm  Pulses: Normal pulses  Heart sounds: Normal heart sounds  No murmur heard  Pulmonary:      Effort: Pulmonary effort is normal  No respiratory distress  Breath sounds: No wheezing  Abdominal:      General: Abdomen is flat  Bowel sounds are normal  There is no distension  Palpations: Abdomen is soft  Tenderness: There is no abdominal tenderness  There is no guarding  Musculoskeletal:         General: Normal range of motion  Cervical back: Normal range of motion  Right lower leg: No edema  Left lower leg: No edema  Skin:     General: Skin is warm  Neurological:      General: No focal deficit present  Mental Status: She is alert and oriented to person, place, and time  Mental status is at baseline  Motor: No weakness  Psychiatric:         Mood and Affect: Mood normal          Behavior: Behavior normal          Thought Content: Thought content normal          Judgment: Judgment normal           Discussion with Family: Updated  (daughter) via phone  Discharge instructions/Information to patient and family:   See after visit summary for information provided to patient and family  Provisions for Follow-Up Care:  See after visit summary for information related to follow-up care and any pertinent home health orders  Disposition:   Home with [facility] (Reminder: Complete face to face encounter)    Planned Readmission: None     Discharge Statement:  I spent 45 minutes discharging the patient  This time was spent on the day of discharge  I had direct contact with the patient on the day of discharge   Greater than 50% of the total time was spent examining patient, answering all patient questions, arranging and discussing plan of care with patient as well as directly providing post-discharge instructions  Additional time then spent on discharge activities  Discharge Medications:  See after visit summary for reconciled discharge medications provided to patient and/or family        **Please Note: This note may have been constructed using a voice recognition system**

## 2023-02-24 LAB — BACTERIA BLD CULT: NORMAL

## 2023-02-25 LAB
BACTERIA BLD CULT: NORMAL
BACTERIA BLD CULT: NORMAL

## 2023-04-24 PROBLEM — N12 PYELONEPHRITIS: Status: RESOLVED | Noted: 2023-02-19 | Resolved: 2023-04-24

## 2024-05-09 ENCOUNTER — APPOINTMENT (EMERGENCY)
Dept: CT IMAGING | Facility: HOSPITAL | Age: 84
DRG: 444 | End: 2024-05-09
Payer: MEDICARE

## 2024-05-09 ENCOUNTER — APPOINTMENT (EMERGENCY)
Dept: ULTRASOUND IMAGING | Facility: HOSPITAL | Age: 84
DRG: 444 | End: 2024-05-09
Payer: MEDICARE

## 2024-05-09 ENCOUNTER — HOSPITAL ENCOUNTER (INPATIENT)
Facility: HOSPITAL | Age: 84
LOS: 5 days | Discharge: HOME HEALTH | DRG: 444 | End: 2024-05-14
Attending: EMERGENCY MEDICINE | Admitting: FAMILY MEDICINE
Payer: MEDICARE

## 2024-05-09 DIAGNOSIS — N39.0 UTI (URINARY TRACT INFECTION): Primary | ICD-10-CM

## 2024-05-09 DIAGNOSIS — R79.89 ELEVATED LFTS: ICD-10-CM

## 2024-05-09 DIAGNOSIS — K80.20 CHOLELITHIASIS: ICD-10-CM

## 2024-05-09 DIAGNOSIS — R74.01 ELEVATION OF LEVELS OF LIVER TRANSAMINASE LEVELS: ICD-10-CM

## 2024-05-09 DIAGNOSIS — R74.01 ELEVATED TRANSAMINASE LEVEL: ICD-10-CM

## 2024-05-09 DIAGNOSIS — J44.1 COPD EXACERBATION (HCC): ICD-10-CM

## 2024-05-09 PROBLEM — J96.01 ACUTE RESPIRATORY FAILURE WITH HYPOXIA (HCC): Status: ACTIVE | Noted: 2024-05-09

## 2024-05-09 PROBLEM — D72.829 LEUKOCYTOSIS: Status: ACTIVE | Noted: 2024-05-09

## 2024-05-09 PROBLEM — K59.00 CONSTIPATION: Status: ACTIVE | Noted: 2024-05-09

## 2024-05-09 LAB
2HR DELTA HS TROPONIN: 32 NG/L
4HR DELTA HS TROPONIN: 75 NG/L
ALBUMIN SERPL BCP-MCNC: 3.8 G/DL (ref 3.5–5)
ALP SERPL-CCNC: 302 U/L (ref 34–104)
ALT SERPL W P-5'-P-CCNC: 534 U/L (ref 7–52)
ANION GAP SERPL CALCULATED.3IONS-SCNC: 10 MMOL/L (ref 4–13)
APTT PPP: 22 SECONDS (ref 23–37)
AST SERPL W P-5'-P-CCNC: 727 U/L (ref 13–39)
ATRIAL RATE: 73 BPM
BACTERIA UR QL AUTO: ABNORMAL /HPF
BASOPHILS # BLD MANUAL: 0 THOUSAND/UL (ref 0–0.1)
BASOPHILS NFR MAR MANUAL: 0 % (ref 0–1)
BILIRUB SERPL-MCNC: 3.33 MG/DL (ref 0.2–1)
BILIRUB UR QL STRIP: ABNORMAL
BUN SERPL-MCNC: 34 MG/DL (ref 5–25)
CALCIUM SERPL-MCNC: 9.2 MG/DL (ref 8.4–10.2)
CARDIAC TROPONIN I PNL SERPL HS: 172 NG/L
CARDIAC TROPONIN I PNL SERPL HS: 204 NG/L
CARDIAC TROPONIN I PNL SERPL HS: 247 NG/L
CHLORIDE SERPL-SCNC: 101 MMOL/L (ref 96–108)
CLARITY UR: ABNORMAL
CO2 SERPL-SCNC: 26 MMOL/L (ref 21–32)
COLOR UR: ABNORMAL
CREAT SERPL-MCNC: 1.64 MG/DL (ref 0.6–1.3)
EOSINOPHIL # BLD MANUAL: 0 THOUSAND/UL (ref 0–0.4)
EOSINOPHIL NFR BLD MANUAL: 0 % (ref 0–6)
ERYTHROCYTE [DISTWIDTH] IN BLOOD BY AUTOMATED COUNT: 14.4 % (ref 11.6–15.1)
FLUAV RNA RESP QL NAA+PROBE: NEGATIVE
FLUBV RNA RESP QL NAA+PROBE: NEGATIVE
GFR SERPL CREATININE-BSD FRML MDRD: 29 ML/MIN/1.73SQ M
GLUCOSE SERPL-MCNC: 124 MG/DL (ref 65–140)
GLUCOSE UR STRIP-MCNC: NEGATIVE MG/DL
HCT VFR BLD AUTO: 39.1 % (ref 34.8–46.1)
HGB BLD-MCNC: 12.4 G/DL (ref 11.5–15.4)
HGB UR QL STRIP.AUTO: NEGATIVE
INR PPP: 1.07 (ref 0.84–1.19)
KETONES UR STRIP-MCNC: ABNORMAL MG/DL
LACTATE SERPL-SCNC: 1.9 MMOL/L (ref 0.5–2)
LEUKOCYTE ESTERASE UR QL STRIP: ABNORMAL
LG PLATELETS BLD QL SMEAR: PRESENT
LYMPHOCYTES # BLD AUTO: 0.38 THOUSAND/UL (ref 0.6–4.47)
LYMPHOCYTES # BLD AUTO: 2 % (ref 14–44)
MACROCYTES BLD QL AUTO: PRESENT
MCH RBC QN AUTO: 31.6 PG (ref 26.8–34.3)
MCHC RBC AUTO-ENTMCNC: 31.7 G/DL (ref 31.4–37.4)
MCV RBC AUTO: 100 FL (ref 82–98)
MONOCYTES # BLD AUTO: 0.19 THOUSAND/UL (ref 0–1.22)
MONOCYTES NFR BLD: 1 % (ref 4–12)
NEUTROPHILS # BLD MANUAL: 18.48 THOUSAND/UL (ref 1.85–7.62)
NEUTS BAND NFR BLD MANUAL: 1 % (ref 0–8)
NEUTS SEG NFR BLD AUTO: 96 % (ref 43–75)
NITRITE UR QL STRIP: POSITIVE
NON-SQ EPI CELLS URNS QL MICRO: ABNORMAL /HPF
P AXIS: 39 DEGREES
PH UR STRIP.AUTO: 6.5 [PH]
PLATELET # BLD AUTO: 242 THOUSANDS/UL (ref 149–390)
PLATELET BLD QL SMEAR: ADEQUATE
PMV BLD AUTO: 9.5 FL (ref 8.9–12.7)
POTASSIUM SERPL-SCNC: 5 MMOL/L (ref 3.5–5.3)
PR INTERVAL: 218 MS
PROCALCITONIN SERPL-MCNC: 7.19 NG/ML
PROT SERPL-MCNC: 6.9 G/DL (ref 6.4–8.4)
PROT UR STRIP-MCNC: ABNORMAL MG/DL
PROTHROMBIN TIME: 14.2 SECONDS (ref 11.6–14.5)
QRS AXIS: -69 DEGREES
QRSD INTERVAL: 140 MS
QT INTERVAL: 424 MS
QTC INTERVAL: 467 MS
RBC # BLD AUTO: 3.93 MILLION/UL (ref 3.81–5.12)
RBC #/AREA URNS AUTO: ABNORMAL /HPF
RBC MORPH BLD: PRESENT
RSV RNA RESP QL NAA+PROBE: NEGATIVE
SARS-COV-2 RNA RESP QL NAA+PROBE: NEGATIVE
SODIUM SERPL-SCNC: 137 MMOL/L (ref 135–147)
SP GR UR STRIP.AUTO: 1.02 (ref 1–1.03)
T WAVE AXIS: 110 DEGREES
UROBILINOGEN UR QL STRIP.AUTO: 4 E.U./DL
VENTRICULAR RATE: 73 BPM
WBC # BLD AUTO: 19.05 THOUSAND/UL (ref 4.31–10.16)
WBC #/AREA URNS AUTO: ABNORMAL /HPF

## 2024-05-09 PROCEDURE — 81001 URINALYSIS AUTO W/SCOPE: CPT | Performed by: PHYSICIAN ASSISTANT

## 2024-05-09 PROCEDURE — 87077 CULTURE AEROBIC IDENTIFY: CPT | Performed by: PHYSICIAN ASSISTANT

## 2024-05-09 PROCEDURE — 84484 ASSAY OF TROPONIN QUANT: CPT | Performed by: PHYSICIAN ASSISTANT

## 2024-05-09 PROCEDURE — 0241U HB NFCT DS VIR RESP RNA 4 TRGT: CPT | Performed by: PHYSICIAN ASSISTANT

## 2024-05-09 PROCEDURE — 93005 ELECTROCARDIOGRAM TRACING: CPT

## 2024-05-09 PROCEDURE — 99284 EMERGENCY DEPT VISIT MOD MDM: CPT

## 2024-05-09 PROCEDURE — 36415 COLL VENOUS BLD VENIPUNCTURE: CPT | Performed by: PHYSICIAN ASSISTANT

## 2024-05-09 PROCEDURE — 94640 AIRWAY INHALATION TREATMENT: CPT

## 2024-05-09 PROCEDURE — 96365 THER/PROPH/DIAG IV INF INIT: CPT

## 2024-05-09 PROCEDURE — 85027 COMPLETE CBC AUTOMATED: CPT | Performed by: PHYSICIAN ASSISTANT

## 2024-05-09 PROCEDURE — 84145 PROCALCITONIN (PCT): CPT | Performed by: PHYSICIAN ASSISTANT

## 2024-05-09 PROCEDURE — 71250 CT THORAX DX C-: CPT

## 2024-05-09 PROCEDURE — 74176 CT ABD & PELVIS W/O CONTRAST: CPT

## 2024-05-09 PROCEDURE — 93010 ELECTROCARDIOGRAM REPORT: CPT | Performed by: INTERNAL MEDICINE

## 2024-05-09 PROCEDURE — 87040 BLOOD CULTURE FOR BACTERIA: CPT | Performed by: PHYSICIAN ASSISTANT

## 2024-05-09 PROCEDURE — 85007 BL SMEAR W/DIFF WBC COUNT: CPT | Performed by: PHYSICIAN ASSISTANT

## 2024-05-09 PROCEDURE — 80053 COMPREHEN METABOLIC PANEL: CPT | Performed by: PHYSICIAN ASSISTANT

## 2024-05-09 PROCEDURE — 99223 1ST HOSP IP/OBS HIGH 75: CPT | Performed by: NURSE PRACTITIONER

## 2024-05-09 PROCEDURE — 85610 PROTHROMBIN TIME: CPT | Performed by: PHYSICIAN ASSISTANT

## 2024-05-09 PROCEDURE — 99285 EMERGENCY DEPT VISIT HI MDM: CPT | Performed by: PHYSICIAN ASSISTANT

## 2024-05-09 PROCEDURE — 96361 HYDRATE IV INFUSION ADD-ON: CPT

## 2024-05-09 PROCEDURE — 87154 CUL TYP ID BLD PTHGN 6+ TRGT: CPT | Performed by: PHYSICIAN ASSISTANT

## 2024-05-09 PROCEDURE — 76705 ECHO EXAM OF ABDOMEN: CPT

## 2024-05-09 PROCEDURE — 87086 URINE CULTURE/COLONY COUNT: CPT | Performed by: PHYSICIAN ASSISTANT

## 2024-05-09 PROCEDURE — 85730 THROMBOPLASTIN TIME PARTIAL: CPT | Performed by: PHYSICIAN ASSISTANT

## 2024-05-09 PROCEDURE — 83605 ASSAY OF LACTIC ACID: CPT | Performed by: PHYSICIAN ASSISTANT

## 2024-05-09 PROCEDURE — 87186 SC STD MICRODIL/AGAR DIL: CPT | Performed by: PHYSICIAN ASSISTANT

## 2024-05-09 RX ORDER — CEFTRIAXONE 1 G/50ML
1000 INJECTION, SOLUTION INTRAVENOUS ONCE
Status: COMPLETED | OUTPATIENT
Start: 2024-05-09 | End: 2024-05-09

## 2024-05-09 RX ORDER — CEFTRIAXONE 1 G/50ML
1000 INJECTION, SOLUTION INTRAVENOUS EVERY 24 HOURS
Status: DISCONTINUED | OUTPATIENT
Start: 2024-05-10 | End: 2024-05-12

## 2024-05-09 RX ORDER — DOCUSATE SODIUM 100 MG/1
100 CAPSULE, LIQUID FILLED ORAL 2 TIMES DAILY
Status: DISCONTINUED | OUTPATIENT
Start: 2024-05-09 | End: 2024-05-14 | Stop reason: HOSPADM

## 2024-05-09 RX ORDER — DULOXETIN HYDROCHLORIDE 30 MG/1
30 CAPSULE, DELAYED RELEASE ORAL DAILY
Status: DISCONTINUED | OUTPATIENT
Start: 2024-05-10 | End: 2024-05-14 | Stop reason: HOSPADM

## 2024-05-09 RX ORDER — BISOPROLOL FUMARATE 5 MG/1
2.5 TABLET, FILM COATED ORAL DAILY
Status: DISCONTINUED | OUTPATIENT
Start: 2024-05-10 | End: 2024-05-14 | Stop reason: HOSPADM

## 2024-05-09 RX ORDER — HYDROXYCHLOROQUINE SULFATE 200 MG/1
200 TABLET, FILM COATED ORAL 2 TIMES DAILY WITH MEALS
Status: DISCONTINUED | OUTPATIENT
Start: 2024-05-10 | End: 2024-05-09

## 2024-05-09 RX ORDER — SODIUM CHLORIDE 9 MG/ML
50 INJECTION, SOLUTION INTRAVENOUS CONTINUOUS
Status: DISCONTINUED | OUTPATIENT
Start: 2024-05-09 | End: 2024-05-13

## 2024-05-09 RX ORDER — LISINOPRIL 10 MG/1
10 TABLET ORAL DAILY
Status: DISCONTINUED | OUTPATIENT
Start: 2024-05-10 | End: 2024-05-14 | Stop reason: HOSPADM

## 2024-05-09 RX ORDER — ACETAMINOPHEN 325 MG/1
650 TABLET ORAL ONCE
Status: COMPLETED | OUTPATIENT
Start: 2024-05-09 | End: 2024-05-09

## 2024-05-09 RX ORDER — ALBUTEROL SULFATE 90 UG/1
2 AEROSOL, METERED RESPIRATORY (INHALATION) EVERY 6 HOURS PRN
Status: DISCONTINUED | OUTPATIENT
Start: 2024-05-09 | End: 2024-05-14 | Stop reason: HOSPADM

## 2024-05-09 RX ORDER — HEPARIN SODIUM 5000 [USP'U]/ML
5000 INJECTION, SOLUTION INTRAVENOUS; SUBCUTANEOUS EVERY 8 HOURS SCHEDULED
Status: DISCONTINUED | OUTPATIENT
Start: 2024-05-09 | End: 2024-05-14 | Stop reason: HOSPADM

## 2024-05-09 RX ORDER — IPRATROPIUM BROMIDE AND ALBUTEROL SULFATE 2.5; .5 MG/3ML; MG/3ML
3 SOLUTION RESPIRATORY (INHALATION) ONCE
Status: COMPLETED | OUTPATIENT
Start: 2024-05-09 | End: 2024-05-09

## 2024-05-09 RX ORDER — BISACODYL 10 MG
10 SUPPOSITORY, RECTAL RECTAL ONCE
Status: COMPLETED | OUTPATIENT
Start: 2024-05-09 | End: 2024-05-09

## 2024-05-09 RX ORDER — PANTOPRAZOLE SODIUM 40 MG/1
40 TABLET, DELAYED RELEASE ORAL
Status: DISCONTINUED | OUTPATIENT
Start: 2024-05-10 | End: 2024-05-14 | Stop reason: HOSPADM

## 2024-05-09 RX ORDER — LORATADINE 10 MG/1
10 TABLET ORAL DAILY
Status: DISCONTINUED | OUTPATIENT
Start: 2024-05-10 | End: 2024-05-14 | Stop reason: HOSPADM

## 2024-05-09 RX ORDER — FOLIC ACID 1 MG/1
1 TABLET ORAL DAILY
Status: DISCONTINUED | OUTPATIENT
Start: 2024-05-10 | End: 2024-05-14 | Stop reason: HOSPADM

## 2024-05-09 RX ADMIN — CEFTRIAXONE 1000 MG: 1 INJECTION, SOLUTION INTRAVENOUS at 16:40

## 2024-05-09 RX ADMIN — SODIUM CHLORIDE 1000 ML: 0.9 INJECTION, SOLUTION INTRAVENOUS at 17:38

## 2024-05-09 RX ADMIN — SODIUM CHLORIDE 1000 ML: 0.9 INJECTION, SOLUTION INTRAVENOUS at 17:20

## 2024-05-09 RX ADMIN — ACETAMINOPHEN 325MG 650 MG: 325 TABLET ORAL at 17:20

## 2024-05-09 RX ADMIN — IPRATROPIUM BROMIDE AND ALBUTEROL SULFATE 3 ML: 2.5; .5 SOLUTION RESPIRATORY (INHALATION) at 14:22

## 2024-05-09 RX ADMIN — DOCUSATE SODIUM 100 MG: 100 CAPSULE, LIQUID FILLED ORAL at 20:04

## 2024-05-09 RX ADMIN — SODIUM CHLORIDE 1000 ML: 0.9 INJECTION, SOLUTION INTRAVENOUS at 14:30

## 2024-05-09 RX ADMIN — SODIUM CHLORIDE 75 ML/HR: 0.9 INJECTION, SOLUTION INTRAVENOUS at 20:02

## 2024-05-09 RX ADMIN — HEPARIN SODIUM 5000 UNITS: 5000 INJECTION, SOLUTION INTRAVENOUS; SUBCUTANEOUS at 22:10

## 2024-05-09 RX ADMIN — BISACODYL 10 MG: 10 SUPPOSITORY RECTAL at 20:04

## 2024-05-09 NOTE — ED PROVIDER NOTES
History  Chief Complaint   Patient presents with    Chills     Chills, nausea, abd pain, weakness and headache x2 days.    Nausea     83-year-old female presented to the emergency department for evaluation of nausea, abdominal pain, fevers and chills.  Abdominal pain is generalized. Reports she feels dehydrated and thirsty.  States she has had intermittent headache for the last 2 days.  Denies any visual changes weakness or numbness.  Denies any double vision or loss of vision.  She admits to chronic shortness of breath due to COPD and asthma.  States she does use inhalers at home.  Denies any cough. She denies dysuria, hematuria, or frequency.    1530: I was able to reach patient's daughter via phone.  Daughter states patient only eats junk food and has a very poor appetite.  States she has recently been complaining she cannot drink.  Reports she heard the patient in the bathroom dry heaving today.  reports she seemed more confused than normal but denies any noticeable facial droop.  Denies patient complaining of any numbness.  Denies any speech changes.        Prior to Admission Medications   Prescriptions Last Dose Informant Patient Reported? Taking?   DULoxetine (CYMBALTA) 30 mg delayed release capsule   Yes No   Sig: Take 30 mg by mouth daily   acetaminophen (TYLENOL) 325 mg tablet   No No   Sig: Take 2 tablets (650 mg total) by mouth every 6 (six) hours as needed for mild pain, headaches or fever   albuterol (ProAir HFA) 90 mcg/act inhaler   No No   Sig: Inhale 2 puffs every 6 (six) hours as needed for wheezing   aspirin 325 mg tablet   Yes No   Sig: Take 325 mg by mouth daily   atorvastatin (LIPITOR) 20 mg tablet   Yes No   Sig: Take 20 mg by mouth daily   bisoprolol-hydrochlorothiazide (ZIAC) 2.5-6.25 MG per tablet  Self Yes No   Sig: Take 1 tablet by mouth daily   calcium citrate-vitamin D (CITRACAL+D) 315-200 MG-UNIT per tablet   Yes No   Sig: Take 1 tablet by mouth 2 (two) times a day   docusate sodium  (COLACE) 100 mg capsule   Yes No   Sig: Take 100 mg by mouth 2 (two) times a day   folic acid (FOLVITE) 1 mg tablet   Yes No   Sig: Take by mouth daily   hydroxychloroquine (PLAQUENIL) 200 mg tablet  Self Yes No   Sig: Take 200 mg by mouth 2 (two) times a day with meals   lisinopril (ZESTRIL) 10 mg tablet   Yes No   Sig: Take 10 mg by mouth daily   loratadine (CLARITIN) 10 mg tablet  Self Yes No   Sig: Take 10 mg by mouth daily   methotrexate 2.5 mg tablet  Self Yes No   Sig: Take by mouth once a week   pantoprazole (PROTONIX) 40 mg tablet  Self Yes No   Sig: Take 40 mg by mouth daily   tolterodine (DETROL LA) 4 mg 24 hr capsule  Self Yes No   Sig: Take 4 mg by mouth daily      Facility-Administered Medications: None       Past Medical History:   Diagnosis Date    Arthritis     Asthma     Diabetes mellitus (HCC)     Hypertension     MI, old        Past Surgical History:   Procedure Laterality Date    BACK SURGERY      CARDIAC SURGERY      HYSTERECTOMY         No family history on file.  I have reviewed and agree with the history as documented.    E-Cigarette/Vaping    E-Cigarette Use Never User      E-Cigarette/Vaping Substances    Nicotine No     THC No     CBD No     Flavoring No     Other No     Unknown No      Social History     Tobacco Use    Smoking status: Former    Smokeless tobacco: Never   Vaping Use    Vaping status: Never Used   Substance Use Topics    Alcohol use: Not Currently    Drug use: Not Currently       Review of Systems   Constitutional:  Positive for appetite change, chills and fever.   HENT: Negative.     Respiratory: Negative.     Cardiovascular: Negative.    Gastrointestinal:  Positive for abdominal pain, nausea and vomiting. Negative for constipation and diarrhea.   Genitourinary: Negative.    Musculoskeletal: Negative.    Skin: Negative.    Neurological: Negative.    All other systems reviewed and are negative.      Physical Exam  Physical Exam  Vitals and nursing note reviewed.    Constitutional:       General: She is not in acute distress.     Appearance: Normal appearance. She is ill-appearing. She is not toxic-appearing or diaphoretic.   HENT:      Head: Normocephalic.      Nose: Nose normal.      Mouth/Throat:      Mouth: Mucous membranes are dry.   Eyes:      Conjunctiva/sclera: Conjunctivae normal.   Cardiovascular:      Rate and Rhythm: Normal rate and regular rhythm.   Pulmonary:      Effort: Pulmonary effort is normal.      Breath sounds: Wheezing present. No rhonchi or rales.   Chest:      Chest wall: No tenderness.   Abdominal:      General: Bowel sounds are normal. There is no distension.      Palpations: Abdomen is soft.      Tenderness: There is abdominal tenderness (mild generzlied tenderness). There is no right CVA tenderness or left CVA tenderness.   Musculoskeletal:         General: Normal range of motion.      Right lower leg: No edema.      Left lower leg: No edema.   Skin:     General: Skin is warm and dry.   Neurological:      General: No focal deficit present.      Mental Status: She is alert and oriented to person, place, and time.   Psychiatric:         Mood and Affect: Mood normal.         Vital Signs  ED Triage Vitals [05/09/24 1410]   Temperature Pulse Respirations Blood Pressure SpO2   99.3 °F (37.4 °C) 75 19 121/59 (!) 87 %      Temp Source Heart Rate Source Patient Position - Orthostatic VS BP Location FiO2 (%)   Oral Monitor Sitting Right arm --      Pain Score       No Pain           Vitals:    05/09/24 1609 05/09/24 1700 05/09/24 1812 05/09/24 1852   BP: 132/64 143/68 146/70 130/68   Pulse: 80 82 84    Patient Position - Orthostatic VS: Lying  Lying          Visual Acuity      ED Medications  Medications   sodium chloride 0.9 % bolus 1,000 mL (0 mL Intravenous Stopped 5/9/24 1604)   ipratropium-albuterol (DUO-NEB) 0.5-2.5 mg/3 mL inhalation solution 3 mL (3 mL Nebulization Given 5/9/24 1422)   cefTRIAXone (ROCEPHIN) IVPB (premix in dextrose) 1,000 mg 50 mL  (0 mg Intravenous Stopped 5/9/24 1724)   sodium chloride 0.9 % bolus 1,000 mL (0 mL Intravenous Stopped 5/9/24 1804)   acetaminophen (TYLENOL) tablet 650 mg (650 mg Oral Given 5/9/24 1720)   sodium chloride 0.9 % bolus 1,000 mL (0 mL Intravenous Stopped 5/9/24 1804)       Diagnostic Studies  Results Reviewed       Procedure Component Value Units Date/Time    HS Troponin I 4hr [[id number]]  (Abnormal) Collected: 05/09/24 1817    Lab Status: Final result Specimen: Blood from Arm, Left Updated: 05/09/24 1843     hs TnI 4hr 247 ng/L      Delta 4hr hsTnI 75 ng/L     HS Troponin I 2hr [[id number]]  (Abnormal) Collected: 05/09/24 1609    Lab Status: Final result Specimen: Blood from Arm, Left Updated: 05/09/24 1648     hs TnI 2hr 204 ng/L      Delta 2hr hsTnI 32 ng/L     Urine Microscopic [[id number]]  (Abnormal) Collected: 05/09/24 1613    Lab Status: Final result Specimen: Urine, Clean Catch Updated: 05/09/24 1645     RBC, UA 0-1 /hpf      WBC, UA 30-50 /hpf      Epithelial Cells Occasional /hpf      Bacteria, UA Innumerable /hpf     Urine culture [[id number]] Collected: 05/09/24 1613    Lab Status: In process Specimen: Urine, Clean Catch Updated: 05/09/24 1645    UA w Reflex to Microscopic w Reflex to Culture [[id number]]  (Abnormal) Collected: 05/09/24 1613    Lab Status: Final result Specimen: Urine, Clean Catch Updated: 05/09/24 1629     Color, UA Orange     Clarity, UA Slightly Cloudy     Specific Gravity, UA 1.025     pH, UA 6.5     Leukocytes, UA Small     Nitrite, UA Positive     Protein, UA Trace mg/dl      Glucose, UA Negative mg/dl      Ketones, UA Trace mg/dl      Urobilinogen, UA 4.0 E.U./dl      Bilirubin, UA Small     Occult Blood, UA Negative    Procalcitonin [[id number]]  (Abnormal) Collected: 05/09/24 1415    Lab Status: Final result Specimen: Blood from Arm, Left Updated: 05/09/24 1541     Procalcitonin 7.19 ng/ml     Manual Differential(PHLEBS Do Not Order) [[id number]]  (Abnormal) Collected: 05/09/24 4070     Lab Status: Final result Specimen: Blood from Arm, Left Updated: 05/09/24 1518     Segmented % 96 %      Bands % 1 %      Lymphocytes % 2 %      Monocytes % 1 %      Eosinophils % 0 %      Basophils % 0 %      Absolute Neutrophils 18.48 Thousand/uL      Absolute Lymphocytes 0.38 Thousand/uL      Absolute Monocytes 0.19 Thousand/uL      Absolute Eosinophils 0.00 Thousand/uL      Absolute Basophils 0.00 Thousand/uL      Total Counted --     RBC Morphology Present     Platelet Estimate Adequate     Large Platelet Present     Macrocytes Present    FLU/RSV/COVID - if FLU/RSV clinically relevant [[id number]]  (Normal) Collected: 05/09/24 1415    Lab Status: Final result Specimen: Nares from Nose Updated: 05/09/24 1512     SARS-CoV-2 Negative     INFLUENZA A PCR Negative     INFLUENZA B PCR Negative     RSV PCR Negative    Narrative:      FOR PEDIATRIC PATIENTS - copy/paste COVID Guidelines URL to browser: [email or web address]    SARS-CoV-2 assay is a Nucleic Acid Amplification assay intended for the  qualitative detection of nucleic acid from SARS-CoV-2 in nasopharyngeal  swabs. Results are for the presumptive identification of SARS-CoV-2 RNA.    Positive results are indicative of infection with SARS-CoV-2, the virus  causing COVID-19, but do not rule out bacterial infection or co-infection  with other viruses. Laboratories within the [place] and its  territories are required to report all positive results to the appropriate  public health authorities. Negative results do not preclude SARS-CoV-2  infection and should not be used as the sole basis for treatment or other  patient management decisions. Negative results must be combined with  clinical observations, patient history, and epidemiological information.  This test has not been FDA cleared or approved.    This test has been authorized by FDA under an Emergency Use Authorization  (EUA). This test is only  authorized for the duration of time the  declaration that circumstances exist justifying the authorization of the  emergency use of an in vitro diagnostic tests for detection of SARS-CoV-2  virus and/or diagnosis of COVID-19 infection under section 564(b)(1) of  the Act, 21 U.S.C. 360bbb-3(b)(1), unless the authorization is terminated  or revoked sooner. The test has been validated but independent review by FDA  and CLIA is pending.    Test performed using [employer] GeneXpert: This RT-PCR assay targets N2,  a region unique to SARS-CoV-2. A conserved region in the E-gene was chosen  for pan-Sarbecovirus detection which includes SARS-CoV-2.    According to CMS-2020-01-R, this platform meets the definition of high-throughput technology.    Comprehensive metabolic panel [[id number]]  (Abnormal) Collected: 05/09/24 1415    Lab Status: Final result Specimen: Blood from Arm, Left Updated: 05/09/24 1507     Sodium 137 mmol/L      Potassium 5.0 mmol/L      Chloride 101 mmol/L      CO2 26 mmol/L      ANION GAP 10 mmol/L      BUN 34 mg/dL      Creatinine 1.64 mg/dL      Glucose 124 mg/dL      Calcium 9.2 mg/dL       U/L       U/L      Alkaline Phosphatase 302 U/L      Total Protein 6.9 g/dL      Albumin 3.8 g/dL      Total Bilirubin 3.33 mg/dL      eGFR 29 ml/min/1.73sq m     Narrative:      National Kidney Disease Foundation guidelines for Chronic Kidney Disease (CKD):     Stage 1 with normal or high GFR (GFR > 90 mL/min/1.73 square meters)    Stage 2 Mild CKD (GFR = 60-89 mL/min/1.73 square meters)    Stage 3A Moderate CKD (GFR = 45-59 mL/min/1.73 square meters)    Stage 3B Moderate CKD (GFR = 30-44 mL/min/1.73 square meters)    Stage 4 Severe CKD (GFR = 15-29 mL/min/1.73 square meters)    Stage 5 End Stage CKD (GFR <15 mL/min/1.73 square meters)  Note: GFR calculation is accurate only with a steady state creatinine    HS Troponin 0hr (reflex protocol) [[id number]]  (Abnormal) Collected: 05/09/24 1415    Lab  Status: Final result Specimen: Blood from Arm, Left Updated: 05/09/24 1502     hs TnI 0hr 172 ng/L     Lactic acid, plasma (w/reflex if result > 2.0) [[id number]]  (Normal) Collected: 05/09/24 1415    Lab Status: Final result Specimen: Blood from Arm, Left Updated: 05/09/24 1502     LACTIC ACID 1.9 mmol/L     Narrative:      Result may be elevated if tourniquet was used during collection.    Protime-INR [[id number]]  (Normal) Collected: 05/09/24 1415    Lab Status: Final result Specimen: Blood from Arm, Left Updated: 05/09/24 1450     Protime 14.2 seconds      INR 1.07    APTT [[id number]]  (Abnormal) Collected: 05/09/24 1415    Lab Status: Final result Specimen: Blood from Arm, Left Updated: 05/09/24 1450     PTT 22 seconds     CBC and differential [[id number]]  (Abnormal) Collected: 05/09/24 1415    Lab Status: Final result Specimen: Blood from Arm, Left Updated: 05/09/24 1440     WBC 19.05 Thousand/uL      RBC 3.93 Million/uL      Hemoglobin 12.4 g/dL      Hematocrit 39.1 %       fL      MCH 31.6 pg      MCHC 31.7 g/dL      RDW 14.4 %      MPV 9.5 fL      Platelets 242 Thousands/uL     Blood culture #2 [[id number]] Collected: 05/09/24 1415    Lab Status: In process Specimen: Blood from Arm, Left Updated: 05/09/24 1429    Blood culture #1 [[id number]] Collected: 05/09/24 1417    Lab Status: In process Specimen: Blood from Arm, Left Updated: 05/09/24 1429                   US right upper quadrant   Final Result by Richelle Fontenot MD (05/09 1647)      Cholelithiasis. No definite evidence of acute cholecystitis. No intra or extrahepatic biliary ductal dilatation.      Simple cyst lower pole right kidney.      The study was marked in EPIC for immediate notification.      Workstation performed: [id number]         CT chest abdomen pelvis wo contrast   Final Result by Richelle Fontenot MD (05/09 1640)      No evidence of an acute inflammatory process.      Cholelithiasis      Large amount of retained stool throughout  the colon.               Workstation performed: [id number]                    Procedures  ECG 12 Lead Documentation Only    Date/Time: 5/9/2024 2:23 PM    Performed by: Jackie Last PA-C  Authorized by: Jackie Last PA-C    Patient location:  ED  Interpretation:     Interpretation: non-specific    Rate:     ECG rate:  73    ECG rate assessment: normal    Rhythm:     Rhythm: sinus rhythm and A-V block    Ectopy:     Ectopy: none    QRS:     QRS axis:  Normal    QRS intervals:  Normal  Conduction:     Conduction: abnormal             ED Course  ED Course as of 05/09/24 1855   Thu May 09, 2024   1439 WBC(!): 19.05   1506 hs TnI 0hr(!): 172  Has been elevated previously    1507 LACTIC ACID: 1.9   1509 Elevated LFTs   1509 Creatinine(!): 1.64  Elevated from previous    1519 FLU/RSV/COVID - if FLU/RSV clinically relevant  negative   1608 Procalcitonin(!): 7.19   1637 Nitrite, UA(!): Positive  UA + For UTI   1647 CT chest abd pelvis: No evidence of an acute inflammatory process.     Cholelithiasis     Large amount of retained stool throughout the colon.     1703 US: Cholelithiasis. No definite evidence of acute cholecystitis. No intra or extrahepatic biliary ductal dilatation.     Simple cyst lower pole right kidney.        1707 Delta 2hr hsTnI(!): 32   1711 TT sent to surgery    1727 Discussed with surgery, Dr. Guadalupe.  Recommended clear fluids until midnight, n.p.o. after midnight repeat LFTs in the morning, surgical consult tomorrow.   1730 TT sent to medicine requesting admission    1732 Accepted for admission                               SBIRT 22yo+      Flowsheet Row Most Recent Value   Initial Alcohol Screen: US AUDIT-C     1. How often do you have a drink containing alcohol? 0 Filed at: 05/09/2024 1412   2. How many drinks containing alcohol do you have on a typical day you are drinking?  0 Filed at: 05/09/2024 1412   3b. FEMALE Any Age, or MALE 65+: How often do you have 4 or more drinks on one occassion?  0 Filed at: 05/09/2024 1412   Audit-C Score 0 Filed at: 05/09/2024 1412                      Medical Decision Making  83-year-old female presents emergency department from home for evaluation of abdominal pain nausea vomiting, chills, fevers.  Vitals medical record reviewed.  Patient at risk for the following but not limited to acute cholecystitis, cholangitis, gastritis, gastroenteritis, pneumonia, COPD exacerbation, pneumonia, hepatitis, UTI, pyelonephritis, appendicitis, small bowel obstruction, visus perforation, diverticulitis.  Patient is chronically ill-appearing, appears dry on exam.  Some generalized abdominal discomfort to palpation.  Had noted leukocytosis, lactic acid normal.  Elevated LFTs and total bilirubin.  UA positive for UTI started on Rocephin.  Patient's imaging was noted for cholelithiasis but no evidence of acute cholecystitis.  Did discuss with surgery who recommended trending LFTs and n.p.o. after midnight.  In the meantime we will treat with fluids and IV antibiotics and admit patient to medicine for continued treatment and evaluation.  Patient is agreeable this treatment plan.  Patient's daughter updated via phone also agreeable this treatment plan    Problems Addressed:  Cholelithiasis: acute illness or injury  Elevated LFTs: acute illness or injury  UTI (urinary tract infection): acute illness or injury    Amount and/or Complexity of Data Reviewed  Labs: ordered. Decision-making details documented in ED Course.  Radiology: ordered.    Risk  OTC drugs.  Prescription drug management.  Decision regarding hospitalization.             Disposition  Final diagnoses:   UTI (urinary tract infection)   Cholelithiasis   Elevated LFTs   COPD exacerbation (HCC)     Time reflects when diagnosis was documented in both MDM as applicable and the Disposition within this note       Time User Action Codes Description Comment    5/9/2024  5:32 PM Jackie Last Add [N39.0] UTI (urinary tract infection)      5/9/2024  5:33 PM Jackie Last Add [K80.20] Cholelithiasis     5/9/2024  5:33 PM Jackie Last Add [R79.89] Elevated LFTs     5/9/2024  6:54 PM Jackie Last Add [J44.1] COPD exacerbation (HCC)           ED Disposition       ED Disposition   Admit    Condition   Stable    Date/Time   u May 9, 2024 9685    Comment   Case was discussed with Dr. Cali and the patient's admission status was agreed to be Admission Status: inpatient status to the service of Dr. Cali .               Follow-up Information    None         Current Discharge Medication List        CONTINUE these medications which have NOT CHANGED    Details   acetaminophen (TYLENOL) 325 mg tablet Take 2 tablets (650 mg total) by mouth every 6 (six) hours as needed for mild pain, headaches or fever  Refills: 0    Associated Diagnoses: Acute cystitis without hematuria; Acute bronchitis, unspecified organism      albuterol (ProAir HFA) 90 mcg/act inhaler Inhale 2 puffs every 6 (six) hours as needed for wheezing  Qty: 8.5 g, Refills: 0    Comments: Substitution to a formulary equivalent within the same pharmaceutical class is authorized.  Associated Diagnoses: Acute bronchitis, unspecified organism      aspirin 325 mg tablet Take 325 mg by mouth daily      atorvastatin (LIPITOR) 20 mg tablet Take 20 mg by mouth daily      bisoprolol-hydrochlorothiazide (ZIAC) 2.5-6.25 MG per tablet Take 1 tablet by mouth daily      calcium citrate-vitamin D (CITRACAL+D) 315-200 MG-UNIT per tablet Take 1 tablet by mouth 2 (two) times a day      docusate sodium (COLACE) 100 mg capsule Take 100 mg by mouth 2 (two) times a day      DULoxetine (CYMBALTA) 30 mg delayed release capsule Take 30 mg by mouth daily      folic acid (FOLVITE) 1 mg tablet Take by mouth daily      hydroxychloroquine (PLAQUENIL) 200 mg tablet Take 200 mg by mouth 2 (two) times a day with meals      lisinopril (ZESTRIL) 10 mg tablet Take 10 mg by mouth daily      loratadine (CLARITIN) 10 mg tablet Take  10 mg by mouth daily      methotrexate 2.5 mg tablet Take by mouth once a week      pantoprazole (PROTONIX) 40 mg tablet Take 40 mg by mouth daily      tolterodine (DETROL LA) 4 mg 24 hr capsule Take 4 mg by mouth daily             No discharge procedures on file.    PDMP Review         Value Time User    PDMP Reviewed  Yes 2/23/2023 11:02 AM Ashley Schwartz PA-C            ED Provider  Electronically Signed by             Jackie Last PA-C  05/09/24 2192       Jackie Last PA-C  05/09/24 5415

## 2024-05-10 PROBLEM — M06.09 RHEUMATOID ARTHRITIS OF MULTIPLE SITES WITH NEGATIVE RHEUMATOID FACTOR (HCC): Status: ACTIVE | Noted: 2022-07-18

## 2024-05-10 PROBLEM — N30.00 ACUTE CYSTITIS WITHOUT HEMATURIA: Status: ACTIVE | Noted: 2024-05-09

## 2024-05-10 PROBLEM — N17.9 AKI (ACUTE KIDNEY INJURY) (HCC): Status: ACTIVE | Noted: 2022-07-18

## 2024-05-10 LAB
ALBUMIN SERPL BCP-MCNC: 3.2 G/DL (ref 3.5–5)
ALP SERPL-CCNC: 239 U/L (ref 34–104)
ALT SERPL W P-5'-P-CCNC: 296 U/L (ref 7–52)
ANION GAP SERPL CALCULATED.3IONS-SCNC: 6 MMOL/L (ref 4–13)
AST SERPL W P-5'-P-CCNC: 300 U/L (ref 13–39)
BASOPHILS # BLD AUTO: 0.01 THOUSANDS/ÂΜL (ref 0–0.1)
BASOPHILS NFR BLD AUTO: 0 % (ref 0–1)
BILIRUB SERPL-MCNC: 4.2 MG/DL (ref 0.2–1)
BUN SERPL-MCNC: 26 MG/DL (ref 5–25)
CALCIUM ALBUM COR SERPL-MCNC: 8.8 MG/DL (ref 8.3–10.1)
CALCIUM SERPL-MCNC: 8.2 MG/DL (ref 8.4–10.2)
CARDIAC TROPONIN I PNL SERPL HS: 333 NG/L (ref 8–18)
CHLORIDE SERPL-SCNC: 108 MMOL/L (ref 96–108)
CO2 SERPL-SCNC: 24 MMOL/L (ref 21–32)
CREAT SERPL-MCNC: 1.45 MG/DL (ref 0.6–1.3)
EOSINOPHIL # BLD AUTO: 0 THOUSAND/ÂΜL (ref 0–0.61)
EOSINOPHIL NFR BLD AUTO: 0 % (ref 0–6)
ERYTHROCYTE [DISTWIDTH] IN BLOOD BY AUTOMATED COUNT: 14.7 % (ref 11.6–15.1)
GFR SERPL CREATININE-BSD FRML MDRD: 33 ML/MIN/1.73SQ M
GLUCOSE SERPL-MCNC: 80 MG/DL (ref 65–140)
HCT VFR BLD AUTO: 35.6 % (ref 34.8–46.1)
HGB BLD-MCNC: 11.1 G/DL (ref 11.5–15.4)
IMM GRANULOCYTES # BLD AUTO: 0.06 THOUSAND/UL (ref 0–0.2)
IMM GRANULOCYTES NFR BLD AUTO: 1 % (ref 0–2)
INR PPP: 1.33 (ref 0.84–1.19)
LIPASE SERPL-CCNC: 15 U/L (ref 11–82)
LYMPHOCYTES # BLD AUTO: 0.27 THOUSANDS/ÂΜL (ref 0.6–4.47)
LYMPHOCYTES NFR BLD AUTO: 3 % (ref 14–44)
MAGNESIUM SERPL-MCNC: 1.7 MG/DL (ref 1.9–2.7)
MCH RBC QN AUTO: 31.7 PG (ref 26.8–34.3)
MCHC RBC AUTO-ENTMCNC: 31.2 G/DL (ref 31.4–37.4)
MCV RBC AUTO: 102 FL (ref 82–98)
MONOCYTES # BLD AUTO: 0.39 THOUSAND/ÂΜL (ref 0.17–1.22)
MONOCYTES NFR BLD AUTO: 4 % (ref 4–12)
NEUTROPHILS # BLD AUTO: 9.32 THOUSANDS/ÂΜL (ref 1.85–7.62)
NEUTS SEG NFR BLD AUTO: 92 % (ref 43–75)
NRBC BLD AUTO-RTO: 0 /100 WBCS
PHOSPHATE SERPL-MCNC: 3.1 MG/DL (ref 2.3–4.1)
PLATELET # BLD AUTO: 176 THOUSANDS/UL (ref 149–390)
PMV BLD AUTO: 9.1 FL (ref 8.9–12.7)
POTASSIUM SERPL-SCNC: 3.7 MMOL/L (ref 3.5–5.3)
PROCALCITONIN SERPL-MCNC: 4.95 NG/ML
PROT SERPL-MCNC: 5.7 G/DL (ref 6.4–8.4)
PROTHROMBIN TIME: 16.8 SECONDS (ref 11.6–14.5)
RBC # BLD AUTO: 3.5 MILLION/UL (ref 3.81–5.12)
SODIUM SERPL-SCNC: 138 MMOL/L (ref 135–147)
TRIGL SERPL-MCNC: 55 MG/DL
VIT B12 SERPL-MCNC: 550 PG/ML (ref 180–914)
WBC # BLD AUTO: 10.05 THOUSAND/UL (ref 4.31–10.16)

## 2024-05-10 PROCEDURE — 84145 PROCALCITONIN (PCT): CPT | Performed by: NURSE PRACTITIONER

## 2024-05-10 PROCEDURE — 84484 ASSAY OF TROPONIN QUANT: CPT | Performed by: NURSE PRACTITIONER

## 2024-05-10 PROCEDURE — 84478 ASSAY OF TRIGLYCERIDES: CPT | Performed by: FAMILY MEDICINE

## 2024-05-10 PROCEDURE — 83735 ASSAY OF MAGNESIUM: CPT | Performed by: NURSE PRACTITIONER

## 2024-05-10 PROCEDURE — 84100 ASSAY OF PHOSPHORUS: CPT | Performed by: NURSE PRACTITIONER

## 2024-05-10 PROCEDURE — 82607 VITAMIN B-12: CPT | Performed by: NURSE PRACTITIONER

## 2024-05-10 PROCEDURE — 99233 SBSQ HOSP IP/OBS HIGH 50: CPT | Performed by: FAMILY MEDICINE

## 2024-05-10 PROCEDURE — 80053 COMPREHEN METABOLIC PANEL: CPT | Performed by: NURSE PRACTITIONER

## 2024-05-10 PROCEDURE — 85027 COMPLETE CBC AUTOMATED: CPT | Performed by: NURSE PRACTITIONER

## 2024-05-10 PROCEDURE — 85610 PROTHROMBIN TIME: CPT | Performed by: NURSE PRACTITIONER

## 2024-05-10 PROCEDURE — 83690 ASSAY OF LIPASE: CPT | Performed by: FAMILY MEDICINE

## 2024-05-10 RX ORDER — POLYETHYLENE GLYCOL 3350 17 G/17G
17 POWDER, FOR SOLUTION ORAL DAILY PRN
Status: DISCONTINUED | OUTPATIENT
Start: 2024-05-10 | End: 2024-05-10

## 2024-05-10 RX ORDER — POLYETHYLENE GLYCOL 3350 17 G/17G
17 POWDER, FOR SOLUTION ORAL DAILY
Status: DISCONTINUED | OUTPATIENT
Start: 2024-05-10 | End: 2024-05-12

## 2024-05-10 RX ORDER — MAGNESIUM SULFATE HEPTAHYDRATE 40 MG/ML
2 INJECTION, SOLUTION INTRAVENOUS ONCE
Status: COMPLETED | OUTPATIENT
Start: 2024-05-10 | End: 2024-05-10

## 2024-05-10 RX ORDER — ACETAMINOPHEN 325 MG/1
650 TABLET ORAL EVERY 6 HOURS PRN
Status: DISCONTINUED | OUTPATIENT
Start: 2024-05-10 | End: 2024-05-14 | Stop reason: HOSPADM

## 2024-05-10 RX ADMIN — BISOPROLOL FUMARATE 2.5 MG: 5 TABLET, FILM COATED ORAL at 10:18

## 2024-05-10 RX ADMIN — DOCUSATE SODIUM 100 MG: 100 CAPSULE, LIQUID FILLED ORAL at 17:21

## 2024-05-10 RX ADMIN — SODIUM CHLORIDE 75 ML/HR: 0.9 INJECTION, SOLUTION INTRAVENOUS at 10:22

## 2024-05-10 RX ADMIN — FOLIC ACID 1 MG: 1 TABLET ORAL at 10:18

## 2024-05-10 RX ADMIN — HEPARIN SODIUM 5000 UNITS: 5000 INJECTION, SOLUTION INTRAVENOUS; SUBCUTANEOUS at 21:09

## 2024-05-10 RX ADMIN — ACETAMINOPHEN 325MG 650 MG: 325 TABLET ORAL at 21:09

## 2024-05-10 RX ADMIN — ACETAMINOPHEN 325MG 650 MG: 325 TABLET ORAL at 00:21

## 2024-05-10 RX ADMIN — LISINOPRIL 10 MG: 10 TABLET ORAL at 10:19

## 2024-05-10 RX ADMIN — MAGNESIUM SULFATE HEPTAHYDRATE 2 G: 40 INJECTION, SOLUTION INTRAVENOUS at 15:23

## 2024-05-10 RX ADMIN — DOCUSATE SODIUM 100 MG: 100 CAPSULE, LIQUID FILLED ORAL at 10:19

## 2024-05-10 RX ADMIN — DULOXETINE HYDROCHLORIDE 30 MG: 30 CAPSULE, DELAYED RELEASE ORAL at 10:19

## 2024-05-10 RX ADMIN — ASPIRIN 81 MG: 81 TABLET, COATED ORAL at 10:18

## 2024-05-10 RX ADMIN — CEFTRIAXONE 1000 MG: 1 INJECTION, SOLUTION INTRAVENOUS at 16:01

## 2024-05-10 RX ADMIN — LORATADINE 10 MG: 10 TABLET ORAL at 10:19

## 2024-05-10 RX ADMIN — HEPARIN SODIUM 5000 UNITS: 5000 INJECTION, SOLUTION INTRAVENOUS; SUBCUTANEOUS at 05:10

## 2024-05-10 RX ADMIN — PANTOPRAZOLE SODIUM 40 MG: 40 TABLET, DELAYED RELEASE ORAL at 05:10

## 2024-05-10 RX ADMIN — ACETAMINOPHEN 325MG 650 MG: 325 TABLET ORAL at 13:58

## 2024-05-10 RX ADMIN — HEPARIN SODIUM 5000 UNITS: 5000 INJECTION, SOLUTION INTRAVENOUS; SUBCUTANEOUS at 15:23

## 2024-05-10 RX ADMIN — POLYETHYLENE GLYCOL 3350 17 G: 17 POWDER, FOR SOLUTION ORAL at 15:23

## 2024-05-10 NOTE — ASSESSMENT & PLAN NOTE
Requiring 2 L of supplemental nasal cannula oxygen at time of admission  No history home oxygen  Resolved at time of admission  CT chest with atelectasis, no infectious process,Compressive atelectasis in the left lower lobe due to large hiatal hernia

## 2024-05-10 NOTE — ASSESSMENT & PLAN NOTE
Large stool burden seen on CT abdomen pelvis  Continue Colace twice daily  Dulcolax suppository x 1

## 2024-05-10 NOTE — PROGRESS NOTES
Consultation - General Surgery   Cheryle Mcleod 83 y.o. female MRN: [id number]  Unit/Bed#: -01 Encounter: [id number]    Assessment/Plan     Assessment:  84yo F with cholelithiasis and elevated LFTs    - AST 33(727),  (534)  - Tbili 4.2 (3.3)  - Bacteremia, BC #1 pending, BC#2 growing gram-negative rods,   - WBC 10 (19)  - procal 4.95 (7.19)  - UA with pyuria, culture pending. Has hx of E-coli UTIs  - Afebrile, VS WNL, abdominal exam benign    -Pt has hx of hyperbilirubinemia and fatty liver dz. negative hepatitis C testing in 2021.     Pmhx: CAD s/p MI, COPD(no home 02), RA, fatty liver   Pshx: hyster, bladder suspension    RUQ US:   BILIARY:  Multiple shadowing gallstones. No pericholecystic fluid or gallbladder wall thickening. The gallbladder is moderately distended.  No intrahepatic biliary dilatation.  CBD measures 5.0 mm.  No choledocholithiasis.  IMPRESSION:  Cholelithiasis. No definite evidence of acute cholecystitis. No intra or extrahepatic biliary ductal dilatation.  Simple cyst lower pole right kidney.     CTAP: IMPRESSION: No evidence of an acute inflammatory process.   Cholelithiasis. Large amount of retained stool throughout the colon.     Plan:  -Continue to monitor, trend fever, LFTs and WBC. If Tbili remains elevated, pt may require MRCP to r/o CBD obstruction.  -No need for emergent surgical intervention as patient is non-tender,afebrile and leukocytosis is resolving. She also expressed desire to avoid surgical intervention if at all possible.  -Advance to FLD  -follow up urine culture, blood cultures  -Continue IV abx, Rocephin  -Pain/nausea control  -miralax, colace,   History of Present Illness     HPI:  Cheryle Mcleod is a 83 y.o. female with a Pmhx of COPD, CAD, RA, HTN, fatty liver, Ecoli UTIs and MSSA bacteremia who presented to the ED with fever, chills, HA, weakness and poor appetite x 2 days. She denies any abdominal pain at home or since admission. She had an  episode of nausea before admission but none since. While in the ED she was found to have significantly elevated LFTs and a leukocytosis. Today her leukocytosis improved, transaminases improved ,but Tbili worsened.     Consults    Review of Systems   Constitutional:  Positive for appetite change, chills, fatigue and fever.   HENT:  Negative for congestion, ear pain, rhinorrhea and sore throat.    Eyes:  Negative for pain and visual disturbance.   Respiratory:  Negative for cough, shortness of breath and wheezing.    Cardiovascular:  Negative for chest pain and palpitations.   Gastrointestinal:  Positive for nausea. Negative for abdominal distention, abdominal pain, blood in stool, constipation, diarrhea, rectal pain and vomiting.   Genitourinary:  Negative for dysuria and hematuria.   Musculoskeletal:  Negative for arthralgias and back pain.   Skin:  Negative for color change and rash.   Neurological:  Positive for weakness. Negative for dizziness, seizures and syncope.   Psychiatric/Behavioral:  Negative for agitation, behavioral problems and confusion.    All other systems reviewed and are negative.      Historical Information   Past Medical History:   Diagnosis Date    Arthritis     Asthma     Diabetes mellitus (HCC)     Hypertension     MI, old      Past Surgical History:   Procedure Laterality Date    BACK SURGERY      CARDIAC SURGERY      HYSTERECTOMY       Social History   Social History     Substance and Sexual Activity   Alcohol Use Not Currently     Social History     Substance and Sexual Activity   Drug Use Not Currently     E-Cigarette/Vaping    E-Cigarette Use Never User      E-Cigarette/Vaping Substances    Nicotine No     THC No     CBD No     Flavoring No     Other No     Unknown No      Social History     Tobacco Use   Smoking Status Former    Passive exposure: Never   Smokeless Tobacco Never     Family History: non-contributory    Meds/Allergies   PTA meds:   Prior to Admission Medications    Prescriptions Last Dose Informant Patient Reported? Taking?   DULoxetine (CYMBALTA) 30 mg delayed release capsule   Yes No   Sig: Take 30 mg by mouth daily   acetaminophen (TYLENOL) 325 mg tablet   No No   Sig: Take 2 tablets (650 mg total) by mouth every 6 (six) hours as needed for mild pain, headaches or fever   albuterol (ProAir HFA) 90 mcg/act inhaler   No No   Sig: Inhale 2 puffs every 6 (six) hours as needed for wheezing   aspirin 325 mg tablet   Yes No   Sig: Take 325 mg by mouth daily   atorvastatin (LIPITOR) 20 mg tablet   Yes No   Sig: Take 20 mg by mouth daily   bisoprolol-hydrochlorothiazide (ZIAC) 2.5-6.25 MG per tablet  Self Yes No   Sig: Take 1 tablet by mouth daily   calcium citrate-vitamin D (CITRACAL+D) 315-200 MG-UNIT per tablet   Yes No   Sig: Take 1 tablet by mouth 2 (two) times a day   docusate sodium (COLACE) 100 mg capsule   Yes No   Sig: Take 100 mg by mouth 2 (two) times a day   folic acid (FOLVITE) 1 mg tablet   Yes No   Sig: Take by mouth daily   hydroxychloroquine (PLAQUENIL) 200 mg tablet  Self Yes No   Sig: Take 200 mg by mouth 2 (two) times a day with meals   lisinopril (ZESTRIL) 10 mg tablet   Yes No   Sig: Take 10 mg by mouth daily   loratadine (CLARITIN) 10 mg tablet  Self Yes No   Sig: Take 10 mg by mouth daily   methotrexate 2.5 mg tablet  Self Yes No   Sig: Take by mouth once a week   pantoprazole (PROTONIX) 40 mg tablet  Self Yes No   Sig: Take 40 mg by mouth daily   tolterodine (DETROL LA) 4 mg 24 hr capsule  Self Yes No   Sig: Take 4 mg by mouth daily      Facility-Administered Medications: None     No Known Allergies    Objective   First Vitals:   Blood Pressure: 121/59 (05/09/24 1410)  Pulse: 75 (05/09/24 1410)  Temperature: 99.3 °F (37.4 °C) (05/09/24 1410)  Temp Source: Oral (05/09/24 1410)  Respirations: 19 (05/09/24 1410)  Weight - Scale: 58 kg (127 lb 13.2 oz) (05/09/24 1410)  SpO2: (!) 87 % (05/09/24 1410)    Current Vitals:   Blood Pressure: 115/60 (05/10/24  0726)  Pulse: 73 (05/10/24 0726)  Temperature: 97.5 °F (36.4 °C) (05/10/24 0726)  Temp Source: Temporal (05/10/24 0642)  Respirations: 17 (05/10/24 0726)  Weight - Scale: 57.8 kg (127 lb 6.8 oz) (05/10/24 0642)  SpO2: 95 % (05/10/24 0726)      Intake/Output Summary (Last 24 hours) at 5/10/2024 1437  Last data filed at 5/10/2024 0910  Gross per 24 hour   Intake 3800 ml   Output 450 ml   Net 3350 ml       Invasive Devices       Peripheral Intravenous Line  Duration             Peripheral IV 05/09/24 Left;Ventral (anterior) Forearm 1 day    Peripheral IV 05/09/24 Left;Proximal;Ventral (anterior) Forearm <1 day              Drain  Duration             External Urinary Catheter <1 day                    Physical Exam  Vitals and nursing note reviewed.   Constitutional:       General: She is not in acute distress.     Appearance: Normal appearance. She is well-developed and normal weight.   HENT:      Head: Normocephalic and atraumatic.      Comments: Nasal o2     Nose: Nose normal.      Mouth/Throat:      Mouth: Mucous membranes are moist.   Eyes:      Conjunctiva/sclera: Conjunctivae normal.   Cardiovascular:      Rate and Rhythm: Normal rate and regular rhythm.   Pulmonary:      Effort: Pulmonary effort is normal. No respiratory distress.      Breath sounds: Normal breath sounds.   Abdominal:      General: Abdomen is flat. Bowel sounds are normal. There is no distension.      Palpations: Abdomen is soft. There is no mass.      Tenderness: There is no abdominal tenderness. There is no guarding or rebound.      Hernia: No hernia is present.   Musculoskeletal:         General: No swelling.      Cervical back: Neck supple.   Skin:     General: Skin is warm and dry.      Capillary Refill: Capillary refill takes less than 2 seconds.      Coloration: Skin is jaundiced.   Neurological:      General: No focal deficit present.      Mental Status: She is alert and oriented to person, place, and time.   Psychiatric:         Mood  "and Affect: Mood normal.         Behavior: Behavior normal.         Lab Results: CBC:   Lab Results   Component Value Date    WBC 10.05 05/10/2024    HGB 11.1 (L) 05/10/2024    HCT 35.6 05/10/2024     (H) 05/10/2024     05/10/2024    RBC 3.50 (L) 05/10/2024    MCH 31.7 05/10/2024    MCHC 31.2 (L) 05/10/2024    RDW 14.7 05/10/2024    MPV 9.1 05/10/2024    NRBC 0 05/10/2024   , CMP:   Lab Results   Component Value Date    SODIUM 138 05/10/2024    K 3.7 05/10/2024     05/10/2024    CO2 24 05/10/2024    BUN 26 (H) 05/10/2024    CREATININE 1.45 (H) 05/10/2024    CALCIUM 8.2 (L) 05/10/2024     (H) 05/10/2024     (H) 05/10/2024    ALKPHOS 239 (H) 05/10/2024    EGFR 33 05/10/2024   , Coagulation:   Lab Results   Component Value Date    INR 1.33 (H) 05/10/2024   , Urinalysis: No results found for: \"COLORU\", \"CLARITYU\", \"SPECGRAV\", \"PHUR\", \"LEUKOCYTESUR\", \"NITRITE\", \"PROTEINUA\", \"GLUCOSEU\", \"KETONESU\", \"BILIRUBINUR\", \"BLOODU\", Amylase: No results found for: \"AMYLASE\", Lipase:   Lab Results   Component Value Date    LIPASE 15 05/10/2024     Imaging: I have personally reviewed pertinent reports.    EKG, Pathology, and Other Studies: I have personally reviewed pertinent reports.      Counseling / Coordination of Care  Total floor / unit time spent today 45 minutes.  Greater than 50% of total time was spent with the patient and / or family counseling and / or coordination of care.  A description of the counseling / coordination of care:  Review of patient's  Pmhx history, labs, imaging.  Obtaining HPI, performing exam, discussing treatment plan with patient.   .      "

## 2024-05-10 NOTE — ASSESSMENT & PLAN NOTE
Troponin [phone]  Denies chest pain  EKG without ST elevation or depression  Suspected secondary to UTI and cholelithiases

## 2024-05-10 NOTE — ASSESSMENT & PLAN NOTE
History hyperbilirubinemia and fatty liver disease in 2021-MSSA bacteremia at time of this diagnosis  Negative hepatitis C testing in 2021  Incidental finding of elevated transaminase levels today  , , alk phos 302, total bili 3.33  Continue daily MELD labs  CT abdomen pelvis and RUQ US no evidence of cirrhosis or obstructive pathology  Noted cholelithiasis without CBD dilation or acute cholecystitis-this is previously known to patient  Possible recently passed cholelithiasis  Abdominal exam benign  Continue empiric ceftriaxone  Plaquenil 200 mg twice daily on hold, MTX q. Thursday on hold pending resolution of transaminase levels  Surgery consulted

## 2024-05-10 NOTE — PROGRESS NOTES
[facility]  Progress Note  Name: Cheryle Mcleod I  MRN: [id number]  Unit/Bed#: -01 I Date of Admission: 5/9/2024   Date of Service: 5/10/2024 I Hospital Day: 1    Assessment/Plan   * Elevated transaminase level  Assessment & Plan  History hyperbilirubinemia and fatty liver disease in 2021-MSSA bacteremia at time of this diagnosis  Negative hepatitis C testing in 2021  Incidental finding of elevated transaminase levels today  , , alk phos 302, total bili 3.33  Continue daily MELD labs  CT abdomen pelvis and RUQ US no evidence of cirrhosis or obstructive pathology  Noted cholelithiasis without CBD dilation or acute cholecystitis-this is previously known to patient  Possible recently passed cholelithiasis  Abdominal exam benign  Continue empiric ceftriaxone  Plaquenil 200 mg twice daily on hold, MTX q. Thursday on hold pending resolution of transaminase levels  Surgery consulted    RONALD (acute kidney injury) (HCC)  Assessment & Plan  Lab Results   Component Value Date    EGFR 33 05/10/2024    EGFR 29 05/09/2024    EGFR 46 (L) 07/06/2023    CREATININE 1.45 (H) 05/10/2024    CREATININE 1.64 (H) 05/09/2024    CREATININE 1.18 (H) 07/06/2023   Baseline creatinine 1-1.2  CR 1.64 on admission  Avoid nephrotoxic agents, renally dose medications  CT abdomen pelvis no obstructive uropathy  UA with pyuria/bacteriuria, urine Cx pending, empiric ceftriaxone  1/2 blood culture E. coli which may be either GI or  source  HCTZ on hold, gentle hydration    Acute cystitis without hematuria  Assessment & Plan  Presented subjective fevers, chills, abdominal tenderness at home now resolved, denies dysuria  UA nitrite positive with bacteriuria and pyuria  History E. coli UTI susceptible to ceftriaxone  Urine culture pending  Empiric ceftriaxone  CT abdomen pelvis no obstructive uropathy    Acute respiratory failure with hypoxia (HCC)  Assessment & Plan  Requiring 2 L of supplemental  nasal cannula oxygen at time of admission  No history home oxygen  Resolved at time of admission  CT chest with atelectasis, no infectious process,Compressive atelectasis in the left lower lobe due to large hiatal hernia     Constipation  Assessment & Plan  Large stool burden seen on CT abdomen pelvis  Continue Colace twice daily  Dulcolax suppository x 1 and miralax ordered    Troponin level elevated  Assessment & Plan  Troponin [phone]  Denies chest pain  EKG without ST elevation or depression  Suspected secondary to UTI and cholelithiases    Calculus of gallbladder without cholecystitis without obstruction  Assessment & Plan  Ultrasound abdomen and ct abd reviewed shows Cholelithiasis. No definite evidence of acute cholecystitis. No intra or extrahepatic biliary ductal dilatation.   Elevated transaminitis on presentation along with 1 out of 2 blood cultures showing gram-negative bacteremia possibly E. coli which may be either GI or  source.  Transaminits are trending down however still has elevated bilirubin  Ask surgery for evaluation    Coronary artery disease involving native coronary artery of native heart  Assessment & Plan  History CABG  Maintained on full-strength aspirin  Denies chest pain  Continue bisoprolol and lisinopril  Statin on hold with elevated transaminase    Rheumatoid arthritis of multiple sites with negative rheumatoid factor (HCC)  Assessment & Plan  History rheumatoid arthritis  Maintained on Plaquenil 200 mg twice daily and methotrexate q. weekly, folic acid daily  Plaquenil and methotrexate on hold in setting of infection with transaminase elevations  Outpatient follow-up    COPD (chronic obstructive pulmonary disease) ([facility])  Assessment & Plan  No acute exacerbation    Essential hypertension  Assessment & Plan  Continue bisoprolol, lisinopril  HCTZ on hold  Trend blood pressures               VTE Pharmacologic Prophylaxis:   Moderate Risk (Score 3-4) - Pharmacological DVT  Prophylaxis Ordered: heparin.    Mobility:   Basic Mobility Inpatient Raw Score: 16  JH-HLM Goal: 5: Stand one or more mins  JH-HLM Achieved: 2: Bed activities/Dependent transfer  JH-HLM Goal achieved. Continue to encourage appropriate mobility.    Patient Centered Rounds: I performed bedside rounds with nursing staff today.   Discussions with Specialists or Other Care Team Provider: [facility]    Education and Discussions with Family / Patient: Updated  (daughter) via phone.    Total Time Spent on Date of Encounter in care of patient: 35 mins. This time was spent on one or more of the following: performing physical exam; counseling and coordination of care; obtaining or reviewing history; documenting in the medical record; reviewing/ordering tests, medications or procedures; communicating with other healthcare professionals and discussing with patient's family/caregivers.    Current Length of Stay: 1 day(s)  Current Patient Status: Inpatient   Certification Statement: The patient will continue to require additional inpatient hospital stay due to transaminitis  Discharge Plan: Anticipate discharge in 48-72 hrs to home with home services.    Code Status: Level 3 - DNAR and DNI    Subjective:   Patient denies any abdominal pain today she started to feel better compared to yesterday no nausea or vomiting. tolerating clear liquid diet    Objective:     Vitals:   Temp (24hrs), Av.1 °F (36.7 °C), Min:97.3 °F (36.3 °C), Max:99.4 °F (37.4 °C)    Temp:  [97.3 °F (36.3 °C)-99.4 °F (37.4 °C)] 97.5 °F (36.4 °C)  HR:  [68-84] 73  Resp:  [17-18] 17  BP: (115-146)/(60-70) 115/60  SpO2:  [94 %-97 %] 95 %  Body mass index is 24.89 kg/m².     Input and Output Summary (last 24 hours):     Intake/Output Summary (Last 24 hours) at 5/10/2024 1419  Last data filed at 5/10/2024 0910  Gross per 24 hour   Intake 3800 ml   Output 450 ml   Net 3350 ml       Physical Exam:   Physical Exam  Vitals and nursing note reviewed.    Constitutional:       Appearance: Normal appearance.   HENT:      Head: Normocephalic and atraumatic.      Right Ear: External ear normal.      Left Ear: External ear normal.      Nose: Nose normal.      Mouth/Throat:      Pharynx: Oropharynx is clear.   Eyes:      Pupils: Pupils are equal, round, and reactive to light.   Cardiovascular:      Rate and Rhythm: Normal rate and regular rhythm.      Heart sounds: Normal heart sounds.   Pulmonary:      Effort: Pulmonary effort is normal.      Breath sounds: Normal breath sounds.   Abdominal:      General: Bowel sounds are normal.      Palpations: Abdomen is soft.      Tenderness: There is no abdominal tenderness.   Musculoskeletal:         General: Normal range of motion.      Cervical back: Normal range of motion and neck supple.   Skin:     General: Skin is warm and dry.      Capillary Refill: Capillary refill takes less than 2 seconds.   Neurological:      General: No focal deficit present.      Mental Status: She is alert and oriented to person, place, and time.   Psychiatric:         Mood and Affect: Mood normal.            Additional Data:     Labs:  Results from last 7 days   Lab Units 05/10/24  0500 05/09/24  1415   WBC Thousand/uL 10.05 19.05*   HEMOGLOBIN g/dL 11.1* 12.4   HEMATOCRIT % 35.6 39.1   PLATELETS Thousands/uL 176 242   BANDS PCT %  --  1   SEGS PCT % 92*  --    LYMPHO PCT % 3* 2*   MONO PCT % 4 1*   EOS PCT % 0 0     Results from last 7 days   Lab Units 05/10/24  0500   SODIUM mmol/L 138   POTASSIUM mmol/L 3.7   CHLORIDE mmol/L 108   CO2 mmol/L 24   BUN mg/dL 26*   CREATININE mg/dL 1.45*   ANION GAP mmol/L 6   CALCIUM mg/dL 8.2*   ALBUMIN g/dL 3.2*   TOTAL BILIRUBIN mg/dL 4.20*   ALK PHOS U/L 239*   ALT U/L 296*   AST U/L 300*   GLUCOSE RANDOM mg/dL 80     Results from last 7 days   Lab Units 05/10/24  0500   INR  1.33*             Results from last 7 days   Lab Units 05/10/24  0500 05/09/24  1415   LACTIC ACID mmol/L  --  1.9   PROCALCITONIN ng/ml  4.95* 7.19*       Lines/Drains:  Invasive Devices       Peripheral Intravenous Line  Duration             Peripheral IV 05/09/24 Left;Ventral (anterior) Forearm 1 day    Peripheral IV 05/09/24 Left;Proximal;Ventral (anterior) Forearm <1 day              Drain  Duration             External Urinary Catheter <1 day                          Imaging: Reviewed radiology reports from this admission including: abdominal/pelvic CT and ultrasound(s)    Recent Cultures (last 7 days):   Results from last 7 days   Lab Units 05/09/24  1417 05/09/24  1415   BLOOD CULTURE   --  Received in Microbiology Lab. Culture in Progress.   GRAM STAIN RESULT  Gram negative rods*  --        Last 24 Hours Medication List:   Current Facility-Administered Medications   Medication Dose Route Frequency Provider Last Rate    acetaminophen  650 mg Oral Q6H PRN Maura S Michael, CRNP      albuterol  2 puff Inhalation Q6H PRN Maura S Michael, CRNP      aspirin  81 mg Oral Daily Maura S Michael, CRNP      bisoprolol  2.5 mg Oral Daily Maura S Michael, CRNP      cefTRIAXone  1,000 mg Intravenous Q24H Maura S Michael, CRNP      docusate sodium  100 mg Oral BID Maura S Michael, CRNP      DULoxetine  30 mg Oral Daily Maura S Michael, CRNP      folic acid  1 mg Oral Daily Maura S Michael, CRNP      heparin (porcine)  5,000 Units Subcutaneous Q8H [facility] Maura S Michael, CRNP      lisinopril  10 mg Oral Daily Maura S Michael, CRNP      loratadine  10 mg Oral Daily Maura S Michael, CRNP      pantoprazole  40 mg Oral Early Morning Maura S Michael, CRNP      polyethylene glycol  17 g Oral Daily Alice Cali MD      sodium chloride  75 mL/hr Intravenous Continuous Maura S Michael, CRNP 75 mL/hr (05/10/24 1022)        Today, Patient Was Seen By: Alice Cali MD    **Please Note: This note may have been constructed using a voice recognition system.**
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
## 2024-05-10 NOTE — H&P
[facility]  H&P  Name: Cheryle Mcleod 83 y.o. female I MRN: [id number]  Unit/Bed#: -01 I Date of Admission: 5/9/2024   Date of Service: 5/9/2024 I Hospital Day: 0      Assessment/Plan   * UTI (urinary tract infection)  Assessment & Plan  Presented subjective fevers, chills, abdominal tenderness at home now resolved, denies dysuria  UA nitrite positive with bacteriuria and pyuria  History E. coli UTI susceptible to ceftriaxone  Urine culture pending  Empiric ceftriaxone  CT abdomen pelvis no obstructive uropathy    Elevated transaminase level  Assessment & Plan  History hyperbilirubinemia and fatty liver disease in 2021-MSSA bacteremia at time of this diagnosis  Negative hepatitis C testing in 2021  Incidental finding of elevated transaminase levels today  , , alk phos 302, total bili 3.33  Continue daily MELD labs  CT abdomen pelvis and RUQ US no evidence of cirrhosis or obstructive pathology  Noted cholelithiasis without CBD dilation or acute cholecystitis-this is previously known to patient  Possible recently passed cholelithiasis  Abdominal exam benign  Possible secondary to UTI or bacteremia-CXR pending  Continue empiric ceftriaxone  Plaquenil 200 mg twice daily on hold, MTX q. Thursday on hold pending resolution of transaminase levels  Appreciate gastroenterology consultation    Acute respiratory failure with hypoxia (HCC)  Assessment & Plan  Requiring 2 L of supplemental nasal cannula oxygen at time of admission  No history home oxygen  Resolved at time of admission  CT chest with atelectasis, no infectious process,Compressive atelectasis in the left lower lobe due to large hiatal hernia     Leukocytosis  Assessment & Plan  Presented with chills, subjective fevers at home, found to have UTI  Leukocytosis 19,000 with left shift, no bandemia  No other sepsis criteria  Blood cultures pending, urine culture pending  Procalcitonin 7.19  CT chest abdomen pelvis  no source of infectious process  Empiric ceftriaxone pending culture results    Constipation  Assessment & Plan  Large stool burden seen on CT abdomen pelvis  Continue Colace twice daily  Dulcolax suppository x 1    Troponin level elevated  Assessment & Plan  Troponin [id number]  Denies chest pain  EKG without ST elevation or depression  Suspected secondary to UTI    Calculus of gallbladder without cholecystitis without obstruction  Assessment & Plan  Ultrasound right abdomen  Cholelithiasis. No definite evidence of acute cholecystitis. No intra or extrahepatic biliary ductal dilatation.   Abdominal exam benign  Outpatient follow-up    Coronary artery disease involving native coronary artery of native heart  Assessment & Plan  History CABG  Maintained on full-strength aspirin  Denies chest pain  Continue bisoprolol and lisinopril  Statin on hold with elevated transaminase    Stage 3b chronic kidney disease (HCC)  Assessment & Plan  Lab Results   Component Value Date    EGFR 29 05/09/2024    EGFR 46 (L) 07/06/2023    EGFR 43 (L) 07/05/2023    CREATININE 1.64 (H) 05/09/2024    CREATININE 1.18 (H) 07/06/2023    CREATININE 1.25 (H) 07/05/2023     CR 1.64 close to baseline  Avoid nephrotoxic agents, renally dose medications  CT abdomen pelvis no obstructive uropathy  UA with pyuria/bacteriuria, urine Cx pending, empiric ceftriaxone  Trend creatinine  HCTZ on hold, gentle hydration    GERD (gastroesophageal reflux disease)  Assessment & Plan  Continue PPI    Arthritis  Assessment & Plan  History rheumatoid arthritis  Maintained on Plaquenil 20 mg twice daily and methotrexate q. weekly, folic acid daily  Plaquenil and methotrexate on hold in setting of infection with transaminase elevations  Outpatient follow-up    COPD (chronic obstructive pulmonary disease) ([facility])  Assessment & Plan  No acute exacerbation    Essential hypertension  Assessment & Plan  Continue bisoprolol, lisinopril  HCTZ on hold  Trend blood  pressures         VTE Pharmacologic Prophylaxis:   High Risk (Score >/= 5) - Pharmacological DVT Prophylaxis Ordered: heparin. Sequential Compression Devices Ordered.  Code Status: Level 3 - DNAR and DNI   Discussion with family: Patient declined call to .  Offered to call granddaughter Jeannie with whom she lives and patient declines    Anticipated Length of Stay: Patient will be admitted on an inpatient basis with an anticipated length of stay of greater than 2 midnights secondary to UTI.    Total Time Spent on Date of Encounter in care of patient: 45 mins. This time was spent on one or more of the following: performing physical exam; counseling and coordination of care; obtaining or reviewing history; documenting in the medical record; reviewing/ordering tests, medications or procedures; communicating with other healthcare professionals and discussing with patient's family/caregivers.    Chief Complaint: Subjective fevers, chills, abdominal pain    History of Present Illness:  Cheryle Mcleod is a 83 y.o. female with a PMH of COPD, CAD, RA arthritis, HTN, large hiatal hernia, E. coli UTI, Fatty liver disease, MSSA bacteremia who presents with subjective fevers, chills, abdominal pain.  Found to have UTI and elevated transaminase levels in the ED.  Significant leukocytosis without other sepsis criteria.  Presented to the medical service for further evaluation and treatment.  Lives with her granddaughter.    Review of Systems:  Review of Systems   Constitutional:  Positive for chills and fever.   HENT:  Negative for ear pain and sore throat.    Eyes:  Negative for pain and visual disturbance.   Respiratory:  Negative for cough and shortness of breath.    Cardiovascular:  Negative for chest pain and palpitations.   Gastrointestinal:  Positive for abdominal pain, nausea and vomiting.   Genitourinary:  Negative for dysuria and hematuria.   Musculoskeletal:  Negative for arthralgias and back pain.    Skin:  Negative for color change and rash.   Neurological:  Positive for weakness and headaches. Negative for seizures and syncope.   All other systems reviewed and are negative.      Past Medical and Surgical History:   Past Medical History:   Diagnosis Date    Arthritis     Asthma     Diabetes mellitus (HCC)     Hypertension     MI, old        Past Surgical History:   Procedure Laterality Date    BACK SURGERY      CARDIAC SURGERY      HYSTERECTOMY         Meds/Allergies:  Prior to Admission medications    Medication Sig Start Date End Date Taking? Authorizing Provider   acetaminophen (TYLENOL) 325 mg tablet Take 2 tablets (650 mg total) by mouth every 6 (six) hours as needed for mild pain, headaches or fever 8/1/22   Alice Cali MD   albuterol (ProAir HFA) 90 mcg/act inhaler Inhale 2 puffs every 6 (six) hours as needed for wheezing 8/1/22   Alice Cali MD   aspirin 325 mg tablet Take 325 mg by mouth daily    Historical Provider, MD   atorvastatin (LIPITOR) 20 mg tablet Take 20 mg by mouth daily    Historical Provider, MD   bisoprolol-hydrochlorothiazide (ZIAC) 2.5-6.25 MG per tablet Take 1 tablet by mouth daily    Historical Provider, MD   calcium citrate-vitamin D (CITRACAL+D) 315-200 MG-UNIT per tablet Take 1 tablet by mouth 2 (two) times a day    Historical Provider, MD   docusate sodium (COLACE) 100 mg capsule Take 100 mg by mouth 2 (two) times a day    Historical Provider, MD   DULoxetine (CYMBALTA) 30 mg delayed release capsule Take 30 mg by mouth daily    Historical Provider, MD   folic acid (FOLVITE) 1 mg tablet Take by mouth daily    Historical Provider, MD   hydroxychloroquine (PLAQUENIL) 200 mg tablet Take 200 mg by mouth 2 (two) times a day with meals    Historical Provider, MD   lisinopril (ZESTRIL) 10 mg tablet Take 10 mg by mouth daily    Historical Provider, MD   loratadine (CLARITIN) 10 mg tablet Take 10 mg by mouth daily    Historical Provider, MD   methotrexate 2.5 mg tablet Take by mouth  once a week    Historical Provider, MD   pantoprazole (PROTONIX) 40 mg tablet Take 40 mg by mouth daily    Historical Provider, MD   tolterodine (DETROL LA) 4 mg 24 hr capsule Take 4 mg by mouth daily    Historical Provider, MD LIRIANO have reviewed home medications with patient personally.    Allergies: No Known Allergies    Social History:  Marital Status: /Civil Union   Occupation: Retired  Patient Pre-hospital Living Situation: Home  Patient Pre-hospital Level of Mobility: unable to be assessed at time of evaluation  Patient Pre-hospital Diet Restrictions: None  Substance Use History:   Social History     Substance and Sexual Activity   Alcohol Use Not Currently     Social History     Tobacco Use   Smoking Status Former   Smokeless Tobacco Never     Social History     Substance and Sexual Activity   Drug Use Not Currently       Family History:  No family history on file.    Physical Exam:     Vitals:   Blood Pressure: 130/68 (05/09/24 1852)  Pulse: 78 (05/09/24 2000)  Temperature: (!) 97.3 °F (36.3 °C) (05/09/24 1852)  Temp Source: Oral (05/09/24 1812)  Respirations: 18 (05/09/24 1812)  Weight - Scale: 58 kg (127 lb 13.2 oz) (05/09/24 1410)  SpO2: 97 % (05/09/24 2000)    Physical Exam  Vitals and nursing note reviewed.   Constitutional:       General: She is not in acute distress.     Appearance: She is well-developed. She is ill-appearing.   HENT:      Head: Normocephalic and atraumatic.      Mouth/Throat:      Mouth: Mucous membranes are dry.   Eyes:      Conjunctiva/sclera: Conjunctivae normal.   Cardiovascular:      Rate and Rhythm: Normal rate and regular rhythm.      Heart sounds: No murmur heard.  Pulmonary:      Effort: Pulmonary effort is normal. No respiratory distress.      Breath sounds: Normal breath sounds.   Abdominal:      General: There is no distension.      Palpations: Abdomen is soft.      Tenderness: There is no abdominal tenderness. There is no guarding.   Musculoskeletal:          General: No swelling.      Cervical back: Neck supple.   Skin:     General: Skin is warm and dry.      Capillary Refill: Capillary refill takes less than 2 seconds.   Neurological:      General: No focal deficit present.      Mental Status: She is alert and oriented to person, place, and time.   Psychiatric:         Mood and Affect: Mood normal.         Behavior: Behavior normal.          Additional Data:     Lab Results:  Results from last 7 days   Lab Units 05/09/24  1415   WBC Thousand/uL 19.05*   HEMOGLOBIN g/dL 12.4   HEMATOCRIT % 39.1   PLATELETS Thousands/uL 242   BANDS PCT % 1   LYMPHO PCT % 2*   MONO PCT % 1*   EOS PCT % 0     Results from last 7 days   Lab Units 05/09/24  1415   SODIUM mmol/L 137   POTASSIUM mmol/L 5.0   CHLORIDE mmol/L 101   CO2 mmol/L 26   BUN mg/dL 34*   CREATININE mg/dL 1.64*   ANION GAP mmol/L 10   CALCIUM mg/dL 9.2   ALBUMIN g/dL 3.8   TOTAL BILIRUBIN mg/dL 3.33*   ALK PHOS U/L 302*   ALT U/L 534*   AST U/L 727*   GLUCOSE RANDOM mg/dL 124     Results from last 7 days   Lab Units 05/09/24  1415   INR  1.07             Results from last 7 days   Lab Units 05/09/24  1415   LACTIC ACID mmol/L 1.9   PROCALCITONIN ng/ml 7.19*       Lines/Drains:  Invasive Devices       Peripheral Intravenous Line  Duration             Peripheral IV 05/09/24 Left;Proximal;Ventral (anterior) Forearm <1 day    Peripheral IV 05/09/24 Left;Ventral (anterior) Forearm <1 day                        Imaging: Reviewed radiology reports from this admission including: chest CT scan, abdominal/pelvic CT, and ultrasound(s)  US right upper quadrant   Final Result by Richelle Fontenot MD (05/09 1647)      Cholelithiasis. No definite evidence of acute cholecystitis. No intra or extrahepatic biliary ductal dilatation.      Simple cyst lower pole right kidney.      The study was marked in EPIC for immediate notification.      Workstation performed: [id number]         CT chest abdomen pelvis wo contrast   Final Result by  Richelle Fontenot MD (05/09 1640)      No evidence of an acute inflammatory process.      Cholelithiasis      Large amount of retained stool throughout the colon.               Workstation performed: [id number]             EKG and Other Studies Reviewed on Admission:   EKG: NSR. HR 73.    ** Please Note: This note has been constructed using a voice recognition system. **

## 2024-05-10 NOTE — ASSESSMENT & PLAN NOTE
Lab Results   Component Value Date    EGFR 29 05/09/2024    EGFR 46 (L) 07/06/2023    EGFR 43 (L) 07/05/2023    CREATININE 1.64 (H) 05/09/2024    CREATININE 1.18 (H) 07/06/2023    CREATININE 1.25 (H) 07/05/2023     CR 1.64 close to baseline  Avoid nephrotoxic agents, renally dose medications  CT abdomen pelvis no obstructive uropathy  UA with pyuria/bacteriuria, urine Cx pending, empiric ceftriaxone  Trend creatinine  HCTZ on hold, gentle hydration

## 2024-05-10 NOTE — ASSESSMENT & PLAN NOTE
Lab Results   Component Value Date    EGFR 33 05/10/2024    EGFR 29 05/09/2024    EGFR 46 (L) 07/06/2023    CREATININE 1.45 (H) 05/10/2024    CREATININE 1.64 (H) 05/09/2024    CREATININE 1.18 (H) 07/06/2023   Baseline creatinine 1-1.2  CR 1.64 on admission  Avoid nephrotoxic agents, renally dose medications  CT abdomen pelvis no obstructive uropathy  UA with pyuria/bacteriuria, urine Cx pending, empiric ceftriaxone  1/2 blood culture E. coli which may be either GI or  source  HCTZ on hold, gentle hydration

## 2024-05-10 NOTE — ASSESSMENT & PLAN NOTE
Presented subjective fevers, chills, abdominal tenderness at home now resolved, denies dysuria  UA nitrite positive with bacteriuria and pyuria  History E. coli UTI susceptible to ceftriaxone  Urine culture pending  Empiric ceftriaxone  CT abdomen pelvis no obstructive uropathy

## 2024-05-10 NOTE — PLAN OF CARE
Problem: PAIN - ADULT  Goal: Verbalizes/displays adequate comfort level or baseline comfort level  Description: Interventions:  - Encourage patient to monitor pain and request assistance  - Assess pain using appropriate pain scale  - Administer analgesics based on type and severity of pain and evaluate response  - Implement non-pharmacological measures as appropriate and evaluate response  - Consider cultural and social influences on pain and pain management  - Notify physician/advanced practitioner if interventions unsuccessful or patient reports new pain  Outcome: Progressing     Problem: INFECTION - ADULT  Goal: Absence or prevention of progression during hospitalization  Description: INTERVENTIONS:  - Assess and monitor for signs and symptoms of infection  - Monitor lab/diagnostic results  - Monitor all insertion sites, i.e. indwelling lines, tubes, and drains  - Monitor endotracheal if appropriate and nasal secretions for changes in amount and color  - [place] appropriate cooling/warming therapies per order  - Administer medications as ordered  - Instruct and encourage patient and family to use good hand hygiene technique  - Identify and instruct in appropriate isolation precautions for identified infection/condition  Outcome: Progressing  Goal: Absence of fever/infection during neutropenic period  Description: INTERVENTIONS:  - Monitor WBC    Outcome: Progressing     Problem: SAFETY ADULT  Goal: Patient will remain free of falls  Description: INTERVENTIONS:  - Educate patient/family on patient safety including physical limitations  - Instruct patient to call for assistance with activity   - Consult OT/PT to assist with strengthening/mobility   - Keep Call bell within reach  - Keep bed low and locked with side rails adjusted as appropriate  - Keep care items and personal belongings within reach  - Initiate and maintain comfort rounds  - Make Fall Risk Sign visible to staff  - Offer Toileting every 2 Hours,  in advance of need  - Initiate/Maintain bed alarm  - Obtain necessary fall risk management equipment: yellow socks, yellow wristband  - Apply yellow socks and bracelet for high fall risk patients  - Consider moving patient to room near nurses station  Outcome: Progressing  Goal: Maintain or return to baseline ADL function  Description: INTERVENTIONS:  -  Assess patient's ability to carry out ADLs; assess patient's baseline for ADL function and identify physical deficits which impact ability to perform ADLs (bathing, care of mouth/teeth, toileting, grooming, dressing, etc.)  - Assess/evaluate cause of self-care deficits   - Assess range of motion  - Assess patient's mobility; develop plan if impaired  - Assess patient's need for assistive devices and provide as appropriate  - Encourage maximum independence but intervene and supervise when necessary  - Involve family in performance of ADLs  - Assess for home care needs following discharge   - Consider OT consult to assist with ADL evaluation and planning for discharge  - Provide patient education as appropriate  Outcome: Progressing  Goal: Maintains/Returns to pre admission functional level  Description: INTERVENTIONS:  - Perform AM-PAC 6 Click Basic Mobility/ Daily Activity assessment daily.  - Set and communicate daily mobility goal to care team and patient/family/caregiver.   - Collaborate with rehabilitation services on mobility goals if consulted  - Perform Range of Motion 2 times a day.  - Reposition patient every 2 hours.  - Dangle patient 3 times a day  - Stand patient 3 times a day  - Ambulate patient 3 times a day  - Out of bed to chair 3 times a day   - Out of bed for meals 3 times a day  - Out of bed for toileting  - Record patient progress and toleration of activity level   Outcome: Progressing     Problem: DISCHARGE PLANNING  Goal: Discharge to home or other facility with appropriate resources  Description: INTERVENTIONS:  - Identify barriers to  discharge w/patient and caregiver  - Arrange for needed discharge resources and transportation as appropriate  - Identify discharge learning needs (meds, wound care, etc.)  - Arrange for interpretive services to assist at discharge as needed  - Refer to Case Management Department for coordinating discharge planning if the patient needs post-hospital services based on physician/advanced practitioner order or complex needs related to functional status, cognitive ability, or social support system  Outcome: Progressing     Problem: Knowledge Deficit  Goal: Patient/family/caregiver demonstrates understanding of disease process, treatment plan, medications, and discharge instructions  Description: Complete learning assessment and assess knowledge base.  Interventions:  - Provide teaching at level of understanding  - Provide teaching via preferred learning methods  Outcome: Progressing

## 2024-05-10 NOTE — ASSESSMENT & PLAN NOTE
Large stool burden seen on CT abdomen pelvis  Continue Colace twice daily  Dulcolax suppository x 1 and miralax ordered

## 2024-05-10 NOTE — ASSESSMENT & PLAN NOTE
History hyperbilirubinemia and fatty liver disease in 2021-MSSA bacteremia at time of this diagnosis  Negative hepatitis C testing in 2021  Incidental finding of elevated transaminase levels today  , , alk phos 302, total bili 3.33  Continue daily MELD labs  CT abdomen pelvis and RUQ US no evidence of cirrhosis or obstructive pathology  Noted cholelithiasis without CBD dilation or acute cholecystitis-this is previously known to patient  Possible recently passed cholelithiasis  Abdominal exam benign  Possible secondary to UTI or bacteremia-CXR pending  Continue empiric ceftriaxone  Plaquenil 200 mg twice daily on hold, MTX q. Thursday on hold pending resolution of transaminase levels  Appreciate gastroenterology consultation

## 2024-05-10 NOTE — CONSULTS
Consultation - General Surgery   Cheryle Mcleod 83 y.o. female MRN: [id number]  Unit/Bed#: -01 Encounter: [id number]        Assessment/Plan   Assessment:  82yo F with cholelithiasis and elevated LFTs     - AST 33(727),  (534)  - Tbili 4.2 (3.3)  - Bacteremia, BC #1 pending, BC#2 growing gram-negative rods,   - WBC 10 (19)  - procal 4.95 (7.19)  - UA with pyuria, culture pending. Has hx of E-coli UTIs  - Afebrile, VS WNL, abdominal exam benign     -Pt has hx of hyperbilirubinemia and fatty liver dz. negative hepatitis C testing in 2021.      Pmhx: CAD s/p MI, COPD(no home 02), RA, fatty liver   Pshx: hyster, bladder suspension     RUQ US:   BILIARY:  Multiple shadowing gallstones. No pericholecystic fluid or gallbladder wall thickening. The gallbladder is moderately distended.  No intrahepatic biliary dilatation.  CBD measures 5.0 mm.  No choledocholithiasis.  IMPRESSION:  Cholelithiasis. No definite evidence of acute cholecystitis. No intra or extrahepatic biliary ductal dilatation.  Simple cyst lower pole right kidney.     CTAP: IMPRESSION: No evidence of an acute inflammatory process.   Cholelithiasis. Large amount of retained stool throughout the colon.     Plan:  -Continue to monitor, trend fever, LFTs and WBC. If Tbili remains elevated, pt may require MRCP to r/o CBD obstruction.  -No need for emergent surgical intervention as patient is non-tender,afebrile and leukocytosis is resolving. She also expressed desire to avoid surgical intervention if at all possible.  -Advance to FLD  -follow up urine culture, blood cultures  -Continue IV abx, Rocephin  -Pain/nausea control  -miralax, colace,         History of Present Illness   HPI:  Cheryle Mcleod is a 83 y.o. female with a Pmhx of COPD, CAD, RA, HTN, fatty liver, Ecoli UTIs and MSSA bacteremia who presented to the ED with fever, chills, HA, weakness and poor appetite x 2 days. She denies any abdominal pain at home or since admission.  She had an episode of nausea before admission but none since. While in the ED she was found to have significantly elevated LFTs and a leukocytosis. Today her leukocytosis improved, transaminases improved ,but Tbili worsened.      Consults     Review of Systems   Constitutional:  Positive for appetite change, chills, fatigue and fever.   HENT:  Negative for congestion, ear pain, rhinorrhea and sore throat.    Eyes:  Negative for pain and visual disturbance.   Respiratory:  Negative for cough, shortness of breath and wheezing.    Cardiovascular:  Negative for chest pain and palpitations.   Gastrointestinal:  Positive for nausea. Negative for abdominal distention, abdominal pain, blood in stool, constipation, diarrhea, rectal pain and vomiting.   Genitourinary:  Negative for dysuria and hematuria.   Musculoskeletal:  Negative for arthralgias and back pain.   Skin:  Negative for color change and rash.   Neurological:  Positive for weakness. Negative for dizziness, seizures and syncope.   Psychiatric/Behavioral:  Negative for agitation, behavioral problems and confusion.    All other systems reviewed and are negative.              Historical Information   Medical History        Past Medical History:   Diagnosis Date    Arthritis      Asthma      Diabetes mellitus (HCC)      Hypertension      MI, old           Surgical History         Past Surgical History:   Procedure Laterality Date    BACK SURGERY        CARDIAC SURGERY        HYSTERECTOMY                   Social History   Social History          Substance and Sexual Activity   Alcohol Use Not Currently      Social History          Substance and Sexual Activity   Drug Use Not Currently            E-Cigarette/Vaping    E-Cigarette Use Never User              E-Cigarette/Vaping Substances    Nicotine No      THC No      CBD No      Flavoring No      Other No      Unknown No        Social History           Tobacco Use   Smoking Status Former    Passive exposure: Never    Smokeless Tobacco Never      Family History: non-contributory           Meds/Allergies   PTA meds:   Prior to Admission Medications   Prescriptions Last Dose Informant Patient Reported? Taking?   DULoxetine (CYMBALTA) 30 mg delayed release capsule     Yes No   Sig: Take 30 mg by mouth daily   acetaminophen (TYLENOL) 325 mg tablet     No No   Sig: Take 2 tablets (650 mg total) by mouth every 6 (six) hours as needed for mild pain, headaches or fever   albuterol (ProAir HFA) 90 mcg/act inhaler     No No   Sig: Inhale 2 puffs every 6 (six) hours as needed for wheezing   aspirin 325 mg tablet     Yes No   Sig: Take 325 mg by mouth daily   atorvastatin (LIPITOR) 20 mg tablet     Yes No   Sig: Take 20 mg by mouth daily   bisoprolol-hydrochlorothiazide (ZIAC) 2.5-6.25 MG per tablet   Self Yes No   Sig: Take 1 tablet by mouth daily   calcium citrate-vitamin D (CITRACAL+D) 315-200 MG-UNIT per tablet     Yes No   Sig: Take 1 tablet by mouth 2 (two) times a day   docusate sodium (COLACE) 100 mg capsule     Yes No   Sig: Take 100 mg by mouth 2 (two) times a day   folic acid (FOLVITE) 1 mg tablet     Yes No   Sig: Take by mouth daily   hydroxychloroquine (PLAQUENIL) 200 mg tablet   Self Yes No   Sig: Take 200 mg by mouth 2 (two) times a day with meals   lisinopril (ZESTRIL) 10 mg tablet     Yes No   Sig: Take 10 mg by mouth daily   loratadine (CLARITIN) 10 mg tablet   Self Yes No   Sig: Take 10 mg by mouth daily   methotrexate 2.5 mg tablet   Self Yes No   Sig: Take by mouth once a week   pantoprazole (PROTONIX) 40 mg tablet   Self Yes No   Sig: Take 40 mg by mouth daily   tolterodine (DETROL LA) 4 mg 24 hr capsule   Self Yes No   Sig: Take 4 mg by mouth daily      Facility-Administered Medications: None      No Known Allergies           Objective   First Vitals:   Blood Pressure: 121/59 (05/09/24 1410)  Pulse: 75 (05/09/24 1410)  Temperature: 99.3 °F (37.4 °C) (05/09/24 1410)  Temp Source: Oral (05/09/24 1410)  Respirations:  19 (05/09/24 1410)  Weight - Scale: 58 kg (127 lb 13.2 oz) (05/09/24 1410)  SpO2: (!) 87 % (05/09/24 1410)     Current Vitals:   Blood Pressure: 115/60 (05/10/24 0726)  Pulse: 73 (05/10/24 0726)  Temperature: 97.5 °F (36.4 °C) (05/10/24 0726)  Temp Source: Temporal (05/10/24 0642)  Respirations: 17 (05/10/24 0726)  Weight - Scale: 57.8 kg (127 lb 6.8 oz) (05/10/24 0642)  SpO2: 95 % (05/10/24 0726)        Intake/Output Summary (Last 24 hours) at 5/10/2024 1437  Last data filed at 5/10/2024 0910      Gross per 24 hour   Intake 3800 ml   Output 450 ml   Net 3350 ml         Invasive Devices         Peripheral Intravenous Line  Duration                Peripheral IV 05/09/24 Left;Ventral (anterior) Forearm 1 day     Peripheral IV 05/09/24 Left;Proximal;Ventral (anterior) Forearm <1 day                  Drain  Duration                External Urinary Catheter <1 day                          Physical Exam  Vitals and nursing note reviewed.   Constitutional:       General: She is not in acute distress.     Appearance: Normal appearance. She is well-developed and normal weight.   HENT:      Head: Normocephalic and atraumatic.      Comments: Nasal o2     Nose: Nose normal.      Mouth/Throat:      Mouth: Mucous membranes are moist.   Eyes:      Conjunctiva/sclera: Conjunctivae normal.   Cardiovascular:      Rate and Rhythm: Normal rate and regular rhythm.   Pulmonary:      Effort: Pulmonary effort is normal. No respiratory distress.      Breath sounds: Normal breath sounds.   Abdominal:      General: Abdomen is flat. Bowel sounds are normal. There is no distension.      Palpations: Abdomen is soft. There is no mass.      Tenderness: There is no abdominal tenderness. There is no guarding or rebound.      Hernia: No hernia is present.   Musculoskeletal:         General: No swelling.      Cervical back: Neck supple.   Skin:     General: Skin is warm and dry.      Capillary Refill: Capillary refill takes less than 2 seconds.       "Coloration: Skin is jaundiced.   Neurological:      General: No focal deficit present.      Mental Status: She is alert and oriented to person, place, and time.   Psychiatric:         Mood and Affect: Mood normal.         Behavior: Behavior normal.            Lab Results: CBC:         Lab Results   Component Value Date     WBC 10.05 05/10/2024     HGB 11.1 (L) 05/10/2024     HCT 35.6 05/10/2024      (H) 05/10/2024      05/10/2024     RBC 3.50 (L) 05/10/2024     MCH 31.7 05/10/2024     MCHC 31.2 (L) 05/10/2024     RDW 14.7 05/10/2024     MPV 9.1 05/10/2024     NRBC 0 05/10/2024   , CMP:         Lab Results   Component Value Date     SODIUM 138 05/10/2024     K 3.7 05/10/2024      05/10/2024     CO2 24 05/10/2024     BUN 26 (H) 05/10/2024     CREATININE 1.45 (H) 05/10/2024     CALCIUM 8.2 (L) 05/10/2024      (H) 05/10/2024      (H) 05/10/2024     ALKPHOS 239 (H) 05/10/2024     EGFR 33 05/10/2024   , Coagulation:         Lab Results   Component Value Date     INR 1.33 (H) 05/10/2024   , Urinalysis: No results found for: \"COLORU\", \"CLARITYU\", \"SPECGRAV\", \"PHUR\", \"LEUKOCYTESUR\", \"NITRITE\", \"PROTEINUA\", \"GLUCOSEU\", \"KETONESU\", \"BILIRUBINUR\", \"BLOODU\", Amylase: No results found for: \"AMYLASE\", Lipase:         Lab Results   Component Value Date     LIPASE 15 05/10/2024      Imaging: I have personally reviewed pertinent reports.    EKG, Pathology, and Other Studies: I have personally reviewed pertinent reports.       Counseling / Coordination of Care  Total floor / unit time spent today 45 minutes.  Greater than 50% of total time was spent with the patient and / or family counseling and / or coordination of care.  A description of the counseling / coordination of care:  Review of patient's  Pmhx history, labs, imaging.  Obtaining HPI, performing exam, discussing treatment plan with patient.   .             "

## 2024-05-10 NOTE — ASSESSMENT & PLAN NOTE
History rheumatoid arthritis  Maintained on Plaquenil 20 mg twice daily and methotrexate q. weekly, folic acid daily  Plaquenil and methotrexate on hold in setting of infection with transaminase elevations  Outpatient follow-up

## 2024-05-10 NOTE — ASSESSMENT & PLAN NOTE
Ultrasound right abdomen  Cholelithiasis. No definite evidence of acute cholecystitis. No intra or extrahepatic biliary ductal dilatation.   Abdominal exam benign  Outpatient follow-up

## 2024-05-10 NOTE — CASE MANAGEMENT
Case Management Assessment & Discharge Planning Note    Patient name Cheryle Mcleod  Location /-01 MRN [id number]  : 1940 Date 5/10/2024       Current Admission Date: 2024  Current Admission Diagnosis:Elevated transaminase level   Patient Active Problem List    Diagnosis Date Noted    Troponin level elevated 2024    Elevated transaminase level 2024    Acute cystitis without hematuria 2024    Constipation 2024    Leukocytosis 2024    Acute respiratory failure with hypoxia (HCC) 2024    Oral thrush 2023    Nonischemic nontraumatic myocardial injury 2023    Urinary incontinence 2023    Calculus of gallbladder without cholecystitis without obstruction 2023    Loss of appetite 2023    Acute bronchitis 2022    Coronary artery disease involving native coronary artery of native heart 2022    COVID 2022    Essential hypertension 2022    COPD (chronic obstructive pulmonary disease) ([facility]) 2022    Rheumatoid arthritis of multiple sites with negative rheumatoid factor ([facility]) 2022    GERD (gastroesophageal reflux disease) 2022    Fatty liver 2022    Generalized weakness 2022    RONALD (acute kidney injury) ([facility]) 2022    Prolonged Q-T interval on ECG 2022      LOS (days): 1  Geometric Mean LOS (GMLOS) (days): 3.9  Days to GMLOS:3     OBJECTIVE:    Risk of Unplanned Readmission Score: 20.83         Current admission status: Inpatient  Referral Reason:  (dc planning)    Preferred Pharmacy:   [id number] - [place] - [address] [address]  Phone: [phone] Fax: [phone]    [facility] - [place], [place] - [address]  [address]  [place] [address]  Phone: [phone] Fax: [phone]    [id number] - [place], [place] - [address]  [address]  Phone: [phone] Fax: [phone]    Primary Care Provider: Brian Peraza DO    Primary Insurance: MEDICARE  Secondary Insurance:     CM met with patient at the bedside,baseline information  was obtained. CM discussed the role of CM in helping the patient develop a discharge plan and assist the patient in carry out their plan.      ASSESSMENT:  Active Health Care Proxies    There are no active Health Care Proxies on file.       Advance Directives  Does patient have a Health Care POA?: Yes  Does patient have Advance Directives?: Yes  Advance Directives: Living will  Primary Contact: Cheryle Cervantes (Daughter)  [phone]         Readmission Root Cause  30 Day Readmission: No    Patient Information  Admitted from:: Home  Mental Status: Alert  During Assessment patient was accompanied by: Not accompanied during assessment  Assessment information provided by:: Patient  Primary Caregiver: Self  Support Systems: Daughter  County [facility] Residence: [facility]  What city do you live in?: [facility]  Home entry access options. Select all that apply.: Stairs  Number of steps to enter home.: 3  Do the steps have railings?: Yes  Type of Current Residence: [address]  Upon entering residence, is there a bedroom on the main floor (no further steps)?:  (after 3 steps)  Upon entering residence, is there a bathroom on the main floor (no further steps)?: Yes (after 3 steps in)  Living Arrangements: Lives w/ Daughter  Is patient a ?: No    Activities of Daily Living Prior to Admission  Functional Status: Independent  Completes ADLs independently?: Yes  Ambulates independently?: No  Does patient use assisted devices?: Yes  Assisted Devices (DME) used: Walker  Does patient currently own DME?: Yes  What DME does the patient currently own?: Walker  Does patient have a history of Outpatient Therapy (PT/OT)?: No  Does the patient have a history of Short-Term Rehab?: No ([facility])  Does patient have a history of HHC?:  Yes  Does patient currently have HHC?: No         Patient Information Continued  Income Source: Pension/long term  Does patient have prescription coverage?: Yes  Does patient receive dialysis treatments?: No  Does patient have a history of substance abuse?: No  Does patient have a history of Mental Health Diagnosis?: Yes (medications for anxiety)  Is patient receiving treatment for mental health?: Yes         Means of Transportation  Means of Transport to [facility]:: Family transport    Pt resides with her daughter Cheryle, who is undergoing cancer treatments, her NICK and a great granddaughter who is 12 years old. She has been living there about 2 1/2 years.   She uses a walker to ambulate.  She did have [facility] in the past, however they have stopped.  Per patient her daughter, Terri takes her to PCP appointments.      DISCHARGE DETAILS:    Discharge planning discussed with:: patient  Freedom of Choice: Yes          Contacts  Patient Contacts: Terri  Relationship to Patient:: Family  Contact Method: Phone  Phone Number: see face sheet  Reason/Outcome: Discharge Planning         Treatment Team Recommendation:  (TBD)  Discharge Destination Plan::  (TBD)  Transport at Discharge : Family            Will follow therapy evaluation for dc needs.

## 2024-05-10 NOTE — ASSESSMENT & PLAN NOTE
History CABG  Maintained on full-strength aspirin  Denies chest pain  Continue bisoprolol and lisinopril  Statin on hold with elevated transaminase

## 2024-05-10 NOTE — ASSESSMENT & PLAN NOTE
Troponin [id number]  Denies chest pain  EKG without ST elevation or depression  Suspected secondary to UTI

## 2024-05-10 NOTE — ASSESSMENT & PLAN NOTE
History rheumatoid arthritis  Maintained on Plaquenil 200 mg twice daily and methotrexate q. weekly, folic acid daily  Plaquenil and methotrexate on hold in setting of infection with transaminase elevations  Outpatient follow-up

## 2024-05-10 NOTE — ASSESSMENT & PLAN NOTE
Presented with chills, subjective fevers at home, found to have UTI  Leukocytosis 19,000 with left shift, no bandemia  No other sepsis criteria  Blood cultures pending, urine culture pending  Procalcitonin 7.19  CT chest abdomen pelvis no source of infectious process  Empiric ceftriaxone pending culture results

## 2024-05-10 NOTE — ASSESSMENT & PLAN NOTE
Ultrasound abdomen and ct abd reviewed shows Cholelithiasis. No definite evidence of acute cholecystitis. No intra or extrahepatic biliary ductal dilatation.   Elevated transaminitis on presentation along with 1 out of 2 blood cultures showing gram-negative bacteremia possibly E. coli which may be either GI or  source.  Transaminits are trending down however still has elevated bilirubin  Ask surgery for evaluation

## 2024-05-11 ENCOUNTER — APPOINTMENT (INPATIENT)
Dept: MRI IMAGING | Facility: HOSPITAL | Age: 84
DRG: 444 | End: 2024-05-11
Payer: MEDICARE

## 2024-05-11 LAB
ALBUMIN SERPL BCP-MCNC: 3 G/DL (ref 3.5–5)
ALP SERPL-CCNC: 215 U/L (ref 34–104)
ALT SERPL W P-5'-P-CCNC: 184 U/L (ref 7–52)
ANION GAP SERPL CALCULATED.3IONS-SCNC: 2 MMOL/L (ref 4–13)
AST SERPL W P-5'-P-CCNC: 130 U/L (ref 13–39)
BACTERIA UR CULT: ABNORMAL
BILIRUB SERPL-MCNC: 3.1 MG/DL (ref 0.2–1)
BUN SERPL-MCNC: 21 MG/DL (ref 5–25)
CALCIUM ALBUM COR SERPL-MCNC: 9.2 MG/DL (ref 8.3–10.1)
CALCIUM SERPL-MCNC: 8.4 MG/DL (ref 8.4–10.2)
CHLORIDE SERPL-SCNC: 109 MMOL/L (ref 96–108)
CO2 SERPL-SCNC: 26 MMOL/L (ref 21–32)
CREAT SERPL-MCNC: 1.24 MG/DL (ref 0.6–1.3)
ERYTHROCYTE [DISTWIDTH] IN BLOOD BY AUTOMATED COUNT: 14.9 % (ref 11.6–15.1)
GFR SERPL CREATININE-BSD FRML MDRD: 40 ML/MIN/1.73SQ M
GLUCOSE SERPL-MCNC: 101 MG/DL (ref 65–140)
HCT VFR BLD AUTO: 33.8 % (ref 34.8–46.1)
HGB BLD-MCNC: 10.6 G/DL (ref 11.5–15.4)
MCH RBC QN AUTO: 31.6 PG (ref 26.8–34.3)
MCHC RBC AUTO-ENTMCNC: 31.4 G/DL (ref 31.4–37.4)
MCV RBC AUTO: 101 FL (ref 82–98)
PLATELET # BLD AUTO: 180 THOUSANDS/UL (ref 149–390)
PMV BLD AUTO: 9.4 FL (ref 8.9–12.7)
POTASSIUM SERPL-SCNC: 4.2 MMOL/L (ref 3.5–5.3)
PROT SERPL-MCNC: 5.6 G/DL (ref 6.4–8.4)
RBC # BLD AUTO: 3.35 MILLION/UL (ref 3.81–5.12)
SODIUM SERPL-SCNC: 137 MMOL/L (ref 135–147)
WBC # BLD AUTO: 6.62 THOUSAND/UL (ref 4.31–10.16)

## 2024-05-11 PROCEDURE — 85027 COMPLETE CBC AUTOMATED: CPT | Performed by: FAMILY MEDICINE

## 2024-05-11 PROCEDURE — 80053 COMPREHEN METABOLIC PANEL: CPT | Performed by: FAMILY MEDICINE

## 2024-05-11 PROCEDURE — 99232 SBSQ HOSP IP/OBS MODERATE 35: CPT | Performed by: INTERNAL MEDICINE

## 2024-05-11 PROCEDURE — 74181 MRI ABDOMEN W/O CONTRAST: CPT

## 2024-05-11 PROCEDURE — 99232 SBSQ HOSP IP/OBS MODERATE 35: CPT | Performed by: PHYSICIAN ASSISTANT

## 2024-05-11 RX ADMIN — LISINOPRIL 10 MG: 10 TABLET ORAL at 08:28

## 2024-05-11 RX ADMIN — LORATADINE 10 MG: 10 TABLET ORAL at 08:28

## 2024-05-11 RX ADMIN — DOCUSATE SODIUM 100 MG: 100 CAPSULE, LIQUID FILLED ORAL at 17:34

## 2024-05-11 RX ADMIN — DULOXETINE HYDROCHLORIDE 30 MG: 30 CAPSULE, DELAYED RELEASE ORAL at 08:28

## 2024-05-11 RX ADMIN — FOLIC ACID 1 MG: 1 TABLET ORAL at 08:28

## 2024-05-11 RX ADMIN — PANTOPRAZOLE SODIUM 40 MG: 40 TABLET, DELAYED RELEASE ORAL at 05:17

## 2024-05-11 RX ADMIN — POLYETHYLENE GLYCOL 3350 17 G: 17 POWDER, FOR SOLUTION ORAL at 08:28

## 2024-05-11 RX ADMIN — CEFTRIAXONE 1000 MG: 1 INJECTION, SOLUTION INTRAVENOUS at 17:00

## 2024-05-11 RX ADMIN — HEPARIN SODIUM 5000 UNITS: 5000 INJECTION, SOLUTION INTRAVENOUS; SUBCUTANEOUS at 14:25

## 2024-05-11 RX ADMIN — SODIUM CHLORIDE 50 ML/HR: 0.9 INJECTION, SOLUTION INTRAVENOUS at 05:17

## 2024-05-11 RX ADMIN — ALBUTEROL SULFATE 2 PUFF: 90 AEROSOL, METERED RESPIRATORY (INHALATION) at 17:43

## 2024-05-11 RX ADMIN — DOCUSATE SODIUM 100 MG: 100 CAPSULE, LIQUID FILLED ORAL at 08:28

## 2024-05-11 RX ADMIN — BISOPROLOL FUMARATE 2.5 MG: 5 TABLET, FILM COATED ORAL at 08:28

## 2024-05-11 RX ADMIN — HEPARIN SODIUM 5000 UNITS: 5000 INJECTION, SOLUTION INTRAVENOUS; SUBCUTANEOUS at 05:17

## 2024-05-11 RX ADMIN — HEPARIN SODIUM 5000 UNITS: 5000 INJECTION, SOLUTION INTRAVENOUS; SUBCUTANEOUS at 22:04

## 2024-05-11 RX ADMIN — ACETAMINOPHEN 325MG 650 MG: 325 TABLET ORAL at 14:25

## 2024-05-11 RX ADMIN — ASPIRIN 81 MG: 81 TABLET, COATED ORAL at 08:28

## 2024-05-11 NOTE — ASSESSMENT & PLAN NOTE
Large stool burden seen on CT abdomen pelvis  Continue Colace twice daily  Dulcolax suppository x 1 and miralax ordered  Checking with nursing staff if patient had BM- if not will need additional bowel regimen.

## 2024-05-11 NOTE — ASSESSMENT & PLAN NOTE
History hyperbilirubinemia and fatty liver disease in 2021-MSSA bacteremia at time of this diagnosis  Negative hepatitis C testing in 2021  Incidental finding of elevated transaminase levels today  Trending down now

## 2024-05-11 NOTE — ASSESSMENT & PLAN NOTE
Ultrasound abdomen and ct abd reviewed shows Cholelithiasis. No definite evidence of acute cholecystitis. No intra or extrahepatic biliary ductal dilatation.   Elevated transaminitis on presentation along with 1 out of 2 blood cultures showing gram-negative bacteremia possibly E. coli which may be either GI or  source.  Transaminits are trending down however still has elevated bilirubin  Appreciate surgery input- MRI not remarkable for obstruction   Trend LFTS for now and continue abx.

## 2024-05-11 NOTE — PROGRESS NOTES
[facility]  Progress Note  Name: Cheryle Mcleod I  MRN: [id number]  Unit/Bed#: -01 I Date of Admission: 5/9/2024   Date of Service: 5/11/2024 I Hospital Day: 2    Assessment/Plan   Acute respiratory failure with hypoxia (HCC)  Assessment & Plan  Requiring 2 L of supplemental nasal cannula oxygen at time of admission  No history home oxygen  Resolved at time of admission  CT chest with atelectasis, no infectious process,Compressive atelectasis in the left lower lobe due to large hiatal hernia     Constipation  Assessment & Plan  Large stool burden seen on CT abdomen pelvis  Continue Colace twice daily  Dulcolax suppository x 1 and miralax ordered  Checking with nursing staff if patient had BM- if not will need additional bowel regimen.     Acute cystitis without hematuria  Assessment & Plan  Presented subjective fevers, chills, abdominal tenderness at home now resolved, denies dysuria  UA nitrite positive with bacteriuria and pyuria  History E. coli UTI susceptible to ceftriaxone  Urine culture pending  Empiric ceftriaxone  CT abdomen pelvis no obstructive uropathy    Troponin level elevated  Assessment & Plan  Troponin [id number]  Denies chest pain  EKG without ST elevation or depression  Suspected secondary to UTI and cholelithiases    Calculus of gallbladder without cholecystitis without obstruction  Assessment & Plan  Ultrasound abdomen and ct abd reviewed shows Cholelithiasis. No definite evidence of acute cholecystitis. No intra or extrahepatic biliary ductal dilatation.   Elevated transaminitis on presentation along with 1 out of 2 blood cultures showing gram-negative bacteremia possibly E. coli which may be either GI or  source.  Transaminits are trending down however still has elevated bilirubin  Appreciate surgery input- MRI not remarkable for obstruction   Trend LFTS for now and continue abx.     Coronary artery disease involving native coronary artery of native  heart  Assessment & Plan  History CABG  Maintained on full-strength aspirin  Denies chest pain  Continue bisoprolol and lisinopril  Statin on hold with elevated transaminase    RONALD (acute kidney injury) (HCC)  Assessment & Plan  Lab Results   Component Value Date    EGFR 40 05/11/2024    EGFR 33 05/10/2024    EGFR 29 05/09/2024    CREATININE 1.24 05/11/2024    CREATININE 1.45 (H) 05/10/2024    CREATININE 1.64 (H) 05/09/2024   Baseline creatinine 1-1.2  CR 1.64 on admission  Avoid nephrotoxic agents, renally dose medications  CT abdomen pelvis no obstructive uropathy  Creatinine is trending down.     Rheumatoid arthritis of multiple sites with negative rheumatoid factor (HCC)  Assessment & Plan  History rheumatoid arthritis  Maintained on Plaquenil 200 mg twice daily and methotrexate q. weekly, folic acid daily  Plaquenil and methotrexate on hold in setting of infection with transaminase elevations  Outpatient follow-up    * Elevated transaminase level  Assessment & Plan  History hyperbilirubinemia and fatty liver disease in 2021-MSSA bacteremia at time of this diagnosis  Negative hepatitis C testing in 2021  Incidental finding of elevated transaminase levels today  Trending down now             VTE Pharmacologic Prophylaxis:   Moderate Risk (Score 3-4) - Pharmacological DVT Prophylaxis Ordered: heparin.    Mobility:   Basic Mobility Inpatient Raw Score: 16  -HL Goal: 5: Stand one or more mins  -HL Achieved: 2: Bed activities/Dependent transfer      Patient Centered Rounds: I performed bedside rounds with nursing staff today.   Discussions with Specialists or Other Care Team Provider: Discussed with nursing.     Education and Discussions with Family / Patient:  Called Cheryle - updated her.     Total Time Spent on Date of Encounter in care of patient: 30 mins. This time was spent on one or more of the following: performing physical exam; counseling and coordination of care; obtaining or reviewing history;  "documenting in the medical record; reviewing/ordering tests, medications or procedures; communicating with other healthcare professionals and discussing with patient's family/caregivers.    Current Length of Stay: 2 day(s)  Current Patient Status: Inpatient   Certification Statement: The patient will continue to require additional inpatient hospital stay due to IVABX.   Discharge Plan: Anticipate discharge in 48 hrs to home.    Code Status: Level 3 - DNAR and DNI    Subjective:   Patient seen and examined   No new symptoms   Feeling \"Better\"    Objective:     Vitals:   Temp (24hrs), Av.7 °F (37.1 °C), Min:97.7 °F (36.5 °C), Max:99.8 °F (37.7 °C)    Temp:  [97.7 °F (36.5 °C)-99.8 °F (37.7 °C)] 98.1 °F (36.7 °C)  HR:  [76-82] 77  Resp:  [16-18] 16  BP: (124-152)/(70-78) 146/78  SpO2:  [91 %-97 %] 97 %  Body mass index is 24.89 kg/m².     Input and Output Summary (last 24 hours):     Intake/Output Summary (Last 24 hours) at 2024 1506  Last data filed at 2024 1100  Gross per 24 hour   Intake 3176.67 ml   Output 300 ml   Net 2876.67 ml       Physical Exam:   Physical Exam  Constitutional:       General: She is not in acute distress.     Appearance: She is not ill-appearing, toxic-appearing or diaphoretic.   Eyes:      General: No scleral icterus.        Right eye: No discharge.         Left eye: No discharge.      Pupils: Pupils are equal, round, and reactive to light.   Cardiovascular:      Rate and Rhythm: Normal rate.   Abdominal:      General: There is no distension.      Palpations: There is no mass.      Tenderness: There is no abdominal tenderness. There is no right CVA tenderness, left CVA tenderness, guarding or rebound.      Hernia: No hernia is present.   Skin:     Capillary Refill: Capillary refill takes less than 2 seconds.      Coloration: Skin is not jaundiced or pale.      Findings: No bruising, erythema, lesion or rash.   Neurological:      Mental Status: She is alert.      Cranial Nerves: " No cranial nerve deficit.      Sensory: No sensory deficit.      Motor: No weakness.      Coordination: Coordination normal.      Gait: Gait normal.      Deep Tendon Reflexes: Reflexes normal.   Psychiatric:         Mood and Affect: Mood normal.          Additional Data:     Labs:  Results from last 7 days   Lab Units 05/11/24  0509 05/10/24  0500 05/09/24  1415   WBC Thousand/uL 6.62 10.05 19.05*   HEMOGLOBIN g/dL 10.6* 11.1* 12.4   HEMATOCRIT % 33.8* 35.6 39.1   PLATELETS Thousands/uL 180 176 242   BANDS PCT %  --   --  1   SEGS PCT %  --  92*  --    LYMPHO PCT %  --  3* 2*   MONO PCT %  --  4 1*   EOS PCT %  --  0 0     Results from last 7 days   Lab Units 05/11/24  0509   SODIUM mmol/L 137   POTASSIUM mmol/L 4.2   CHLORIDE mmol/L 109*   CO2 mmol/L 26   BUN mg/dL 21   CREATININE mg/dL 1.24   ANION GAP mmol/L 2*   CALCIUM mg/dL 8.4   ALBUMIN g/dL 3.0*   TOTAL BILIRUBIN mg/dL 3.10*   ALK PHOS U/L 215*   ALT U/L 184*   AST U/L 130*   GLUCOSE RANDOM mg/dL 101     Results from last 7 days   Lab Units 05/10/24  0500   INR  1.33*             Results from last 7 days   Lab Units 05/10/24  0500 05/09/24  1415   LACTIC ACID mmol/L  --  1.9   PROCALCITONIN ng/ml 4.95* 7.19*       Lines/Drains:  Invasive Devices       Peripheral Intravenous Line  Duration             Peripheral IV 05/09/24 Left;Ventral (anterior) Forearm 2 days    Peripheral IV 05/09/24 Left;Proximal;Ventral (anterior) Forearm 1 day              Drain  Duration             External Urinary Catheter 1 day                          Imaging: No pertinent imaging reviewed.    Recent Cultures (last 7 days):   Results from last 7 days   Lab Units 05/09/24  1613 05/09/24  1417 05/09/24  1415   BLOOD CULTURE   --  Escherichia coli* No Growth at 24 hrs.   GRAM STAIN RESULT   --  Gram negative rods*  --    URINE CULTURE  80,000-89,000 cfu/ml Escherichia coli*  --   --        Last 24 Hours Medication List:   Current Facility-Administered Medications   Medication Dose  Route Frequency Provider Last Rate    acetaminophen  650 mg Oral Q6H PRN Maura S Michael, CRNP      albuterol  2 puff Inhalation Q6H PRN Maura S Michael, CRNP      aspirin  81 mg Oral Daily Maura S Michael, CRNP      bisoprolol  2.5 mg Oral Daily Maura S Michael, CRNP      cefTRIAXone  1,000 mg Intravenous Q24H Maura S Michael, CRNP Stopped (05/10/24 1719)    docusate sodium  100 mg Oral BID Maura S Michael, CRNP      DULoxetine  30 mg Oral Daily Maura S Michael, CRNP      folic acid  1 mg Oral Daily Maura S Michael, CRNP      heparin (porcine)  5,000 Units Subcutaneous Q8H HUSSEIN Maura S Michael, CRNP      lisinopril  10 mg Oral Daily Maura S Michael, CRNP      loratadine  10 mg Oral Daily Maura S Michael, CRNP      pantoprazole  40 mg Oral Early Morning Maura S Michael, CRNP      polyethylene glycol  17 g Oral Daily Alice Cali MD      sodium chloride  50 mL/hr Intravenous Continuous Alice Cali MD 50 mL/hr (05/11/24 0517)        Today, Patient Was Seen By: Angely Masterson MD    **Please Note: This note may have been constructed using a voice recognition system.**

## 2024-05-11 NOTE — ASSESSMENT & PLAN NOTE
Lab Results   Component Value Date    EGFR 40 05/11/2024    EGFR 33 05/10/2024    EGFR 29 05/09/2024    CREATININE 1.24 05/11/2024    CREATININE 1.45 (H) 05/10/2024    CREATININE 1.64 (H) 05/09/2024   Baseline creatinine 1-1.2  CR 1.64 on admission  Avoid nephrotoxic agents, renally dose medications  CT abdomen pelvis no obstructive uropathy  Creatinine is trending down.

## 2024-05-11 NOTE — PROGRESS NOTES
Progress Note - General Surgery   Cheryle Mcleod 83 y.o. female MRN: [id number]  Unit/Bed#: -01 Encounter: [id number]    Assessment:  82 yo F with transaminitis, cholelithiasis with E. Coli bacteremia  AVSS on 2L NC  WBC 6 (10, 19)  RONALD, Cr 1.2 (1.4)  LFTs improving, Tbili 3.1 (4.2)    Plan:  - plan for MRCP for further evaluation of bile duct, tentatively on for today  - diet as tolerated. Could advance to low fat if MRCP unremarkable  - Rocephin per primary for UTI, E. Coli bacteremia   - trend LFTs  - monitor for abd pain  - f/u blood cx    Rest of care per primary    Subjective/Objective     Subjective: pt states she still does not feel 100% herself, but states overall significantly improved since admission. She denies any abd pain. She is tolerating CLD without n/v. She endorses sob with talking but states that is her baseline. She does not require o2 at home.    Objective:     Blood pressure 146/78, pulse 77, temperature 98.1 °F (36.7 °C), resp. rate 16, weight 57.8 kg (127 lb 6.8 oz), SpO2 97%.,Body mass index is 24.89 kg/m².      Intake/Output Summary (Last 24 hours) at 5/11/2024 0925  Last data filed at 5/11/2024 0517  Gross per 24 hour   Intake 3286.67 ml   Output 300 ml   Net 2986.67 ml       Invasive Devices       Peripheral Intravenous Line  Duration             Peripheral IV 05/09/24 Left;Proximal;Ventral (anterior) Forearm 1 day    Peripheral IV 05/09/24 Left;Ventral (anterior) Forearm 1 day              Drain  Duration             External Urinary Catheter 1 day                    Physical Exam  Vitals and nursing note reviewed.   Constitutional:       General: She is not in acute distress.     Appearance: Normal appearance. She is normal weight. She is not ill-appearing, toxic-appearing or diaphoretic.   HENT:      Head: Normocephalic and atraumatic.   Eyes:      Extraocular Movements: Extraocular movements intact.   Cardiovascular:      Rate and Rhythm: Normal rate.   Pulmonary:       Effort: Pulmonary effort is normal. No respiratory distress.   Abdominal:      General: Abdomen is flat. There is no distension.      Palpations: Abdomen is soft.      Tenderness: There is no abdominal tenderness. There is no guarding or rebound.   Skin:     General: Skin is warm.      Capillary Refill: Capillary refill takes less than 2 seconds.   Neurological:      General: No focal deficit present.      Mental Status: She is alert.   Psychiatric:         Mood and Affect: Mood normal.         Behavior: Behavior normal.            Scheduled Meds:  Current Facility-Administered Medications   Medication Dose Route Frequency Provider Last Rate    acetaminophen  650 mg Oral Q6H PRN Maura S Michael, CRNP      albuterol  2 puff Inhalation Q6H PRN Maura S Michael, CRNP      aspirin  81 mg Oral Daily Maura S Michael, CRNP      bisoprolol  2.5 mg Oral Daily Maura S Michael, CRNP      cefTRIAXone  1,000 mg Intravenous Q24H Maura S Michael, CRNP Stopped (05/10/24 1719)    docusate sodium  100 mg Oral BID Maura S Michael, CRNP      DULoxetine  30 mg Oral Daily Maura S Michael, CRNP      folic acid  1 mg Oral Daily Maura S Michael, CRNP      heparin (porcine)  5,000 Units Subcutaneous Q8H HUSSEIN Maura S Michael, CRNP      lisinopril  10 mg Oral Daily Maura S Michael, CRNP      loratadine  10 mg Oral Daily Maura S Michael, CRNP      pantoprazole  40 mg Oral Early Morning Maura S Michael, CRNP      polyethylene glycol  17 g Oral Daily Alice Cali MD      sodium chloride  50 mL/hr Intravenous Continuous Alice Cali MD 50 mL/hr (05/11/24 0517)     Continuous Infusions:sodium chloride, 50 mL/hr, Last Rate: 50 mL/hr (05/11/24 0517)      PRN Meds:.  acetaminophen    albuterol      Lab, Imaging and other studies:I have personally reviewed pertinent lab results.  , CBC:   Lab Results   Component Value Date    WBC 6.62 05/11/2024    HGB 10.6 (L) 05/11/2024    HCT 33.8 (L) 05/11/2024     (H) 05/11/2024     05/11/2024    RBC 3.35 (L) 05/11/2024     "MCH 31.6 05/11/2024    MCHC 31.4 05/11/2024    RDW 14.9 05/11/2024    MPV 9.4 05/11/2024   , CMP:   Lab Results   Component Value Date    SODIUM 137 05/11/2024    K 4.2 05/11/2024     (H) 05/11/2024    CO2 26 05/11/2024    BUN 21 05/11/2024    CREATININE 1.24 05/11/2024    CALCIUM 8.4 05/11/2024     (H) 05/11/2024     (H) 05/11/2024    ALKPHOS 215 (H) 05/11/2024    EGFR 40 05/11/2024   , Coagulation: No results found for: \"PT\", \"INR\", \"APTT\", Urinalysis: No results found for: \"COLORU\", \"CLARITYU\", \"SPECGRAV\", \"PHUR\", \"LEUKOCYTESUR\", \"NITRITE\", \"PROTEINUA\", \"GLUCOSEU\", \"KETONESU\", \"BILIRUBINUR\", \"BLOODU\"  VTE Pharmacologic Prophylaxis: Heparin  VTE Mechanical Prophylaxis: sequential compression device      Ibis Medina PA-C  5/11/2024 9:25 AM    "

## 2024-05-11 NOTE — PLAN OF CARE
Problem: PAIN - ADULT  Goal: Verbalizes/displays adequate comfort level or baseline comfort level  Description: Interventions:  - Encourage patient to monitor pain and request assistance  - Assess pain using appropriate pain scale  - Administer analgesics based on type and severity of pain and evaluate response  - Implement non-pharmacological measures as appropriate and evaluate response  - Consider cultural and social influences on pain and pain management  - Notify physician/advanced practitioner if interventions unsuccessful or patient reports new pain  Outcome: Progressing     Problem: INFECTION - ADULT  Goal: Absence or prevention of progression during hospitalization  Description: INTERVENTIONS:  - Assess and monitor for signs and symptoms of infection  - Monitor lab/diagnostic results  - Monitor all insertion sites, i.e. indwelling lines, tubes, and drains  - Monitor endotracheal if appropriate and nasal secretions for changes in amount and color  - [place] appropriate cooling/warming therapies per order  - Administer medications as ordered  - Instruct and encourage patient and family to use good hand hygiene technique  - Identify and instruct in appropriate isolation precautions for identified infection/condition  Outcome: Progressing

## 2024-05-11 NOTE — PLAN OF CARE
Problem: PAIN - ADULT  Goal: Verbalizes/displays adequate comfort level or baseline comfort level  Description: Interventions:  - Encourage patient to monitor pain and request assistance  - Assess pain using appropriate pain scale  - Administer analgesics based on type and severity of pain and evaluate response  - Implement non-pharmacological measures as appropriate and evaluate response  - Consider cultural and social influences on pain and pain management  - Notify physician/advanced practitioner if interventions unsuccessful or patient reports new pain  Outcome: Progressing     Problem: INFECTION - ADULT  Goal: Absence or prevention of progression during hospitalization  Description: INTERVENTIONS:  - Assess and monitor for signs and symptoms of infection  - Monitor lab/diagnostic results  - Monitor all insertion sites, i.e. indwelling lines, tubes, and drains  - Monitor endotracheal if appropriate and nasal secretions for changes in amount and color  - [place] appropriate cooling/warming therapies per order  - Administer medications as ordered  - Instruct and encourage patient and family to use good hand hygiene technique  - Identify and instruct in appropriate isolation precautions for identified infection/condition  Outcome: Progressing  Goal: Absence of fever/infection during neutropenic period  Description: INTERVENTIONS:  - Monitor WBC    Outcome: Progressing     Problem: SAFETY ADULT  Goal: Patient will remain free of falls  Description: INTERVENTIONS:  - Educate patient/family on patient safety including physical limitations  - Instruct patient to call for assistance with activity   - Consult OT/PT to assist with strengthening/mobility   - Keep Call bell within reach  - Keep bed low and locked with side rails adjusted as appropriate  - Keep care items and personal belongings within reach  - Initiate and maintain comfort rounds  - Make Fall Risk Sign visible to staff  - Offer Toileting every    Hours,  in advance of need  - Initiate/Maintain   alarm  - Obtain necessary fall risk management equipment:     - Apply yellow socks and bracelet for high fall risk patients  - Consider moving patient to room near nurses station  Outcome: Progressing  Goal: Maintain or return to baseline ADL function  Description: INTERVENTIONS:  -  Assess patient's ability to carry out ADLs; assess patient's baseline for ADL function and identify physical deficits which impact ability to perform ADLs (bathing, care of mouth/teeth, toileting, grooming, dressing, etc.)  - Assess/evaluate cause of self-care deficits   - Assess range of motion  - Assess patient's mobility; develop plan if impaired  - Assess patient's need for assistive devices and provide as appropriate  - Encourage maximum independence but intervene and supervise when necessary  - Involve family in performance of ADLs  - Assess for home care needs following discharge   - Consider OT consult to assist with ADL evaluation and planning for discharge  - Provide patient education as appropriate  Outcome: Progressing  Goal: Maintains/Returns to pre admission functional level  Description: INTERVENTIONS:  - Perform AM-PAC 6 Click Basic Mobility/ Daily Activity assessment daily.  - Set and communicate daily mobility goal to care team and patient/family/caregiver.   - Collaborate with rehabilitation services on mobility goals if consulted  - Perform Range of Motion    times a day.  - Reposition patient every    hours.  - Dangle patient    times a day  - Stand patient    times a day  - Ambulate patient    times a day  - Out of bed to chair    times a day   - Out of bed for meals        times a day  - Out of bed for toileting  - Record patient progress and toleration of activity level   Outcome: Progressing     Problem: DISCHARGE PLANNING  Goal: Discharge to home or other facility with appropriate resources  Description: INTERVENTIONS:  - Identify barriers to discharge w/patient and  caregiver  - Arrange for needed discharge resources and transportation as appropriate  - Identify discharge learning needs (meds, wound care, etc.)  - Arrange for interpretive services to assist at discharge as needed  - Refer to Case Management Department for coordinating discharge planning if the patient needs post-hospital services based on physician/advanced practitioner order or complex needs related to functional status, cognitive ability, or social support system  Outcome: Progressing     Problem: Knowledge Deficit  Goal: Patient/family/caregiver demonstrates understanding of disease process, treatment plan, medications, and discharge instructions  Description: Complete learning assessment and assess knowledge base.  Interventions:  - Provide teaching at level of understanding  - Provide teaching via preferred learning methods  Outcome: Progressing

## 2024-05-12 LAB
ALBUMIN SERPL BCP-MCNC: 3.1 G/DL (ref 3.5–5)
ALP SERPL-CCNC: 274 U/L (ref 34–104)
ALT SERPL W P-5'-P-CCNC: 145 U/L (ref 7–52)
ANION GAP SERPL CALCULATED.3IONS-SCNC: 7 MMOL/L (ref 4–13)
AST SERPL W P-5'-P-CCNC: 96 U/L (ref 13–39)
BACTERIA BLD CULT: ABNORMAL
BASOPHILS # BLD AUTO: 0.02 THOUSANDS/ÂΜL (ref 0–0.1)
BASOPHILS NFR BLD AUTO: 0 % (ref 0–1)
BILIRUB SERPL-MCNC: 2.77 MG/DL (ref 0.2–1)
BUN SERPL-MCNC: 13 MG/DL (ref 5–25)
CALCIUM ALBUM COR SERPL-MCNC: 9 MG/DL (ref 8.3–10.1)
CALCIUM SERPL-MCNC: 8.3 MG/DL (ref 8.4–10.2)
CHLORIDE SERPL-SCNC: 111 MMOL/L (ref 96–108)
CO2 SERPL-SCNC: 21 MMOL/L (ref 21–32)
CREAT SERPL-MCNC: 0.89 MG/DL (ref 0.6–1.3)
E COLI DNA BLD POS QL NAA+NON-PROBE: DETECTED
EOSINOPHIL # BLD AUTO: 0.06 THOUSAND/ÂΜL (ref 0–0.61)
EOSINOPHIL NFR BLD AUTO: 1 % (ref 0–6)
ERYTHROCYTE [DISTWIDTH] IN BLOOD BY AUTOMATED COUNT: 14.8 % (ref 11.6–15.1)
GFR SERPL CREATININE-BSD FRML MDRD: 60 ML/MIN/1.73SQ M
GLUCOSE SERPL-MCNC: 100 MG/DL (ref 65–140)
GRAM STN SPEC: ABNORMAL
HCT VFR BLD AUTO: 37.3 % (ref 34.8–46.1)
HGB BLD-MCNC: 11.5 G/DL (ref 11.5–15.4)
IMM GRANULOCYTES # BLD AUTO: 0.04 THOUSAND/UL (ref 0–0.2)
IMM GRANULOCYTES NFR BLD AUTO: 1 % (ref 0–2)
LYMPHOCYTES # BLD AUTO: 0.67 THOUSANDS/ÂΜL (ref 0.6–4.47)
LYMPHOCYTES NFR BLD AUTO: 10 % (ref 14–44)
MCH RBC QN AUTO: 31.8 PG (ref 26.8–34.3)
MCHC RBC AUTO-ENTMCNC: 30.8 G/DL (ref 31.4–37.4)
MCV RBC AUTO: 103 FL (ref 82–98)
MONOCYTES # BLD AUTO: 0.79 THOUSAND/ÂΜL (ref 0.17–1.22)
MONOCYTES NFR BLD AUTO: 11 % (ref 4–12)
NEUTROPHILS # BLD AUTO: 5.4 THOUSANDS/ÂΜL (ref 1.85–7.62)
NEUTS SEG NFR BLD AUTO: 77 % (ref 43–75)
NRBC BLD AUTO-RTO: 0 /100 WBCS
PLATELET # BLD AUTO: 210 THOUSANDS/UL (ref 149–390)
PMV BLD AUTO: 9.7 FL (ref 8.9–12.7)
POTASSIUM SERPL-SCNC: 3.8 MMOL/L (ref 3.5–5.3)
PROT SERPL-MCNC: 5.6 G/DL (ref 6.4–8.4)
RBC # BLD AUTO: 3.62 MILLION/UL (ref 3.81–5.12)
SODIUM SERPL-SCNC: 139 MMOL/L (ref 135–147)
WBC # BLD AUTO: 6.98 THOUSAND/UL (ref 4.31–10.16)

## 2024-05-12 PROCEDURE — 99232 SBSQ HOSP IP/OBS MODERATE 35: CPT | Performed by: PHYSICIAN ASSISTANT

## 2024-05-12 PROCEDURE — 85025 COMPLETE CBC W/AUTO DIFF WBC: CPT | Performed by: INTERNAL MEDICINE

## 2024-05-12 PROCEDURE — 97167 OT EVAL HIGH COMPLEX 60 MIN: CPT

## 2024-05-12 PROCEDURE — 99232 SBSQ HOSP IP/OBS MODERATE 35: CPT | Performed by: INTERNAL MEDICINE

## 2024-05-12 PROCEDURE — 80053 COMPREHEN METABOLIC PANEL: CPT | Performed by: INTERNAL MEDICINE

## 2024-05-12 PROCEDURE — 97116 GAIT TRAINING THERAPY: CPT

## 2024-05-12 PROCEDURE — 97535 SELF CARE MNGMENT TRAINING: CPT

## 2024-05-12 PROCEDURE — 97163 PT EVAL HIGH COMPLEX 45 MIN: CPT

## 2024-05-12 RX ORDER — CEFTRIAXONE 1 G/50ML
1000 INJECTION, SOLUTION INTRAVENOUS EVERY 24 HOURS
Status: DISCONTINUED | OUTPATIENT
Start: 2024-05-12 | End: 2024-05-14 | Stop reason: HOSPADM

## 2024-05-12 RX ORDER — LANOLIN ALCOHOL/MO/W.PET/CERES
6 CREAM (GRAM) TOPICAL
Status: DISCONTINUED | OUTPATIENT
Start: 2024-05-12 | End: 2024-05-14 | Stop reason: HOSPADM

## 2024-05-12 RX ORDER — LACTULOSE 20 G/30ML
10 SOLUTION ORAL 3 TIMES DAILY
Status: DISCONTINUED | OUTPATIENT
Start: 2024-05-12 | End: 2024-05-12

## 2024-05-12 RX ORDER — POLYETHYLENE GLYCOL 3350 17 G/17G
17 POWDER, FOR SOLUTION ORAL 2 TIMES DAILY
Status: DISCONTINUED | OUTPATIENT
Start: 2024-05-12 | End: 2024-05-12

## 2024-05-12 RX ADMIN — HEPARIN SODIUM 5000 UNITS: 5000 INJECTION, SOLUTION INTRAVENOUS; SUBCUTANEOUS at 06:28

## 2024-05-12 RX ADMIN — SODIUM CHLORIDE 50 ML/HR: 0.9 INJECTION, SOLUTION INTRAVENOUS at 22:11

## 2024-05-12 RX ADMIN — BISOPROLOL FUMARATE 2.5 MG: 5 TABLET, FILM COATED ORAL at 08:50

## 2024-05-12 RX ADMIN — PANTOPRAZOLE SODIUM 40 MG: 40 TABLET, DELAYED RELEASE ORAL at 06:28

## 2024-05-12 RX ADMIN — DOCUSATE SODIUM 100 MG: 100 CAPSULE, LIQUID FILLED ORAL at 08:50

## 2024-05-12 RX ADMIN — ACETAMINOPHEN 325MG 650 MG: 325 TABLET ORAL at 02:31

## 2024-05-12 RX ADMIN — HEPARIN SODIUM 5000 UNITS: 5000 INJECTION, SOLUTION INTRAVENOUS; SUBCUTANEOUS at 21:05

## 2024-05-12 RX ADMIN — FOLIC ACID 1 MG: 1 TABLET ORAL at 08:50

## 2024-05-12 RX ADMIN — SODIUM CHLORIDE 50 ML/HR: 0.9 INJECTION, SOLUTION INTRAVENOUS at 01:58

## 2024-05-12 RX ADMIN — DOCUSATE SODIUM 100 MG: 100 CAPSULE, LIQUID FILLED ORAL at 17:15

## 2024-05-12 RX ADMIN — DULOXETINE HYDROCHLORIDE 30 MG: 30 CAPSULE, DELAYED RELEASE ORAL at 08:50

## 2024-05-12 RX ADMIN — LORATADINE 10 MG: 10 TABLET ORAL at 08:50

## 2024-05-12 RX ADMIN — LISINOPRIL 10 MG: 10 TABLET ORAL at 08:50

## 2024-05-12 RX ADMIN — Medication 6 MG: at 22:14

## 2024-05-12 RX ADMIN — PIPERACILLIN AND TAZOBACTAM 4.5 G: 36; 4.5 INJECTION, POWDER, FOR SOLUTION INTRAVENOUS at 11:30

## 2024-05-12 RX ADMIN — ASPIRIN 81 MG: 81 TABLET, COATED ORAL at 08:50

## 2024-05-12 RX ADMIN — ALBUTEROL SULFATE 2 PUFF: 90 AEROSOL, METERED RESPIRATORY (INHALATION) at 02:34

## 2024-05-12 RX ADMIN — HEPARIN SODIUM 5000 UNITS: 5000 INJECTION, SOLUTION INTRAVENOUS; SUBCUTANEOUS at 14:51

## 2024-05-12 RX ADMIN — CEFTRIAXONE 1000 MG: 1 INJECTION, SOLUTION INTRAVENOUS at 14:51

## 2024-05-12 RX ADMIN — ACETAMINOPHEN 325MG 650 MG: 325 TABLET ORAL at 22:10

## 2024-05-12 RX ADMIN — ALBUTEROL SULFATE 2 PUFF: 90 AEROSOL, METERED RESPIRATORY (INHALATION) at 19:20

## 2024-05-12 RX ADMIN — POLYETHYLENE GLYCOL 3350 17 G: 17 POWDER, FOR SOLUTION ORAL at 08:50

## 2024-05-12 NOTE — PROGRESS NOTES
"Progress Note - General Surgery   Cheryle Mcleod 83 y.o. female MRN: [id number]  Unit/Bed#: -01 Encounter: [id number]    Assessment:  84 yo F with transaminitis, cholelithiasis with E. Coli bacteremia  AVSS on 1L NC  WBC 6 (6)  RONALD, resolved. Cr 0.9 (1.2, 1.4)  Alk phos 274 (214, 296)  Rest of LFTs improving, Tbili 2.7 (3.1, 4.2)    1/2 blood cx: E. Coli  Urine cx: 80-89k e. Coli    MRCP: numerous small gallstones in the gallbladder. No evidence of biliary ductal dilatation or choledocholithiasis.     Plan:  - continue low fat diet as tolerated  - Rocephin per primary for UTI, E. Coli bacteremia   - trend LFTs  - monitor for abd pain  - f/u final blood cx results  - wean o2 as able  - f/u am labs. If LFTs continue to trend down and pt continues to tolerate diet without abd pain/tenderness, pt will likely be stable for d/c from surgical standpoint    Rest of care per primary    Subjective/Objective     Subjective: continues to feel better, however still states she does not feel 100%. States when first started eating felt a little discomfort \"in stomach\", however states this quickly resolved. Denies any RUQ pain. Denies any nausea, vomiting. C/o headache yesterday that has since resolved.    Objective:     Blood pressure 146/74, pulse 70, temperature (!) 97.2 °F (36.2 °C), resp. rate 16, height 5' (1.524 m), weight 57.8 kg (127 lb 6.8 oz), SpO2 95%.,Body mass index is 24.89 kg/m².      Intake/Output Summary (Last 24 hours) at 5/12/2024 0927  Last data filed at 5/12/2024 0210  Gross per 24 hour   Intake 1584.17 ml   Output 800 ml   Net 784.17 ml       Invasive Devices       Peripheral Intravenous Line  Duration             Peripheral IV 05/09/24 Left;Proximal;Ventral (anterior) Forearm 2 days    Peripheral IV 05/09/24 Left;Ventral (anterior) Forearm 2 days              Drain  Duration             External Urinary Catheter 2 days                    Physical Exam  Vitals and nursing note reviewed. "   Constitutional:       General: She is not in acute distress.     Appearance: Normal appearance. She is normal weight. She is not ill-appearing, toxic-appearing or diaphoretic.   HENT:      Head: Normocephalic and atraumatic.   Eyes:      Extraocular Movements: Extraocular movements intact.   Cardiovascular:      Rate and Rhythm: Normal rate.   Pulmonary:      Effort: Pulmonary effort is normal. No respiratory distress.   Abdominal:      General: Abdomen is flat. There is no distension.      Palpations: Abdomen is soft.      Tenderness: There is no abdominal tenderness. There is no guarding or rebound.   Musculoskeletal:         General: No swelling or tenderness.   Skin:     General: Skin is warm.      Capillary Refill: Capillary refill takes less than 2 seconds.   Neurological:      General: No focal deficit present.      Mental Status: She is alert and oriented to person, place, and time.   Psychiatric:         Mood and Affect: Mood normal.         Behavior: Behavior normal.            Scheduled Meds:  Current Facility-Administered Medications   Medication Dose Route Frequency Provider Last Rate    acetaminophen  650 mg Oral Q6H PRN Maura S Michael, CRNP      albuterol  2 puff Inhalation Q6H PRN Maura S Michael, CRNP      aspirin  81 mg Oral Daily Maura S Michael, CRNP      bisoprolol  2.5 mg Oral Daily Maura S Michael, CRNP      cefTRIAXone  1,000 mg Intravenous Q24H Maura S Michael, CRNP 1,000 mg (05/11/24 1700)    docusate sodium  100 mg Oral BID Maura S Michael, CRNP      DULoxetine  30 mg Oral Daily Maura S Michael, CRNP      folic acid  1 mg Oral Daily Maura S Michael, CRNP      heparin (porcine)  5,000 Units Subcutaneous Q8H [facility] Maura S Michael, CRNP      lisinopril  10 mg Oral Daily Maura S Michael, CRNP      loratadine  10 mg Oral Daily Maura S Michael, CRNP      pantoprazole  40 mg Oral Early Morning Maura S Michael, CRNP      polyethylene glycol  17 g Oral Daily Alice Cali MD      sodium chloride  50 mL/hr Intravenous  "Continuous Alice Cali MD 50 mL/hr (05/12/24 0158)     Continuous Infusions:sodium chloride, 50 mL/hr, Last Rate: 50 mL/hr (05/12/24 0158)      PRN Meds:.  acetaminophen    albuterol      Lab, Imaging and other studies:I have personally reviewed pertinent lab results.  , CBC:   Lab Results   Component Value Date    WBC 6.98 05/12/2024    HGB 11.5 05/12/2024    HCT 37.3 05/12/2024     (H) 05/12/2024     05/12/2024    RBC 3.62 (L) 05/12/2024    MCH 31.8 05/12/2024    MCHC 30.8 (L) 05/12/2024    RDW 14.8 05/12/2024    MPV 9.7 05/12/2024    NRBC 0 05/12/2024   , CMP:   Lab Results   Component Value Date    SODIUM 139 05/12/2024    K 3.8 05/12/2024     (H) 05/12/2024    CO2 21 05/12/2024    BUN 13 05/12/2024    CREATININE 0.89 05/12/2024    CALCIUM 8.3 (L) 05/12/2024    AST 96 (H) 05/12/2024     (H) 05/12/2024    ALKPHOS 274 (H) 05/12/2024    EGFR 60 05/12/2024   , Coagulation: No results found for: \"PT\", \"INR\", \"APTT\", Urinalysis: No results found for: \"COLORU\", \"CLARITYU\", \"SPECGRAV\", \"PHUR\", \"LEUKOCYTESUR\", \"NITRITE\", \"PROTEINUA\", \"GLUCOSEU\", \"KETONESU\", \"BILIRUBINUR\", \"BLOODU\", Lipase: No results found for: \"LIPASE\"  VTE Pharmacologic Prophylaxis: Heparin  VTE Mechanical Prophylaxis: sequential compression device      Ibis Medina PA-C  5/12/2024 9:27 AM    "

## 2024-05-12 NOTE — ASSESSMENT & PLAN NOTE
Lab Results   Component Value Date    EGFR 60 05/12/2024    EGFR 40 05/11/2024    EGFR 33 05/10/2024    CREATININE 0.89 05/12/2024    CREATININE 1.24 05/11/2024    CREATININE 1.45 (H) 05/10/2024   Baseline creatinine 1-1.2  CR 1.64 on admission- now 0.89  Avoid nephrotoxic agents, renally dose medications  CT abdomen pelvis no obstructive uropathy  Creatinine is trending down.

## 2024-05-12 NOTE — ASSESSMENT & PLAN NOTE
Large stool burden seen on CT abdomen pelvis  Continue Colace twice daily  Dulcolax suppository x 1 and miralax ordered 2 days ago  Reported BM 5/10 by nursing staff  Will continue to monitor with stool softener and miralax given stool burden

## 2024-05-12 NOTE — OCCUPATIONAL THERAPY NOTE
"    Occupational Therapy Evaluation     Patient Name: Cheryle Mcleod  Today's Date: 5/12/2024  Problem List  Principal Problem:    Elevated transaminase level  Active Problems:    Rheumatoid arthritis of multiple sites with negative rheumatoid factor (HCC)    RONALD (acute kidney injury) (HCC)    Coronary artery disease involving native coronary artery of native heart    Calculus of gallbladder without cholecystitis without obstruction    Troponin level elevated    Acute cystitis without hematuria    Constipation    Acute respiratory failure with hypoxia (HCC)    Past Medical History  Past Medical History:   Diagnosis Date    Arthritis     Asthma     Diabetes mellitus (HCC)     Hypertension     MI, old      Past Surgical History  Past Surgical History:   Procedure Laterality Date    BACK SURGERY      CARDIAC SURGERY      HYSTERECTOMY        05/12/24 1004   Note Type   Note type Evaluation   Pain Assessment   Pain Assessment Tool 0-10   Pain Score No Pain   Restrictions/Precautions   Other Precautions Chair Alarm;Bed Alarm;Fall Risk;Multiple lines   Home Living   Home Equipment Walker;Other (Comment)  (rollator)   Additional Comments Pt inbetween living situations, reports was living with her son but was currently living with her oldest daughter but is planning on going to her youngest daughter home upon discharge. Will confirm details of home living with CM. RW at baseline for mobility.   Prior Function   Level of [place] Independent with ADLs;Independent with functional mobility;Needs assistance with IADLS   IADLs Family/Friend/Other provides transportation;Family/Friend/Other provides meals;Family/Friend/Other provides medication management   Falls in the last 6 months 0   General   Family/Caregiver Present Yes  (middle daughter, Terri, and son-in-law, Javier)   Subjective   Subjective \"I have a hard time with getting showers and putting my socks on\"   ADL   UB Dressing Assistance 5  Supervision/Setup   UB " Dressing Deficit Setup;Verbal cueing;Increased time to complete   LB Dressing Assistance   (SBA)   LB Dressing Deficit Setup;Requires assistive device for steadying;Verbal cueing;Increased time to complete   Additional Comments UB ADLs @ S/set-up while seated at EOB. LB ADLs @ SBA d/t decreased standing balance and occasional posterior lean. Pt able to don socks while seated at EOB, SBA for clothing management while standing. See tx assessment for greater detail  regarding ADL performance.   Bed Mobility   Supine to Sit 5  Supervision   Additional items Increased time required;Verbal cues   Additional Comments Pt supine in bed at beginning of session. Supine to sit @ S.   Transfers   Sit to Stand   (SBA)   Additional items Increased time required;Verbal cues   Stand to Sit   (SBA)   Additional items Increased time required;Verbal cues   Stand pivot   (Steadying assist)   Additional items Assist x 1;Increased time required;Verbal cues   Additional Comments STS from EOB to RW @ SBA. Pt able to complete short distance functional mobility with RW @ Steadying assist progressing to SBA. See tx assessment for greater detail regarding functional mobility.   Balance   Static Sitting Good   Dynamic Sitting Fair +   Static Standing Fair   Dynamic Standing Fair -   Activity Tolerance   Activity Tolerance Patient tolerated treatment well;Patient limited by fatigue   Medical Staff Made Aware Spoke with PT Feli FINLEY Assessment   RUE Assessment WFL   LUE Assessment   LUE Assessment WFL   Hand Function   Gross Motor Coordination Functional   Fine Motor Coordination Functional   Cognition   Overall Cognitive Status WFL   Arousal/Participation Alert;Responsive;Cooperative   Attention Attends with cues to redirect   Orientation Level Oriented X4   Memory Within functional limits   Following Commands Follows one step commands without difficulty   Assessment   Limitation Decreased ADL status;Decreased UE strength;Decreased  endurance;Decreased high-level ADLs;Decreased self-care trans   Prognosis Good   Assessment Pt is a 83 y.o. female, admitted to [facility] 5/9/2024 d/t experiencing abdominal pain. Dx: elevated transaminase level. Pt with PMHx impacting their performance during ADL tasks, including: arthritis, asthma, DM, hypertension, COPD, CAD, RA. Prior to admission to the hospital Pt was performing ADLs without physical assistance. IADLs with physical assistance. Functional transfers/ambulation without physical assistance. Cognitive status was PTA was Intact. OT order placed to assess Pt's ADLs, cognitive status, and performance during functional tasks in order to maximize safety and independence while making most appropriate d/c recommendations. PT/OT co-evaluation completed at this time d/t significant mobility deficits and safety concerns. Pt's clinical presentation is currently evolving given new onset deficits that effect Pt's occupational performance and ability to safely return to PLOF including decrease activity tolerance, decrease standing balance, decrease performance during ADL tasks, decrease UB MS, decrease generalized strength, decrease activity engagement, and decrease performance during functional transfers combined with medical complications of abnormal H&H, abnormal CBC, and need for input for mobility technique/safety. Personal factors affecting Pt at time of initial evaluation include: advanced age, inability to perform IADLs, and uncertain home environment . Pt will benefit from continued skilled OT services to address deficits as defined above and to maximize level independence/participation during ADLs and functional tasks to facilitate return toward PLOF and improved quality of life. From an occupational therapy standpoint, recommendation at time of d/c would be Level III: Minimum Resource Intensity Therapy.   Plan   Treatment Interventions ADL retraining;Visual perceptual retraining;Functional transfer  training;UE strengthening/ROM;Endurance training;Cognitive reorientation;Patient/family training;Neuromuscular reeducation;Equipment evaluation/education;Compensatory technique education;Fine motor coordination activities;UE splinting;Continued evaluation;Cardiac education;Energy conservation;Activityengagement   Goal Expiration Date 05/26/24   OT Treatment Day 1   OT Frequency 2-3x/wk   Discharge Recommendation   Rehab Resource Intensity Level, OT III (Minimum Resource Intensity)   AM-PAC Daily Activity Inpatient   Lower Body Dressing 3   Bathing 3   Toileting 3   Upper Body Dressing 3   Grooming 3   Eating 4   Daily Activity Raw Score 19   Daily Activity Standardized Score (Calc for Raw Score >=11) 40.22   AM-PAC Applied Cognition Inpatient   Following a Speech/Presentation 3   Understanding Ordinary Conversation 4   Taking Medications 2   Remembering Where Things Are Placed or Put Away 3   Remembering List of 4-5 Errands 3   Taking Care of Complicated Tasks 3   Applied Cognition Raw Score 18   Applied Cognition Standardized Score 38.07   Additional Treatment Session   Start Time 1020   End Time 1028   Treatment Assessment Pt completed OT tx session #1 focused on ADL performance and functional mobility. Pt seated on toilet at beginning of session. Toilet transfer @ SBA with cues for hand placement. Pt able to complete clothing management and pericare while standing @ SBA. Pt able to wash hands while standing at sink @ S. Pt able to complete further functional mobility around room to chair with RW @ SBA. Pt seated OOB in chair with chair alarm intact, call bell within reach and all needs met.     The patient's raw score on the AM-PAC Daily Activity Inpatient Short Form is 19. A raw score of greater than or equal to 19 suggests the patient may benefit from discharge to home. Please refer to the recommendation of the Occupational Therapist for safe discharge planning.    Pt goals to be met by 5/26/2024    Pt will  demonstrate ability to complete grooming/hygiene tasks @ Mod I after set-up.  Pt will demonstrate ability to complete supine<>sit @ Mod I in order to increase safety and independence during ADL tasks.  Pt will demonstrate ability to complete UB ADLs including washing/dressing @ Mod I in order to increase performance and participation during meaningful tasks  Pt will demonstrate ability to complete LB dressing @ Mod I in order to increase safety and independence during meaningful tasks.   Pt will demonstrate ability to marta/doff socks/shoes while sitting EOB @ Mod I in order to increase safety and independence during meaningful tasks.   Pt will demonstrate ability to complete toileting tasks including CM and pericare @ Mod I in order to increase safety and independence during meaningful tasks.  Pt will demonstrate ability to complete EOB, chair, toilet/commode transfers @ Mod I in order to increase performance and participation during functional tasks.  Pt will demonstrate ability to stand for 5-8 minutes while maintaining fair + balance with use of RW for UB support PRN.  Pt will demonstrate ability to tolerate 30-35 minute OT session with no vc'ing for deep breathing or use of energy conservation techniques in order to increase activity tolerance during functional tasks.   Pt will demonstrate Good carryover of use of energy conservation/compensatory strategies during ADLs and functional tasks in order to increase safety and reduce risk for falls.   Pt will demonstrate Good attention and participation in continued evaluation of functional ambulation house hold distances in order to assist with safe d/c planning.  Pt will attend to continued cognitive assessments 100% of the time in order to provide most appropriate d/c recommendations.   Pt will follow 100% simple 2-step commands and be A&O x4 consistently with environmental cues to increase participation in functional activities.   Pt will identify 3 areas of  interest/hobbies and 1 intervention on how to incorporate into daily life in order to increase interaction with environment and peers as well as increase participation in meaningful tasks.   Pt will demonstrate 100% carryover of BUE HEP in order to increase BUE MS and increase performance during functional tasks upon d/c home.    January Luo, OTR/L

## 2024-05-12 NOTE — ASSESSMENT & PLAN NOTE
Presented subjective fevers, chills, abdominal tenderness at home now resolved, denies dysuria  UA nitrite positive with bacteriuria and pyuria  History E. coli UTI susceptible to ceftriaxone  Urine culture pending  Empiric ceftriaxone  CT abdomen pelvis no obstructive uropathy  Blood and urine are susceptible to rocephin.

## 2024-05-12 NOTE — ASSESSMENT & PLAN NOTE
Requiring 2 L of supplemental nasal cannula oxygen at time of admission  Now on 1 L   Will get home O2 eval

## 2024-05-12 NOTE — PLAN OF CARE
Problem: PAIN - ADULT  Goal: Verbalizes/displays adequate comfort level or baseline comfort level  Description: Interventions:  - Encourage patient to monitor pain and request assistance  - Assess pain using appropriate pain scale  - Administer analgesics based on type and severity of pain and evaluate response  - Implement non-pharmacological measures as appropriate and evaluate response  - Consider cultural and social influences on pain and pain management  - Notify physician/advanced practitioner if interventions unsuccessful or patient reports new pain  Outcome: Progressing     Problem: INFECTION - ADULT  Goal: Absence or prevention of progression during hospitalization  Description: INTERVENTIONS:  - Assess and monitor for signs and symptoms of infection  - Monitor lab/diagnostic results  - Monitor all insertion sites, i.e. indwelling lines, tubes, and drains  - Monitor endotracheal if appropriate and nasal secretions for changes in amount and color  - Lutz appropriate cooling/warming therapies per order  - Administer medications as ordered  - Instruct and encourage patient and family to use good hand hygiene technique  - Identify and instruct in appropriate isolation precautions for identified infection/condition  Outcome: Progressing  Goal: Absence of fever/infection during neutropenic period  Description: INTERVENTIONS:  - Monitor WBC    Outcome: Progressing     Problem: SAFETY ADULT  Goal: Patient will remain free of falls  Description: INTERVENTIONS:  - Educate patient/family on patient safety including physical limitations  - Instruct patient to call for assistance with activity   - Consult OT/PT to assist with strengthening/mobility   - Keep Call bell within reach  - Keep bed low and locked with side rails adjusted as appropriate  - Keep care items and personal belongings within reach  - Initiate and maintain comfort rounds  - Make Fall Risk Sign visible to staff  - Offer Toileting every  Hours,  in advance of need  - Initiate/Maintain alarm  - Obtain necessary fall risk management equipment:   - Apply yellow socks and bracelet for high fall risk patients  - Consider moving patient to room near nurses station  Outcome: Progressing  Goal: Maintain or return to baseline ADL function  Description: INTERVENTIONS:  -  Assess patient's ability to carry out ADLs; assess patient's baseline for ADL function and identify physical deficits which impact ability to perform ADLs (bathing, care of mouth/teeth, toileting, grooming, dressing, etc.)  - Assess/evaluate cause of self-care deficits   - Assess range of motion  - Assess patient's mobility; develop plan if impaired  - Assess patient's need for assistive devices and provide as appropriate  - Encourage maximum independence but intervene and supervise when necessary  - Involve family in performance of ADLs  - Assess for home care needs following discharge   - Consider OT consult to assist with ADL evaluation and planning for discharge  - Provide patient education as appropriate  Outcome: Progressing  Goal: Maintains/Returns to pre admission functional level  Description: INTERVENTIONS:  - Perform AM-PAC 6 Click Basic Mobility/ Daily Activity assessment daily.  - Set and communicate daily mobility goal to care team and patient/family/caregiver.   - Collaborate with rehabilitation services on mobility goals if consulted  - Perform Range of Motion  times a day.  - Reposition patient every  hours.  - Dangle patient  times a day  - Stand patient times a day  - Ambulate patient  times a day  - Out of bed to chair  times a day   - Out of bed for meals  times a day  - Out of bed for toileting  - Record patient progress and toleration of activity level   Outcome: Progressing     Problem: DISCHARGE PLANNING  Goal: Discharge to home or other facility with appropriate resources  Description: INTERVENTIONS:  - Identify barriers to discharge w/patient and caregiver  - Arrange for  needed discharge resources and transportation as appropriate  - Identify discharge learning needs (meds, wound care, etc.)  - Arrange for interpretive services to assist at discharge as needed  - Refer to Case Management Department for coordinating discharge planning if the patient needs post-hospital services based on physician/advanced practitioner order or complex needs related to functional status, cognitive ability, or social support system  Outcome: Progressing     Problem: Knowledge Deficit  Goal: Patient/family/caregiver demonstrates understanding of disease process, treatment plan, medications, and discharge instructions  Description: Complete learning assessment and assess knowledge base.  Interventions:  - Provide teaching at level of understanding  - Provide teaching via preferred learning methods  Outcome: Progressing

## 2024-05-12 NOTE — PLAN OF CARE
Problem: PHYSICAL THERAPY ADULT  Goal: Performs mobility at highest level of function for planned discharge setting.  See evaluation for individualized goals.  Description: Treatment/Interventions: ADL retraining, Functional transfer training, LE strengthening/ROM, Therapeutic exercise, Endurance training, Elevations, Patient/family training, Equipment eval/education, Bed mobility, Gait training, Compensatory technique education, Spoke to nursing, Spoke to case management, OT  Equipment Recommended:  (walker-pt has)       See flowsheet documentation for full assessment, interventions and recommendations.  Note: Prognosis: Good  Problem List: Decreased strength, Decreased endurance, Impaired balance, Decreased mobility, Decreased safety awareness  Assessment: Pt is a 83 y.o. female seen for PT evaluation s/p admission to [facility] on 5/9/2024 with Elevated transaminase level.  Order placed for PT services.  Upon evaluation: Pt is presenting with impaired functional mobility due to decreased strength, decreased endurance, impaired balance, gait deviations, decreased safety awareness, and fall risk requiring  supervision assistance for bed mobility, stand by assistance for transfers, and steadying assistance for ambulation with steadying . Pt's clinical presentation is currently unpredictable given the functional mobility deficits above, especially weakness, decreased endurance, impaired balance, gait deviations, decreased activity tolerance, decreased functional mobility tolerance, and decreased safety awareness, coupled with fall risks as indicated by AM-PAC 6-Clicks: 18/24 as well as impaired balance, polypharmacy, and decreased safety awareness and combined with medical complications of need for input for mobility technique/safety and acute respiratory failure with hypoxia requiring supplemental O2 on admission, acute cystitis, elevated troponin suspected due to UTI and cholelithiasis, RONALD  Called pt and set up alix't for next wk, she vu   elevated transaminase level .  Pt's PMHx and comorbidities that may affect physical performance and progress include: CAD and rheumatoid arthritis, history hyperbilirubinemia and fatty liver disease, CKD, COPD, large hiatal hernia, asthma, h/o MI . Personal factors affecting pt at time of IE include: step(s) to enter environment, advanced age, inability to perform IADLs, inability to perform ADLs, and inability to navigate level surfaces without external assistance. Pt will benefit from continued skilled PT services to address deficits as defined above and to maximize level of functional mobility to facilitate return toward PLOF and improved QOL. From PT/mobility standpoint, recommendation at time of d/c would be Level III (Minimum Resource Intensity), with family and/or caregiver support, and with walker in order to reduce fall risk and maximize pt's functional independence and consistency with mobility. Recommend trial with walker next 1-2 sessions and ther ex next 1-2 sessions.     Barriers to Discharge Comments: pt has support from family, however currently in between living situations  Rehab Resource Intensity Level, PT: III (Minimum Resource Intensity)    See flowsheet documentation for full assessment.

## 2024-05-12 NOTE — PHYSICAL THERAPY NOTE
"   PHYSICAL THERAPY EVALUATION  NAME:  Cheryle Mcleod  DATE: 05/12/24    AGE:   83 y.o.  Mrn:   [id number]  ADMIT DX:  Cholelithiasis [K80.20]  Chills [R68.83]  Nausea [R11.0]  UTI (urinary tract infection) [N39.0]  Elevated LFTs [R79.89]    Past Medical History:   Diagnosis Date    Arthritis     Asthma     Diabetes mellitus (HCC)     Hypertension     MI, old      Length Of Stay: 3  Performed at least 2 patient identifiers during session: Name and Birthday  PHYSICAL THERAPY EVALUATION :    05/12/24 1005   PT Last Visit   PT Visit Date 05/12/24   Note Type   Note type Evaluation   Pain Assessment   Pain Assessment Tool 0-10   Pain Score No Pain   Restrictions/Precautions   Other Precautions Chair Alarm;Bed Alarm;Fall Risk;Multiple lines;O2  (1 lpm NC upon arrival to room)   Home Living   Home Equipment Walker;Other (Comment)  (rollator)   Additional Comments Pt in between living situations, reports was living with her son but was currently living with her oldest daughter but is planning on going to her youngest daughter home upon discharge. Will confirm details of home living with CM.   Prior Function   Level of [place] Independent with ADLs;Independent with functional mobility;Needs assistance with IADLS   IADLs Family/Friend/Other provides transportation;Family/Friend/Other provides meals;Family/Friend/Other provides medication management   Falls in the last 6 months 0   Comments Reports being independent with mobility with walker.   Cognition   Orientation Level Oriented X4   Following Commands Follows all commands and directions without difficulty   Subjective   Subjective \"My grandson is away at the [facility].\"   RLE Assessment   RLE Assessment WFL  (4/5)   LLE Assessment   LLE Assessment WFL  (4/5)   Light Touch   RLE Light Touch Grossly intact   LLE Light Touch Grossly intact   Bed Mobility   Supine to Sit 5  Supervision   Additional items Increased time required;Verbal cues   Additional Comments HOB " "flat without bedrail. inc time to complete.   Transfers   Sit to Stand   (SBA)   Additional items Increased time required;Verbal cues   Stand to Sit   (SBA)   Additional items Increased time required;Verbal cues   Stand pivot   (steadying assistance)   Additional items Assist x 1;Increased time required;Verbal cues   Additional Comments use of RW. min cues for hand placement for saftey with RW. sit<>stand with RW with SBA. standing dynamic balance with 1 UE support with minAx1 with inc posteiror lean. cues for ant wt shifting. spt with RW with steadying assistance wih cues for turning completely prior to sitting.   Ambulation/Elevation   Gait pattern Wide ZACK;Short stride;Excessively slow;Decreased foot clearance   Gait Assistance   (steadying assistance)   Additional items Assist x 1;Verbal cues   Assistive Device Rolling walker   Distance ambulated 25'x1 with RW with steadying asisstance with slow pia, decreased step length and min cues for inc step length and to stay within ZACK of RW.   Balance   Static Sitting Good   Dynamic Sitting Fair +   Static Standing Fair   Dynamic Standing Fair -   Ambulatory Fair -   Endurance Deficit   Endurance Deficit Yes   Endurance Deficit Description SpO2 at rest on 1 lpm 97%. trialed on room air. Spo2 >/= 90% throughout on room air.   Activity Tolerance   Activity Tolerance Patient limited by fatigue   Medical Staff Made Aware January GARCIA   Nurse Made Aware Sofia KING   Assessment   Prognosis Good   Problem List Decreased strength;Decreased endurance;Impaired balance;Decreased mobility;Decreased safety awareness   Barriers to Discharge Comments pt has support from family, however currently in between living situations   Goals   Patient Goals \"Go to my youngest daughter's.\"   STG Expiration Date 05/26/24   PT Treatment Day 1   Plan   Treatment/Interventions ADL retraining;Functional transfer training;LE strengthening/ROM;Therapeutic exercise;Endurance " training;Elevations;Patient/family training;Equipment eval/education;Bed mobility;Gait training;Compensatory technique education;Spoke to nursing;Spoke to case management;OT   PT Frequency 3-5x/wk   Discharge Recommendation   Rehab Resource Intensity Level, PT III (Minimum Resource Intensity)   Equipment Recommended   (walker-pt has)   Additional Comments recommend continued support and assistance prn from family. can consider senior living if appropriate.   AM-PAC Basic Mobility Inpatient   Turning in Flat Bed Without Bedrails 3   Lying on Back to Sitting on Edge of Flat Bed Without Bedrails 3   Moving Bed to Chair 3   Standing Up From Chair Using Arms 3   Walk in Room 3   Climb 3-5 Stairs With Railing 3   Basic Mobility Inpatient Raw Score 18   Basic Mobility Standardized Score 41.05   [facility] Highest Level Of Mobility   -HLM Goal 6: Walk 10 steps or more   -HLM Achieved 7: Walk 25 feet or more   Additional Treatment Session   Start Time 1019   End Time 1029   Treatment Assessment Pt tolerated session fairly well. She was able to ambulate increased distance with decreased assistance and was able to trial stairs. She reports fatigue after and minimal DENTON. She requires min cues for improved gait pattern. She is limited by decreased strength, balance, endurance. She will continue to benefit from PT services to maximize LOF.   Equipment Use use of RW. min cues for hand placement for safety with RW. sit<>stand with SBA. spt with RW with SBA with min cues to stay wihtin ZACK of RW. standing dynamic balance without UE support with SBA. ambulated 80'x1 and 90'x1 with RW with SBA with slow pia, decreased step length and min cues for inc step length and foot clearance. pt with min DENTON. Spo2 >/= 90%. completed 3 steps with B hands on left HR facing left HR up with R LE and down left LE with steadying to SBA with inc time.   End of Consult   Patient Position at End of Consult Bedside chair;Bed/Chair alarm activated;All  needs within reach       Pt requires PT/OT co-eval due to medical complexity, safety concerns, fall risk, significant assistance with mobility and/or cognitive-behavioral impairments.    (Please find full objective findings from PT assessment regarding body systems outlined above).     Assessment: Pt is a 83 y.o. female seen for PT evaluation s/p admission to [facility] on 5/9/2024 with Elevated transaminase level.  Order placed for PT services.  Upon evaluation: Pt is presenting with impaired functional mobility due to decreased strength, decreased endurance, impaired balance, gait deviations, decreased safety awareness, and fall risk requiring  supervision assistance for bed mobility, stand by assistance for transfers, and steadying assistance for ambulation with steadying . Pt's clinical presentation is currently unpredictable given the functional mobility deficits above, especially weakness, decreased endurance, impaired balance, gait deviations, decreased activity tolerance, decreased functional mobility tolerance, and decreased safety awareness, coupled with fall risks as indicated by AM-PAC 6-Clicks: 18/24 as well as impaired balance, polypharmacy, and decreased safety awareness and combined with medical complications of need for input for mobility technique/safety and acute respiratory failure with hypoxia requiring supplemental O2 on admission, acute cystitis, elevated troponin suspected due to UTI and cholelithiasis, RONALD elevated transaminase level .  Pt's PMHx and comorbidities that may affect physical performance and progress include: CAD and rheumatoid arthritis, history hyperbilirubinemia and fatty liver disease, CKD, COPD, large hiatal hernia, asthma, h/o MI . Personal factors affecting pt at time of IE include: step(s) to enter environment, advanced age, inability to perform IADLs, inability to perform ADLs, and inability to navigate level surfaces without external assistance. Pt  will benefit from continued skilled PT services to address deficits as defined above and to maximize level of functional mobility to facilitate return toward PLOF and improved QOL. From PT/mobility standpoint, recommendation at time of d/c would be Level III (Minimum Resource Intensity), with family and/or caregiver support, and with walker in order to reduce fall risk and maximize pt's functional independence and consistency with mobility. Recommend trial with walker next 1-2 sessions and ther ex next 1-2 sessions.       The patient's AM-PAC Basic Mobility Inpatient Short Form Raw Score is 18. A Raw score of greater than 16 suggests the patient may benefit from discharge to home. Please also refer to the recommendation of the Physical Therapist for safe discharge planning.       Goals: Pt will: Perform bed mobility tasks with modified Independent to reposition in bed and prepare for transfers. Pt will perform transfers with modified Independent to decrease burden of care, decrease risk for falls, and improve activity tolerance and prepare for ambulation. Pt will ambulate with RW for >/= 150' with  modified Independent  to decrease burden of care, decrease risk for falls, and improve activity tolerance and to access home environment. Pt will complete 1 step with RW or LRAD and >/= 8 steps with unilateral handrail with supervision to decrease burden of care, return to home with RONNELL, decrease risk for falls, and improve activity tolerance. Pt will participate in objective balance assessment to determine baseline fall risk. Pt will participate in SSWS assessment to determine level of mobility. Pt will increase B LE strength >/= 1/2 MMT grade to facilitate functional mobility.      Feli Loaiza, PT,DPT

## 2024-05-12 NOTE — PLAN OF CARE
Problem: PAIN - ADULT  Goal: Verbalizes/displays adequate comfort level or baseline comfort level  Description: Interventions:  - Encourage patient to monitor pain and request assistance  - Assess pain using appropriate pain scale  - Administer analgesics based on type and severity of pain and evaluate response  - Implement non-pharmacological measures as appropriate and evaluate response  - Consider cultural and social influences on pain and pain management  - Notify physician/advanced practitioner if interventions unsuccessful or patient reports new pain  Outcome: Progressing     Problem: INFECTION - ADULT  Goal: Absence or prevention of progression during hospitalization  Description: INTERVENTIONS:  - Assess and monitor for signs and symptoms of infection  - Monitor lab/diagnostic results  - Monitor all insertion sites, i.e. indwelling lines, tubes, and drains  - Monitor endotracheal if appropriate and nasal secretions for changes in amount and color  - [place] appropriate cooling/warming therapies per order  - Administer medications as ordered  - Instruct and encourage patient and family to use good hand hygiene technique  - Identify and instruct in appropriate isolation precautions for identified infection/condition  Outcome: Progressing  Goal: Absence of fever/infection during neutropenic period  Description: INTERVENTIONS:  - Monitor WBC    Outcome: Progressing     Problem: SAFETY ADULT  Goal: Patient will remain free of falls  Description: INTERVENTIONS:  - Educate patient/family on patient safety including physical limitations  - Instruct patient to call for assistance with activity   - Consult OT/PT to assist with strengthening/mobility   - Keep Call bell within reach  - Keep bed low and locked with side rails adjusted as appropriate  - Keep care items and personal belongings within reach  - Initiate and maintain comfort rounds  - Make Fall Risk Sign visible to staff  - Offer Toileting every    Hours,  in advance of need  - Initiate/Maintain   alarm  - Obtain necessary fall risk management equipment:     - Apply yellow socks and bracelet for high fall risk patients  - Consider moving patient to room near nurses station  Outcome: Progressing  Goal: Maintain or return to baseline ADL function  Description: INTERVENTIONS:  -  Assess patient's ability to carry out ADLs; assess patient's baseline for ADL function and identify physical deficits which impact ability to perform ADLs (bathing, care of mouth/teeth, toileting, grooming, dressing, etc.)  - Assess/evaluate cause of self-care deficits   - Assess range of motion  - Assess patient's mobility; develop plan if impaired  - Assess patient's need for assistive devices and provide as appropriate  - Encourage maximum independence but intervene and supervise when necessary  - Involve family in performance of ADLs  - Assess for home care needs following discharge   - Consider OT consult to assist with ADL evaluation and planning for discharge  - Provide patient education as appropriate  Outcome: Progressing  Goal: Maintains/Returns to pre admission functional level  Description: INTERVENTIONS:  - Perform AM-PAC 6 Click Basic Mobility/ Daily Activity assessment daily.  - Set and communicate daily mobility goal to care team and patient/family/caregiver.   - Collaborate with rehabilitation services on mobility goals if consulted  - Perform Range of Motion    times a day.  - Reposition patient every    hours.  - Dangle patient    times a day  - Stand patient    times a day  - Ambulate patient    times a day  - Out of bed to chair    times a day   - Out of bed for meals        times a day  - Out of bed for toileting  - Record patient progress and toleration of activity level   Outcome: Progressing     Problem: DISCHARGE PLANNING  Goal: Discharge to home or other facility with appropriate resources  Description: INTERVENTIONS:  - Identify barriers to discharge w/patient and  caregiver  - Arrange for needed discharge resources and transportation as appropriate  - Identify discharge learning needs (meds, wound care, etc.)  - Arrange for interpretive services to assist at discharge as needed  - Refer to Case Management Department for coordinating discharge planning if the patient needs post-hospital services based on physician/advanced practitioner order or complex needs related to functional status, cognitive ability, or social support system  Outcome: Progressing     Problem: Knowledge Deficit  Goal: Patient/family/caregiver demonstrates understanding of disease process, treatment plan, medications, and discharge instructions  Description: Complete learning assessment and assess knowledge base.  Interventions:  - Provide teaching at level of understanding  - Provide teaching via preferred learning methods  Outcome: Progressing

## 2024-05-12 NOTE — ASSESSMENT & PLAN NOTE
Ultrasound abdomen and ct abd reviewed shows Cholelithiasis. No definite evidence of acute cholecystitis. No intra or extrahepatic biliary ductal dilatation.   Elevated transaminitis on presentation along with 1 out of 2 blood cultures showing gram-negative bacteremia possibly E. coli which may be either GI or  source.  Transaminits are trending down however still has elevated bilirubin  Appreciate surgery input- MRI not remarkable for obstruction   They recommend trending LFTs till tomorrow and if stable or trending down then likely cleared from surgical standpoint.

## 2024-05-12 NOTE — ASSESSMENT & PLAN NOTE
Requiring 2 L of supplemental nasal cannula oxygen at time of admission  Now on 1 L   Will get home O2 eval amb and overnight

## 2024-05-12 NOTE — PLAN OF CARE
Problem: OCCUPATIONAL THERAPY ADULT  Goal: Performs self-care activities at highest level of function for planned discharge setting.  See evaluation for individualized goals.  Description: Treatment Interventions: ADL retraining, Visual perceptual retraining, Functional transfer training, UE strengthening/ROM, Endurance training, Cognitive reorientation, Patient/family training, Neuromuscular reeducation, Equipment evaluation/education, Compensatory technique education, Fine motor coordination activities, UE splinting, Continued evaluation, Cardiac education, Energy conservation, Activityengagement          See flowsheet documentation for full assessment, interventions and recommendations.   Note: Limitation: Decreased ADL status, Decreased UE strength, Decreased endurance, Decreased high-level ADLs, Decreased self-care trans  Prognosis: Good  Assessment: Pt is a 83 y.o. female, admitted to [facility] 5/9/2024 d/t experiencing abdominal pain. Dx: elevated transaminase level. Pt with PMHx impacting their performance during ADL tasks, including: arthritis, asthma, DM, hypertension, COPD, CAD, RA. Prior to admission to the hospital Pt was performing ADLs without physical assistance. IADLs with physical assistance. Functional transfers/ambulation without physical assistance. Cognitive status was PTA was Intact. OT order placed to assess Pt's ADLs, cognitive status, and performance during functional tasks in order to maximize safety and independence while making most appropriate d/c recommendations. PT/OT co-evaluation completed at this time d/t significant mobility deficits and safety concerns. Pt's clinical presentation is currently evolving given new onset deficits that effect Pt's occupational performance and ability to safely return to PLOF including decrease activity tolerance, decrease standing balance, decrease performance during ADL tasks, decrease UB MS, decrease generalized strength, decrease activity engagement,  and decrease performance during functional transfers combined with medical complications of abnormal H&H, abnormal CBC, and need for input for mobility technique/safety. Personal factors affecting Pt at time of initial evaluation include: advanced age, inability to perform IADLs, and uncertain home environment . Pt will benefit from continued skilled OT services to address deficits as defined above and to maximize level independence/participation during ADLs and functional tasks to facilitate return toward PLOF and improved quality of life. From an occupational therapy standpoint, recommendation at time of d/c would be Level III: Minimum Resource Intensity Therapy.     Rehab Resource Intensity Level, OT: III (Minimum Resource Intensity)

## 2024-05-12 NOTE — PROGRESS NOTES
[facility]  Progress Note  Name: Cheryle Mcleod I  MRN: [id number]  Unit/Bed#: -D[facility] I Date of Admission: 5/9/2024   Date of Service: 5/12/2024 I Hospital Day: 3    Assessment/Plan   Acute respiratory failure with hypoxia (HCC)  Assessment & Plan  Requiring 2 L of supplemental nasal cannula oxygen at time of admission  Now on 1 L   Will get home O2 eval    Constipation  Assessment & Plan  Large stool burden seen on CT abdomen pelvis  Continue Colace twice daily  Dulcolax suppository x 1 and miralax ordered 2 days ago  Reported BM 5/10 by nursing staff  Will continue to monitor with stool softener and miralax given stool burden    Acute cystitis without hematuria  Assessment & Plan  Presented subjective fevers, chills, abdominal tenderness at home now resolved, denies dysuria  UA nitrite positive with bacteriuria and pyuria  History E. coli UTI susceptible to ceftriaxone  Urine culture pending  Empiric ceftriaxone  CT abdomen pelvis no obstructive uropathy  Blood and urine are susceptible to rocephin.     Troponin level elevated  Assessment & Plan  Troponin [id number]  Denies chest pain  EKG without ST elevation or depression  Suspected secondary to UTI and cholelithiases    Calculus of gallbladder without cholecystitis without obstruction  Assessment & Plan  Ultrasound abdomen and ct abd reviewed shows Cholelithiasis. No definite evidence of acute cholecystitis. No intra or extrahepatic biliary ductal dilatation.   Elevated transaminitis on presentation along with 1 out of 2 blood cultures showing gram-negative bacteremia possibly E. coli which may be either GI or  source.  Transaminits are trending down however still has elevated bilirubin  Appreciate surgery input- MRI not remarkable for obstruction   They recommend trending LFTs till tomorrow and if stable or trending down then likely cleared from surgical standpoint.     Coronary artery disease involving native coronary  artery of native heart  Assessment & Plan  History CABG  Maintained on full-strength aspirin  Denies chest pain  Continue bisoprolol and lisinopril  Statin on hold with elevated transaminase    RONALD (acute kidney injury) (HCC)  Assessment & Plan  Lab Results   Component Value Date    EGFR 60 05/12/2024    EGFR 40 05/11/2024    EGFR 33 05/10/2024    CREATININE 0.89 05/12/2024    CREATININE 1.24 05/11/2024    CREATININE 1.45 (H) 05/10/2024   Baseline creatinine 1-1.2  CR 1.64 on admission- now 0.89  Avoid nephrotoxic agents, renally dose medications  CT abdomen pelvis no obstructive uropathy  Creatinine is trending down.     Rheumatoid arthritis of multiple sites with negative rheumatoid factor (HCC)  Assessment & Plan  History rheumatoid arthritis  Maintained on Plaquenil 200 mg twice daily and methotrexate q. weekly, folic acid daily  Plaquenil and methotrexate on hold in setting of infection with transaminase elevations  Outpatient follow-up    * Elevated transaminase level  Assessment & Plan  History hyperbilirubinemia and fatty liver disease in 2021-MSSA bacteremia at time of this diagnosis  Negative hepatitis C testing in 2021  Incidental finding of elevated transaminase levels today  Trending down now               VTE Pharmacologic Prophylaxis:   Moderate Risk (Score 3-4) - Pharmacological DVT Prophylaxis Ordered: heparin.    Mobility:   Basic Mobility Inpatient Raw Score: 18  JH-HLM Goal: 6: Walk 10 steps or more  JH-HLM Achieved: 7: Walk 25 feet or more      Patient Centered Rounds: I performed bedside rounds with nursing staff today.   Discussions with Specialists or Other Care Team Provider: Discussed with nursing. Reviewed surgery note.     Education and Discussions with Family / Patient:  called daughter Cheryle. .     Total Time Spent on Date of Encounter in care of patient: 30 mins. This time was spent on one or more of the following: performing physical exam; counseling and coordination of care;  "obtaining or reviewing history; documenting in the medical record; reviewing/ordering tests, medications or procedures; communicating with other healthcare professionals and discussing with patient's family/caregivers.    Current Length of Stay: 3 day(s)  Current Patient Status: Inpatient   Certification Statement: The patient will continue to require additional inpatient hospital stay due to IV abx, surgery likely okay with DC tomorrow if labs trending down.   Discharge Plan: Anticipate discharge in 24-48 hrs to home.    Code Status: Level 3 - DNAR and DNI    Subjective:   Patient seen and examined   Feeling \"okay\"    Objective:     Vitals:   Temp (24hrs), Av.5 °F (36.9 °C), Min:97.2 °F (36.2 °C), Max:99.6 °F (37.6 °C)    Temp:  [97.2 °F (36.2 °C)-99.6 °F (37.6 °C)] 97.2 °F (36.2 °C)  HR:  [70-82] 70  Resp:  [16-18] 16  BP: (133-153)/(64-87) 146/74  SpO2:  [94 %-95 %] 95 %  Body mass index is 24.89 kg/m².     Input and Output Summary (last 24 hours):     Intake/Output Summary (Last 24 hours) at 2024 1331  Last data filed at 2024 0210  Gross per 24 hour   Intake 1034.17 ml   Output 800 ml   Net 234.17 ml       Physical Exam:   Physical Exam  Constitutional:       General: She is not in acute distress.     Appearance: She is not ill-appearing, toxic-appearing or diaphoretic.   HENT:      Head: Normocephalic.      Mouth/Throat:      Mouth: Mucous membranes are moist.   Eyes:      Pupils: Pupils are equal, round, and reactive to light.   Cardiovascular:      Rate and Rhythm: Normal rate.   Pulmonary:      Effort: Pulmonary effort is normal.   Abdominal:      General: There is no distension.      Palpations: There is no mass.      Tenderness: There is no abdominal tenderness. There is no right CVA tenderness, left CVA tenderness, guarding or rebound.      Hernia: No hernia is present.   Skin:     Capillary Refill: Capillary refill takes less than 2 seconds.      Coloration: Skin is not jaundiced or pale. "      Findings: No bruising, erythema, lesion or rash.   Neurological:      Mental Status: She is alert.      Cranial Nerves: No cranial nerve deficit.      Sensory: No sensory deficit.      Motor: No weakness.      Coordination: Coordination normal.      Gait: Gait normal.   Psychiatric:         Mood and Affect: Mood normal.          Additional Data:     Labs:  Results from last 7 days   Lab Units 05/12/24  0509 05/10/24  0500 05/09/24  1415   WBC Thousand/uL 6.98   < > 19.05*   HEMOGLOBIN g/dL 11.5   < > 12.4   HEMATOCRIT % 37.3   < > 39.1   PLATELETS Thousands/uL 210   < > 242   BANDS PCT %  --   --  1   SEGS PCT % 77*   < >  --    LYMPHO PCT % 10*   < > 2*   MONO PCT % 11   < > 1*   EOS PCT % 1   < > 0    < > = values in this interval not displayed.     Results from last 7 days   Lab Units 05/12/24  0509   SODIUM mmol/L 139   POTASSIUM mmol/L 3.8   CHLORIDE mmol/L 111*   CO2 mmol/L 21   BUN mg/dL 13   CREATININE mg/dL 0.89   ANION GAP mmol/L 7   CALCIUM mg/dL 8.3*   ALBUMIN g/dL 3.1*   TOTAL BILIRUBIN mg/dL 2.77*   ALK PHOS U/L 274*   ALT U/L 145*   AST U/L 96*   GLUCOSE RANDOM mg/dL 100     Results from last 7 days   Lab Units 05/10/24  0500   INR  1.33*             Results from last 7 days   Lab Units 05/10/24  0500 05/09/24  1415   LACTIC ACID mmol/L  --  1.9   PROCALCITONIN ng/ml 4.95* 7.19*       Lines/Drains:  Invasive Devices       Peripheral Intravenous Line  Duration             Peripheral IV 05/09/24 Left;Proximal;Ventral (anterior) Forearm 2 days    Peripheral IV 05/09/24 Left;Ventral (anterior) Forearm 2 days              Drain  Duration             External Urinary Catheter 2 days                          Imaging: No pertinent imaging reviewed.    Recent Cultures (last 7 days):   Results from last 7 days   Lab Units 05/09/24  1613 05/09/24  1417 05/09/24  1415   BLOOD CULTURE   --  Escherichia coli* No Growth at 48 hrs.   GRAM STAIN RESULT   --  Gram negative rods*  --    URINE CULTURE  80,000-89,000  cfu/ml Escherichia coli*  --   --        Last 24 Hours Medication List:   Current Facility-Administered Medications   Medication Dose Route Frequency Provider Last Rate    acetaminophen  650 mg Oral Q6H PRN Maura S Michael, CRNP      albuterol  2 puff Inhalation Q6H PRN Maura S Michael, CRNP      aspirin  81 mg Oral Daily Maura S Michael, CRNP      bisoprolol  2.5 mg Oral Daily Maura S Michael, CRNP      cefTRIAXone  1,000 mg Intravenous Q24H Angely Masterson MD      docusate sodium  100 mg Oral BID Maura S Michael, CRNP      DULoxetine  30 mg Oral Daily Maura S Michael, CRNP      folic acid  1 mg Oral Daily Maura S Michael, CRNP      heparin (porcine)  5,000 Units Subcutaneous Q8H HUSSEIN Maura S Michael, CRNP      lisinopril  10 mg Oral Daily Maura S Michael, CRNP      loratadine  10 mg Oral Daily Maura S Michael, CRNP      pantoprazole  40 mg Oral Early Morning Maura S Michael, CRNP      polyethylene glycol  17 g Oral Daily Alice Cali MD      sodium chloride  50 mL/hr Intravenous Continuous Alice Cali MD 50 mL/hr (05/12/24 0158)        Today, Patient Was Seen By: Angely Masterson MD    **Please Note: This note may have been constructed using a voice recognition system.**

## 2024-05-12 NOTE — PLAN OF CARE
Problem: PAIN - ADULT  Goal: Verbalizes/displays adequate comfort level or baseline comfort level  Description: Interventions:  - Encourage patient to monitor pain and request assistance  - Assess pain using appropriate pain scale  - Administer analgesics based on type and severity of pain and evaluate response  - Implement non-pharmacological measures as appropriate and evaluate response  - Consider cultural and social influences on pain and pain management  - Notify physician/advanced practitioner if interventions unsuccessful or patient reports new pain  Outcome: Progressing     Problem: INFECTION - ADULT  Goal: Absence or prevention of progression during hospitalization  Description: INTERVENTIONS:  - Assess and monitor for signs and symptoms of infection  - Monitor lab/diagnostic results  - Monitor all insertion sites, i.e. indwelling lines, tubes, and drains  - Monitor endotracheal if appropriate and nasal secretions for changes in amount and color  - [place] appropriate cooling/warming therapies per order  - Administer medications as ordered  - Instruct and encourage patient and family to use good hand hygiene technique  - Identify and instruct in appropriate isolation precautions for identified infection/condition  Outcome: Progressing  Goal: Absence of fever/infection during neutropenic period  Description: INTERVENTIONS:  - Monitor WBC    Outcome: Progressing     Problem: SAFETY ADULT  Goal: Patient will remain free of falls  Description: INTERVENTIONS:  - Educate patient/family on patient safety including physical limitations  - Instruct patient to call for assistance with activity   - Consult OT/PT to assist with strengthening/mobility   - Keep Call bell within reach  - Keep bed low and locked with side rails adjusted as appropriate  - Keep care items and personal belongings within reach  - Initiate and maintain comfort rounds  - Make Fall Risk Sign visible to staff  - Offer Toileting every 2 Hours,  in advance of need  - Initiate/Maintain bed alarm  - Obtain necessary fall risk management equipment: walker  - Apply yellow socks and bracelet for high fall risk patients  - Consider moving patient to room near nurses station  Outcome: Progressing  Goal: Maintain or return to baseline ADL function  Description: INTERVENTIONS:  -  Assess patient's ability to carry out ADLs; assess patient's baseline for ADL function and identify physical deficits which impact ability to perform ADLs (bathing, care of mouth/teeth, toileting, grooming, dressing, etc.)  - Assess/evaluate cause of self-care deficits   - Assess range of motion  - Assess patient's mobility; develop plan if impaired  - Assess patient's need for assistive devices and provide as appropriate  - Encourage maximum independence but intervene and supervise when necessary  - Involve family in performance of ADLs  - Assess for home care needs following discharge   - Consider OT consult to assist with ADL evaluation and planning for discharge  - Provide patient education as appropriate  Outcome: Progressing  Goal: Maintains/Returns to pre admission functional level  Description: INTERVENTIONS:  - Perform AM-PAC 6 Click Basic Mobility/ Daily Activity assessment daily.  - Set and communicate daily mobility goal to care team and patient/family/caregiver.   - Collaborate with rehabilitation services on mobility goals if consulted  - Perform Range of Motion 3 times a day.  - Reposition patient every 2 hours.  - Dangle patient 3 times a day  - Stand patient 3 times a day  - Ambulate patient 3 times a day  - Out of bed to chair 3 times a day   - Out of bed for meals 3 times a day  - Out of bed for toileting  - Record patient progress and toleration of activity level   Outcome: Progressing     Problem: DISCHARGE PLANNING  Goal: Discharge to home or other facility with appropriate resources  Description: INTERVENTIONS:  - Identify barriers to discharge w/patient and  caregiver  - Arrange for needed discharge resources and transportation as appropriate  - Identify discharge learning needs (meds, wound care, etc.)  - Arrange for interpretive services to assist at discharge as needed  - Refer to Case Management Department for coordinating discharge planning if the patient needs post-hospital services based on physician/advanced practitioner order or complex needs related to functional status, cognitive ability, or social support system  Outcome: Progressing     Problem: Knowledge Deficit  Goal: Patient/family/caregiver demonstrates understanding of disease process, treatment plan, medications, and discharge instructions  Description: Complete learning assessment and assess knowledge base.  Interventions:  - Provide teaching at level of understanding  - Provide teaching via preferred learning methods  Outcome: Progressing

## 2024-05-13 ENCOUNTER — APPOINTMENT (INPATIENT)
Dept: RADIOLOGY | Facility: HOSPITAL | Age: 84
DRG: 444 | End: 2024-05-13
Payer: MEDICARE

## 2024-05-13 LAB
ALBUMIN SERPL BCP-MCNC: 2.8 G/DL (ref 3.5–5)
ALP SERPL-CCNC: 298 U/L (ref 34–104)
ALT SERPL W P-5'-P-CCNC: 118 U/L (ref 7–52)
ANION GAP SERPL CALCULATED.3IONS-SCNC: 6 MMOL/L (ref 4–13)
AST SERPL W P-5'-P-CCNC: 73 U/L (ref 13–39)
BASOPHILS # BLD AUTO: 0.04 THOUSANDS/ÂΜL (ref 0–0.1)
BASOPHILS NFR BLD AUTO: 1 % (ref 0–1)
BILIRUB SERPL-MCNC: 2.6 MG/DL (ref 0.2–1)
BUN SERPL-MCNC: 11 MG/DL (ref 5–25)
CALCIUM ALBUM COR SERPL-MCNC: 9.7 MG/DL (ref 8.3–10.1)
CALCIUM SERPL-MCNC: 8.7 MG/DL (ref 8.4–10.2)
CHLORIDE SERPL-SCNC: 112 MMOL/L (ref 96–108)
CO2 SERPL-SCNC: 23 MMOL/L (ref 21–32)
CREAT SERPL-MCNC: 0.88 MG/DL (ref 0.6–1.3)
EOSINOPHIL # BLD AUTO: 0.03 THOUSAND/ÂΜL (ref 0–0.61)
EOSINOPHIL NFR BLD AUTO: 1 % (ref 0–6)
ERYTHROCYTE [DISTWIDTH] IN BLOOD BY AUTOMATED COUNT: 14.9 % (ref 11.6–15.1)
GFR SERPL CREATININE-BSD FRML MDRD: 61 ML/MIN/1.73SQ M
GLUCOSE SERPL-MCNC: 99 MG/DL (ref 65–140)
HCT VFR BLD AUTO: 35.7 % (ref 34.8–46.1)
HGB BLD-MCNC: 11.4 G/DL (ref 11.5–15.4)
IMM GRANULOCYTES # BLD AUTO: 0.05 THOUSAND/UL (ref 0–0.2)
IMM GRANULOCYTES NFR BLD AUTO: 1 % (ref 0–2)
LYMPHOCYTES # BLD AUTO: 0.93 THOUSANDS/ÂΜL (ref 0.6–4.47)
LYMPHOCYTES NFR BLD AUTO: 15 % (ref 14–44)
MCH RBC QN AUTO: 31.9 PG (ref 26.8–34.3)
MCHC RBC AUTO-ENTMCNC: 31.9 G/DL (ref 31.4–37.4)
MCV RBC AUTO: 100 FL (ref 82–98)
MONOCYTES # BLD AUTO: 0.92 THOUSAND/ÂΜL (ref 0.17–1.22)
MONOCYTES NFR BLD AUTO: 14 % (ref 4–12)
NEUTROPHILS # BLD AUTO: 4.43 THOUSANDS/ÂΜL (ref 1.85–7.62)
NEUTS SEG NFR BLD AUTO: 68 % (ref 43–75)
NRBC BLD AUTO-RTO: 0 /100 WBCS
PLATELET # BLD AUTO: 209 THOUSANDS/UL (ref 149–390)
PMV BLD AUTO: 9.7 FL (ref 8.9–12.7)
POTASSIUM SERPL-SCNC: 4 MMOL/L (ref 3.5–5.3)
PROT SERPL-MCNC: 5.6 G/DL (ref 6.4–8.4)
RBC # BLD AUTO: 3.57 MILLION/UL (ref 3.81–5.12)
SODIUM SERPL-SCNC: 141 MMOL/L (ref 135–147)
WBC # BLD AUTO: 6.4 THOUSAND/UL (ref 4.31–10.16)

## 2024-05-13 PROCEDURE — 71045 X-RAY EXAM CHEST 1 VIEW: CPT

## 2024-05-13 PROCEDURE — 80053 COMPREHEN METABOLIC PANEL: CPT | Performed by: INTERNAL MEDICINE

## 2024-05-13 PROCEDURE — 99232 SBSQ HOSP IP/OBS MODERATE 35: CPT | Performed by: FAMILY MEDICINE

## 2024-05-13 PROCEDURE — 85025 COMPLETE CBC W/AUTO DIFF WBC: CPT | Performed by: INTERNAL MEDICINE

## 2024-05-13 PROCEDURE — 94761 N-INVAS EAR/PLS OXIMETRY MLT: CPT

## 2024-05-13 RX ORDER — FUROSEMIDE 10 MG/ML
40 INJECTION INTRAMUSCULAR; INTRAVENOUS ONCE
Status: COMPLETED | OUTPATIENT
Start: 2024-05-13 | End: 2024-05-13

## 2024-05-13 RX ADMIN — FUROSEMIDE 40 MG: 10 INJECTION, SOLUTION INTRAMUSCULAR; INTRAVENOUS at 16:12

## 2024-05-13 RX ADMIN — DULOXETINE HYDROCHLORIDE 30 MG: 30 CAPSULE, DELAYED RELEASE ORAL at 09:35

## 2024-05-13 RX ADMIN — LISINOPRIL 10 MG: 10 TABLET ORAL at 09:35

## 2024-05-13 RX ADMIN — HEPARIN SODIUM 5000 UNITS: 5000 INJECTION, SOLUTION INTRAVENOUS; SUBCUTANEOUS at 05:13

## 2024-05-13 RX ADMIN — DOCUSATE SODIUM 100 MG: 100 CAPSULE, LIQUID FILLED ORAL at 16:10

## 2024-05-13 RX ADMIN — HEPARIN SODIUM 5000 UNITS: 5000 INJECTION, SOLUTION INTRAVENOUS; SUBCUTANEOUS at 21:05

## 2024-05-13 RX ADMIN — BISOPROLOL FUMARATE 2.5 MG: 5 TABLET, FILM COATED ORAL at 09:35

## 2024-05-13 RX ADMIN — CEFTRIAXONE 1000 MG: 1 INJECTION, SOLUTION INTRAVENOUS at 16:14

## 2024-05-13 RX ADMIN — DOCUSATE SODIUM 100 MG: 100 CAPSULE, LIQUID FILLED ORAL at 09:35

## 2024-05-13 RX ADMIN — LORATADINE 10 MG: 10 TABLET ORAL at 09:35

## 2024-05-13 RX ADMIN — ASPIRIN 81 MG: 81 TABLET, COATED ORAL at 09:35

## 2024-05-13 RX ADMIN — ACETAMINOPHEN 325MG 650 MG: 325 TABLET ORAL at 19:46

## 2024-05-13 RX ADMIN — HEPARIN SODIUM 5000 UNITS: 5000 INJECTION, SOLUTION INTRAVENOUS; SUBCUTANEOUS at 16:11

## 2024-05-13 RX ADMIN — PANTOPRAZOLE SODIUM 40 MG: 40 TABLET, DELAYED RELEASE ORAL at 05:13

## 2024-05-13 RX ADMIN — FOLIC ACID 1 MG: 1 TABLET ORAL at 09:35

## 2024-05-13 NOTE — PLAN OF CARE
Problem: PAIN - ADULT  Goal: Verbalizes/displays adequate comfort level or baseline comfort level  Description: Interventions:  - Encourage patient to monitor pain and request assistance  - Assess pain using appropriate pain scale  - Administer analgesics based on type and severity of pain and evaluate response  - Implement non-pharmacological measures as appropriate and evaluate response  - Consider cultural and social influences on pain and pain management  - Notify physician/advanced practitioner if interventions unsuccessful or patient reports new pain  Outcome: Progressing     Problem: INFECTION - ADULT  Goal: Absence or prevention of progression during hospitalization  Description: INTERVENTIONS:  - Assess and monitor for signs and symptoms of infection  - Monitor lab/diagnostic results  - Monitor all insertion sites, i.e. indwelling lines, tubes, and drains  - Monitor endotracheal if appropriate and nasal secretions for changes in amount and color  - [place] appropriate cooling/warming therapies per order  - Administer medications as ordered  - Instruct and encourage patient and family to use good hand hygiene technique  - Identify and instruct in appropriate isolation precautions for identified infection/condition  Outcome: Progressing  Goal: Absence of fever/infection during neutropenic period  Description: INTERVENTIONS:  - Monitor WBC    Outcome: Progressing     Problem: SAFETY ADULT  Goal: Patient will remain free of falls  Description: INTERVENTIONS:  - Educate patient/family on patient safety including physical limitations  - Instruct patient to call for assistance with activity   - Consult OT/PT to assist with strengthening/mobility   - Keep Call bell within reach  - Keep bed low and locked with side rails adjusted as appropriate  - Keep care items and personal belongings within reach  - Initiate and maintain comfort rounds  - Make Fall Risk Sign visible to staff  - Offer Toileting every 2 Hours,  in advance of need  - Initiate/Maintain bed alarm  - Obtain necessary fall risk management equipment  - Apply yellow socks and bracelet for high fall risk patients  - Consider moving patient to room near nurses station  Outcome: Progressing  Goal: Maintain or return to baseline ADL function  Description: INTERVENTIONS:  -  Assess patient's ability to carry out ADLs; assess patient's baseline for ADL function and identify physical deficits which impact ability to perform ADLs (bathing, care of mouth/teeth, toileting, grooming, dressing, etc.)  - Assess/evaluate cause of self-care deficits   - Assess range of motion  - Assess patient's mobility; develop plan if impaired  - Assess patient's need for assistive devices and provide as appropriate  - Encourage maximum independence but intervene and supervise when necessary  - Involve family in performance of ADLs  - Assess for home care needs following discharge   - Consider OT consult to assist with ADL evaluation and planning for discharge  - Provide patient education as appropriate  Outcome: Progressing  Goal: Maintains/Returns to pre admission functional level  Description: INTERVENTIONS:  - Perform AM-PAC 6 Click Basic Mobility/ Daily Activity assessment daily.  - Set and communicate daily mobility goal to care team and patient/family/caregiver.   - Collaborate with rehabilitation services on mobility goals  - Reposition patient every 2 hours.  - Stand patient 3 times a day  - Ambulate patient 3 times a day  - Out of bed to chair 3 times a day   - Out of bed for meals 3 times a day  - Out of bed for toileting  - Record patient progress and toleration of activity level   Outcome: Progressing     Problem: DISCHARGE PLANNING  Goal: Discharge to home or other facility with appropriate resources  Description: INTERVENTIONS:  - Identify barriers to discharge w/patient and caregiver  - Arrange for needed discharge resources and transportation as appropriate  - Identify  discharge learning needs (meds, wound care, etc.)  - Arrange for interpretive services to assist at discharge as needed  - Refer to Case Management Department for coordinating discharge planning if the patient needs post-hospital services based on physician/advanced practitioner order or complex needs related to functional status, cognitive ability, or social support system  Outcome: Progressing     Problem: Knowledge Deficit  Goal: Patient/family/caregiver demonstrates understanding of disease process, treatment plan, medications, and discharge instructions  Description: Complete learning assessment and assess knowledge base.  Interventions:  - Provide teaching at level of understanding  - Provide teaching via preferred learning methods  Outcome: Progressing     Problem: Prexisting or High Potential for Compromised Skin Integrity  Goal: Skin integrity is maintained or improved  Description: INTERVENTIONS:  - Identify patients at risk for skin breakdown  - Assess and monitor skin integrity  - Assess and monitor nutrition and hydration status  - Monitor labs   - Assess for incontinence   - Turn and reposition patient  - Assist with mobility/ambulation  - Relieve pressure over bony prominences  - Avoid friction and shearing  - Provide appropriate hygiene as needed including keeping skin clean and dry  - Evaluate need for skin moisturizer/barrier cream  - Collaborate with interdisciplinary team   - Patient/family teaching  - Consider wound care consult   Outcome: Progressing

## 2024-05-13 NOTE — PROGRESS NOTES
[facility]  Progress Note  Name: Cheryle Mcloed I  MRN: [id number]  Unit/Bed#: -01 I Date of Admission: 5/9/2024   Date of Service: 5/13/2024 I Hospital Day: 4    Assessment/Plan   * Elevated transaminase level  Assessment & Plan  History hyperbilirubinemia and fatty liver disease in 2021-MSSA bacteremia at time of this diagnosis  Negative hepatitis C testing in 2021  Incidental finding of elevated transaminase levels today  Trending down now    RNOALD (acute kidney injury) (HCC)  Assessment & Plan  Lab Results   Component Value Date    EGFR 61 05/13/2024    EGFR 60 05/12/2024    EGFR 40 05/11/2024    CREATININE 0.88 05/13/2024    CREATININE 0.89 05/12/2024    CREATININE 1.24 05/11/2024   Baseline creatinine 1-1.2  CR 1.64 on admission- now 0.89  Avoid nephrotoxic agents, renally dose medications  CT abdomen pelvis no obstructive uropathy  Creatinine is trending down.     Acute cystitis without hematuria  Assessment & Plan  Presented subjective fevers, chills, abdominal tenderness at home now resolved, denies dysuria  UA nitrite positive with bacteriuria and pyuria  History E. coli UTI susceptible to ceftriaxone  Urine culture e.coli  Empiric ceftriaxone  CT abdomen pelvis no obstructive uropathy  Blood and urine are susceptible to rocephin.     Acute respiratory failure with hypoxia (HCC)  Assessment & Plan  Requiring 2 L of supplemental nasal cannula oxygen at time of admission  Now on 1 L   Will get home O2 eval amb and overnight    Constipation  Assessment & Plan  Large stool burden seen on CT abdomen pelvis  Continue Colace twice daily  Dulcolax suppository x 1 and miralax ordered 2 days ago  Reported BM 5/10 by nursing staff  Will continue to monitor with stool softener and miralax given stool burden    Troponin level elevated  Assessment & Plan  Troponin [id number]  Denies chest pain  EKG without ST elevation or depression  Suspected secondary to UTI and  cholelithiases    Calculus of gallbladder without cholecystitis without obstruction  Assessment & Plan  Ultrasound abdomen and ct abd reviewed shows Cholelithiasis. No definite evidence of acute cholecystitis. No intra or extrahepatic biliary ductal dilatation.   Elevated transaminitis on presentation along with 1 out of 2 blood cultures showing gram-negative bacteremia possibly E. coli which may be either GI or  source.  Transaminits are trending down however still has elevated bilirubin  Appreciate surgery input- MRI not remarkable for obstruction   They recommend trending LFTs till tomorrow and if stable or trending down then likely cleared from surgical standpoint.     Coronary artery disease involving native coronary artery of native heart  Assessment & Plan  History CABG  Maintained on full-strength aspirin  Denies chest pain  Continue bisoprolol and lisinopril  Statin on hold with elevated transaminase    Rheumatoid arthritis of multiple sites with negative rheumatoid factor (HCC)  Assessment & Plan  History rheumatoid arthritis  Maintained on Plaquenil 200 mg twice daily and methotrexate q. weekly, folic acid daily  Plaquenil and methotrexate on hold in setting of infection with transaminase elevations  Outpatient follow-up               VTE Pharmacologic Prophylaxis:   Moderate Risk (Score 3-4) - Pharmacological DVT Prophylaxis Ordered: heparin.    Mobility:   Basic Mobility Inpatient Raw Score: 18  JH-HLM Goal: 6: Walk 10 steps or more  JH-HLM Achieved: 6: Walk 10 steps or more  JH-HLM Goal achieved. Continue to encourage appropriate mobility.    Patient Centered Rounds: I performed bedside rounds with nursing staff today.   Discussions with Specialists or Other Care Team Provider: none    Education and Discussions with Family / Patient:     Total Time Spent on Date of Encounter in care of patient: 35 mins. This time was spent on one or more of the following: performing physical exam; counseling and  coordination of care; obtaining or reviewing history; documenting in the medical record; reviewing/ordering tests, medications or procedures; communicating with other healthcare professionals and discussing with patient's family/caregivers.    Current Length of Stay: 4 day(s)  Current Patient Status: Inpatient   Certification Statement: The patient will continue to require additional inpatient hospital stay due to transaminitis  Discharge Plan: Anticipate discharge tomorrow to home with home services.    Code Status: Level 3 - DNAR and DNI    Subjective:   Denies any chest pain still having some shortness of breath and requiring oxygen support    Objective:     Vitals:   Temp (24hrs), Av.5 °F (36.4 °C), Min:97.2 °F (36.2 °C), Max:97.9 °F (36.6 °C)    Temp:  [97.2 °F (36.2 °C)-97.9 °F (36.6 °C)] 97.2 °F (36.2 °C)  HR:  [69-84] 72  Resp:  [17-20] 18  BP: (152-168)/(73-92) 160/80  SpO2:  [90 %-98 %] 98 %  Body mass index is 24.89 kg/m².     Input and Output Summary (last 24 hours):     Intake/Output Summary (Last 24 hours) at 2024 1409  Last data filed at 2024 0900  Gross per 24 hour   Intake 360 ml   Output 400 ml   Net -40 ml       Physical Exam:   Physical Exam  Vitals and nursing note reviewed.   Constitutional:       Appearance: Normal appearance.   HENT:      Head: Normocephalic and atraumatic.      Right Ear: External ear normal.      Left Ear: External ear normal.      Nose: Nose normal.      Mouth/Throat:      Pharynx: Oropharynx is clear.   Eyes:      Pupils: Pupils are equal, round, and reactive to light.   Cardiovascular:      Rate and Rhythm: Normal rate and regular rhythm.      Heart sounds: Normal heart sounds.   Pulmonary:      Effort: Pulmonary effort is normal.      Breath sounds: Rales present.   Abdominal:      General: Bowel sounds are normal.      Palpations: Abdomen is soft.      Tenderness: There is no abdominal tenderness.   Musculoskeletal:         General: Normal range of  motion.      Cervical back: Normal range of motion and neck supple.   Skin:     General: Skin is warm and dry.      Capillary Refill: Capillary refill takes less than 2 seconds.   Neurological:      General: No focal deficit present.      Mental Status: She is alert and oriented to person, place, and time.   Psychiatric:         Mood and Affect: Mood normal.            Additional Data:     Labs:  Results from last 7 days   Lab Units 05/13/24  0505 05/10/24  0500 05/09/24  1415   WBC Thousand/uL 6.40   < > 19.05*   HEMOGLOBIN g/dL 11.4*   < > 12.4   HEMATOCRIT % 35.7   < > 39.1   PLATELETS Thousands/uL 209   < > 242   BANDS PCT %  --   --  1   SEGS PCT % 68   < >  --    LYMPHO PCT % 15   < > 2*   MONO PCT % 14*   < > 1*   EOS PCT % 1   < > 0    < > = values in this interval not displayed.     Results from last 7 days   Lab Units 05/13/24  0505   SODIUM mmol/L 141   POTASSIUM mmol/L 4.0   CHLORIDE mmol/L 112*   CO2 mmol/L 23   BUN mg/dL 11   CREATININE mg/dL 0.88   ANION GAP mmol/L 6   CALCIUM mg/dL 8.7   ALBUMIN g/dL 2.8*   TOTAL BILIRUBIN mg/dL 2.60*   ALK PHOS U/L 298*   ALT U/L 118*   AST U/L 73*   GLUCOSE RANDOM mg/dL 99     Results from last 7 days   Lab Units 05/10/24  0500   INR  1.33*             Results from last 7 days   Lab Units 05/10/24  0500 05/09/24  1415   LACTIC ACID mmol/L  --  1.9   PROCALCITONIN ng/ml 4.95* 7.19*       Lines/Drains:  Invasive Devices       Peripheral Intravenous Line  Duration             Peripheral IV 05/13/24 Dorsal (posterior);Right Forearm <1 day              Drain  Duration             External Urinary Catheter 3 days                          Imaging: Reviewed radiology reports from this admission including: MRI abdomen/MRCP    Recent Cultures (last 7 days):   Results from last 7 days   Lab Units 05/09/24  1613 05/09/24  1417 05/09/24  1415   BLOOD CULTURE   --  Escherichia coli* No Growth at 72 hrs.   GRAM STAIN RESULT   --  Gram negative rods*  --    URINE CULTURE   80,000-89,000 cfu/ml Escherichia coli*  --   --        Last 24 Hours Medication List:   Current Facility-Administered Medications   Medication Dose Route Frequency Provider Last Rate    acetaminophen  650 mg Oral Q6H PRN Maura S Michael, CRNP      albuterol  2 puff Inhalation Q6H PRN Maura S Michael, CRNP      aspirin  81 mg Oral Daily Maura S Michael, CRNP      bisoprolol  2.5 mg Oral Daily Maura S Michael, CRNP      cefTRIAXone  1,000 mg Intravenous Q24H Angely Masterson MD 1,000 mg (05/12/24 1451)    docusate sodium  100 mg Oral BID Maura S Michael, CRNP      DULoxetine  30 mg Oral Daily Maura S Michael, CRNP      folic acid  1 mg Oral Daily Maura S Michael, CRNP      heparin (porcine)  5,000 Units Subcutaneous Q8H HUSSEIN Maura S Michael, CRNP      lisinopril  10 mg Oral Daily Maura S Michael, CRNP      loratadine  10 mg Oral Daily Maura S Michael, CRNP      melatonin  6 mg Oral HS PRN Maura S Michael, CRNP      pantoprazole  40 mg Oral Early Morning Maura S Michael, CRNP      sodium chloride  50 mL/hr Intravenous Continuous Alice Cali MD 50 mL/hr (05/12/24 9241)        Today, Patient Was Seen By: Ailce Cali MD    **Please Note: This note may have been constructed using a voice recognition system.**

## 2024-05-13 NOTE — ASSESSMENT & PLAN NOTE
Lab Results   Component Value Date    EGFR 61 05/13/2024    EGFR 60 05/12/2024    EGFR 40 05/11/2024    CREATININE 0.88 05/13/2024    CREATININE 0.89 05/12/2024    CREATININE 1.24 05/11/2024   Baseline creatinine 1-1.2  CR 1.64 on admission- now 0.89  Avoid nephrotoxic agents, renally dose medications  CT abdomen pelvis no obstructive uropathy  Creatinine is trending down.

## 2024-05-13 NOTE — PLAN OF CARE
Problem: PAIN - ADULT  Goal: Verbalizes/displays adequate comfort level or baseline comfort level  Description: Interventions:  - Encourage patient to monitor pain and request assistance  - Assess pain using appropriate pain scale  - Administer analgesics based on type and severity of pain and evaluate response  - Implement non-pharmacological measures as appropriate and evaluate response  - Consider cultural and social influences on pain and pain management  - Notify physician/advanced practitioner if interventions unsuccessful or patient reports new pain  Outcome: Progressing     Problem: INFECTION - ADULT  Goal: Absence or prevention of progression during hospitalization  Description: INTERVENTIONS:  - Assess and monitor for signs and symptoms of infection  - Monitor lab/diagnostic results  - Monitor all insertion sites, i.e. indwelling lines, tubes, and drains  - Monitor endotracheal if appropriate and nasal secretions for changes in amount and color  - [place] appropriate cooling/warming therapies per order  - Administer medications as ordered  - Instruct and encourage patient and family to use good hand hygiene technique  - Identify and instruct in appropriate isolation precautions for identified infection/condition  Outcome: Progressing  Goal: Absence of fever/infection during neutropenic period  Description: INTERVENTIONS:  - Monitor WBC    Outcome: Progressing     Problem: SAFETY ADULT  Goal: Patient will remain free of falls  Description: INTERVENTIONS:  - Educate patient/family on patient safety including physical limitations  - Instruct patient to call for assistance with activity   - Consult OT/PT to assist with strengthening/mobility   - Keep Call bell within reach  - Keep bed low and locked with side rails adjusted as appropriate  - Keep care items and personal belongings within reach  - Initiate and maintain comfort rounds  - Make Fall Risk Sign visible to staff  - Offer Toileting every 2 Hours,  in advance of need  - Initiate/Maintain bed alarm  - Obtain necessary fall risk management equipment: yellow socks, yellow wristband  - Apply yellow socks and bracelet for high fall risk patients  - Consider moving patient to room near nurses station  Outcome: Progressing  Goal: Maintain or return to baseline ADL function  Description: INTERVENTIONS:  -  Assess patient's ability to carry out ADLs; assess patient's baseline for ADL function and identify physical deficits which impact ability to perform ADLs (bathing, care of mouth/teeth, toileting, grooming, dressing, etc.)  - Assess/evaluate cause of self-care deficits   - Assess range of motion  - Assess patient's mobility; develop plan if impaired  - Assess patient's need for assistive devices and provide as appropriate  - Encourage maximum independence but intervene and supervise when necessary  - Involve family in performance of ADLs  - Assess for home care needs following discharge   - Consider OT consult to assist with ADL evaluation and planning for discharge  - Provide patient education as appropriate  Outcome: Progressing  Goal: Maintains/Returns to pre admission functional level  Description: INTERVENTIONS:  - Perform AM-PAC 6 Click Basic Mobility/ Daily Activity assessment daily.  - Set and communicate daily mobility goal to care team and patient/family/caregiver.   - Collaborate with rehabilitation services on mobility goals if consulted  - Perform Range of Motion 2 times a day.  - Reposition patient every 2 hours.  - Dangle patient 3 times a day  - Stand patient 3 times a day  - Ambulate patient 3 times a day  - Out of bed to chair 3 times a day   - Out of bed for meals 3 times a day  - Out of bed for toileting  - Record patient progress and toleration of activity level   Outcome: Progressing     Problem: DISCHARGE PLANNING  Goal: Discharge to home or other facility with appropriate resources  Description: INTERVENTIONS:  - Identify barriers to  discharge w/patient and caregiver  - Arrange for needed discharge resources and transportation as appropriate  - Identify discharge learning needs (meds, wound care, etc.)  - Arrange for interpretive services to assist at discharge as needed  - Refer to Case Management Department for coordinating discharge planning if the patient needs post-hospital services based on physician/advanced practitioner order or complex needs related to functional status, cognitive ability, or social support system  Outcome: Progressing     Problem: Knowledge Deficit  Goal: Patient/family/caregiver demonstrates understanding of disease process, treatment plan, medications, and discharge instructions  Description: Complete learning assessment and assess knowledge base.  Interventions:  - Provide teaching at level of understanding  - Provide teaching via preferred learning methods  Outcome: Progressing

## 2024-05-13 NOTE — CASE MANAGEMENT
Case Management Discharge Planning Note    Patient name Cheryle Mcleod  Location /-01 MRN [id number]  : 1940 Date 2024       Current Admission Date: 2024  Current Admission Diagnosis:Elevated transaminase level   Patient Active Problem List    Diagnosis Date Noted    Troponin level elevated 2024    Elevated transaminase level 2024    Acute cystitis without hematuria 2024    Constipation 2024    Leukocytosis 2024    Acute respiratory failure with hypoxia (HCC) 2024    Oral thrush 2023    Nonischemic nontraumatic myocardial injury 2023    Urinary incontinence 2023    Calculus of gallbladder without cholecystitis without obstruction 2023    Loss of appetite 2023    Acute bronchitis 2022    Coronary artery disease involving native coronary artery of native heart 2022    COVID 2022    Essential hypertension 2022    COPD (chronic obstructive pulmonary disease) ([facility]) 2022    Rheumatoid arthritis of multiple sites with negative rheumatoid factor ([facility]) 2022    GERD (gastroesophageal reflux disease) 2022    Fatty liver 2022    Generalized weakness 2022    RONALD (acute kidney injury) ([facility]) 2022    Prolonged Q-T interval on ECG 2022      LOS (days): 4  Geometric Mean LOS (GMLOS) (days): 3.9  Days to GMLOS:0.1     OBJECTIVE:  Risk of Unplanned Readmission Score: 12.76         Current admission status: Inpatient   Preferred Pharmacy:   [id number] [place], [place] - [address] [address]  Phone: [phone] Fax: [phone]    [facility] [place], [place] - [address]  [address] [facility]  [place] [address]  Phone: [phone] Fax: [phone]    [id number] [place], [place] - [address] [facility]  [address] [facility] [address]  Phone: [phone] Fax: [phone]    Primary  "Care Provider: Brian Peraza DO    Primary Insurance: MEDICARE  Secondary Insurance:     DISCHARGE DETAILS:     0930 CM met with patient to discuss discharge planning. Patient indicated she would like to go to stay with her daughterLauryn.  Patient contacted Lauryn while CM in room. Lauryn indicated patient could come live with her, no problem.  Lauryn Sinclair [address] [place] [address] [phone]. Lauryn indicated she works but she has people who can check on patient during day.  Lauryn indicated she has health care background. CM discussed potential for home O2 and HHC upon discharge. Lauryn agreeable.  Lauryn indicated she will provide transport home for patient, should patient come live with her.    1215 CM contacted Cheryle doll.  Cheryle indicated she does not like idea of patient going to live with sister, Jeannie, [place]annie works.  Cheryle also said Jeannie's dogs may knock patient over. Cheryle also said patient and Jeannie argue.    CM encouraged Cheryle to discuss with her mother and Lauryn discharge for patient so CM knows where to set up HHC and O2, if needed.  Cheryle to get back to  after she makes some calls.       1330 CM visited with patient in her room. Patient indicated Cheryle doll, called her and is not happy she is going to stay with Lauryn doll.  Patient began discussing concerns at Cheryle doll's home. Patient reported daughter and her spouse use patient to watch \"[place]lem.\" Daughter and spouse eat without patient and do not offer patient meals they cook; patient has to fend for herself. Patient reported she cannot read and this causes difficulty at restaurants and with cooking microwave foods.       1335 CM received TCF daughterCheryle, who indicated daughterLauryn, told her CM told Lauryn patient needs to go live with Lauryn or she will go to SNF.  CM denied.    CM suggested patient can make her own decisions and is " requesting discharge to Lauryn doll. If family not in agreement with this plan, family needs to discuss with patient.     CM made [facility] referral for HHC at home of Lauryn doll, in [place].  CM to follow for acceptance and review options with Lauryn doll.

## 2024-05-13 NOTE — ASSESSMENT & PLAN NOTE
Presented subjective fevers, chills, abdominal tenderness at home now resolved, denies dysuria  UA nitrite positive with bacteriuria and pyuria  History E. coli UTI susceptible to ceftriaxone  Urine culture e.coli  Empiric ceftriaxone  CT abdomen pelvis no obstructive uropathy  Blood and urine are susceptible to rocephin.

## 2024-05-13 NOTE — RESPIRATORY THERAPY NOTE
Home Oxygen Qualifying Test     Patient name: Cheryle Mcleod        : 1940   Date of Test:  May 13, 2024  Diagnosis:    Home Oxygen Test:    **Medicare Guidelines require item(s) 1-5 on all ambulatory patients or 1 and 2 on non-ambulatory patients.    1. Baseline SPO2 on Room Air at rest 94%   If <= 88% on Room Air add O2 via NC to obtain SpO2 >=88%. If LPM needed, document LPM needed to reach =>88%    SPO2 during exertion on Room Air 90  %  During exertion monitor SPO2. If SPO2 increases >=89%, do not add supplemental oxygen    SPO2 on Oxygen at Rest N/A% at N/A LPM    SPO2 during exertion on Oxygen N/A % at N/A LPM    Test performed during exertion activity.      []  Supplemental Home Oxygen is indicated.    [x]  Client does not qualify for home oxygen.    Respiratory Additional Notes- Pt SpO2>88% during test. No Oxygen needed.     Keira Epperson, RT

## 2024-05-14 VITALS
DIASTOLIC BLOOD PRESSURE: 75 MMHG | BODY MASS INDEX: 25.02 KG/M2 | SYSTOLIC BLOOD PRESSURE: 111 MMHG | HEIGHT: 60 IN | RESPIRATION RATE: 18 BRPM | TEMPERATURE: 98.2 F | OXYGEN SATURATION: 95 % | HEART RATE: 75 BPM | WEIGHT: 127.43 LBS

## 2024-05-14 LAB
ALBUMIN SERPL BCP-MCNC: 3.1 G/DL (ref 3.5–5)
ALP SERPL-CCNC: 393 U/L (ref 34–104)
ALT SERPL W P-5'-P-CCNC: 104 U/L (ref 7–52)
ANION GAP SERPL CALCULATED.3IONS-SCNC: 7 MMOL/L (ref 4–13)
AST SERPL W P-5'-P-CCNC: 65 U/L (ref 13–39)
BACTERIA BLD CULT: NORMAL
BILIRUB SERPL-MCNC: 2.14 MG/DL (ref 0.2–1)
BUN SERPL-MCNC: 11 MG/DL (ref 5–25)
CALCIUM ALBUM COR SERPL-MCNC: 9.8 MG/DL (ref 8.3–10.1)
CALCIUM SERPL-MCNC: 9.1 MG/DL (ref 8.4–10.2)
CHLORIDE SERPL-SCNC: 104 MMOL/L (ref 96–108)
CO2 SERPL-SCNC: 30 MMOL/L (ref 21–32)
CREAT SERPL-MCNC: 0.88 MG/DL (ref 0.6–1.3)
GFR SERPL CREATININE-BSD FRML MDRD: 61 ML/MIN/1.73SQ M
GLUCOSE SERPL-MCNC: 97 MG/DL (ref 65–140)
POTASSIUM SERPL-SCNC: 3.4 MMOL/L (ref 3.5–5.3)
PROT SERPL-MCNC: 6 G/DL (ref 6.4–8.4)
SODIUM SERPL-SCNC: 141 MMOL/L (ref 135–147)

## 2024-05-14 PROCEDURE — 94762 N-INVAS EAR/PLS OXIMTRY CONT: CPT

## 2024-05-14 PROCEDURE — 80053 COMPREHEN METABOLIC PANEL: CPT | Performed by: FAMILY MEDICINE

## 2024-05-14 PROCEDURE — 99239 HOSP IP/OBS DSCHRG MGMT >30: CPT | Performed by: FAMILY MEDICINE

## 2024-05-14 RX ORDER — CEFDINIR 300 MG/1
300 CAPSULE ORAL EVERY 12 HOURS SCHEDULED
Qty: 10 CAPSULE | Refills: 0 | Status: SHIPPED | OUTPATIENT
Start: 2024-05-14 | End: 2024-05-19

## 2024-05-14 RX ORDER — FUROSEMIDE 10 MG/ML
40 INJECTION INTRAMUSCULAR; INTRAVENOUS ONCE
Qty: 4 ML | Refills: 0 | Status: COMPLETED | OUTPATIENT
Start: 2024-05-14 | End: 2024-05-14

## 2024-05-14 RX ORDER — POTASSIUM CHLORIDE 20 MEQ/1
40 TABLET, EXTENDED RELEASE ORAL ONCE
Qty: 2 TABLET | Refills: 0 | Status: COMPLETED | OUTPATIENT
Start: 2024-05-14 | End: 2024-05-14

## 2024-05-14 RX ADMIN — DULOXETINE HYDROCHLORIDE 30 MG: 30 CAPSULE, DELAYED RELEASE ORAL at 10:33

## 2024-05-14 RX ADMIN — LORATADINE 10 MG: 10 TABLET ORAL at 10:34

## 2024-05-14 RX ADMIN — BISOPROLOL FUMARATE 2.5 MG: 5 TABLET, FILM COATED ORAL at 10:34

## 2024-05-14 RX ADMIN — FOLIC ACID 1 MG: 1 TABLET ORAL at 10:33

## 2024-05-14 RX ADMIN — HEPARIN SODIUM 5000 UNITS: 5000 INJECTION, SOLUTION INTRAVENOUS; SUBCUTANEOUS at 05:25

## 2024-05-14 RX ADMIN — DOCUSATE SODIUM 100 MG: 100 CAPSULE, LIQUID FILLED ORAL at 10:33

## 2024-05-14 RX ADMIN — PANTOPRAZOLE SODIUM 40 MG: 40 TABLET, DELAYED RELEASE ORAL at 05:25

## 2024-05-14 RX ADMIN — POTASSIUM CHLORIDE 40 MEQ: 1500 TABLET, EXTENDED RELEASE ORAL at 10:33

## 2024-05-14 RX ADMIN — HEPARIN SODIUM 5000 UNITS: 5000 INJECTION, SOLUTION INTRAVENOUS; SUBCUTANEOUS at 13:20

## 2024-05-14 RX ADMIN — LISINOPRIL 10 MG: 10 TABLET ORAL at 10:34

## 2024-05-14 RX ADMIN — ASPIRIN 81 MG: 81 TABLET, COATED ORAL at 10:33

## 2024-05-14 RX ADMIN — CEFTRIAXONE 1000 MG: 1 INJECTION, SOLUTION INTRAVENOUS at 13:20

## 2024-05-14 RX ADMIN — FUROSEMIDE 40 MG: 10 INJECTION, SOLUTION INTRAMUSCULAR; INTRAVENOUS at 10:34

## 2024-05-14 NOTE — ASSESSMENT & PLAN NOTE
History hyperbilirubinemia and fatty liver disease in 2021-MSSA bacteremia at time of this diagnosis  Negative hepatitis C testing in 2021  transaminitis slowly resolving

## 2024-05-14 NOTE — PROGRESS NOTES
Pastoral Care Progress Note    2024  Patient: Cheryle Mcleod : 1940  Admission Date & Time: 2024 1406  MRN: [id number] CSN: [id number]    CH in consult with CM initiated support visit to pt. Pt received CH upright in bed, no family present.    Pt shared about her family of origin: pt shared that she experienced her father as authoritarian and her upbringing was strict. A severe relationship between parent and child was modeled for her at her childhood home. Pt was  at 22 to her [employer] [facility]. She had 6 children. Pt shared about the death of her third child from Covid.     Pt shared about her family support: she feels she gets along well with one of her daughters, but this particular daughter does not have a house suited to the pt's care. Pt shared her perception that she will be discharge to the house of the daughter that she doesn't get along with as well.    CH provided empathetic listening and support.  CH remains available.             Chaplaincy Interventions Utilized:   Empowerment: Encouraged focus on present    Exploration: Explored relational needs & resources    Collaboration: Consulted with interdisciplinary team    Relationship Building: Listened empathically      Chaplaincy Outcomes Achieved:  Identified meaningful connections    Spiritual Coping Strategies Utilized:   Spiritual practices       24 1300   Clinical Encounter Type   Visited With Patient   Routine Visit Introduction   Referral From

## 2024-05-14 NOTE — CASE MANAGEMENT
Case Management Discharge Planning Note    Patient name Cheryle Mcleod  Location /-01 MRN [id number]  : 1940 Date 2024       Current Admission Date: 2024  Current Admission Diagnosis:Elevated transaminase level   Patient Active Problem List    Diagnosis Date Noted    Troponin level elevated 2024    Elevated transaminase level 2024    Acute cystitis without hematuria 2024    Constipation 2024    Leukocytosis 2024    Acute respiratory failure with hypoxia ([facility]) 2024    Oral thrush 2023    Nonischemic nontraumatic myocardial injury 2023    Urinary incontinence 2023    Calculus of gallbladder without cholecystitis without obstruction 2023    Loss of appetite 2023    Acute bronchitis 2022    Coronary artery disease involving native coronary artery of native heart 2022    COVID 2022    Essential hypertension 2022    COPD (chronic obstructive pulmonary disease) ([facility]) 2022    Rheumatoid arthritis of multiple sites with negative rheumatoid factor ([facility]) 2022    GERD (gastroesophageal reflux disease) 2022    Fatty liver 2022    Generalized weakness 2022    RONALD (acute kidney injury) ([facility]) 2022    Prolonged Q-T interval on ECG 2022      LOS (days): 5  Geometric Mean LOS (GMLOS) (days): 3.9  Days to GMLOS:-1     OBJECTIVE:  Risk of Unplanned Readmission Score: 13.38         Current admission status: Inpatient   Preferred Pharmacy:   [id number] [place], [place] - [address] [address]  Phone: [phone] Fax: [phone]    [facility] - [place], [place] - [address]  [address]   [place] [address]  Phone: [phone] Fax: [phone]    [id number] [place], [place] - [address] [facility]  [address] [facility] [address]  Phone: [phone] Fax: [phone]    [facility]  Care Provider: Brian Peraza DO    Primary Insurance: MEDICARE  Secondary Insurance:     DISCHARGE DETAILS:     AVS was forward to [facility]

## 2024-05-14 NOTE — ASSESSMENT & PLAN NOTE
Large stool burden seen on CT abdomen pelvis  Continue Colace twice daily  Dulcolax suppository x 1 and miralax ordered 2 days ago  Reported BMs

## 2024-05-14 NOTE — PROGRESS NOTES
Progress Note - General Surgery   Cheryle Mcleod 83 y.o. female MRN: [id number]  Unit/Bed#: -01 Encounter: [id number]    Assessment:  82 yo F with transaminitis, cholelithiasis with E. Coli bacteremia     - Afebrile, episodes of HTN, stable on 1-2 L O2 overnight    No new cbc today but yesterday's reviewed:  - WBC 6 (6)  - Hgb 11 (11)  CMP today:  - RONALD resolved - 0.88 (0.88, 0.89, 1.24)  - Transaminitis improving with slight bump in alk phos only- AST 65 (73 ,96, 130),  (118, 145, 184), Alk Phos 393 ( 298, 274, 215)  - Tbili 2.14 (2.60 2.77, 3.10)     - 1/2 blood cx  05/09/2024: E coli  - Urine cx: 80-89k E coli     - MRCP: numerous small gallstones in the gallbladder. No evidence of biliary ductal dilatation or choledocholithiasis.     - Pmhx: CAD s/p MI, COPD(no home 02), RA, fatty liver   - Pshx: hyster, bladder suspension     Plan:  - Continue diet as tolerated, low fat, patient was able to eat all of her Israeli toast this morning  - On Ceftriaxone for UTI, E.coli bacteremia, per primary team  - LFTs, Tbili down-trending with the exception of very mild increase in alkaline phosphatase, remainder of transaminases trending down  - F/u final blood cx results  - Wean o2 as able  - Tbili has previously been in the 1.0 - 1.7 range on outpatient labs, as low as 0.4, there is no acute need for surgical intervention as patient has no signs of acute cholecystitis. Patient states she has no interest in undergoing surgery for the gallbladder if able to be avoided  -Consider outpatient follow-up with GI given elevated transaminases, particularly if they continue without signs or symptoms of acute cholecystitis    - Rest of care per primary    Subjective/Objective     Subjective:   Patient is seen in bed.  She has eaten all of her Israeli toast this morning.  Denies any nausea vomiting or abdominal pain.  Does admit to some bloating at times, but denies any at the current time.  Denies any fever or chills.  " She is concerned about being discharged, particularly with her living situation.  Case management coming in to talk to the patient at this time.    Objective: Afebrile, episodes of HTN, stable on 1-2 L O2 overnight    Blood pressure 146/83, pulse 67, temperature (!) 97.2 °F (36.2 °C), resp. rate 16, height 5' (1.524 m), weight 57.8 kg (127 lb 6.8 oz), SpO2 92%.,Body mass index is 24.89 kg/m².      Intake/Output Summary (Last 24 hours) at 5/14/2024 0703  Last data filed at 5/13/2024 1941  Gross per 24 hour   Intake 360 ml   Output 1350 ml   Net -990 ml       Invasive Devices       Peripheral Intravenous Line  Duration             Peripheral IV 05/13/24 Dorsal (posterior);Right Forearm 1 day              Drain  Duration             External Urinary Catheter 4 days                    Physical Exam: General appearance: alert and oriented, in no acute distress  Lungs: Breathing comfortably  Heart: regular rate and rhythm  Abdomen: Soft, nondistended, nontender  Neurologic: Grossly normal    Lab, Imaging and other studies:I have personally reviewed pertinent lab results.  , CBC: No results found for: \"WBC\", \"HGB\", \"HCT\", \"MCV\", \"PLT\", \"ADJUSTEDWBC\", \"RBC\", \"MCH\", \"MCHC\", \"RDW\", \"MPV\", \"NRBC\", CMP:   Lab Results   Component Value Date    SODIUM 141 05/14/2024    K 3.4 (L) 05/14/2024     05/14/2024    CO2 30 05/14/2024    BUN 11 05/14/2024    CREATININE 0.88 05/14/2024    CALCIUM 9.1 05/14/2024    AST 65 (H) 05/14/2024     (H) 05/14/2024    ALKPHOS 393 (H) 05/14/2024    EGFR 61 05/14/2024     VTE Pharmacologic Prophylaxis: Heparin  VTE Mechanical Prophylaxis: sequential compression device  "

## 2024-05-14 NOTE — ASSESSMENT & PLAN NOTE
Requiring 2 L of supplemental nasal cannula oxygen at time of admission  And does not require any oxygen while awake at rest or with activity but requires oxygen while sleeping at night  Arrange home o2

## 2024-05-14 NOTE — CASE MANAGEMENT
Case Management Discharge Planning Note    Patient name Cheryle Mcleod  Location /-01 MRN [id number]  : 1940 Date 2024       Current Admission Date: 2024  Current Admission Diagnosis:Elevated transaminase level   Patient Active Problem List    Diagnosis Date Noted    Troponin level elevated 2024    Elevated transaminase level 2024    Acute cystitis without hematuria 2024    Constipation 2024    Leukocytosis 2024    Acute respiratory failure with hypoxia (HCC) 2024    Oral thrush 2023    Nonischemic nontraumatic myocardial injury 2023    Urinary incontinence 2023    Calculus of gallbladder without cholecystitis without obstruction 2023    Loss of appetite 2023    Acute bronchitis 2022    Coronary artery disease involving native coronary artery of native heart 2022    COVID 2022    Essential hypertension 2022    COPD (chronic obstructive pulmonary disease) ([facility]) 2022    Rheumatoid arthritis of multiple sites with negative rheumatoid factor ([facility]) 2022    GERD (gastroesophageal reflux disease) 2022    Fatty liver 2022    Generalized weakness 2022    RONALD (acute kidney injury) ([facility]) 2022    Prolonged Q-T interval on ECG 2022      LOS (days): 5  Geometric Mean LOS (GMLOS) (days): 3.9  Days to GMLOS:-0.8     OBJECTIVE:  Risk of Unplanned Readmission Score: 12.92         Current admission status: Inpatient   Preferred Pharmacy:   [id number] [place], [place] - [address] [address]  Phone: [phone] Fax: [phone]    [facility] - [place], [place] - [address]  [address]  [place] [address]  Phone: [phone] Fax: [phone]    [id number] [place], [place] - [address]  [address] [address]  Phone: [phone] Fax: [phone]    Primary  Care Provider: Brian Peraza DO    Primary Insurance: MEDICARE  Secondary Insurance:     DISCHARGE DETAILS:    Pt will need 2 liters of 02 at night and HHC.    Discharge planning discussed with:: patient  Freedom of Choice: Yes  Comments - Freedom of Choice: Discussed HHC, Pt is not sure where she is going upon dc. ? Lauryn's home in [place]. Pt was upset as her phone was charging all night and she doesn't know what is going on.  CM called Sharmila Combs, awaiting a call back             Contacts  Patient Contacts: Party  Relationship to Patient:: Family  Contact Method: Phone  Phone Number: [phone].  Reason/Outcome: Discharge Planning    Requested Home Health Care         Is the patient interested in HHC at discharge?: Yes  Home Health Discipline requested:: Nursing, Physical Therapy  Home Health Follow-Up Provider:: PCP  Home Health Services Needed:: Evaluate Functional Status and Safety, Gait/ADL Training, Oxygen Via Nasal Cannula (Cardiopulmonary assessment and med management)  Oxygen LPM Ordered (if applicable based on home health services needed)::  (2 liters of 02 at night)  Homebound Criteria Met:: Uses an Assist Device (i.e. cane, walker, etc)  Supporting Clincal Findings:: Fatigues Easliy in Short Distances, Limited Endurance              Treatment Team Recommendation: Home with Home Health Care  Discharge Destination Plan:: Home with Home Health Care  Transport at Discharge : Family                  IMM Given (Date):: 05/14/24  IMM Given to:: Patient        Await call back from sharmila Combs to see where pt is being dc to-- to set up home 02 and HHC          0950- call received back from Lauryn, she is struggling with the decision to take her Mother home as she works during the day. She admits her Mother can be difficult to get along with, she feels her sister Jeannie needs to figure it out with her Mom as she is the POA. Robbi has not spoken to her mother for 2 months prior to admission.    Lauryn  "is going to call and speak with Cheryle, sister and come up with a plan.      1145_Per Nursing patient, is now going back home with her daughter Cheryle. Perr Mrs Mcleod,\"they have straighten things out.\" .    CM spoke to Cheryle the daughter_ Discussed C and her choice is to have [facility] home Care as patient had them before, this was secured on AIDIN for dc and informed [facility] patient will now be in [facility] [place].    Reviewed patient needs 2 liters of 02 at night.    [place] of choice was provided in regards to needing a home medical equipment company, pt does not have preference. Pt is aware that we frequently utilized [employer] as we have a working relationship with this company. Daughter is fine with using [employer].    Nocturnal 02 was ordered via [employer].  "

## 2024-05-14 NOTE — DISCHARGE SUMMARY
[facility]  Discharge- Cheryle Mcleod 1940, 83 y.o. female MRN: [id number]  Unit/Bed#: -01 Encounter: [id number]  Primary Care Provider: Brian Peraza DO   Date and time admitted to hospital: 5/9/2024  2:06 PM    * Elevated transaminase level  Assessment & Plan  History hyperbilirubinemia and fatty liver disease in 2021-MSSA bacteremia at time of this diagnosis  Negative hepatitis C testing in 2021  transaminitis slowly resolving    RONALD (acute kidney injury) (HCC)  Assessment & Plan  Lab Results   Component Value Date    EGFR 61 05/14/2024    EGFR 61 05/13/2024    EGFR 60 05/12/2024    CREATININE 0.88 05/14/2024    CREATININE 0.88 05/13/2024    CREATININE 0.89 05/12/2024   Baseline creatinine 1-1.2  CR 1.64 on admission- now 0.89  Avoid nephrotoxic agents, renally dose medications  CT abdomen pelvis no obstructive uropathy  Creatinine is trending down.     Acute cystitis without hematuria  Assessment & Plan  Presented subjective fevers, chills, abdominal tenderness at home now resolved, denies dysuria  UA nitrite positive with bacteriuria and pyuria  History E. coli UTI susceptible to ceftriaxone  Urine culture e.coli  Empiric ceftriaxone  CT abdomen pelvis no obstructive uropathy  Blood and urine are susceptible to rocephin. received 5 doses of IV Rocephin and will discharge on oral antibiotics    Acute respiratory failure with hypoxia (HCC)  Assessment & Plan  Requiring 2 L of supplemental nasal cannula oxygen at time of admission  And does not require any oxygen while awake at rest or with activity but requires oxygen while sleeping at night  Arrange home o2    Constipation  Assessment & Plan  Large stool burden seen on CT abdomen pelvis  Continue Colace twice daily  Dulcolax suppository x 1 and miralax ordered 2 days ago  Reported BMs    Troponin level elevated  Assessment & Plan  Troponin [id number]  Denies chest pain  EKG without ST elevation or  depression  Suspected secondary to UTI and cholelithiases    Calculus of gallbladder without cholecystitis without obstruction  Assessment & Plan  Ultrasound abdomen and ct abd reviewed shows Cholelithiasis. No definite evidence of acute cholecystitis. No intra or extrahepatic biliary ductal dilatation.   Elevated transaminitis on presentation along with 1 out of 2 blood cultures showing gram-negative bacteremia possibly E. coli which may be either GI or  source.  Transaminits are trending down however still has elevated bilirubin  Appreciate surgery input- MRI not remarkable for obstruction   Outpt follow up with surgery    Coronary artery disease involving native coronary artery of native heart  Assessment & Plan  History CABG  Maintained on full-strength aspirin  Denies chest pain  Continue bisoprolol and lisinopril  Statin on hold with elevated transaminase    Rheumatoid arthritis of multiple sites with negative rheumatoid factor (HCC)  Assessment & Plan  History rheumatoid arthritis  Maintained on Plaquenil 200 mg twice daily and methotrexate q. weekly, folic acid daily  Plaquenil and methotrexate on hold in setting of infection with transaminase elevations  Outpatient follow-up    Acute respiratory failure with hypoxia, on admission  Findings: pt. hypoxic, once with saturation of 87% on room air, respirations 16-18 and no evidence of increased WOB, accessory muscle usage or inability to speak in full sentences.  Patient received IV fluid hydration and some pulmonary edema and right hemidiaphragm noted on chest x-ray.  Will give a dose of IV Lasix prior to discharge and also home oxygen while sleeping at night as she becomes hypoxic while sleeping.     Patient was given an albuterol inhaler from the hospital I personally counseled the patient on medication indication, compliance, utilization and side effects patient may be discharged home with the albuterol inhaler    Discharging Physician / Practitioner:  Alice Cali MD  PCP: Brian Peraza DO  Admission Date:   Admission Orders (From admission, onward)       Ordered        05/09/24 1733  INPATIENT ADMISSION  Once                          Discharge Date: 05/14/24    Medical Problems       Resolved Problems  Date Reviewed: 5/14/2024   None         Consultations During Hospital Stay:  General surgery    Procedures Performed:   none    Significant Findings / Test Results:   XR chest portable    Result Date: 5/14/2024  Impression: Pulmonary vascular congestion. Bibasilar atelectasis and severe elevation of the right hemidiaphragm. Workstation performed: [id number]     MRI abdomen wo contrast and mrcp    Result Date: 5/11/2024  Impression: Numerous small gallstones in the gallbladder. No evidence of biliary ductal dilatation or choledocholithiasis. Workstation performed: [id number]     US right upper quadrant    Result Date: 5/9/2024  Impression: Cholelithiasis. No definite evidence of acute cholecystitis. No intra or extrahepatic biliary ductal dilatation. Simple cyst lower pole right kidney. The study was marked in EPIC for immediate notification. Workstation performed: [id number]     CT chest abdomen pelvis wo contrast    Result Date: 5/9/2024  Impression: No evidence of an acute inflammatory process. Cholelithiasis Large amount of retained stool throughout the colon. Workstation performed: [id number]      Incidental Findings:   none    Test Results Pending at Discharge (will require follow up):   none     Outpatient Tests Requested:  Cmp in 2 weeks  Outpt fu with surgery    Complications:  none    Reason for Admission: Abdominal pain    Hospital Course:     Cheryle Mcleod is a 83 y.o. female patient who originally presented to the hospital on 5/9/2024 due to transaminitis possibly passed gallstone.  Constipation, acute cystitis without hematuria and gram-negative bacteremia with E. coli.  Patient received IV antibiotics supportive care bowel rest bowel protocol  to resolve constipation and is now clinically improving afebrile and transaminitis is resolving imaging studies reviewed and also seen by general surgery.  Will discharge on a course of oral antibiotics and recommend outpatient follow-up with general surgery to decide whether she would like to have an elective cholecystectomy done in the future or not.  Patient due to her age and current comorbidities does not really want to proceed with any surgical intervention however advised her to follow-up and decide    Also noted to have nocturnal hypoxia and placed on home O2 on discharge while sleeping        Please see above list of diagnoses and related plan for additional information.     Condition at Discharge: good     Discharge Day Visit / Exam:     Subjective: Patient denies any complaints denies any nausea vomiting or abdominal pain  Vitals: Blood Pressure: 142/81 (05/14/24 0747)  Pulse: 75 (05/14/24 0747)  Temperature: 97.9 °F (36.6 °C) (05/14/24 0747)  Temp Source: Temporal (05/14/24 1035)  Respirations: 18 (05/14/24 0747)  Height: 5' (152.4 cm) (05/11/24 2034)  Weight - Scale: 57.8 kg (127 lb 6.8 oz) (05/10/24 0642)  SpO2: 95 % (05/14/24 0747)  Exam:   Physical Exam  Vitals and nursing note reviewed.   Constitutional:       Appearance: Normal appearance.   HENT:      Head: Normocephalic and atraumatic.      Right Ear: External ear normal.      Left Ear: External ear normal.      Nose: Nose normal.      Mouth/Throat:      Pharynx: Oropharynx is clear.   Eyes:      Pupils: Pupils are equal, round, and reactive to light.   Cardiovascular:      Rate and Rhythm: Normal rate and regular rhythm.      Heart sounds: Normal heart sounds.   Pulmonary:      Effort: Pulmonary effort is normal.      Breath sounds: Normal breath sounds.      Comments: mod air entry bilaterally with diminished breath sounds bilateral bases  Abdominal:      General: Bowel sounds are normal.      Palpations: Abdomen is soft.      Tenderness: There  is no abdominal tenderness.   Musculoskeletal:         General: Normal range of motion.      Cervical back: Normal range of motion and neck supple.   Skin:     General: Skin is warm and dry.      Capillary Refill: Capillary refill takes less than 2 seconds.   Neurological:      General: No focal deficit present.      Mental Status: She is alert and oriented to person, place, and time.   Psychiatric:         Mood and Affect: Mood normal.         Discussion with Family: dw daughter    Discharge instructions/Information to patient and family:   See after visit summary for information provided to patient and family.      Provisions for Follow-Up Care:  See after visit summary for information related to follow-up care and any pertinent home health orders.      Disposition:     Home with VNA Services (Reminder: Complete face to face encounter)    For Discharges to [facility] SNF:   Not Applicable to this Patient - Not Applicable to this Patient    Planned Readmission: none     Discharge Statement:  I spent 35 minutes discharging the patient. This time was spent on the day of discharge. I had direct contact with the patient on the day of discharge. Greater than 50% of the total time was spent examining patient, answering all patient questions, arranging and discussing plan of care with patient as well as directly providing post-discharge instructions.  Additional time then spent on discharge activities.    Discharge Medications:  See after visit summary for reconciled discharge medications provided to patient and family.      ** Please Note: This note has been constructed using a voice recognition system **

## 2024-05-14 NOTE — ASSESSMENT & PLAN NOTE
Presented subjective fevers, chills, abdominal tenderness at home now resolved, denies dysuria  UA nitrite positive with bacteriuria and pyuria  History E. coli UTI susceptible to ceftriaxone  Urine culture e.coli  Empiric ceftriaxone  CT abdomen pelvis no obstructive uropathy  Blood and urine are susceptible to rocephin. received 5 doses of IV Rocephin and will discharge on oral antibiotics

## 2024-05-14 NOTE — ASSESSMENT & PLAN NOTE
Is This A New Presentation, Or A Follow-Up?: Phototherapy Treatment Ultrasound abdomen and ct abd reviewed shows Cholelithiasis. No definite evidence of acute cholecystitis. No intra or extrahepatic biliary ductal dilatation.   Elevated transaminitis on presentation along with 1 out of 2 blood cultures showing gram-negative bacteremia possibly E. coli which may be either GI or  source.  Transaminits are trending down however still has elevated bilirubin  Appreciate surgery input- MRI not remarkable for obstruction   Outpt follow up with surgery

## 2024-05-14 NOTE — CASE MANAGEMENT
Case Management Discharge Planning Note    Patient name Cheryle Mcleod  Location /-01 MRN [id number]  : 1940 Date 2024       Current Admission Date: 2024  Current Admission Diagnosis:Elevated transaminase level   Patient Active Problem List    Diagnosis Date Noted    Troponin level elevated 2024    Elevated transaminase level 2024    Acute cystitis without hematuria 2024    Constipation 2024    Leukocytosis 2024    Acute respiratory failure with hypoxia ([facility]) 2024    Oral thrush 2023    Nonischemic nontraumatic myocardial injury 2023    Urinary incontinence 2023    Calculus of gallbladder without cholecystitis without obstruction 2023    Loss of appetite 2023    Acute bronchitis 2022    Coronary artery disease involving native coronary artery of native heart 2022    COVID 2022    Essential hypertension 2022    COPD (chronic obstructive pulmonary disease) ([facility]) 2022    Rheumatoid arthritis of multiple sites with negative rheumatoid factor ([facility]) 2022    GERD (gastroesophageal reflux disease) 2022    Fatty liver 2022    Generalized weakness 2022    RONALD (acute kidney injury) ([facility]) 2022    Prolonged Q-T interval on ECG 2022      LOS (days): 5  Geometric Mean LOS (GMLOS) (days): 3.9  Days to GMLOS:-1     OBJECTIVE:  Risk of Unplanned Readmission Score: 13.38         Current admission status: Inpatient   Preferred Pharmacy:   [id number] [place], [place] - [address] [address]  Phone: [phone] Fax: [phone]    [facility] - [place], [place] - [address]  [address]   [place] [address]  Phone: [phone] Fax: [phone]    [id number] [place], [place] - [address]  [address] [address]  Phone: [phone] Fax: [phone]    [facility]  Care Provider: Brian Peraza DO    Primary Insurance: MEDICARE  Secondary Insurance:     DISCHARGE DETAILS:     AVS was forward to [facility].

## 2024-05-14 NOTE — PROGRESS NOTES
Patient:    MRN:  [id number]    Lorenzo Request ID:  [id number]    Level of care reserved:  Home Health Agency    Partner Reserved:  [place] [address] [phone]    Clinical needs requested:    Geography searched:  [id number]    Start of Service:    Request sent:  1:46pm EDT on 5/13/2024 by Estefania Manley    Partner reserved:  12:05pm EDT on 5/14/2024 by Lisa Lowe    Choice list shared:  6:49am EDT on 5/14/2024 by Lisa Lowe

## 2024-05-14 NOTE — NURSING NOTE
Discharge instructions provided and reviewed with patient and NICK, no questions at present time of discharge. IV successfully removed and purewick. Pt dressed with clothes provided by family and all belongings sent home with patient. Plan of care updated.

## 2024-05-14 NOTE — PLAN OF CARE
Problem: PAIN - ADULT  Goal: Verbalizes/displays adequate comfort level or baseline comfort level  Description: Interventions:  - Encourage patient to monitor pain and request assistance  - Assess pain using appropriate pain scale  - Administer analgesics based on type and severity of pain and evaluate response  - Implement non-pharmacological measures as appropriate and evaluate response  - Consider cultural and social influences on pain and pain management  - Notify physician/advanced practitioner if interventions unsuccessful or patient reports new pain  Outcome: Progressing     Problem: INFECTION - ADULT  Goal: Absence or prevention of progression during hospitalization  Description: INTERVENTIONS:  - Assess and monitor for signs and symptoms of infection  - Monitor lab/diagnostic results  - Monitor all insertion sites, i.e. indwelling lines, tubes, and drains  - Monitor endotracheal if appropriate and nasal secretions for changes in amount and color  - [place] appropriate cooling/warming therapies per order  - Administer medications as ordered  - Instruct and encourage patient and family to use good hand hygiene technique  - Identify and instruct in appropriate isolation precautions for identified infection/condition  Outcome: Progressing  Goal: Absence of fever/infection during neutropenic period  Description: INTERVENTIONS:  - Monitor WBC    Outcome: Progressing     Problem: SAFETY ADULT  Goal: Patient will remain free of falls  Description: INTERVENTIONS:  - Educate patient/family on patient safety including physical limitations  - Instruct patient to call for assistance with activity   - Consult OT/PT to assist with strengthening/mobility   - Keep Call bell within reach  - Keep bed low and locked with side rails adjusted as appropriate  - Keep care items and personal belongings within reach  - Initiate and maintain comfort rounds  - Make Fall Risk Sign visible to staff  - Offer Toileting every 2 Hours,  in advance of need  - Initiate/Maintain bed alarm  - Obtain necessary fall risk management equipment: yes  - Apply yellow socks and bracelet for high fall risk patients  - Consider moving patient to room near nurses station  Outcome: Progressing  Goal: Maintain or return to baseline ADL function  Description: INTERVENTIONS:  -  Assess patient's ability to carry out ADLs; assess patient's baseline for ADL function and identify physical deficits which impact ability to perform ADLs (bathing, care of mouth/teeth, toileting, grooming, dressing, etc.)  - Assess/evaluate cause of self-care deficits   - Assess range of motion  - Assess patient's mobility; develop plan if impaired  - Assess patient's need for assistive devices and provide as appropriate  - Encourage maximum independence but intervene and supervise when necessary  - Involve family in performance of ADLs  - Assess for home care needs following discharge   - Consider OT consult to assist with ADL evaluation and planning for discharge  - Provide patient education as appropriate  Outcome: Progressing  Goal: Maintains/Returns to pre admission functional level  Description: INTERVENTIONS:  - Perform AM-PAC 6 Click Basic Mobility/ Daily Activity assessment daily.  - Set and communicate daily mobility goal to care team and patient/family/caregiver.   - Collaborate with rehabilitation services on mobility goals if consulted  - Perform Range of Motion 3 times a day.  - Reposition patient every 2 hours.  - Dangle patient 3 times a day  - Stand patient 3 times a day  - Ambulate patient 3 times a day  - Out of bed to chair 3 times a day   - Out of bed for meals 3 times a day  - Out of bed for toileting  - Record patient progress and toleration of activity level   Outcome: Progressing     Problem: DISCHARGE PLANNING  Goal: Discharge to home or other facility with appropriate resources  Description: INTERVENTIONS:  - Identify barriers to discharge w/patient and  caregiver  - Arrange for needed discharge resources and transportation as appropriate  - Identify discharge learning needs (meds, wound care, etc.)  - Arrange for interpretive services to assist at discharge as needed  - Refer to Case Management Department for coordinating discharge planning if the patient needs post-hospital services based on physician/advanced practitioner order or complex needs related to functional status, cognitive ability, or social support system  Outcome: Progressing     Problem: Knowledge Deficit  Goal: Patient/family/caregiver demonstrates understanding of disease process, treatment plan, medications, and discharge instructions  Description: Complete learning assessment and assess knowledge base.  Interventions:  - Provide teaching at level of understanding  - Provide teaching via preferred learning methods  Outcome: Progressing     Problem: Prexisting or High Potential for Compromised Skin Integrity  Goal: Skin integrity is maintained or improved  Description: INTERVENTIONS:  - Identify patients at risk for skin breakdown  - Assess and monitor skin integrity  - Assess and monitor nutrition and hydration status  - Monitor labs   - Assess for incontinence   - Turn and reposition patient  - Assist with mobility/ambulation  - Relieve pressure over bony prominences  - Avoid friction and shearing  - Provide appropriate hygiene as needed including keeping skin clean and dry  - Evaluate need for skin moisturizer/barrier cream  - Collaborate with interdisciplinary team   - Patient/family teaching  - Consider wound care consult   Outcome: Progressing

## 2024-05-14 NOTE — ASSESSMENT & PLAN NOTE
Lab Results   Component Value Date    EGFR 61 05/14/2024    EGFR 61 05/13/2024    EGFR 60 05/12/2024    CREATININE 0.88 05/14/2024    CREATININE 0.88 05/13/2024    CREATININE 0.89 05/12/2024   Baseline creatinine 1-1.2  CR 1.64 on admission- now 0.89  Avoid nephrotoxic agents, renally dose medications  CT abdomen pelvis no obstructive uropathy  Creatinine is trending down.

## 2024-05-15 LAB

## 2024-06-01 ENCOUNTER — HOSPITAL ENCOUNTER (EMERGENCY)
Facility: HOSPITAL | Age: 84
Discharge: HOME | End: 2024-06-01
Attending: EMERGENCY MEDICINE
Payer: MEDICARE

## 2024-06-01 ENCOUNTER — APPOINTMENT (EMERGENCY)
Dept: RADIOLOGY | Facility: HOSPITAL | Age: 84
End: 2024-06-01
Payer: MEDICARE

## 2024-06-01 VITALS
SYSTOLIC BLOOD PRESSURE: 122 MMHG | DIASTOLIC BLOOD PRESSURE: 56 MMHG | RESPIRATION RATE: 16 BRPM | HEART RATE: 62 BPM | OXYGEN SATURATION: 98 % | TEMPERATURE: 97.3 F

## 2024-06-01 DIAGNOSIS — R07.9 CHEST PAIN: Primary | ICD-10-CM

## 2024-06-01 LAB
2HR DELTA HS TROPONIN: -12 NG/L
ALBUMIN SERPL BCP-MCNC: 3.5 G/DL (ref 3.5–5)
ALP SERPL-CCNC: 219 U/L (ref 34–104)
ALT SERPL W P-5'-P-CCNC: 26 U/L (ref 7–52)
ANION GAP SERPL CALCULATED.3IONS-SCNC: 7 MMOL/L (ref 4–13)
AST SERPL W P-5'-P-CCNC: 56 U/L (ref 13–39)
BASOPHILS # BLD AUTO: 0.05 THOUSANDS/ÂΜL (ref 0–0.1)
BASOPHILS NFR BLD AUTO: 1 % (ref 0–1)
BILIRUB DIRECT SERPL-MCNC: 0.3 MG/DL (ref 0–0.2)
BILIRUB SERPL-MCNC: 1.09 MG/DL (ref 0.2–1)
BUN SERPL-MCNC: 29 MG/DL (ref 5–25)
CALCIUM SERPL-MCNC: 9 MG/DL (ref 8.4–10.2)
CARDIAC TROPONIN I PNL SERPL HS: 120 NG/L
CARDIAC TROPONIN I PNL SERPL HS: 132 NG/L
CHLORIDE SERPL-SCNC: 105 MMOL/L (ref 96–108)
CO2 SERPL-SCNC: 27 MMOL/L (ref 21–32)
CREAT SERPL-MCNC: 1.68 MG/DL (ref 0.6–1.3)
EOSINOPHIL # BLD AUTO: 0.27 THOUSAND/ÂΜL (ref 0–0.61)
EOSINOPHIL NFR BLD AUTO: 4 % (ref 0–6)
ERYTHROCYTE [DISTWIDTH] IN BLOOD BY AUTOMATED COUNT: 14.6 % (ref 11.6–15.1)
GFR SERPL CREATININE-BSD FRML MDRD: 28 ML/MIN/1.73SQ M
GLUCOSE SERPL-MCNC: 111 MG/DL (ref 65–140)
HCT VFR BLD AUTO: 39.3 % (ref 34.8–46.1)
HGB BLD-MCNC: 12.4 G/DL (ref 11.5–15.4)
IMM GRANULOCYTES # BLD AUTO: 0.04 THOUSAND/UL (ref 0–0.2)
IMM GRANULOCYTES NFR BLD AUTO: 1 % (ref 0–2)
LIPASE SERPL-CCNC: 38 U/L (ref 11–82)
LYMPHOCYTES # BLD AUTO: 1.86 THOUSANDS/ÂΜL (ref 0.6–4.47)
LYMPHOCYTES NFR BLD AUTO: 24 % (ref 14–44)
MCH RBC QN AUTO: 31.7 PG (ref 26.8–34.3)
MCHC RBC AUTO-ENTMCNC: 31.6 G/DL (ref 31.4–37.4)
MCV RBC AUTO: 101 FL (ref 82–98)
MONOCYTES # BLD AUTO: 0.9 THOUSAND/ÂΜL (ref 0.17–1.22)
MONOCYTES NFR BLD AUTO: 12 % (ref 4–12)
NEUTROPHILS # BLD AUTO: 4.59 THOUSANDS/ÂΜL (ref 1.85–7.62)
NEUTS SEG NFR BLD AUTO: 58 % (ref 43–75)
NRBC BLD AUTO-RTO: 0 /100 WBCS
PLATELET # BLD AUTO: 272 THOUSANDS/UL (ref 149–390)
PMV BLD AUTO: 9.5 FL (ref 8.9–12.7)
POTASSIUM SERPL-SCNC: 5 MMOL/L (ref 3.5–5.3)
PROT SERPL-MCNC: 6.7 G/DL (ref 6.4–8.4)
RBC # BLD AUTO: 3.91 MILLION/UL (ref 3.81–5.12)
SODIUM SERPL-SCNC: 139 MMOL/L (ref 135–147)
WBC # BLD AUTO: 7.71 THOUSAND/UL (ref 4.31–10.16)

## 2024-06-01 PROCEDURE — 96374 THER/PROPH/DIAG INJ IV PUSH: CPT

## 2024-06-01 PROCEDURE — 96361 HYDRATE IV INFUSION ADD-ON: CPT

## 2024-06-01 PROCEDURE — 71045 X-RAY EXAM CHEST 1 VIEW: CPT

## 2024-06-01 PROCEDURE — 99285 EMERGENCY DEPT VISIT HI MDM: CPT

## 2024-06-01 PROCEDURE — 99285 EMERGENCY DEPT VISIT HI MDM: CPT | Performed by: EMERGENCY MEDICINE

## 2024-06-01 PROCEDURE — 96375 TX/PRO/DX INJ NEW DRUG ADDON: CPT

## 2024-06-01 PROCEDURE — 84484 ASSAY OF TROPONIN QUANT: CPT | Performed by: EMERGENCY MEDICINE

## 2024-06-01 PROCEDURE — 93005 ELECTROCARDIOGRAM TRACING: CPT

## 2024-06-01 PROCEDURE — 85025 COMPLETE CBC W/AUTO DIFF WBC: CPT | Performed by: EMERGENCY MEDICINE

## 2024-06-01 PROCEDURE — 82248 BILIRUBIN DIRECT: CPT | Performed by: EMERGENCY MEDICINE

## 2024-06-01 PROCEDURE — 80053 COMPREHEN METABOLIC PANEL: CPT | Performed by: EMERGENCY MEDICINE

## 2024-06-01 PROCEDURE — 83690 ASSAY OF LIPASE: CPT | Performed by: EMERGENCY MEDICINE

## 2024-06-01 PROCEDURE — 36415 COLL VENOUS BLD VENIPUNCTURE: CPT | Performed by: EMERGENCY MEDICINE

## 2024-06-01 RX ORDER — FAMOTIDINE 10 MG/ML
20 INJECTION, SOLUTION INTRAVENOUS ONCE
Status: COMPLETED | OUTPATIENT
Start: 2024-06-01 | End: 2024-06-01

## 2024-06-01 RX ORDER — MORPHINE SULFATE 4 MG/ML
4 INJECTION, SOLUTION INTRAMUSCULAR; INTRAVENOUS ONCE
Status: COMPLETED | OUTPATIENT
Start: 2024-06-01 | End: 2024-06-01

## 2024-06-01 RX ORDER — NITROGLYCERIN 0.4 MG/1
0.4 TABLET SUBLINGUAL ONCE
Status: COMPLETED | OUTPATIENT
Start: 2024-06-01 | End: 2024-06-01

## 2024-06-01 RX ORDER — MAGNESIUM HYDROXIDE/ALUMINUM HYDROXICE/SIMETHICONE 120; 1200; 1200 MG/30ML; MG/30ML; MG/30ML
30 SUSPENSION ORAL ONCE
Status: COMPLETED | OUTPATIENT
Start: 2024-06-01 | End: 2024-06-01

## 2024-06-01 RX ADMIN — SODIUM CHLORIDE 1000 ML: 0.9 INJECTION, SOLUTION INTRAVENOUS at 03:42

## 2024-06-01 RX ADMIN — ALUMINUM HYDROXIDE, MAGNESIUM HYDROXIDE, AND DIMETHICONE 30 ML: 200; 20; 200 SUSPENSION ORAL at 04:04

## 2024-06-01 RX ADMIN — MORPHINE SULFATE 4 MG: 4 INJECTION INTRAVENOUS at 03:12

## 2024-06-01 RX ADMIN — FAMOTIDINE 20 MG: 10 INJECTION, SOLUTION INTRAVENOUS at 01:31

## 2024-06-01 RX ADMIN — NITROGLYCERIN 0.4 MG: 0.4 TABLET SUBLINGUAL at 03:20

## 2024-06-01 NOTE — ED PROVIDER NOTES
History  Chief Complaint   Patient presents with    Chest Pain     Pt BIBA from home, c/o midsternal chest pain/pressure while she was resting. Reports she had just eaten and thought at first it was reflux. EMS administered 325 ASA. No pain at this time.     8 out of 10 chest pressure started approximately 1 hour ago when patient lay down.  States earlier in the day she had eaten a hoagie with tomatoes and onions.  Patient does admit to history of gallstones and occasional acid reflux.  EMS was called and patient was given 324 mg aspirin, now states pain is 2/10.  Denies nausea or vomiting but does admit to some shortness of breath.  No abdominal pain.  No other complaints.      History provided by:  Patient   used: No    Chest Pain  Pain location:  Substernal area  Pain quality: pressure    Pain radiates to:  Does not radiate  Pain severity:  Moderate  Timing:  Constant  Progression:  Improving  Chronicity:  New  Relieved by:  Nothing  Worsened by:  Nothing tried  Ineffective treatments:  None tried  Associated symptoms: shortness of breath    Associated symptoms: no abdominal pain, no altered mental status, no cough, no diaphoresis, no dizziness, no dysphagia, no fatigue, no fever, no headache, no heartburn, no lower extremity edema, no nausea, no near-syncope, no palpitations, no syncope, not vomiting and no weakness        Prior to Admission Medications   Prescriptions Last Dose Informant Patient Reported? Taking?   DULoxetine (CYMBALTA) 30 mg delayed release capsule   Yes No   Sig: Take 30 mg by mouth daily   acetaminophen (TYLENOL) 325 mg tablet   No No   Sig: Take 2 tablets (650 mg total) by mouth every 6 (six) hours as needed for mild pain, headaches or fever   albuterol (ProAir HFA) 90 mcg/act inhaler   No No   Sig: Inhale 2 puffs every 6 (six) hours as needed for wheezing   aspirin 325 mg tablet   Yes No   Sig: Take 325 mg by mouth daily   bisoprolol-hydrochlorothiazide (ZIAC) 2.5-6.25  MG per tablet  Self Yes No   Sig: Take 1 tablet by mouth daily   calcium citrate-vitamin D (CITRACAL+D) 315-200 MG-UNIT per tablet   Yes No   Sig: Take 1 tablet by mouth 2 (two) times a day   docusate sodium (COLACE) 100 mg capsule   Yes No   Sig: Take 100 mg by mouth 2 (two) times a day   folic acid (FOLVITE) 1 mg tablet   Yes No   Sig: Take by mouth daily   hydroxychloroquine (PLAQUENIL) 200 mg tablet  Self Yes No   Sig: Take 200 mg by mouth 2 (two) times a day with meals   lisinopril (ZESTRIL) 10 mg tablet   Yes No   Sig: Take 10 mg by mouth daily   loratadine (CLARITIN) 10 mg tablet  Self Yes No   Sig: Take 10 mg by mouth daily   methotrexate 2.5 mg tablet  Self Yes No   Sig: Take by mouth once a week   pantoprazole (PROTONIX) 40 mg tablet  Self Yes No   Sig: Take 40 mg by mouth daily   tolterodine (DETROL LA) 4 mg 24 hr capsule  Self Yes No   Sig: Take 4 mg by mouth daily      Facility-Administered Medications: None       Past Medical History:   Diagnosis Date    Arthritis     Asthma     Diabetes mellitus (HCC)     Hypertension     MI, old        Past Surgical History:   Procedure Laterality Date    BACK SURGERY      CARDIAC SURGERY      HYSTERECTOMY         History reviewed. No pertinent family history.  I have reviewed and agree with the history as documented.    E-Cigarette/Vaping    E-Cigarette Use Never User      E-Cigarette/Vaping Substances    Nicotine No     THC No     CBD No     Flavoring No     Other No     Unknown No      Social History     Tobacco Use    Smoking status: Former     Passive exposure: Never    Smokeless tobacco: Never   Vaping Use    Vaping status: Never Used   Substance Use Topics    Alcohol use: Not Currently    Drug use: Not Currently       Review of Systems   Constitutional:  Negative for chills, diaphoresis, fatigue and fever.   HENT:  Negative for ear pain, hearing loss, sore throat, trouble swallowing and voice change.    Eyes:  Negative for pain and discharge.   Respiratory:   Positive for shortness of breath. Negative for cough and wheezing.    Cardiovascular:  Positive for chest pain. Negative for palpitations, syncope and near-syncope.   Gastrointestinal:  Negative for abdominal pain, blood in stool, constipation, diarrhea, heartburn, nausea and vomiting.   Genitourinary:  Negative for dysuria, flank pain, frequency and hematuria.   Musculoskeletal:  Negative for joint swelling, neck pain and neck stiffness.   Skin:  Negative for rash and wound.   Neurological:  Negative for dizziness, seizures, syncope, facial asymmetry, weakness and headaches.   Psychiatric/Behavioral:  Negative for hallucinations, self-injury and suicidal ideas.    All other systems reviewed and are negative.      Physical Exam  Physical Exam  Vitals and nursing note reviewed.   Constitutional:       General: She is not in acute distress.     Appearance: She is well-developed. She is not ill-appearing or diaphoretic.   HENT:      Head: Normocephalic and atraumatic.      Right Ear: External ear normal.      Left Ear: External ear normal.   Eyes:      General: No scleral icterus.        Right eye: No discharge.         Left eye: No discharge.      Extraocular Movements: Extraocular movements intact.      Conjunctiva/sclera: Conjunctivae normal.   Cardiovascular:      Rate and Rhythm: Normal rate and regular rhythm.      Heart sounds: Normal heart sounds. No murmur heard.  Pulmonary:      Effort: Pulmonary effort is normal. No respiratory distress.      Breath sounds: Normal breath sounds. No decreased breath sounds, wheezing, rhonchi or rales.   Abdominal:      General: Bowel sounds are normal. There is no distension.      Palpations: Abdomen is soft.      Tenderness: There is no abdominal tenderness. There is no guarding or rebound.   Musculoskeletal:         General: No deformity or signs of injury. Normal range of motion.      Cervical back: Normal range of motion and neck supple.   Skin:     General: Skin is warm  and dry.      Coloration: Skin is not jaundiced or pale.      Findings: No rash.   Neurological:      General: No focal deficit present.      Mental Status: She is alert and oriented to person, place, and time.      Cranial Nerves: No cranial nerve deficit.      Coordination: Coordination normal.      Gait: Gait normal.   Psychiatric:         Mood and Affect: Mood normal.         Behavior: Behavior normal.         Thought Content: Thought content normal.         Judgment: Judgment normal.         Vital Signs  ED Triage Vitals [06/01/24 0125]   Temperature Pulse Respirations Blood Pressure SpO2   (!) 97.3 °F (36.3 °C) 62 18 129/62 95 %      Temp Source Heart Rate Source Patient Position - Orthostatic VS BP Location FiO2 (%)   Temporal Monitor Lying Right arm --      Pain Score       No Pain           Vitals:    06/01/24 0200 06/01/24 0230 06/01/24 0300 06/01/24 0345   BP: 145/68 157/71 162/74 91/60   Pulse: 62 65 66 65   Patient Position - Orthostatic VS: Lying Lying Lying Lying         Visual Acuity      ED Medications  Medications   sodium chloride 0.9 % bolus 1,000 mL (1,000 mL Intravenous New Bag 6/1/24 0342)   aluminum-magnesium hydroxide-simethicone (MAALOX) oral suspension 30 mL (has no administration in time range)   Famotidine (PF) (PEPCID) injection 20 mg (20 mg Intravenous Given 6/1/24 0131)   nitroglycerin (NITROSTAT) SL tablet 0.4 mg (0.4 mg Sublingual Given 6/1/24 0320)   morphine injection 4 mg (4 mg Intravenous Given 6/1/24 0312)       Diagnostic Studies  Results Reviewed       Procedure Component Value Units Date/Time    HS Troponin I 2hr [[id number]]  (Abnormal) Collected: 06/01/24 0320    Lab Status: Final result Specimen: Blood from Arm, Right Updated: 06/01/24 0352     hs TnI 2hr 120 ng/L      Delta 2hr hsTnI -12 ng/L     HS Troponin I 4hr [[id number]]     Lab Status: No result Specimen: Blood     HS Troponin 0hr (reflex protocol) [[id number]]  (Abnormal) Collected: 06/01/24 0129    Lab Status:  Final result Specimen: Blood from Arm, Right Updated: 06/01/24 0159     hs TnI 0hr 132 ng/L     Comprehensive metabolic panel [[id number]]  (Abnormal) Collected: 06/01/24 0129    Lab Status: Final result Specimen: Blood from Arm, Right Updated: 06/01/24 0156     Sodium 139 mmol/L      Potassium 5.0 mmol/L      Chloride 105 mmol/L      CO2 27 mmol/L      ANION GAP 7 mmol/L      BUN 29 mg/dL      Creatinine 1.68 mg/dL      Glucose 111 mg/dL      Calcium 9.0 mg/dL      AST 56 U/L      ALT 26 U/L      Alkaline Phosphatase 219 U/L      Total Protein 6.7 g/dL      Albumin 3.5 g/dL      Total Bilirubin 1.09 mg/dL      eGFR 28 ml/min/1.73sq m     Narrative:      National Kid[employer] guidelines for Chronic Kidney Disease (CKD):     Stage 1 with normal or high GFR (GFR > 90 mL/min/1.73 square meters)    Stage 2 Mild CKD (GFR = 60-89 mL/min/1.73 square meters)    Stage 3A Moderate CKD (GFR = 45-59 mL/min/1.73 square meters)    Stage 3B Moderate CKD (GFR = 30-44 mL/min/1.73 square meters)    Stage 4 Severe CKD (GFR = 15-29 mL/min/1.73 square meters)    Stage 5 End Stage CKD (GFR <15 mL/min/1.73 square meters)  Note: GFR calculation is accurate only with a steady state creatinine    Lipase [[id number]]  (Normal) Collected: 06/01/24 0129    Lab Status: Final result Specimen: Blood from Arm, Right Updated: 06/01/24 0156     Lipase 38 u/L     Bilirubin, direct [[id number]]  (Abnormal) Collected: 06/01/24 0129    Lab Status: Final result Specimen: Blood from Arm, Right Updated: 06/01/24 0156     Bilirubin, Direct 0.30 mg/dL     CBC and differential [[id number]]  (Abnormal) Collected: 06/01/24 0129    Lab Status: Final result Specimen: Blood from Arm, Right Updated: 06/01/24 0135     WBC 7.71 Thousand/uL      RBC 3.91 Million/uL      Hemoglobin 12.4 g/dL      Hematocrit 39.3 %       fL      MCH 31.7 pg      MCHC 31.6 g/dL      RDW 14.6 %      MPV 9.5 fL      Platelets 272 Thousands/uL      nRBC 0 /100 WBCs       Segmented % 58 %      Immature Grans % 1 %      Lymphocytes % 24 %      Monocytes % 12 %      Eosinophils Relative 4 %      Basophils Relative 1 %      Absolute Neutrophils 4.59 Thousands/µL      Absolute Immature Grans 0.04 Thousand/uL      Absolute Lymphocytes 1.86 Thousands/µL      Absolute Monocytes 0.90 Thousand/µL      Eosinophils Absolute 0.27 Thousand/µL      Basophils Absolute 0.05 Thousands/µL                    XR chest portable   ED Interpretation by Wander Morgan MD (06/01 0358)   Hiatal hernia, no acute finding otherwise                 Procedures  ECG 12 Lead Documentation Only    Date/Time: 6/1/2024 1:31 AM    Performed by: Wander Morgan MD  Authorized by: Wander Morgan MD    ECG reviewed by me, the ED Provider: yes    Patient location:  ED  Previous ECG:     Previous ECG:  Unavailable  Interpretation:     Interpretation: non-specific    Rate:     ECG rate:  61    ECG rate assessment: normal    Rhythm:     Rhythm: sinus rhythm    Ectopy:     Ectopy: none    QRS:     QRS axis:  Normal    QRS intervals:  Normal  Conduction:     Conduction: abnormal      Abnormal conduction: complete LBBB    ST segments:     ST segments:  Normal  T waves:     T waves: normal    Comments:      Left bundle also present on prior EKGs           ED Course  ED Course as of 06/01/24 0400   Sat Jun 01, 2024 0355 EKG negative, serial troponins trending down.  Unlikely cardiac.  Appropriate for discharge             HEART Risk Score      Flowsheet Row Most Recent Value   Heart Score Risk Calculator    History 0 Filed at: 06/01/2024 0355   ECG 0 Filed at: 06/01/2024 0355   Age 2 Filed at: 06/01/2024 0355   Risk Factors 1 Filed at: 06/01/2024 0355   Troponin 2 Filed at: 06/01/2024 0355   HEART Score 5 Filed at: 06/01/2024 0355                          SBIRT 20yo+      Flowsheet Row Most Recent Value   Initial Alcohol Screen: US AUDIT-C     1. How often do you have a drink containing alcohol? 0 Filed at: 06/01/2024  0127   2. How many drinks containing alcohol do you have on a typical day you are drinking?  0 Filed at: 06/01/2024 0127   3b. FEMALE Any Age, or MALE 65+: How often do you have 4 or more drinks on one occassion? 0 Filed at: 06/01/2024 0127   Audit-C Score 0 Filed at: 06/01/2024 0127   CHAD: How many times in the past year have you...    Used an illegal drug or used a prescription medication for non-medical reasons? Never Filed at: 06/01/2024 0127                      Medical Decision Making  Based on the history and medical screening exam performed the diagnostic considerations include but are not limited to GERD, gastritis, ACS, STEMI, NSTEMI.    Based on the work-up performed in the emergency room which includes physical examination, and which may include laboratory studies and imaging as warranted including advanced imaging such as CT scan or ultrasound, the diagnostic considerations are narrowed to exclude limb or life-threatening process.    The patient is stable for discharge.  Patient relates symptoms which are epigastric/chest discomfort occurred after lying flat.  She has history of gallstones and acid reflux.  Serial troponins are trending downward and EKG is negative.  Very unlikely cardiac.  Based on this, appropriate for discharge.    Amount and/or Complexity of Data Reviewed  Labs: ordered. Decision-making details documented in ED Course.     Details: Troponins trending downward  Radiology: ordered and independent interpretation performed. Decision-making details documented in ED Course.     Details: Chest x-ray with hiatal hernia otherwise negative  ECG/medicine tests: ordered and independent interpretation performed. Decision-making details documented in ED Course.     Details: Normal sinus rhythm rate 61 with first-degree block and left bundle which is known    Risk  Prescription drug management.             Disposition  Final diagnoses:   Chest pain     Time reflects when diagnosis was documented  in both MDM as applicable and the Disposition within this note       Time User Action Codes Description Comment    6/1/2024  3:59 AM Wander Morgan Add [R07.9] Chest pain           ED Disposition       ED Disposition   Discharge    Condition   Stable    Date/Time   Sat Jun 1, 2024 0359    Comment   Cherylejay Mcleod discharge to home/self care.                   Follow-up Information       Follow up With Specialties Details Why Contact Info    Brian Peraza DO Internal Medicine   [address] [place] [id number] [phone]              Patient's Medications   Discharge Prescriptions    No medications on file       No discharge procedures on file.    PDMP Review         Value Time User    PDMP Reviewed  Yes 2/23/2023 11:02 AM Ashley Schwartz PA-C            ED Provider  Electronically Signed by             Wander Morgan MD  06/01/24 6730

## 2024-06-01 NOTE — ED NOTES
Pt. O2 dropped to 84% on RA while sleeping. Placed on 2 l NC, O2 increased to 95%. Pt. Reports she wears 2 L NC at night     Jenny Aragon  06/01/24 0337       Jenny Aragon  06/01/24 0338

## 2024-06-02 LAB
ATRIAL RATE: 61 BPM
P AXIS: 40 DEGREES
PR INTERVAL: 220 MS
QRS AXIS: -63 DEGREES
QRSD INTERVAL: 140 MS
QT INTERVAL: 458 MS
QTC INTERVAL: 461 MS
T WAVE AXIS: 114 DEGREES
VENTRICULAR RATE: 61 BPM

## 2024-06-02 PROCEDURE — 93010 ELECTROCARDIOGRAM REPORT: CPT | Performed by: INTERNAL MEDICINE

## 2024-06-09 PROBLEM — N30.00 ACUTE CYSTITIS WITHOUT HEMATURIA: Status: RESOLVED | Noted: 2024-05-09 | Resolved: 2024-06-09

## 2024-09-07 ENCOUNTER — APPOINTMENT (EMERGENCY)
Dept: RADIOLOGY | Facility: HOSPITAL | Age: 84
End: 2024-09-07
Payer: MEDICARE

## 2024-09-07 ENCOUNTER — HOSPITAL ENCOUNTER (EMERGENCY)
Facility: HOSPITAL | Age: 84
Discharge: HOME | End: 2024-09-07
Attending: EMERGENCY MEDICINE | Admitting: EMERGENCY MEDICINE
Payer: MEDICARE

## 2024-09-07 VITALS
HEART RATE: 73 BPM | WEIGHT: 125 LBS | TEMPERATURE: 98.2 F | DIASTOLIC BLOOD PRESSURE: 69 MMHG | HEIGHT: 60 IN | BODY MASS INDEX: 24.54 KG/M2 | SYSTOLIC BLOOD PRESSURE: 136 MMHG | RESPIRATION RATE: 18 BRPM | OXYGEN SATURATION: 94 %

## 2024-09-07 DIAGNOSIS — M25.50 POLYARTHRALGIA: Primary | ICD-10-CM

## 2024-09-07 LAB
ALBUMIN SERPL BCG-MCNC: 3.6 G/DL (ref 3.5–5)
ALP SERPL-CCNC: 116 U/L (ref 34–104)
ALT SERPL W P-5'-P-CCNC: 18 U/L (ref 7–52)
ANION GAP SERPL CALCULATED.3IONS-SCNC: 6 MMOL/L (ref 4–13)
AST SERPL W P-5'-P-CCNC: 28 U/L (ref 13–39)
BASOPHILS # BLD AUTO: 0.06 THOUSANDS/ÂΜL (ref 0–0.1)
BASOPHILS NFR BLD AUTO: 1 % (ref 0–1)
BILIRUB SERPL-MCNC: 0.55 MG/DL (ref 0.2–1)
BUN SERPL-MCNC: 23 MG/DL (ref 5–25)
CALCIUM SERPL-MCNC: 9.2 MG/DL (ref 8.4–10.2)
CHLORIDE SERPL-SCNC: 105 MMOL/L (ref 96–108)
CO2 SERPL-SCNC: 27 MMOL/L (ref 21–32)
CREAT SERPL-MCNC: 1.29 MG/DL (ref 0.6–1.3)
EOSINOPHIL # BLD AUTO: 0.07 THOUSAND/ÂΜL (ref 0–0.61)
EOSINOPHIL NFR BLD AUTO: 1 % (ref 0–6)
ERYTHROCYTE [DISTWIDTH] IN BLOOD BY AUTOMATED COUNT: 13.2 % (ref 11.6–15.1)
GFR SERPL CREATININE-BSD FRML MDRD: 38 ML/MIN/1.73SQ M
GLUCOSE SERPL-MCNC: 101 MG/DL (ref 65–140)
HCT VFR BLD AUTO: 35.5 % (ref 34.8–46.1)
HGB BLD-MCNC: 11 G/DL (ref 11.5–15.4)
IMM GRANULOCYTES # BLD AUTO: 0.01 THOUSAND/UL (ref 0–0.2)
IMM GRANULOCYTES NFR BLD AUTO: 0 % (ref 0–2)
LYMPHOCYTES # BLD AUTO: 1.42 THOUSANDS/ÂΜL (ref 0.6–4.47)
LYMPHOCYTES NFR BLD AUTO: 18 % (ref 14–44)
MCH RBC QN AUTO: 29.1 PG (ref 26.8–34.3)
MCHC RBC AUTO-ENTMCNC: 31 G/DL (ref 31.4–37.4)
MCV RBC AUTO: 94 FL (ref 82–98)
MONOCYTES # BLD AUTO: 0.78 THOUSAND/ÂΜL (ref 0.17–1.22)
MONOCYTES NFR BLD AUTO: 10 % (ref 4–12)
NEUTROPHILS # BLD AUTO: 5.52 THOUSANDS/ÂΜL (ref 1.85–7.62)
NEUTS SEG NFR BLD AUTO: 70 % (ref 43–75)
NRBC BLD AUTO-RTO: 0 /100 WBCS
PLATELET # BLD AUTO: 278 THOUSANDS/UL (ref 149–390)
PMV BLD AUTO: 9.2 FL (ref 8.9–12.7)
POTASSIUM SERPL-SCNC: 4.5 MMOL/L (ref 3.5–5.3)
PROT SERPL-MCNC: 6.2 G/DL (ref 6.4–8.4)
RBC # BLD AUTO: 3.78 MILLION/UL (ref 3.81–5.12)
SODIUM SERPL-SCNC: 138 MMOL/L (ref 135–147)
WBC # BLD AUTO: 7.86 THOUSAND/UL (ref 4.31–10.16)

## 2024-09-07 PROCEDURE — 96374 THER/PROPH/DIAG INJ IV PUSH: CPT

## 2024-09-07 PROCEDURE — 80053 COMPREHEN METABOLIC PANEL: CPT | Performed by: PHYSICIAN ASSISTANT

## 2024-09-07 PROCEDURE — 99284 EMERGENCY DEPT VISIT MOD MDM: CPT | Performed by: PHYSICIAN ASSISTANT

## 2024-09-07 PROCEDURE — 73030 X-RAY EXAM OF SHOULDER: CPT

## 2024-09-07 PROCEDURE — 99285 EMERGENCY DEPT VISIT HI MDM: CPT

## 2024-09-07 PROCEDURE — 73564 X-RAY EXAM KNEE 4 OR MORE: CPT

## 2024-09-07 PROCEDURE — 85025 COMPLETE CBC W/AUTO DIFF WBC: CPT | Performed by: PHYSICIAN ASSISTANT

## 2024-09-07 PROCEDURE — 36415 COLL VENOUS BLD VENIPUNCTURE: CPT | Performed by: PHYSICIAN ASSISTANT

## 2024-09-07 RX ORDER — DEXAMETHASONE SODIUM PHOSPHATE 10 MG/ML
10 INJECTION, SOLUTION INTRAMUSCULAR; INTRAVENOUS ONCE
Status: COMPLETED | OUTPATIENT
Start: 2024-09-07 | End: 2024-09-07

## 2024-09-07 RX ORDER — PREDNISONE 20 MG/1
TABLET ORAL
Qty: 18 TABLET | Refills: 0 | Status: SHIPPED | OUTPATIENT
Start: 2024-09-07 | End: 2024-09-22

## 2024-09-07 RX ORDER — PREDNISONE 20 MG/1
TABLET ORAL
Qty: 18 TABLET | Refills: 0 | Status: SHIPPED | OUTPATIENT
Start: 2024-09-07 | End: 2024-09-07

## 2024-09-07 RX ORDER — OXYCODONE HYDROCHLORIDE 5 MG/1
5 TABLET ORAL ONCE
Status: COMPLETED | OUTPATIENT
Start: 2024-09-07 | End: 2024-09-07

## 2024-09-07 RX ADMIN — DEXAMETHASONE SODIUM PHOSPHATE 10 MG: 10 INJECTION, SOLUTION INTRAMUSCULAR; INTRAVENOUS at 10:29

## 2024-09-07 RX ADMIN — OXYCODONE HYDROCHLORIDE 5 MG: 5 TABLET ORAL at 10:29

## 2024-09-07 NOTE — ED PROVIDER NOTES
History  Chief Complaint   Patient presents with    Joint Pain     Pt reports hx of arthritis, states pain in right knee and left elbow/shoulder     The patient is an 83-year-old female past medical history of rheumatoid arthritis on methotrexate and Plaquenil who presents emergency department today with a chief complaint of polyarthralgia.  Patient states that she injured her left shoulder/collarbone a while ago and has always had issues with her left shoulder but over the last 4 days she has had worsening pain in her left shoulder without any injury.  She has also had pain that is been worsening in her bilateral hands and arms over the last 4 days without injury.  Patient states she has been feeling a cracking/popping in her right knee over the last 4 days without injury and worsening pain.  She states that last night she was very uncomfortable and was unable to sleep.  She states that about a month ago she was seen at a urgent care for right hand pain where she was given prednisone and that seemed to help her joint pain but it was only a brief dose.    She states that she recently switched primary care providers.  She used to be on a chronic prednisone dose but she is no longer on that anymore.  Denies any fevers chills cough congestion nausea vomiting diarrhea            Prior to Admission Medications   Prescriptions Last Dose Informant Patient Reported? Taking?   DULoxetine (CYMBALTA) 30 mg delayed release capsule   Yes No   Sig: Take 30 mg by mouth daily   acetaminophen (TYLENOL) 325 mg tablet   No No   Sig: Take 2 tablets (650 mg total) by mouth every 6 (six) hours as needed for mild pain, headaches or fever   albuterol (ProAir HFA) 90 mcg/act inhaler   No No   Sig: Inhale 2 puffs every 6 (six) hours as needed for wheezing   aspirin 325 mg tablet   Yes No   Sig: Take 325 mg by mouth daily   bisoprolol-hydrochlorothiazide (ZIAC) 2.5-6.25 MG per tablet  Self Yes No   Sig: Take 1 tablet by mouth daily   calcium  citrate-vitamin D (CITRACAL+D) 315-200 MG-UNIT per tablet   Yes No   Sig: Take 1 tablet by mouth 2 (two) times a day   docusate sodium (COLACE) 100 mg capsule   Yes No   Sig: Take 100 mg by mouth 2 (two) times a day   folic acid (FOLVITE) 1 mg tablet   Yes No   Sig: Take by mouth daily   hydroxychloroquine (PLAQUENIL) 200 mg tablet  Self Yes No   Sig: Take 200 mg by mouth 2 (two) times a day with meals   lisinopril (ZESTRIL) 10 mg tablet   Yes No   Sig: Take 10 mg by mouth daily   loratadine (CLARITIN) 10 mg tablet  Self Yes No   Sig: Take 10 mg by mouth daily   methotrexate 2.5 mg tablet  Self Yes No   Sig: Take by mouth once a week   pantoprazole (PROTONIX) 40 mg tablet  Self Yes No   Sig: Take 40 mg by mouth daily   tolterodine (DETROL LA) 4 mg 24 hr capsule  Self Yes No   Sig: Take 4 mg by mouth daily      Facility-Administered Medications: None       Past Medical History:   Diagnosis Date    Arthritis     Asthma     Diabetes mellitus (HCC)     Hypertension     MI, old        Past Surgical History:   Procedure Laterality Date    BACK SURGERY      CARDIAC SURGERY      HYSTERECTOMY         History reviewed. No pertinent family history.  I have reviewed and agree with the history as documented.    E-Cigarette/Vaping    E-Cigarette Use Never User      E-Cigarette/Vaping Substances    Nicotine No     THC No     CBD No     Flavoring No     Other No     Unknown No      Social History     Tobacco Use    Smoking status: Former     Passive exposure: Never    Smokeless tobacco: Never   Vaping Use    Vaping status: Never Used   Substance Use Topics    Alcohol use: Not Currently    Drug use: Not Currently       Review of Systems   All other systems reviewed and are negative.      Physical Exam  Physical Exam  Vitals and nursing note reviewed.   Constitutional:       General: She is in acute distress.      Appearance: She is well-developed.   HENT:      Head: Normocephalic and atraumatic.      Right Ear: External ear normal.       Left Ear: External ear normal.   Eyes:      Extraocular Movements: Extraocular movements intact.      Pupils: Pupils are equal, round, and reactive to light.   Cardiovascular:      Rate and Rhythm: Normal rate and regular rhythm.      Heart sounds: No murmur heard.  Pulmonary:      Effort: Pulmonary effort is normal. No respiratory distress.      Breath sounds: Normal breath sounds. No wheezing.   Abdominal:      General: Bowel sounds are normal.      Palpations: Abdomen is soft. There is no mass.      Tenderness: There is no abdominal tenderness. There is no rebound.      Hernia: No hernia is present.   Musculoskeletal:      Left shoulder: Decreased range of motion.      Right hand: Tenderness present.      Left hand: Tenderness present.      Cervical back: Normal range of motion and neck supple.      Right knee: Tenderness present over the medial joint line and lateral joint line.   Skin:     General: Skin is warm and dry.      Capillary Refill: Capillary refill takes less than 2 seconds.   Neurological:      Mental Status: She is alert and oriented to person, place, and time.      Coordination: Coordination normal.   Psychiatric:         Behavior: Behavior normal.         Vital Signs  ED Triage Vitals [09/07/24 0849]   Temperature Pulse Respirations Blood Pressure SpO2   98.2 °F (36.8 °C) 85 18 136/69 96 %      Temp Source Heart Rate Source Patient Position - Orthostatic VS BP Location FiO2 (%)   Temporal Monitor Sitting Right arm --      Pain Score       --           Vitals:    09/07/24 0849 09/07/24 0900   BP: 136/69 136/69   Pulse: 85 73   Patient Position - Orthostatic VS: Sitting Lying         Visual Acuity      ED Medications  Medications   dexamethasone (PF) (DECADRON) injection 10 mg (has no administration in time range)   oxyCODONE (ROXICODONE) IR tablet 5 mg (has no administration in time range)       Diagnostic Studies  Results Reviewed       Procedure Component Value Units Date/Time     Comprehensive metabolic panel [[id number]]  (Abnormal) Collected: 09/07/24 0937    Lab Status: Final result Specimen: Blood from Arm, Left Updated: 09/07/24 0957     Sodium 138 mmol/L      Potassium 4.5 mmol/L      Chloride 105 mmol/L      CO2 27 mmol/L      ANION GAP 6 mmol/L      BUN 23 mg/dL      Creatinine 1.29 mg/dL      Glucose 101 mg/dL      Calcium 9.2 mg/dL      AST 28 U/L      ALT 18 U/L      Alkaline Phosphatase 116 U/L      Total Protein 6.2 g/dL      Albumin 3.6 g/dL      Total Bilirubin 0.55 mg/dL      eGFR 38 ml/min/1.73sq m     Narrative:      [employer] guidelines for Chronic Kidney Disease (CKD):     Stage 1 with normal or high GFR (GFR > 90 mL/min/1.73 square meters)    Stage 2 Mild CKD (GFR = 60-89 mL/min/1.73 square meters)    Stage 3A Moderate CKD (GFR = 45-59 mL/min/1.73 square meters)    Stage 3B Moderate CKD (GFR = 30-44 mL/min/1.73 square meters)    Stage 4 Severe CKD (GFR = 15-29 mL/min/1.73 square meters)    Stage 5 End Stage CKD (GFR <15 mL/min/1.73 square meters)  Note: GFR calculation is accurate only with a steady state creatinine    CBC and differential [[id number]]  (Abnormal) Collected: 09/07/24 0937    Lab Status: Final result Specimen: Blood from Arm, Left Updated: 09/07/24 0942     WBC 7.86 Thousand/uL      RBC 3.78 Million/uL      Hemoglobin 11.0 g/dL      Hematocrit 35.5 %      MCV 94 fL      MCH 29.1 pg      MCHC 31.0 g/dL      RDW 13.2 %      MPV 9.2 fL      Platelets 278 Thousands/uL      nRBC 0 /100 WBCs      Segmented % 70 %      Immature Grans % 0 %      Lymphocytes % 18 %      Monocytes % 10 %      Eosinophils Relative 1 %      Basophils Relative 1 %      Absolute Neutrophils 5.52 Thousands/µL      Absolute Immature Grans 0.01 Thousand/uL      Absolute Lymphocytes 1.42 Thousands/µL      Absolute Monocytes 0.78 Thousand/µL      Eosinophils Absolute 0.07 Thousand/µL      Basophils Absolute 0.06 Thousands/µL                    XR knee 4+ vw right  injury   ED Interpretation by Neto Perez PA-C (09/07 1011)   No fx      XR shoulder 2+ views LEFT   ED Interpretation by Neto Perez PA-C (09/07 1011)   No fx                   Procedures  Procedures         ED Course                                 SBIRT 22yo+      Flowsheet Row Most Recent Value   Initial Alcohol Screen: US AUDIT-C     1. How often do you have a drink containing alcohol? 0 Filed at: 09/07/2024 0848   2. How many drinks containing alcohol do you have on a typical day you are drinking?  0 Filed at: 09/07/2024 0848   3b. FEMALE Any Age, or MALE 65+: How often do you have 4 or more drinks on one occassion? 0 Filed at: 09/07/2024 0848   Audit-C Score 0 Filed at: 09/07/2024 0848   CHAD: How many times in the past year have you...    Used an illegal drug or used a prescription medication for non-medical reasons? Never Filed at: 09/07/2024 0848                      Medical Decision Making  The patient is an 83-year-old female past medical history of rheumatoid arthritis on methotrexate and Plaquenil who presents emergency department today with a chief complaint of polyarthralgia.  Patient states that she injured her left shoulder/collarbone a while ago and has always had issues with her left shoulder but over the last 4 days she has had worsening pain in her left shoulder without any injury.  She has also had pain that is been worsening in her bilateral hands and arms over the last 4 days without injury.  Patient states she has been feeling a cracking/popping in her right knee over the last 4 days without injury and worsening pain.  She states that last night she was very uncomfortable and was unable to sleep.  She states that about a month ago she was seen at a urgent care for right hand pain where she was given prednisone and that seemed to help her joint pain but it was only a brief dose.    She states that she recently switched primary care providers.  She used to be on a chronic  prednisone dose but she is no longer on that anymore.  Denies any fevers chills cough congestion nausea vomiting diarrhea    On examination the patient has polyarthralgia.  Patient has not had any new injuries but did have imaging due to worsening joint pain of the 2 joints including the left shoulder and the right knee.  The patient had pain with movement, active range of motion of joints.  Patient's examination was consistent with arthritic pain    X-rays were unremarkable for any acute etiology.  Blood work was obtained.  Patient was given IV Decadron and 1 dose of oxycodone in the emergency department and then discharged on prednisone taper.    Will treat the patient for polyarthralgia most likely secondary to her rheumatoid arthritis.      Amount and/or Complexity of Data Reviewed  Labs: ordered. Decision-making details documented in ED Course.  Radiology: ordered and independent interpretation performed. Decision-making details documented in ED Course.    Risk  Prescription drug management.                 Disposition  Final diagnoses:   Polyarthralgia     Time reflects when diagnosis was documented in both MDM as applicable and the Disposition within this note       Time User Action Codes Description Comment    9/7/2024 10:08 AM Neto Perez Add [M06.9] Rheumatoid arthritis flare (HCC)     9/7/2024 10:08 AM Neto Perez Remove [M06.9] Rheumatoid arthritis flare (HCC)     9/7/2024 10:08 AM Neto Perez Add [M25.50] Polyarthralgia           ED Disposition       ED Disposition   Discharge    Condition   Stable    Date/Time   Sat Sep 7, 2024 1008    Comment   Cheryle Mcleod discharge to home/self care.                   Follow-up Information       Follow up With Specialties Details Why Contact Info    Brian Peraza, DO Internal Medicine Schedule an appointment as soon as possible for a visit   [address] [place] [id number] [phone]              Current Discharge Medication List         START taking these medications    Details   predniSONE 20 mg tablet Take 2 tablets (40 mg total) by mouth daily for 5 days, THEN 1 tablet (20 mg total) daily for 5 days, THEN 0.5 tablets (10 mg total) daily for 5 days.  Qty: 18 tablet, Refills: 0    Associated Diagnoses: Polyarthralgia           CONTINUE these medications which have NOT CHANGED    Details   acetaminophen (TYLENOL) 325 mg tablet Take 2 tablets (650 mg total) by mouth every 6 (six) hours as needed for mild pain, headaches or fever  Refills: 0    Associated Diagnoses: Acute cystitis without hematuria; Acute bronchitis, unspecified organism      albuterol (ProAir HFA) 90 mcg/act inhaler Inhale 2 puffs every 6 (six) hours as needed for wheezing  Qty: 8.5 g, Refills: 0    Comments: Substitution to a formulary equivalent within the same pharmaceutical class is authorized.  Associated Diagnoses: Acute bronchitis, unspecified organism      aspirin 325 mg tablet Take 325 mg by mouth daily      bisoprolol-hydrochlorothiazide (ZIAC) 2.5-6.25 MG per tablet Take 1 tablet by mouth daily      calcium citrate-vitamin D (CITRACAL+D) 315-200 MG-UNIT per tablet Take 1 tablet by mouth 2 (two) times a day      docusate sodium (COLACE) 100 mg capsule Take 100 mg by mouth 2 (two) times a day      DULoxetine (CYMBALTA) 30 mg delayed release capsule Take 30 mg by mouth daily      folic acid (FOLVITE) 1 mg tablet Take by mouth daily      hydroxychloroquine (PLAQUENIL) 200 mg tablet Take 200 mg by mouth 2 (two) times a day with meals      lisinopril (ZESTRIL) 10 mg tablet Take 10 mg by mouth daily      loratadine (CLARITIN) 10 mg tablet Take 10 mg by mouth daily      methotrexate 2.5 mg tablet Take by mouth once a week      pantoprazole (PROTONIX) 40 mg tablet Take 40 mg by mouth daily      tolterodine (DETROL LA) 4 mg 24 hr capsule Take 4 mg by mouth daily             No discharge procedures on file.    PDMP Review         Value Time User    PDMP Reviewed  Yes  2/23/2023 11:02 AM Ashley Schwartz PA-C            ED Provider  Electronically Signed by             Neto Perez PA-C  09/07/24 1020

## 2025-03-13 ENCOUNTER — OFFICE VISIT (OUTPATIENT)
Dept: OBGYN CLINIC | Facility: CLINIC | Age: 85
End: 2025-03-13
Payer: MEDICARE

## 2025-03-13 VITALS — HEIGHT: 60 IN | WEIGHT: 108.6 LBS | BODY MASS INDEX: 21.32 KG/M2

## 2025-03-13 DIAGNOSIS — G89.29 CHRONIC PAIN OF BOTH KNEES: Primary | ICD-10-CM

## 2025-03-13 DIAGNOSIS — M25.561 CHRONIC PAIN OF BOTH KNEES: Primary | ICD-10-CM

## 2025-03-13 DIAGNOSIS — M06.09 RHEUMATOID ARTHRITIS OF MULTIPLE SITES WITH NEGATIVE RHEUMATOID FACTOR (HCC): ICD-10-CM

## 2025-03-13 DIAGNOSIS — M25.562 CHRONIC PAIN OF BOTH KNEES: Primary | ICD-10-CM

## 2025-03-13 DIAGNOSIS — M17.11 PRIMARY OSTEOARTHRITIS OF RIGHT KNEE: ICD-10-CM

## 2025-03-13 PROCEDURE — 20610 DRAIN/INJ JOINT/BURSA W/O US: CPT | Performed by: ORTHOPAEDIC SURGERY

## 2025-03-13 PROCEDURE — 99204 OFFICE O/P NEW MOD 45 MIN: CPT | Performed by: ORTHOPAEDIC SURGERY

## 2025-03-13 RX ORDER — BUPIVACAINE HYDROCHLORIDE 2.5 MG/ML
4 INJECTION, SOLUTION INFILTRATION; PERINEURAL
Status: COMPLETED | OUTPATIENT
Start: 2025-03-13 | End: 2025-03-13

## 2025-03-13 RX ORDER — TRIAMCINOLONE ACETONIDE 40 MG/ML
40 INJECTION, SUSPENSION INTRA-ARTICULAR; INTRAMUSCULAR
Status: COMPLETED | OUTPATIENT
Start: 2025-03-13 | End: 2025-03-13

## 2025-03-13 RX ADMIN — BUPIVACAINE HYDROCHLORIDE 4 ML: 2.5 INJECTION, SOLUTION INFILTRATION; PERINEURAL at 08:00

## 2025-03-13 RX ADMIN — TRIAMCINOLONE ACETONIDE 40 MG: 40 INJECTION, SUSPENSION INTRA-ARTICULAR; INTRAMUSCULAR at 08:00

## 2025-03-13 NOTE — PROGRESS NOTES
Assessment/Plan:  Assessment & Plan   Diagnoses and all orders for this visit:    Chronic pain of both knees    Rheumatoid arthritis of multiple sites with negative rheumatoid factor (HCC)    Primary osteoarthritis of right knee        Plan: Treatment options were discussed.  She elected to proceed with right knee intra-articular injection which was performed without difficulty and tolerated well.  She is unwilling to consider physical therapy.  She is currently using Tylenol and may increase to 3 tablets (975 mg) 3 times daily and may add anti-inflammatories if needed.  I will see her in 2 to 3 weeks for reevaluation.  The office should be contacted if questions or concerns arise.  She does describe more diffuse lower extremity pain as well as paresthesias and complains of back pain.  Certainly the diffuse lower extremity pain is not likely to respond to her injection and if this is more of an issue after the knee feels better, evaluation by Dr. Engle might be reasonable.  However, she is very hesitant to consider any physical therapy which would be the first line of treatment.    Large joint arthrocentesis: R knee  Universal Protocol:  Consent: Verbal consent obtained.  Consent given by: patient  Patient understanding: patient states understanding of the procedure being performed  Supporting Documentation  Indications: pain   Procedure Details  Location: knee - R knee  Needle size: 22 G  Ultrasound guidance: no  Approach: lateral  Medications administered: 4 mL bupivacaine 0.25 %; 40 mg triamcinolone acetonide 40 mg/mL                Patient Active Problem List   Diagnosis    COVID    Essential hypertension    COPD (chronic obstructive pulmonary disease) (HCC)    Rheumatoid arthritis of multiple sites with negative rheumatoid factor (HCC)    GERD (gastroesophageal reflux disease)    Fatty liver    Generalized weakness    RONALD (acute kidney injury) (HCC)    Prolonged Q-T interval on ECG    Coronary artery  disease involving native coronary artery of native heart    Acute bronchitis    Nonischemic nontraumatic myocardial injury    Urinary incontinence    Calculus of gallbladder without cholecystitis without obstruction    Loss of appetite    Oral thrush    Troponin level elevated    Elevated transaminase level    Constipation    Leukocytosis    Acute respiratory failure with hypoxia (HCC)    Chronic pain of both knees    Primary osteoarthritis of right knee        Subjective:   Patient ID: Cheryle Mcleod is a 84 y.o. female.    NEIL Dennis is an 84-year-old female with a history of knee pain dating back at least a couple of years.  Both knees do bother her but the right knee is more significantly involved and there is minimal complaints referable to her left knee.  She notes difficulty sleeping and awakening at night because of pain.  She describes pain radiating throughout the right leg awakening her from sleep not just localized to the knee.  She has minimal pain at rest but increasing pain with activity.  Again, she notes diffuse lower extremity pain with activity in addition to the localized knee pain.  She also notes paresthesias in the right lower extremity.  She does admit to back pain.  She was seen by her rheumatologist and had an injection to her buttock for her pain but she denies ever having had any intra-articular injections to her knee.  She has had periodic home physical therapy after different hospitalizations but no formal outpatient physical therapy for her knee.  She currently takes Tylenol as needed.    The following portions of the patient's history were reviewed and updated as appropriate: allergies, current medications, past family history, past medical history, past social history, past surgical history, and problem list.    Review of Systems: The patient's 10 organ system review is negative excluding the chief complaint and as is consistent with her past medical history except for the  addition of left wrist pain for which she is seeing Dr. Figueroa tomorrow.    Objective:  Ortho Exam  Exam of the right knee demonstrates obvious hypertrophy.  The skin is warm and dry and there is no significant effusion.  Range of motion is from approximately 5 degrees to 100 degrees with complaints of pain at end range flexion and extension.  There is mild crepitus noted during range of motion.  She complains of diffuse tenderness with pain greatest medially.  Ligaments are grossly stable.  Apprehension does limit testing to some degree.  The remainder of the lower extremity examination bilaterally is benign.      X-rays of the right knee from September 2024 were reviewed demonstrating advanced degenerative disease with nearly complete loss of medial joint space, subchondral sclerosis and flattening of the femoral condyle.  There are moderate to significant changes noted at the patellofemoral joint and minimal changes noted laterally.        The x-ray report was reviewed.    Her primary care physician and rheumatology notes were reviewed.

## 2025-03-14 ENCOUNTER — OFFICE VISIT (OUTPATIENT)
Dept: OBGYN CLINIC | Facility: CLINIC | Age: 85
End: 2025-03-14
Payer: MEDICARE

## 2025-03-14 ENCOUNTER — HOSPITAL ENCOUNTER (OUTPATIENT)
Dept: RADIOLOGY | Facility: CLINIC | Age: 85
End: 2025-03-14
Payer: MEDICARE

## 2025-03-14 VITALS — BODY MASS INDEX: 21.24 KG/M2 | WEIGHT: 108.2 LBS | HEIGHT: 60 IN

## 2025-03-14 DIAGNOSIS — M19.031 PRIMARY OSTEOARTHRITIS OF RIGHT WRIST: ICD-10-CM

## 2025-03-14 DIAGNOSIS — M25.531 PAIN IN RIGHT WRIST: ICD-10-CM

## 2025-03-14 DIAGNOSIS — M25.531 PAIN IN RIGHT WRIST: Primary | ICD-10-CM

## 2025-03-14 PROCEDURE — 73110 X-RAY EXAM OF WRIST: CPT

## 2025-03-14 PROCEDURE — 99203 OFFICE O/P NEW LOW 30 MIN: CPT | Performed by: STUDENT IN AN ORGANIZED HEALTH CARE EDUCATION/TRAINING PROGRAM

## 2025-03-14 PROCEDURE — 20600 DRAIN/INJ JOINT/BURSA W/O US: CPT | Performed by: STUDENT IN AN ORGANIZED HEALTH CARE EDUCATION/TRAINING PROGRAM

## 2025-03-14 RX ORDER — BUSPIRONE HYDROCHLORIDE 5 MG/1
5 TABLET ORAL 3 TIMES DAILY
COMMUNITY

## 2025-03-14 RX ORDER — ATORVASTATIN CALCIUM 20 MG/1
20 TABLET, FILM COATED ORAL DAILY
COMMUNITY

## 2025-03-14 RX ORDER — MULTIVITAMIN
1 TABLET ORAL DAILY
COMMUNITY

## 2025-03-14 RX ORDER — BETAMETHASONE SODIUM PHOSPHATE AND BETAMETHASONE ACETATE 3; 3 MG/ML; MG/ML
6 INJECTION, SUSPENSION INTRA-ARTICULAR; INTRALESIONAL; INTRAMUSCULAR; SOFT TISSUE
Status: COMPLETED | OUTPATIENT
Start: 2025-03-14 | End: 2025-03-14

## 2025-03-14 RX ORDER — LIDOCAINE HYDROCHLORIDE 10 MG/ML
1 INJECTION, SOLUTION INFILTRATION; PERINEURAL
Status: COMPLETED | OUTPATIENT
Start: 2025-03-14 | End: 2025-03-14

## 2025-03-14 RX ADMIN — BETAMETHASONE SODIUM PHOSPHATE AND BETAMETHASONE ACETATE 6 MG: 3; 3 INJECTION, SUSPENSION INTRA-ARTICULAR; INTRALESIONAL; INTRAMUSCULAR; SOFT TISSUE at 07:30

## 2025-03-14 RX ADMIN — LIDOCAINE HYDROCHLORIDE 1 ML: 10 INJECTION, SOLUTION INFILTRATION; PERINEURAL at 07:30

## 2025-03-14 NOTE — ASSESSMENT & PLAN NOTE
Orders:  •  XR wrist 3+ vw right; Future  •  Small joint arthrocentesis: R thumb CMC  •  Ambulatory Referral to Physical Therapy; Future

## 2025-03-14 NOTE — ASSESSMENT & PLAN NOTE
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  I discussed the etiology of arthritis with the patient today.  Discussed that it is a progressive degenerative process.  We discussed the general treatment options for arthritis.  These focused on activity modification, bracing, anti-inflammatory medications both orally and topically, Tylenol, and physical therapy.  Per this discussion the patient is able to tolerate anti-inflammatories and will continue her over-the-counter NSAIDs which we discussed the use of an precautions for.  She has tried topical creams in the past and does not like these.  She has not fully tried bracing so we will provide a thumb spica splint and discussed the appropriate use where in regards to arthritis.  We discussed physical therapy but the patient declined this.  We also discussed the use of injections and arthritis.  I discussed that these are for a single joint.  While she does have diffuse disease, most of her pain does seem to be coming from the CMC joint.  As such we discussed the risk and benefits of provide injection of the CMC joint.  After hearing this the patient elected to proceed.  She tolerated the injection well.  Patient to return to clinic as needed understanding that she cannot receive another injection in the CMC joint for another 3 months.  Orders:  •  Small joint arthrocentesis: R thumb CMC  •  Ambulatory Referral to Physical Therapy; Future

## 2025-03-14 NOTE — PROGRESS NOTES
ASSESSMENT/PLAN:  Assessment & Plan  Pain in right wrist    Orders:  •  XR wrist 3+ vw right; Future  •  Small joint arthrocentesis: R thumb CMC  •  Ambulatory Referral to Physical Therapy; Future    Primary osteoarthritis of right wrist  Discussed with the patient that she has right hand radiocarpal arthritis as well as right hand CMC arthritis grade 3-4.  I discussed the etiology of arthritis with the patient today.  Discussed that it is a progressive degenerative process.  We discussed the general treatment options for arthritis.  These focused on activity modification, bracing, anti-inflammatory medications both orally and topically, Tylenol, and physical therapy.  Per this discussion the patient is able to tolerate anti-inflammatories and will continue her over-the-counter NSAIDs which we discussed the use of an precautions for.  She has tried topical creams in the past and does not like these.  She has not fully tried bracing so we will provide a thumb spica splint and discussed the appropriate use where in regards to arthritis.  We discussed physical therapy but the patient declined this.  We also discussed the use of injections and arthritis.  I discussed that these are for a single joint.  While she does have diffuse disease, most of her pain does seem to be coming from the CMC joint.  As such we discussed the risk and benefits of provide injection of the CMC joint.  After hearing this the patient elected to proceed.  She tolerated the injection well.  Patient to return to clinic as needed understanding that she cannot receive another injection in the CMC joint for another 3 months.  Orders:  •  Small joint arthrocentesis: R thumb CMC  •  Ambulatory Referral to Physical Therapy; Future        Assessment:   Osteoarthritis: The anatomy and physiology of osteoarthritis was discussed with the patient today in the office.  Deterioration of the articular cartilage eventually leads to altered mobility at the  joint, resulting in joint subluxation, osteophyte formation, cystic changes, as well as subchondral sclerosis.  Eventually, pain, limited mobility, and compensatory hypermobility at surrounding joints may develop.  While normal activity and usage of the joint may provide a painful experience to the patient, this typically does not result in damage to the limb.  Treatment options include splints to decreased joint edema, pain, and inflammation.  Therapy exercises to strengthen the surrounding musculature may relieve pain, but do not alter the overall continued development of osteoarthritis.  Oral medications, topical medications, corticosteroid injections may decrease pain and increase overall function.  Eventually, some patients may require surgical intervention. At this time, patient elected to receive a cortisone injection into the right CMC joint. Patient aware that it may take multiple days for symptoms to improve following the injection. Discussed repeat injections can be performed every 3 months as needed for symptomatic relief.  Additionally, patient provided with thumb spica brace during today's visit.     The patient verbalized understanding of exam findings and treatment plan. We engaged in the shared decision-making process and treatment options were discussed at length with the patient. Surgical and conservative management discussed today along with risks and benefits.     All questions answers to patient satisfaction.       Plan:  Encouraged activity and range of motion at the right wrist as tolerated  Cortisone injection to the R CMC joint performed today  Thumb spica splint for immobilization at nighttime   Compression gloves to be worn at nighttime for swelling reduction    Tylenol/NSAIDs for symptomatic relief   Referral to PT provided   Follow-up on an as-needed basis       _____________________________________________________  CHIEF COMPLAINT:  Right wrist pain     SUBJECTIVE:  Cheryle ORDOÑEZ  Shani is a 84 y.o., right handed female, who presents with generalized right wrist pain. The patient reports that she has been experiencing these symptoms for multiple years, with progressive worsening over time. Symptoms seem to be worse in the morning and worse with certain activity include, gripping and twisting activities. Denies any numbness or tingling throughout the hand. Denies prior physical therapy for her symptoms. Denies any previous cortisone injections or prior surgeries to the affected hand.   Radiation: None  Previous Treatments: oral and topical NSAIDs, bracing   Associated symptoms: morning stiffness, loss of range of motion, loss of function   Handedness: right      I have personally reviewed all the relevant PMH, PSH, SH, FH, Medications and allergies    PAST MEDICAL HISTORY:  Past Medical History:   Diagnosis Date   • Arthritis    • Asthma    • Diabetes mellitus (HCC)    • Hypertension    • MI, old        PAST SURGICAL HISTORY:  Past Surgical History:   Procedure Laterality Date   • BACK SURGERY     • CARDIAC SURGERY     • HYSTERECTOMY         FAMILY HISTORY:  History reviewed. No pertinent family history.    SOCIAL HISTORY:  Social History     Tobacco Use   • Smoking status: Former     Passive exposure: Never   • Smokeless tobacco: Never   Vaping Use   • Vaping status: Never Used   Substance Use Topics   • Alcohol use: Not Currently   • Drug use: Not Currently       MEDICATIONS:    Current Outpatient Medications:   •  aspirin 325 mg tablet, Take 325 mg by mouth daily, Disp: , Rfl:   •  atorvastatin (LIPITOR) 20 mg tablet, Take 20 mg by mouth daily, Disp: , Rfl:   •  busPIRone (BUSPAR) 5 mg tablet, Take 5 mg by mouth 3 (three) times a day, Disp: , Rfl:   •  DULoxetine (CYMBALTA) 30 mg delayed release capsule, Take 30 mg by mouth daily, Disp: , Rfl:   •  folic acid (FOLVITE) 1 mg tablet, Take by mouth daily, Disp: , Rfl:   •  hydroxychloroquine (PLAQUENIL) 200 mg tablet, Take 200 mg by mouth  "daily, Disp: , Rfl:   •  lisinopril (ZESTRIL) 10 mg tablet, Take 10 mg by mouth daily, Disp: , Rfl:   •  loratadine (CLARITIN) 10 mg tablet, Take 10 mg by mouth daily, Disp: , Rfl:   •  Multiple Vitamin (multivitamin) tablet, Take 1 tablet by mouth daily, Disp: , Rfl:   •  pantoprazole (PROTONIX) 40 mg tablet, Take 40 mg by mouth daily, Disp: , Rfl:   •  tolterodine (DETROL LA) 4 mg 24 hr capsule, Take 4 mg by mouth daily, Disp: , Rfl:   •  acetaminophen (TYLENOL) 325 mg tablet, Take 2 tablets (650 mg total) by mouth every 6 (six) hours as needed for mild pain, headaches or fever (Patient not taking: Reported on 3/14/2025), Disp: , Rfl: 0  •  albuterol (ProAir HFA) 90 mcg/act inhaler, Inhale 2 puffs every 6 (six) hours as needed for wheezing (Patient not taking: Reported on 3/14/2025), Disp: 8.5 g, Rfl: 0  •  bisoprolol-hydrochlorothiazide (ZIAC) 2.5-6.25 MG per tablet, Take 1 tablet by mouth daily (Patient not taking: Reported on 3/14/2025), Disp: , Rfl:   •  calcium citrate-vitamin D (CITRACAL+D) 315-200 MG-UNIT per tablet, Take 1 tablet by mouth 2 (two) times a day (Patient not taking: Reported on 3/14/2025), Disp: , Rfl:     ALLERGIES:  No Known Allergies    REVIEW OF SYSTEMS:  Pertinent items are noted in HPI.  A comprehensive review of systems was negative.    LABS:  HgA1c: No results found for: \"HGBA1C\"  BMP:   Lab Results   Component Value Date    CALCIUM 9.6 11/15/2024    K 5 11/15/2024    CO2 26 11/15/2024     11/15/2024    BUN 21 11/15/2024    CREATININE 1.35 (H) 11/15/2024       _____________________________________________________  PHYSICAL EXAMINATION:  Vital signs: Ht 5' (1.524 m)   Wt 49.1 kg (108 lb 3.2 oz)   BMI 21.13 kg/m²   General: well developed and well nourished, alert, oriented times 3, and appears comfortable  Psychiatric: Normal  HEENT: Trachea Midline, No torticollis  Cardiovascular: No discernable arrhythmia  Pulmonary: No wheezing or stridor  Abdomen: No rebound or " guarding  Extremities: No peripheral edema  Skin: No masses, erythema, lacerations, fluctation, ulcerations  Neurovascular: Sensation Intact to the Median, Ulnar, Radial Nerve, Motor Intact to the Median, Ulnar, Radial Nerve, and Pulses Intact    Right Upper Extremity  Inspection: skin intact, no notable deformity   Palpation: Patient reports tenderness to palpation over the dorsal radial carpal joint, specifically about the SL interval and just distal from the radial styloid. Reports tenderness to palpation over the CMC and experiences pain with traction shift testing. Minimal foveal pain.   Neurologic: 5/5 elbow flexion, 5/5 elbow extension, 5/5 wrist extension, 5/5 wrist flexion, 5/5 finger flexion, 5/5 finger extension, 5/5 FPL, 5/5 EPL, 5/5 APB, 5/5 intrinsics, sensation intact to median, radial, and ulnar nerve distributions  Vascular: Palpable radial pulse, brisk cap refill <2sec, hand warm and well perfused  MSK:   Range of motion of the right wrist from 40 degrees of extension to 20 degrees of flexion     Left Upper Extremity  Inspection: skin intact, no notable deformity   Palpation: Non-tender to palpation throughout   Neurologic: 5/5 elbow flexion, 5/5 elbow extension, 5/5 wrist extension, 5/5 wrist flexion, 5/5 finger flexion, 5/5 finger extension, 5/5 FPL, 5/5 EPL, 5/5 APB, 5/5 intrinsics, sensation intact to median, radial, and ulnar nerve distributions  Vascular: Palpable radial pulse, brisk cap refill <2sec, hand warm and well perfused      _____________________________________________________  STUDIES REVIEWED:  I reviewed imaging in PACS from 3/14/2025 of the right wrist, multiple views, which demonstrates severe degenerative changes of the thumb CMC, which are grade 3 to 4, as well as radiocarpal arthritis, worse at the radial styloid.       PROCEDURES PERFORMED:  Small joint arthrocentesis: R thumb CMC  [employer] Protocol:  Consent: Verbal consent obtained.  Risks and benefits: risks, benefits  and alternatives were discussed  Consent given by: patient  Patient understanding: patient states understanding of the procedure being performed  Relevant documents: relevant documents present and verified  Site marked: the operative site was marked  Radiology Images displayed and confirmed. If images not available, report reviewed: imaging studies available  Patient identity confirmed: verbally with patient  Supporting Documentation  Indications: pain and joint swelling   Procedure Details  Location: thumb - R thumb CMC  Needle size: 25 G  Medications administered: 1 mL lidocaine 1 %; 6 mg betamethasone acetate-betamethasone sodium phosphate 6 (3-3) mg/mL    Patient tolerance: patient tolerated the procedure well with no immediate complications  Dressing:  Sterile dressing applied      Scribe Attestation      I,:  Anne Lobo PA-C am acting as a scribe while in the presence of the attending physician.:       I,:  Brian Figueroa MD personally performed the services described in this documentation    as scribed in my presence.:

## 2025-04-03 ENCOUNTER — OFFICE VISIT (OUTPATIENT)
Dept: OBGYN CLINIC | Facility: CLINIC | Age: 85
End: 2025-04-03
Payer: MEDICARE

## 2025-04-03 VITALS — HEIGHT: 60 IN | BODY MASS INDEX: 21.26 KG/M2 | WEIGHT: 108.3 LBS

## 2025-04-03 DIAGNOSIS — M17.11 PRIMARY OSTEOARTHRITIS OF RIGHT KNEE: Primary | ICD-10-CM

## 2025-04-03 DIAGNOSIS — N18.32 TYPE 2 DIABETES MELLITUS WITH STAGE 3B CHRONIC KIDNEY DISEASE, WITHOUT LONG-TERM CURRENT USE OF INSULIN (HCC): ICD-10-CM

## 2025-04-03 DIAGNOSIS — E11.22 TYPE 2 DIABETES MELLITUS WITH STAGE 3B CHRONIC KIDNEY DISEASE, WITHOUT LONG-TERM CURRENT USE OF INSULIN (HCC): ICD-10-CM

## 2025-04-03 PROCEDURE — 99213 OFFICE O/P EST LOW 20 MIN: CPT | Performed by: ORTHOPAEDIC SURGERY

## 2025-04-03 RX ORDER — NYSTATIN 100000 [USP'U]/ML
500000 SUSPENSION ORAL 3 TIMES DAILY
COMMUNITY
Start: 2025-04-02

## 2025-04-03 RX ORDER — DULOXETIN HYDROCHLORIDE 20 MG/1
CAPSULE, DELAYED RELEASE ORAL
COMMUNITY
Start: 2025-04-02

## 2025-04-03 NOTE — PATIENT INSTRUCTIONS
Patient was fitted with a knee brace today in the office which she feels is more appropriate and does not cause her pain but gives her support.  She is a failed short duration effect of the cortisone we will order Visco supplement injection.  Encouraged to check with local medical supply stores for walker platform adapter to use for her right forearm secondary to her right wrist pain.  Patient will return to the office when viscosupplement injection is available.  Patient was offered but declined physical therapy.  Patient may continue to use her Tylenol as discussed in the office today.

## 2025-04-03 NOTE — ASSESSMENT & PLAN NOTE
History CABG  Maintained on full-strength aspirin  Denies chest pain  Continue bisoprolol and lisinopril  Statin on hold with elevated transaminase   Modify Regimen: Clobetasol cream - Only PRN for flares. apply to affected areas twice daily x 2 weeks/month as needed for flares. Render In Strict Bullet Format?: No Continue Regimen: Vtama - apply to affected areas once daily Detail Level: Zone Initiate Treatment: Clobetasol ointment - bid X 2 weeks, then as needed for flares \\nVtama - once daily to affected area

## 2025-04-03 NOTE — PROGRESS NOTES
"Name: Cheryle Mcleod      : 1940      MRN: [id number]  Encounter Provider: Zeke Garcia DO  Encounter Date: 4/3/2025   Encounter department: [facility] [place]  :  Assessment & Plan  Primary osteoarthritis of right knee  Patient was fitted with a knee brace today in the office which she feels is more appropriate and does not cause her pain but gives her support.  She is a failed short duration effect of the cortisone we will order Visco supplement injection.  Encouraged to check with local medical supply stores for walker platform adapter to use for her right forearm secondary to her right wrist pain.  Patient will return to the office when viscosupplement injection is available.  Patient was offered but declined physical therapy.  Patient may continue to use her Tylenol as discussed in the office today.  Orders:    Injection Procedure Prior Authorization; Future    Type 2 diabetes mellitus with stage 3b chronic kidney disease, without long-term current use of insulin (HCC)    No results found for: \"HGBA1C\"             History of Present Illness   HPI  Cheryle Mcleod is a 84 y.o. female who presents for follow-up of right knee pain and arthritis after having received CSI at her last visit on 3/13/2025.  Reports that she got some improvement in her symptoms and the knee felt pretty good with the cortisone injection but it only lasted for 5 days.  She notes she has been having increased pain over the past week to 2 weeks again in the right knee.  She does wear a pull on knee sleeve but this is actually too large for her not providing any compression.  Her daughter reports that she bought her a copper fit brace but that she does not wear it patient states that she feels it is too tight.  There was discussion about Visco injections at the last visit.  She does see rheumatology for toyed arthritis.  She notes her pain is worse upon standing he did an 8/10.  She also " notes the first initial 7 or 8 steps are very painful when attempting to walk.  It is in the chair today in the office she rates the pain a 4 or 5/10.    Review of Systems  Current Outpatient Medications on File Prior to Visit   Medication Sig Dispense Refill    acetaminophen (TYLENOL) 325 mg tablet Take 2 tablets (650 mg total) by mouth every 6 (six) hours as needed for mild pain, headaches or fever  0    albuterol (ProAir HFA) 90 mcg/act inhaler Inhale 2 puffs every 6 (six) hours as needed for wheezing 8.5 g 0    aspirin 325 mg tablet Take 325 mg by mouth daily      atorvastatin (LIPITOR) 20 mg tablet Take 20 mg by mouth daily      bisoprolol-hydrochlorothiazide (ZIAC) 2.5-6.25 MG per tablet Take 1 tablet by mouth daily      busPIRone (BUSPAR) 5 mg tablet Take 5 mg by mouth 3 (three) times a day      calcium citrate-vitamin D (CITRACAL+D) 315-200 MG-UNIT per tablet Take 1 tablet by mouth 2 (two) times a day      DULoxetine (CYMBALTA) 20 mg capsule       folic acid (FOLVITE) 1 mg tablet Take by mouth daily      hydroxychloroquine (PLAQUENIL) 200 mg tablet Take 200 mg by mouth daily      lisinopril (ZESTRIL) 10 mg tablet Take 10 mg by mouth daily      loratadine (CLARITIN) 10 mg tablet Take 10 mg by mouth daily      Multiple Vitamin (multivitamin) tablet Take 1 tablet by mouth daily      nystatin (MYCOSTATIN) 500,000 units/5 mL suspension Take 500,000 Units by mouth Three times a day      pantoprazole (PROTONIX) 40 mg tablet Take 40 mg by mouth daily      tolterodine (DETROL LA) 4 mg 24 hr capsule Take 4 mg by mouth daily      [DISCONTINUED] DULoxetine (CYMBALTA) 30 mg delayed release capsule Take 30 mg by mouth daily       No current facility-administered medications on file prior to visit.      Social History     Tobacco Use    Smoking status: Former     Passive exposure: Never    Smokeless tobacco: Never   Vaping Use    Vaping status: Never Used   Substance and Sexual Activity    Alcohol use: Not Currently     Drug use: Not Currently    Sexual activity: Not on file      Objective   Ht 5' (1.524 m)   Wt 49.1 kg (108 lb 4.8 oz)   BMI 21.15 kg/m²      Physical Exam    Right knee presents with trace effusion but no erythema warmth or signs of infection.  No ecchymosis about the knee.  She does have significant distal quadriceps muscle atrophy with bony overgrowth of the knee which makes fitting of brace somewhat difficult.  She has tenderness over the medial patella facet and medial distal femur/joint line/proximal tibia.  No gross lateral joint line tenderness.  He lacks the last 2 degrees of extension but is able to flex to approximately 125 degrees.  Strength 4+/5 the quads and hamstrings.  No calf pain.    Previous x-rays were reviewed noting the significant medial and patellofemoral arthritis.

## 2025-04-03 NOTE — ASSESSMENT & PLAN NOTE
Patient was fitted with a knee brace today in the office which she feels is more appropriate and does not cause her pain but gives her support.  She is a failed short duration effect of the cortisone we will order Visco supplement injection.  Encouraged to check with local medical supply stores for walker platform adapter to use for her right forearm secondary to her right wrist pain.  Patient will return to the office when viscosupplement injection is available.  Patient was offered but declined physical therapy.  Patient may continue to use her Tylenol as discussed in the office today.  Orders:    Injection Procedure Prior Authorization; Future

## 2025-04-11 ENCOUNTER — PROCEDURE VISIT (OUTPATIENT)
Dept: OBGYN CLINIC | Facility: CLINIC | Age: 85
End: 2025-04-11
Payer: MEDICARE

## 2025-04-11 VITALS — WEIGHT: 108.4 LBS | HEIGHT: 60 IN | BODY MASS INDEX: 21.28 KG/M2

## 2025-04-11 DIAGNOSIS — M17.11 PRIMARY OSTEOARTHRITIS OF RIGHT KNEE: ICD-10-CM

## 2025-04-11 DIAGNOSIS — M06.09 RHEUMATOID ARTHRITIS OF MULTIPLE SITES WITH NEGATIVE RHEUMATOID FACTOR (HCC): Primary | ICD-10-CM

## 2025-04-11 PROCEDURE — 99213 OFFICE O/P EST LOW 20 MIN: CPT | Performed by: ORTHOPAEDIC SURGERY

## 2025-04-11 PROCEDURE — 20610 DRAIN/INJ JOINT/BURSA W/O US: CPT | Performed by: ORTHOPAEDIC SURGERY

## 2025-04-11 NOTE — ASSESSMENT & PLAN NOTE
I would recommend follow-up as needed.  After a lengthy discussion regarding her further treatment options, she finally agreed to proceed with injection of Durolane.  She tolerated this well.  She can continue using the brace which does seem to be providing some benefit.  She may continue to use Tylenol.  She did question me regarding other options and I have suggested she consider things like Osteo Bi-Flex or chondroitin sulfate.  She can contact her rheumatologist about additional rheumatologic medications but is already taking Plaquenil and apparently something was discontinued because it was causing some problems with her lab results.    In addition to the knee complaints, the patient was apparently seen by Dr. Figueroa and has lost her wrist brace.  The family had questions about her wrist condition and I suggested that she see Dr. Figueroa again if it is felt that she needs further treatment.  They certainly could  an over-the-counter thumb spica brace that would be similar to that which she was provided in the office, if they choose to do so.    Today's visit encompassed approximately 20 minutes of time the majority of which was spent answering numerous questions the patient and her family had as well as explaining the nature of arthritis and the treatment options.    If her symptoms do not respond to the injection, evaluation by Dr. Engle for possible genicular nerve injections would be appropriate.  Knee replacement was discussed but she states she is too old to proceed with any kind of surgery.

## 2025-04-11 NOTE — PROGRESS NOTES
Name: Cheryle Mcleod      : 1940      MRN: [id number]  Encounter Provider: Zeke Garcia DO  Encounter Date: 2025   Encounter department: [facility] [place]  :  Assessment & Plan  Rheumatoid arthritis of multiple sites with negative rheumatoid factor (HCC)         Primary osteoarthritis of right knee  I would recommend follow-up as needed.  After a lengthy discussion regarding her further treatment options, she finally agreed to proceed with injection of Durolane.  She tolerated this well.  She can continue using the brace which does seem to be providing some benefit.  She may continue to use Tylenol.  She did question me regarding other options and I have suggested she consider things like Osteo Bi-Flex or chondroitin sulfate.  She can contact her rheumatologist about additional rheumatologic medications but is already taking Plaquenil and apparently something was discontinued because it was causing some problems with her lab results.    In addition to the knee complaints, the patient was apparently seen by Dr. Figueroa and has lost her wrist brace.  The family had questions about her wrist condition and I suggested that she see Dr. Figueroa again if it is felt that she needs further treatment.  They certainly could  an over-the-counter thumb spica brace that would be similar to that which she was provided in the office, if they choose to do so.    Today's visit encompassed approximately 20 minutes of time the majority of which was spent answering numerous questions the patient and her family had as well as explaining the nature of arthritis and the treatment options.    If her symptoms do not respond to the injection, evaluation by Dr. Engle for possible genicular nerve injections would be appropriate.  Knee replacement was discussed but she states she is too old to proceed with any kind of surgery.       Large joint arthrocentesis: R knee  Universal  Protocol:  Consent: Verbal consent obtained.  Consent given by: patient  Patient understanding: patient states understanding of the procedure being performed  Supporting Documentation  Indications: pain   Procedure Details  Location: knee - R knee  Needle size: 22 G  Ultrasound guidance: no  Approach: lateral  Medications administered: 3 mL sodium hyaluronate 60 MG/3ML              History of Present Illness   HPI  Cheryle Mcleod is a 84 y.o. female who presents for reevaluation of her knee.  When last seen, the plan was to proceed with viscosupplement injection.  Today, she states that the brace she is wearing helps minimize pain.  She initially denied pain with the brace in place but upon further questioning, admits that there is pain when she is standing and walking.  She also has difficulty sleeping at night because of the knee pain.  She has tried to sleep with the brace which does help with the pain but then interferes with sleep because of the discomfort from wearing the brace.       Objective   Ht 5' (1.524 m)   Wt 49.2 kg (108 lb 6.4 oz)   BMI 21.17 kg/m²      Physical Exam  The exam demonstrated supportive knee brace in place.  This was removed without difficulty.  There is no significant effusion.  She lacks about 5 degrees of extension and has flexion to about 110 degrees with complaints of pain.  There is hypertrophy noted.  There is some crepitus noted.

## 2025-04-20 ENCOUNTER — HOSPITAL ENCOUNTER (INPATIENT)
Facility: HOSPITAL | Age: 85
LOS: 3 days | Discharge: NURSING, REHAB OR LONG-TERM CARE | DRG: 391 | End: 2025-04-23
Attending: EMERGENCY MEDICINE | Admitting: STUDENT IN AN ORGANIZED HEALTH CARE EDUCATION/TRAINING PROGRAM
Payer: MEDICARE

## 2025-04-20 ENCOUNTER — APPOINTMENT (EMERGENCY)
Dept: CT IMAGING | Facility: HOSPITAL | Age: 85
DRG: 391 | End: 2025-04-20
Payer: MEDICARE

## 2025-04-20 ENCOUNTER — APPOINTMENT (EMERGENCY)
Dept: RADIOLOGY | Facility: HOSPITAL | Age: 85
DRG: 391 | End: 2025-04-20
Payer: MEDICARE

## 2025-04-20 DIAGNOSIS — R79.89 TROPONIN LEVEL ELEVATED: ICD-10-CM

## 2025-04-20 DIAGNOSIS — R53.1 GENERALIZED WEAKNESS: ICD-10-CM

## 2025-04-20 DIAGNOSIS — F03.918 DEMENTIA WITH BEHAVIORAL DISTURBANCE (HCC): ICD-10-CM

## 2025-04-20 DIAGNOSIS — R62.7 FAILURE TO THRIVE IN ADULT: ICD-10-CM

## 2025-04-20 DIAGNOSIS — K52.9 COLITIS: Primary | ICD-10-CM

## 2025-04-20 PROBLEM — D64.9 ANEMIA: Status: ACTIVE | Noted: 2025-04-20

## 2025-04-20 PROBLEM — R62.51 FAILURE TO THRIVE (CHILD): Status: RESOLVED | Noted: 2025-04-20 | Resolved: 2025-04-20

## 2025-04-20 PROBLEM — R62.51 FAILURE TO THRIVE (CHILD): Status: ACTIVE | Noted: 2025-04-20

## 2025-04-20 PROBLEM — E83.42 HYPOMAGNESEMIA: Status: ACTIVE | Noted: 2025-04-20

## 2025-04-20 LAB
2HR DELTA HS TROPONIN: 26 NG/L
4HR DELTA HS TROPONIN: 7 NG/L
ALBUMIN SERPL BCG-MCNC: 3.2 G/DL (ref 3.5–5)
ALP SERPL-CCNC: 133 U/L (ref 34–104)
ALT SERPL W P-5'-P-CCNC: 15 U/L (ref 7–52)
AMYLASE SERPL-CCNC: 13 IU/L (ref 29–103)
ANION GAP SERPL CALCULATED.3IONS-SCNC: 11 MMOL/L (ref 4–13)
AST SERPL W P-5'-P-CCNC: 31 U/L (ref 13–39)
BASOPHILS # BLD AUTO: 0.06 THOUSANDS/ÂΜL (ref 0–0.1)
BASOPHILS NFR BLD AUTO: 1 % (ref 0–1)
BILIRUB SERPL-MCNC: 0.55 MG/DL (ref 0.2–1)
BILIRUB UR QL STRIP: NEGATIVE
BNP SERPL-MCNC: 1769 PG/ML (ref 0–100)
BUN SERPL-MCNC: 30 MG/DL (ref 5–25)
CALCIUM ALBUM COR SERPL-MCNC: 9.2 MG/DL (ref 8.3–10.1)
CALCIUM SERPL-MCNC: 8.6 MG/DL (ref 8.4–10.2)
CARDIAC TROPONIN I PNL SERPL HS: 194 NG/L (ref ?–50)
CARDIAC TROPONIN I PNL SERPL HS: 201 NG/L (ref ?–50)
CARDIAC TROPONIN I PNL SERPL HS: 220 NG/L (ref ?–50)
CHLORIDE SERPL-SCNC: 105 MMOL/L (ref 96–108)
CHOLEST SERPL-MCNC: 124 MG/DL (ref ?–200)
CLARITY UR: NORMAL
CO2 SERPL-SCNC: 20 MMOL/L (ref 21–32)
COLOR UR: YELLOW
CREAT SERPL-MCNC: 1.19 MG/DL (ref 0.6–1.3)
EOSINOPHIL # BLD AUTO: 0.07 THOUSAND/ÂΜL (ref 0–0.61)
EOSINOPHIL NFR BLD AUTO: 1 % (ref 0–6)
ERYTHROCYTE [DISTWIDTH] IN BLOOD BY AUTOMATED COUNT: 17.6 % (ref 11.6–15.1)
FLUAV AG UPPER RESP QL IA.RAPID: NEGATIVE
FLUBV AG UPPER RESP QL IA.RAPID: NEGATIVE
GFR SERPL CREATININE-BSD FRML MDRD: 42 ML/MIN/1.73SQ M
GLUCOSE SERPL-MCNC: 93 MG/DL (ref 65–140)
GLUCOSE UR STRIP-MCNC: NEGATIVE MG/DL
HCT VFR BLD AUTO: 35.8 % (ref 34.8–46.1)
HDLC SERPL-MCNC: 44 MG/DL
HGB BLD-MCNC: 10.8 G/DL (ref 11.5–15.4)
HGB UR QL STRIP.AUTO: NEGATIVE
IMM GRANULOCYTES # BLD AUTO: 0.04 THOUSAND/UL (ref 0–0.2)
IMM GRANULOCYTES NFR BLD AUTO: 0 % (ref 0–2)
KETONES UR STRIP-MCNC: NEGATIVE MG/DL
LACTATE SERPL-SCNC: 1.2 MMOL/L (ref 0.5–2)
LDLC SERPL CALC-MCNC: 56 MG/DL (ref 0–100)
LEUKOCYTE ESTERASE UR QL STRIP: NEGATIVE
LIPASE SERPL-CCNC: 19 U/L (ref 11–82)
LYMPHOCYTES # BLD AUTO: 1.64 THOUSANDS/ÂΜL (ref 0.6–4.47)
LYMPHOCYTES NFR BLD AUTO: 16 % (ref 14–44)
MAGNESIUM SERPL-MCNC: 1.7 MG/DL (ref 1.9–2.7)
MCH RBC QN AUTO: 27 PG (ref 26.8–34.3)
MCHC RBC AUTO-ENTMCNC: 30.2 G/DL (ref 31.4–37.4)
MCV RBC AUTO: 90 FL (ref 82–98)
MONOCYTES # BLD AUTO: 0.82 THOUSAND/ÂΜL (ref 0.17–1.22)
MONOCYTES NFR BLD AUTO: 8 % (ref 4–12)
NEUTROPHILS # BLD AUTO: 7.97 THOUSANDS/ÂΜL (ref 1.85–7.62)
NEUTS SEG NFR BLD AUTO: 74 % (ref 43–75)
NITRITE UR QL STRIP: NEGATIVE
NRBC BLD AUTO-RTO: 0 /100 WBCS
PH UR STRIP.AUTO: 6.5 [PH]
PHOSPHATE SERPL-MCNC: 3.3 MG/DL (ref 2.3–4.1)
PLATELET # BLD AUTO: 463 THOUSANDS/UL (ref 149–390)
PMV BLD AUTO: 10 FL (ref 8.9–12.7)
POTASSIUM SERPL-SCNC: 4.7 MMOL/L (ref 3.5–5.3)
PROCALCITONIN SERPL-MCNC: 0.15 NG/ML
PROT SERPL-MCNC: 5.8 G/DL (ref 6.4–8.4)
PROT UR STRIP-MCNC: NEGATIVE MG/DL
RBC # BLD AUTO: 4 MILLION/UL (ref 3.81–5.12)
SARS-COV+SARS-COV-2 AG RESP QL IA.RAPID: NEGATIVE
SODIUM SERPL-SCNC: 136 MMOL/L (ref 135–147)
SP GR UR STRIP.AUTO: 1.02 (ref 1–1.03)
TRIGL SERPL-MCNC: 119 MG/DL (ref ?–150)
TSH SERPL DL<=0.05 MIU/L-ACNC: 2.81 UIU/ML (ref 0.45–4.5)
UROBILINOGEN UR QL STRIP.AUTO: 1 E.U./DL
WBC # BLD AUTO: 10.6 THOUSAND/UL (ref 4.31–10.16)

## 2025-04-20 PROCEDURE — 87804 INFLUENZA ASSAY W/OPTIC: CPT | Performed by: EMERGENCY MEDICINE

## 2025-04-20 PROCEDURE — 83880 ASSAY OF NATRIURETIC PEPTIDE: CPT | Performed by: EMERGENCY MEDICINE

## 2025-04-20 PROCEDURE — 80061 LIPID PANEL: CPT

## 2025-04-20 PROCEDURE — 93005 ELECTROCARDIOGRAM TRACING: CPT

## 2025-04-20 PROCEDURE — 83036 HEMOGLOBIN GLYCOSYLATED A1C: CPT

## 2025-04-20 PROCEDURE — 74177 CT ABD & PELVIS W/CONTRAST: CPT

## 2025-04-20 PROCEDURE — 83605 ASSAY OF LACTIC ACID: CPT

## 2025-04-20 PROCEDURE — 87811 SARS-COV-2 COVID19 W/OPTIC: CPT | Performed by: EMERGENCY MEDICINE

## 2025-04-20 PROCEDURE — 82306 VITAMIN D 25 HYDROXY: CPT

## 2025-04-20 PROCEDURE — 99285 EMERGENCY DEPT VISIT HI MDM: CPT

## 2025-04-20 PROCEDURE — 99222 1ST HOSP IP/OBS MODERATE 55: CPT

## 2025-04-20 PROCEDURE — 99285 EMERGENCY DEPT VISIT HI MDM: CPT | Performed by: EMERGENCY MEDICINE

## 2025-04-20 PROCEDURE — 83690 ASSAY OF LIPASE: CPT | Performed by: EMERGENCY MEDICINE

## 2025-04-20 PROCEDURE — 83540 ASSAY OF IRON: CPT

## 2025-04-20 PROCEDURE — 82728 ASSAY OF FERRITIN: CPT

## 2025-04-20 PROCEDURE — 84443 ASSAY THYROID STIM HORMONE: CPT | Performed by: EMERGENCY MEDICINE

## 2025-04-20 PROCEDURE — 87040 BLOOD CULTURE FOR BACTERIA: CPT | Performed by: EMERGENCY MEDICINE

## 2025-04-20 PROCEDURE — 83550 IRON BINDING TEST: CPT

## 2025-04-20 PROCEDURE — 81003 URINALYSIS AUTO W/O SCOPE: CPT | Performed by: EMERGENCY MEDICINE

## 2025-04-20 PROCEDURE — 83735 ASSAY OF MAGNESIUM: CPT | Performed by: EMERGENCY MEDICINE

## 2025-04-20 PROCEDURE — 84145 PROCALCITONIN (PCT): CPT

## 2025-04-20 PROCEDURE — 80053 COMPREHEN METABOLIC PANEL: CPT | Performed by: EMERGENCY MEDICINE

## 2025-04-20 PROCEDURE — 85025 COMPLETE CBC W/AUTO DIFF WBC: CPT | Performed by: EMERGENCY MEDICINE

## 2025-04-20 PROCEDURE — 82607 VITAMIN B-12: CPT

## 2025-04-20 PROCEDURE — 36415 COLL VENOUS BLD VENIPUNCTURE: CPT | Performed by: EMERGENCY MEDICINE

## 2025-04-20 PROCEDURE — 82746 ASSAY OF FOLIC ACID SERUM: CPT

## 2025-04-20 PROCEDURE — 84484 ASSAY OF TROPONIN QUANT: CPT | Performed by: EMERGENCY MEDICINE

## 2025-04-20 PROCEDURE — 84100 ASSAY OF PHOSPHORUS: CPT

## 2025-04-20 PROCEDURE — 82150 ASSAY OF AMYLASE: CPT

## 2025-04-20 PROCEDURE — 71045 X-RAY EXAM CHEST 1 VIEW: CPT

## 2025-04-20 RX ORDER — PANTOPRAZOLE SODIUM 40 MG/1
40 TABLET, DELAYED RELEASE ORAL DAILY
Status: DISCONTINUED | OUTPATIENT
Start: 2025-04-21 | End: 2025-04-20

## 2025-04-20 RX ORDER — ACETAMINOPHEN 325 MG/1
650 TABLET ORAL EVERY 6 HOURS PRN
Status: DISCONTINUED | OUTPATIENT
Start: 2025-04-20 | End: 2025-04-23 | Stop reason: HOSPADM

## 2025-04-20 RX ORDER — CALCIUM CARBONATE 500 MG/1
1000 TABLET, CHEWABLE ORAL DAILY PRN
Status: DISCONTINUED | OUTPATIENT
Start: 2025-04-20 | End: 2025-04-23 | Stop reason: HOSPADM

## 2025-04-20 RX ORDER — OXYBUTYNIN CHLORIDE 5 MG/1
10 TABLET, EXTENDED RELEASE ORAL DAILY
Status: DISCONTINUED | OUTPATIENT
Start: 2025-04-21 | End: 2025-04-23 | Stop reason: HOSPADM

## 2025-04-20 RX ORDER — ONDANSETRON 2 MG/ML
4 INJECTION INTRAMUSCULAR; INTRAVENOUS EVERY 6 HOURS PRN
Status: DISCONTINUED | OUTPATIENT
Start: 2025-04-20 | End: 2025-04-23 | Stop reason: HOSPADM

## 2025-04-20 RX ORDER — METRONIDAZOLE 500 MG/100ML
500 INJECTION, SOLUTION INTRAVENOUS EVERY 12 HOURS
Status: DISCONTINUED | OUTPATIENT
Start: 2025-04-20 | End: 2025-04-22

## 2025-04-20 RX ORDER — MAGNESIUM SULFATE HEPTAHYDRATE 40 MG/ML
2 INJECTION, SOLUTION INTRAVENOUS ONCE
Status: COMPLETED | OUTPATIENT
Start: 2025-04-20 | End: 2025-04-20

## 2025-04-20 RX ORDER — LORATADINE 10 MG/1
10 TABLET ORAL DAILY
Status: DISCONTINUED | OUTPATIENT
Start: 2025-04-21 | End: 2025-04-23 | Stop reason: HOSPADM

## 2025-04-20 RX ORDER — ASPIRIN 325 MG
325 TABLET ORAL DAILY
Status: DISCONTINUED | OUTPATIENT
Start: 2025-04-21 | End: 2025-04-23 | Stop reason: HOSPADM

## 2025-04-20 RX ORDER — BUSPIRONE HYDROCHLORIDE 5 MG/1
5 TABLET ORAL 3 TIMES DAILY
Status: DISCONTINUED | OUTPATIENT
Start: 2025-04-20 | End: 2025-04-23 | Stop reason: HOSPADM

## 2025-04-20 RX ORDER — DULOXETIN HYDROCHLORIDE 20 MG/1
20 CAPSULE, DELAYED RELEASE ORAL DAILY
Status: DISCONTINUED | OUTPATIENT
Start: 2025-04-21 | End: 2025-04-23

## 2025-04-20 RX ORDER — HEPARIN SODIUM 5000 [USP'U]/ML
5000 INJECTION, SOLUTION INTRAVENOUS; SUBCUTANEOUS EVERY 8 HOURS SCHEDULED
Status: DISCONTINUED | OUTPATIENT
Start: 2025-04-20 | End: 2025-04-23 | Stop reason: HOSPADM

## 2025-04-20 RX ORDER — ALBUTEROL SULFATE 90 UG/1
2 INHALANT RESPIRATORY (INHALATION) EVERY 6 HOURS PRN
Status: DISCONTINUED | OUTPATIENT
Start: 2025-04-20 | End: 2025-04-23 | Stop reason: HOSPADM

## 2025-04-20 RX ORDER — LISINOPRIL 10 MG/1
10 TABLET ORAL DAILY
Status: DISCONTINUED | OUTPATIENT
Start: 2025-04-21 | End: 2025-04-21

## 2025-04-20 RX ORDER — CEFTRIAXONE 1 G/50ML
1000 INJECTION, SOLUTION INTRAVENOUS EVERY 24 HOURS
Status: DISCONTINUED | OUTPATIENT
Start: 2025-04-20 | End: 2025-04-22

## 2025-04-20 RX ORDER — HYDROXYCHLOROQUINE SULFATE 200 MG/1
200 TABLET, FILM COATED ORAL DAILY
Status: DISCONTINUED | OUTPATIENT
Start: 2025-04-21 | End: 2025-04-23 | Stop reason: HOSPADM

## 2025-04-20 RX ORDER — SODIUM CHLORIDE, SODIUM LACTATE, POTASSIUM CHLORIDE, CALCIUM CHLORIDE 600; 310; 30; 20 MG/100ML; MG/100ML; MG/100ML; MG/100ML
50 INJECTION, SOLUTION INTRAVENOUS CONTINUOUS
Status: DISCONTINUED | OUTPATIENT
Start: 2025-04-20 | End: 2025-04-22

## 2025-04-20 RX ORDER — BISOPROLOL FUMARATE 5 MG/1
2.5 TABLET, FILM COATED ORAL DAILY
Status: DISCONTINUED | OUTPATIENT
Start: 2025-04-21 | End: 2025-04-23 | Stop reason: HOSPADM

## 2025-04-20 RX ORDER — DICYCLOMINE HYDROCHLORIDE 10 MG/1
20 CAPSULE ORAL 3 TIMES DAILY
Status: DISCONTINUED | OUTPATIENT
Start: 2025-04-20 | End: 2025-04-23 | Stop reason: HOSPADM

## 2025-04-20 RX ORDER — ATORVASTATIN CALCIUM 20 MG/1
20 TABLET, FILM COATED ORAL DAILY
Status: DISCONTINUED | OUTPATIENT
Start: 2025-04-21 | End: 2025-04-23 | Stop reason: HOSPADM

## 2025-04-20 RX ORDER — FOLIC ACID 1 MG/1
1 TABLET ORAL DAILY
Status: DISCONTINUED | OUTPATIENT
Start: 2025-04-21 | End: 2025-04-23 | Stop reason: HOSPADM

## 2025-04-20 RX ORDER — HYDROCHLOROTHIAZIDE 12.5 MG/1
6.25 TABLET ORAL DAILY
Status: DISCONTINUED | OUTPATIENT
Start: 2025-04-21 | End: 2025-04-21

## 2025-04-20 RX ADMIN — MAGNESIUM SULFATE HEPTAHYDRATE 2 G: 40 INJECTION, SOLUTION INTRAVENOUS at 20:28

## 2025-04-20 RX ADMIN — HEPARIN SODIUM 5000 UNITS: 5000 INJECTION INTRAVENOUS; SUBCUTANEOUS at 22:20

## 2025-04-20 RX ADMIN — BUSPIRONE HYDROCHLORIDE 5 MG: 5 TABLET ORAL at 22:20

## 2025-04-20 RX ADMIN — DICYCLOMINE HYDROCHLORIDE 20 MG: 10 CAPSULE ORAL at 20:33

## 2025-04-20 RX ADMIN — METRONIDAZOLE 500 MG: 500 INJECTION, SOLUTION INTRAVENOUS at 20:28

## 2025-04-20 RX ADMIN — IOHEXOL 65 ML: 350 INJECTION, SOLUTION INTRAVENOUS at 17:04

## 2025-04-20 RX ADMIN — CEFTRIAXONE 1000 MG: 1 INJECTION, SOLUTION INTRAVENOUS at 22:21

## 2025-04-20 RX ADMIN — SODIUM CHLORIDE, SODIUM LACTATE, POTASSIUM CHLORIDE, AND CALCIUM CHLORIDE 75 ML/HR: .6; .31; .03; .02 INJECTION, SOLUTION INTRAVENOUS at 20:27

## 2025-04-20 NOTE — ASSESSMENT & PLAN NOTE
Lab Results   Component Value Date    MG 1.7 (L) 04/20/2025    MG 1.7 (L) 05/10/2024    MG 2.4 02/23/2023   Magnesium on admission noted to be 1.7  Received mag sulfate 2 g IV  Trend magnesium

## 2025-04-20 NOTE — ASSESSMENT & PLAN NOTE
Noted history of chronically elevated troponin  EKG in the ED without ST segment abnormality = NSR with 1st degree AV block, RBBB left anterior fascicular block, bifascicular block. Left ventricular hypertrophy with repolarizing abnormality. Possible lateral infarct.   Obtain serial EKG with any chest pain and/or elevated troponin levels  Troponin: 194 -> 220, trend  BNP elevated at 1769  Patient denies chest pain  Suspected secondary to colitis

## 2025-04-20 NOTE — H&P
"H&P - Hospitalist   Name: Cheryle Mcleod 84 y.o. female I MRN: [id number]  Unit/Bed#: -01 I Date of Admission: 4/20/2025   Date of Service: 4/20/2025 I Hospital Day: 0     Assessment & Plan  Colitis  Patient presents with general malaise for 1 week and diarrhea for 3 to 4 days.   She reports several days of diarrhea and weight loss over the past week due to decreased appetite.   CT abdomen pelvis: \"Mild to moderate mural thickening of the descending and sigmoid colonic segments concerning for infectious/inflammatory segmental colitis. There is similar mild mural thickening of the mid transverse colon. There is no evidence of bowel obstruction, free intraperitoneal air, or drainable ascites. Large hiatal hernia is again noted.\"  Did not meet sepsis criteria  COVID/flu negative  No fevers, UA negative, lipase normal at 19  Amylase pending  WBC elevated at 10.60, trend  Blood cultures pending  Received Rocephin and Flagyl IV, continue  Start Bentyl  Start LR IVF at 75 mL/hr  On exam, patient denies any abdominal pain, fever, nausea, or vomiting  Monitor fever curve, vital signs, and symptoms  Troponin level elevated  Noted history of chronically elevated troponin  EKG in the ED without ST segment abnormality = NSR with 1st degree AV block, RBBB left anterior fascicular block, bifascicular block. Left ventricular hypertrophy with repolarizing abnormality. Possible lateral infarct.   Obtain serial EKG with any chest pain and/or elevated troponin levels  Troponin: 194 -> 220, trend  BNP elevated at 1769  Patient denies chest pain  Suspected secondary to colitis  Hypomagnesemia  Lab Results   Component Value Date    MG 1.7 (L) 04/20/2025    MG 1.7 (L) 05/10/2024    MG 2.4 02/23/2023   Magnesium on admission noted to be 1.7  Received mag sulfate 2 g IV  Trend magnesium  Generalized weakness  Patient reporting some generalized weakness  Josey questioning whether inpatient rehab and/or long-term care would be an " option at time of discharge  Appreciate PT/OT recommendation  Appreciate case management recommendations  Failure to thrive in adult  Family is concerned that patient may be experiencing failure to thrive  Appreciate nutrition recommendations  Anemia  Lab Results   Component Value Date    HGB 10.8 (L) 04/20/2025    HGB 11.0 (L) 09/07/2024    HGB 12.4 06/01/2024    HCT 35.8 04/20/2025    HCT 35.5 09/07/2024    HCT 39.3 06/01/2024   Hemoglobin on admission 10.8  Iron panel pending  Essential hypertension  BP in the /60, current /54  Continue home Ziac and lisinopril with hold parameters  Monitor BP  COPD (chronic obstructive pulmonary disease) (HCC)  Noted history of COPD  Not in acute exacerbation  Continue Home albuterol inhaler as needed  GERD (gastroesophageal reflux disease)  Continue home pantoprazole  Coronary artery disease involving native coronary artery of native heart  Noted Hx of CABG -patient reports 4 stents in her heart  Maintained on aspirin  Denies any chest pain  Continue home Ziac and lisinopril  Continue home atorvastatin    VTE Pharmacologic Prophylaxis: VTE Score: 5 High Risk (Score >/= 5) - Pharmacological DVT Prophylaxis Ordered: heparin. Sequential Compression Devices Ordered.  Code Status: Level 1 - Full Code per patient  Discussion with family: Patient declined call to .     Anticipated Length of Stay: Patient will be admitted on an inpatient basis with an anticipated length of stay of greater than 2 midnights secondary to colitis and elevated troponin management.    History of Present Illness   Chief Complaint: Malaise and diarrhea    Cheryle Mcleod is a 84 y.o. female with a PMH of arthritis, asthma, diabetes, hypertension, heart attack, GERD, COPD, CAD who presents with malaise and diarrhea. Patient presents with general malaise for 1 week and diarrhea for 3 to 4 days. She reports several days of diarrhea and weight loss over the past week due to decreased  "appetite. CT abdomen pelvis showed mild to moderate mural thickening of the descending and sigmoid colonic segments concerning for infectious/inflammatory segmental colitis. There is similar mild mural thickening of the mid transverse colon. There is no evidence of bowel obstruction, free intraperitoneal air, or drainable ascites.Large hiatal hernia is again noted. On exam, patient denies any abdominal pain, fever, nausea, or vomiting.    Review of Systems   Constitutional:  Positive for appetite change, chills and unexpected weight change. Negative for fever.   HENT:  Negative for ear pain and sore throat.    Eyes:  Negative for pain and visual disturbance.   Respiratory:  Positive for shortness of breath. Negative for cough and chest tightness.         Patient reports intermittent shortness of breath due to COPD   Cardiovascular:  Negative for chest pain and palpitations.   Gastrointestinal:  Positive for abdominal pain and diarrhea. Negative for nausea and vomiting.        Patient reports intermittent stomach pain prior to bowel movement.  Reports \"gushy\" BM - total 6 for 3-4 day duration of diarrhea   Endocrine: Negative.    Genitourinary:  Negative for dysuria and hematuria.   Musculoskeletal:  Negative for arthralgias and back pain.   Skin:  Negative for color change and rash.   Allergic/Immunologic: Negative.    Neurological:  Positive for weakness. Negative for seizures, syncope and numbness.   Hematological: Negative.    Psychiatric/Behavioral: Negative.     All other systems reviewed and are negative.    Historical Information   Past Medical History:   Diagnosis Date    Arthritis     Asthma     COPD (chronic obstructive pulmonary disease) (HCC)     Coronary artery disease     Diabetes mellitus (HCC)     GERD (gastroesophageal reflux disease)     Hypertension     MI, old      Past Surgical History:   Procedure Laterality Date    BACK SURGERY      CARDIAC SURGERY      HYSTERECTOMY       Social History "     Tobacco Use    Smoking status: Former     Passive exposure: Never    Smokeless tobacco: Never   Vaping Use    Vaping status: Never Used   Substance and Sexual Activity    Alcohol use: Not Currently    Drug use: Not Currently    Sexual activity: Not on file     E-Cigarette/Vaping    E-Cigarette Use Never User      E-Cigarette/Vaping Substances    Nicotine No     THC No     CBD No     Flavoring No     Other No     Unknown No      Family History   Problem Relation Age of Onset    Heart disease Mother     Other (cabg) Mother     Heart disease Father     Heart attack Father     No Known Problems Sister     Breast cancer Sister     Cancer Brother     Heart disease Brother     No Known Problems Son     No Known Problems Son     Cancer Daughter     No Known Problems Daughter     No Known Problems Daughter     Stomach cancer Nephew      Social History:  Marital Status: /Civil Union   Occupation: Retired  Patient Pre-hospital Living Situation: Home, With other family member: Daughter and son-in-law  Patient Pre-hospital Level of Mobility: walks with walker  Patient Pre-hospital Diet Restrictions: None    Meds/Allergies   I have reviewed home medications with patient personally.  Prior to Admission medications    Medication Sig Start Date End Date Taking? Authorizing Provider   acetaminophen (TYLENOL) 325 mg tablet Take 2 tablets (650 mg total) by mouth every 6 (six) hours as needed for mild pain, headaches or fever 8/1/22   Alice Cali MD   albuterol (ProAir HFA) 90 mcg/act inhaler Inhale 2 puffs every 6 (six) hours as needed for wheezing 8/1/22   Alice Cali MD   aspirin 325 mg tablet Take 325 mg by mouth daily    Historical Provider, MD   atorvastatin (LIPITOR) 20 mg tablet Take 20 mg by mouth daily    Historical Provider, MD   bisoprolol-hydrochlorothiazide (ZIAC) 2.5-6.25 MG per tablet Take 1 tablet by mouth daily    Historical Provider, MD   busPIRone (BUSPAR) 5 mg tablet Take 5 mg by mouth 3 (three) times a  day    Historical Provider, MD   calcium citrate-vitamin D (CITRACAL+D) 315-200 MG-UNIT per tablet Take 1 tablet by mouth 2 (two) times a day    Historical Provider, MD   DULoxetine (CYMBALTA) 20 mg capsule  4/2/25   Historical Provider, MD   folic acid (FOLVITE) 1 mg tablet Take by mouth daily    Historical Provider, MD   hydroxychloroquine (PLAQUENIL) 200 mg tablet Take 200 mg by mouth daily    Historical Provider, MD   lisinopril (ZESTRIL) 10 mg tablet Take 10 mg by mouth daily    Historical Provider, MD   loratadine (CLARITIN) 10 mg tablet Take 10 mg by mouth daily    Historical Provider, MD   Multiple Vitamin (multivitamin) tablet Take 1 tablet by mouth daily    Historical Provider, MD   nystatin (MYCOSTATIN) 500,000 units/5 mL suspension Take 500,000 Units by mouth Three times a day 4/2/25   Historical Provider, MD   pantoprazole (PROTONIX) 40 mg tablet Take 40 mg by mouth daily    Historical Provider, MD   tolterodine (DETROL LA) 4 mg 24 hr capsule Take 4 mg by mouth daily    Historical Provider, MD     No Known Allergies    Objective :  Temp:  [97 °F (36.1 °C)-98.2 °F (36.8 °C)] 97 °F (36.1 °C)  HR:  [58-84] 58  BP: (104-117)/(53-60) 109/54  Resp:  [16-18] 16  SpO2:  [94 %-100 %] 94 %  O2 Device: None (Room air)    Physical Exam  Constitutional:       General: She is not in acute distress.     Appearance: She is not ill-appearing.   Cardiovascular:      Rate and Rhythm: Normal rate and regular rhythm.      Pulses: Normal pulses.      Heart sounds: Normal heart sounds. No murmur heard.  Pulmonary:      Effort: Pulmonary effort is normal. No respiratory distress.      Breath sounds: Normal breath sounds. No wheezing or rales.   Abdominal:      General: Bowel sounds are normal. There is no distension.      Palpations: Abdomen is soft.      Tenderness: There is no abdominal tenderness.   Musculoskeletal:         General: No swelling or tenderness.   Skin:     General: Skin is cool and dry.      Findings: No  "erythema or rash.   Neurological:      General: No focal deficit present.      Mental Status: She is alert and oriented to person, place, and time. Mental status is at baseline.   Psychiatric:         Mood and Affect: Mood normal.       Lines/Drains:      Lab Results: I have reviewed the following results:  Results from last 7 days   Lab Units 04/20/25  1627   WBC Thousand/uL 10.60*   HEMOGLOBIN g/dL 10.8*   HEMATOCRIT % 35.8   PLATELETS Thousands/uL 463*   SEGS PCT % 74   LYMPHO PCT % 16   MONO PCT % 8   EOS PCT % 1     Results from last 7 days   Lab Units 04/20/25  1627   SODIUM mmol/L 136   POTASSIUM mmol/L 4.7   CHLORIDE mmol/L 105   CO2 mmol/L 20*   BUN mg/dL 30*   CREATININE mg/dL 1.19   ANION GAP mmol/L 11   CALCIUM mg/dL 8.6   ALBUMIN g/dL 3.2*   TOTAL BILIRUBIN mg/dL 0.55   ALK PHOS U/L 133*   ALT U/L 15   AST U/L 31   GLUCOSE RANDOM mg/dL 93     No results found for: \"HGBA1C\"      Imaging Results Review: I reviewed radiology reports from this admission including: chest xray and CT abdomen/pelvis.  Other Study Results Review: EKG was reviewed.     Administrative Statements     ** Please Note: This note has been constructed using a voice recognition system. **    "

## 2025-04-20 NOTE — ED PROVIDER NOTES
Time reflects when diagnosis was documented in both MDM as applicable and the Disposition within this note       Time User Action Codes Description Comment    4/20/2025  6:27 PM EddieJustenWander Add [K52.9] Colitis           ED Disposition       ED Disposition   Admit    Condition   Stable    Date/Time   Sun Apr 20, 2025  6:27 PM    Comment                  Assessment & Plan       Medical Decision Making  Patient presents to the emergency department with diarrhea, weight loss, weakness.      Based on the MSE and the history provided, patient may be at risk for dehydration, electrolyte abnormality, colitis, diverticulitis, infectious diarrhea, other diarrhea    Based on the work-up performed in the emergency department which includes physical examination, laboratory testing, imaging which may include advanced imaging as necessary such as CT scan or ultrasound, it is deemed that the patient will require admission to the hospital for treatment of colitis.      Amount and/or Complexity of Data Reviewed  Labs: ordered. Decision-making details documented in ED Course.     Details: Troponin elevation is chronic in nature.  Other lab work is benign.  Radiology: ordered and independent interpretation performed. Decision-making details documented in ED Course.     Details: CT abdomen pelvis consistent with colitis  ECG/medicine tests: ordered and independent interpretation performed. Decision-making details documented in ED Course.     Details: Normal sinus rhythm rate 62 with first-degree block and right bundle branch block.  LVH    Risk  Prescription drug management.             Medications   piperacillin-tazobactam (ZOSYN) 4.5 g in sodium chloride 0.9 % 100 mL IVPB (has no administration in time range)   iohexol (OMNIPAQUE) 350 MG/ML injection (MULTI-DOSE) 65 mL (65 mL Intravenous Given 4/20/25 1704)       ED Risk Strat Scores                    No data recorded                            History of Present Illness        Chief Complaint   Patient presents with    Diarrhea     Patient c/o diarrhea for 3 days       Past Medical History:   Diagnosis Date    Arthritis     Asthma     Diabetes mellitus (HCC)     Hypertension     MI, old       Past Surgical History:   Procedure Laterality Date    BACK SURGERY      CARDIAC SURGERY      HYSTERECTOMY        History reviewed. No pertinent family history.   Social History     Tobacco Use    Smoking status: Former     Passive exposure: Never    Smokeless tobacco: Never   Vaping Use    Vaping status: Never Used   Substance Use Topics    Alcohol use: Not Currently    Drug use: Not Currently      E-Cigarette/Vaping    E-Cigarette Use Never User       E-Cigarette/Vaping Substances    Nicotine No     THC No     CBD No     Flavoring No     Other No     Unknown No       I have reviewed and agree with the history as documented.     Patient states 2 days abdominal pain and diarrhea, slowly improving.  No fevers.  Some nausea but no vomiting.  Daughter states that patient has also had generalized weakness and weight loss over the past several days with decreased appetite.  Daughter describes a unintentional weight loss.      History provided by:  Patient and relative   used: No    Medical Problem  Quality:  Diarrhea, generalized abdominal pain, generalized weakness and weight loss.  Severity:  Moderate  Onset quality:  Gradual  Duration:  2 days (2 days of diarrhea but 1-2 weeks of generalized weakness)  Progression:  Unchanged  Chronicity:  New  Relieved by:  Nothing  Worsened by:  Nothing  Associated symptoms: abdominal pain, diarrhea and nausea    Associated symptoms: no chest pain, no cough, no ear pain, no fever, no headaches, no loss of consciousness, no rash, no shortness of breath, no sore throat, no vomiting and no wheezing        Review of Systems   Constitutional:  Positive for appetite change and unexpected weight change. Negative for chills and fever.   HENT:   Negative for ear pain, hearing loss, sore throat, trouble swallowing and voice change.    Eyes:  Negative for pain and discharge.   Respiratory:  Negative for cough, shortness of breath and wheezing.    Cardiovascular:  Negative for chest pain and palpitations.   Gastrointestinal:  Positive for abdominal pain, diarrhea and nausea. Negative for blood in stool, constipation and vomiting.   Genitourinary:  Negative for dysuria, flank pain, frequency and hematuria.   Musculoskeletal:  Negative for joint swelling, neck pain and neck stiffness.   Skin:  Negative for rash and wound.   Neurological:  Negative for dizziness, seizures, loss of consciousness, syncope, facial asymmetry and headaches.   Psychiatric/Behavioral:  Negative for hallucinations, self-injury and suicidal ideas.    All other systems reviewed and are negative.          Objective       ED Triage Vitals [04/20/25 1559]   Temperature Pulse Blood Pressure Respirations SpO2 Patient Position - Orthostatic VS   98.2 °F (36.8 °C) 84 117/60 18 100 % Lying      Temp Source Heart Rate Source BP Location FiO2 (%) Pain Score    Temporal Monitor Right arm -- --      Vitals      Date and Time Temp Pulse SpO2 Resp BP Pain Score FACES Pain Rating User   04/20/25 1730 -- 61 97 % 18 108/53 -- -- TH   04/20/25 1630 -- 62 97 % 18 104/56 -- -- TH 04/20/25 1559 98.2 °F (36.8 °C) 84 100 % 18 117/60 -- -- AFG            Physical Exam  Vitals and nursing note reviewed.   Constitutional:       General: She is not in acute distress.     Appearance: She is well-developed.   HENT:      Head: Normocephalic and atraumatic.      Right Ear: External ear normal.      Left Ear: External ear normal.   Eyes:      General: No scleral icterus.        Right eye: No discharge.         Left eye: No discharge.      Extraocular Movements: Extraocular movements intact.      Conjunctiva/sclera: Conjunctivae normal.   Cardiovascular:      Rate and Rhythm: Normal rate and regular rhythm.      Heart  sounds: Normal heart sounds. No murmur heard.  Pulmonary:      Effort: Pulmonary effort is normal.      Breath sounds: Normal breath sounds. No wheezing or rales.   Abdominal:      General: Bowel sounds are normal. There is no distension.      Palpations: Abdomen is soft.      Tenderness: There is no abdominal tenderness. There is no guarding or rebound.   Musculoskeletal:         General: No deformity. Normal range of motion.      Cervical back: Normal range of motion and neck supple.   Skin:     General: Skin is warm and dry.      Findings: No rash.   Neurological:      General: No focal deficit present.      Mental Status: She is alert and oriented to person, place, and time.      Cranial Nerves: No cranial nerve deficit.   Psychiatric:         Mood and Affect: Mood normal.         Behavior: Behavior normal.         Thought Content: Thought content normal.         Judgment: Judgment normal.         Results Reviewed       Procedure Component Value Units Date/Time    HS Troponin I 4hr [[id number]]     Lab Status: No result Specimen: Blood     Blood culture #1 [[id number]]     Lab Status: No result Specimen: Blood from Arm, Left     Blood culture #2 [[id number]]     Lab Status: No result Specimen: Blood from Arm, Right     HS Troponin I 2hr [[id number]] Collected: 04/20/25 1819    Lab Status: No result Specimen: Blood from Arm, Right     UA w Reflex to Microscopic w Reflex to Culture [[id number]] Collected: 04/20/25 1805    Lab Status: Final result Specimen: Urine, Other Updated: 04/20/25 1811     Color, UA Yellow     Clarity, UA Cloudy     Specific Gravity, UA 1.020     pH, UA 6.5     Leukocytes, UA Negative     Nitrite, UA Negative     Protein, UA Negative mg/dl      Glucose, UA Negative mg/dl      Ketones, UA Negative mg/dl      Urobilinogen, UA 1.0 E.U./dl      Bilirubin, UA Negative     Occult Blood, UA Negative    TSH, 3rd generation with Free T4 reflex [[id number]]  (Normal) Collected: 04/20/25 1627    Lab Status:  Final result Specimen: Blood from Arm, Left Updated: 04/20/25 1725     TSH 3RD GENERATON 2.805 uIU/mL     B-Type Natriuretic Peptide(BNP) [[id number]]  (Abnormal) Collected: 04/20/25 1627    Lab Status: Final result Specimen: Blood from Arm, Left Updated: 04/20/25 1707     BNP 1,769 pg/mL     HS Troponin 0hr (reflex protocol) [[id number]]  (Abnormal) Collected: 04/20/25 1627    Lab Status: Final result Specimen: Blood from Arm, Left Updated: 04/20/25 1657     hs TnI 0hr 194 ng/L     Comprehensive metabolic panel [[id number]]  (Abnormal) Collected: 04/20/25 1627    Lab Status: Final result Specimen: Blood from Arm, Left Updated: 04/20/25 1653     Sodium 136 mmol/L      Potassium 4.7 mmol/L      Chloride 105 mmol/L      CO2 20 mmol/L      ANION GAP 11 mmol/L      BUN 30 mg/dL      Creatinine 1.19 mg/dL      Glucose 93 mg/dL      Calcium 8.6 mg/dL      Corrected Calcium 9.2 mg/dL      AST 31 U/L      ALT 15 U/L      Alkaline Phosphatase 133 U/L      Total Protein 5.8 g/dL      Albumin 3.2 g/dL      Total Bilirubin 0.55 mg/dL      eGFR 42 ml/min/1.73sq m     Narrative:      National Kidney Disease Foundation guidelines for Chronic Kidney Disease (CKD):     Stage 1 with normal or high GFR (GFR > 90 mL/min/1.73 square meters)    Stage 2 Mild CKD (GFR = 60-89 mL/min/1.73 square meters)    Stage 3A Moderate CKD (GFR = 45-59 mL/min/1.73 square meters)    Stage 3B Moderate CKD (GFR = 30-44 mL/min/1.73 square meters)    Stage 4 Severe CKD (GFR = 15-29 mL/min/1.73 square meters)    Stage 5 End Stage CKD (GFR <15 mL/min/1.73 square meters)  Note: GFR calculation is accurate only with a steady state creatinine    Lipase [[id number]]  (Normal) Collected: 04/20/25 1627    Lab Status: Final result Specimen: Blood from Arm, Left Updated: 04/20/25 1653     Lipase 19 u/L     Magnesium [[id number]]  (Abnormal) Collected: 04/20/25 1627    Lab Status: Final result Specimen: Blood from Arm, Left Updated: 04/20/25 1658     Magnesium 1.7 mg/dL      COVID-19/ Infleunza A/B Rapid Anitgen(30 min. TAT) [[id number]]  (Normal) Collected: 04/20/25 1627    Lab Status: Final result Specimen: Nares from Nose Updated: 04/20/25 1650     SARS COV Rapid Antigen Negative     Influenza A Rapid Antigen Negative     Influenza B Rapid Antigen Negative    Narrative:      This test has been performed using the Bilibot Cindy 2 FLU+SARS Antigen test under the Emergency Use Authorization (EUA). This test has been validated by the  and verified by the performing laboratory. The Cindy uses lateral flow immunofluorescent sandwich assay to detect SARS-COV, Influenza A and Influenza B Antigen.     The Quidel Cindy 2 SARS Antigen test does not differentiate between SARS-CoV and SARS-CoV-2.     Negative results are presumptive and may be confirmed with a molecular assay, if necessary, for patient management. Negative results do not rule out SARS-CoV-2 or influenza infection and should not be used as the sole basis for treatment or patient management decisions. A negative test result may occur if the level of antigen in a sample is below the limit of detection of this test.     Positive results are indicative of the presence of viral antigens, but do not rule out bacterial infection or co-infection with other viruses.     All test results should be used as an adjunct to clinical observations and other information available to the provider.    FOR PEDIATRIC PATIENTS - copy/paste COVID Guidelines URL to browser: [email or web address]    CBC and differential [[id number]]  (Abnormal) Collected: 04/20/25 1627    Lab Status: Final result Specimen: Blood from Arm, Left Updated: 04/20/25 1635     WBC 10.60 Thousand/uL      RBC 4.00 Million/uL      Hemoglobin 10.8 g/dL      Hematocrit 35.8 %      MCV 90 fL      MCH 27.0 pg      MCHC 30.2 g/dL      RDW 17.6 %      MPV 10.0 fL      Platelets 463 Thousands/uL      nRBC 0 /100 WBCs       Segmented % 74 %      Immature Grans % 0 %      Lymphocytes % 16 %      Monocytes % 8 %      Eosinophils Relative 1 %      Basophils Relative 1 %      Absolute Neutrophils 7.97 Thousands/µL      Absolute Immature Grans 0.04 Thousand/uL      Absolute Lymphocytes 1.64 Thousands/µL      Absolute Monocytes 0.82 Thousand/µL      Eosinophils Absolute 0.07 Thousand/µL      Basophils Absolute 0.06 Thousands/µL             CT abdomen pelvis w contrast   Final Interpretation by Nadeem Gillespie MD (04/20 1756)      Mild to moderate mural thickening of the descending and sigmoid colonic segments concerning for infectious/inflammatory segmental colitis. There is similar mild mural thickening of the mid transverse colon.      There is no evidence of bowel obstruction, free intraperitoneal air, or drainable ascites.Large hiatal hernia is again noted.Additional stable incidental findings as detailed.         Workstation performed: [id number]         XR chest portable    (Results Pending)       ECG 12 Lead Documentation Only    Date/Time: 4/20/2025 4:31 PM    Performed by: Wander Morgan MD  Authorized by: Wander Morgan MD    ECG reviewed by me, the ED Provider: yes    Patient location:  ED  Previous ECG:     Previous ECG:  Unavailable  Interpretation:     Interpretation: non-specific    Rate:     ECG rate:  62    ECG rate assessment: normal    Rhythm:     Rhythm: sinus rhythm    Ectopy:     Ectopy: none    QRS:     QRS axis:  Normal    QRS intervals:  Normal  Conduction:     Conduction: abnormal      Abnormal conduction: complete RBBB and 1st degree    ST segments:     ST segments:  Normal  T waves:     T waves: normal    Other findings:     Other findings: LVH        ED Medication and Procedure Management   Prior to Admission Medications   Prescriptions Last Dose Informant Patient Reported? Taking?   DULoxetine (CYMBALTA) 20 mg capsule   Yes No   Multiple Vitamin (multivitamin) tablet   Yes No   Sig: Take 1 tablet by  mouth daily   acetaminophen (TYLENOL) 325 mg tablet   No No   Sig: Take 2 tablets (650 mg total) by mouth every 6 (six) hours as needed for mild pain, headaches or fever   albuterol (ProAir HFA) 90 mcg/act inhaler   No No   Sig: Inhale 2 puffs every 6 (six) hours as needed for wheezing   aspirin 325 mg tablet   Yes No   Sig: Take 325 mg by mouth daily   atorvastatin (LIPITOR) 20 mg tablet   Yes No   Sig: Take 20 mg by mouth daily   bisoprolol-hydrochlorothiazide (ZIAC) 2.5-6.25 MG per tablet  Self Yes No   Sig: Take 1 tablet by mouth daily   busPIRone (BUSPAR) 5 mg tablet   Yes No   Sig: Take 5 mg by mouth 3 (three) times a day   calcium citrate-vitamin D (CITRACAL+D) 315-200 MG-UNIT per tablet   Yes No   Sig: Take 1 tablet by mouth 2 (two) times a day   folic acid (FOLVITE) 1 mg tablet   Yes No   Sig: Take by mouth daily   hydroxychloroquine (PLAQUENIL) 200 mg tablet  Self Yes No   Sig: Take 200 mg by mouth daily   lisinopril (ZESTRIL) 10 mg tablet   Yes No   Sig: Take 10 mg by mouth daily   loratadine (CLARITIN) 10 mg tablet  Self Yes No   Sig: Take 10 mg by mouth daily   nystatin (MYCOSTATIN) 500,000 units/5 mL suspension   Yes No   Sig: Take 500,000 Units by mouth Three times a day   pantoprazole (PROTONIX) 40 mg tablet  Self Yes No   Sig: Take 40 mg by mouth daily   tolterodine (DETROL LA) 4 mg 24 hr capsule  Self Yes No   Sig: Take 4 mg by mouth daily      Facility-Administered Medications: None     Patient's Medications   Discharge Prescriptions    No medications on file     No discharge procedures on file.  ED SEPSIS DOCUMENTATION   Time reflects when diagnosis was documented in both MDM as applicable and the Disposition within this note       Time User Action Codes Description Comment    4/20/2025  6:27 PM Wander Morgan Add [K52.9] Colitis                  Wander Morgan MD  04/20/25 5608

## 2025-04-20 NOTE — ASSESSMENT & PLAN NOTE
Noted Hx of CABG -patient reports 4 stents in her heart  Maintained on aspirin  Denies any chest pain  Continue home Ziac and lisinopril  Continue home atorvastatin

## 2025-04-20 NOTE — ASSESSMENT & PLAN NOTE
"Patient presents with general malaise for 1 week and diarrhea for 3 to 4 days.   She reports several days of diarrhea and weight loss over the past week due to decreased appetite.   CT abdomen pelvis: \"Mild to moderate mural thickening of the descending and sigmoid colonic segments concerning for infectious/inflammatory segmental colitis. There is similar mild mural thickening of the mid transverse colon. There is no evidence of bowel obstruction, free intraperitoneal air, or drainable ascites. Large hiatal hernia is again noted.\"  Did not meet sepsis criteria  COVID/flu negative  No fevers, UA negative, lipase normal at 19  Amylase pending  WBC elevated at 10.60, trend  Blood cultures pending  Received Rocephin and Flagyl IV, continue  Start Bentyl  Start LR IVF at 75 mL/hr  On exam, patient denies any abdominal pain, fever, nausea, or vomiting  Monitor fever curve, vital signs, and symptoms  "

## 2025-04-20 NOTE — ASSESSMENT & PLAN NOTE
Lab Results   Component Value Date    HGB 10.8 (L) 04/20/2025    HGB 11.0 (L) 09/07/2024    HGB 12.4 06/01/2024    HCT 35.8 04/20/2025    HCT 35.5 09/07/2024    HCT 39.3 06/01/2024   Hemoglobin on admission 10.8  Iron panel pending

## 2025-04-20 NOTE — ASSESSMENT & PLAN NOTE
Patient reporting some generalized weakness  Josey questioning whether inpatient rehab and/or long-term care would be an option at time of discharge  Appreciate PT/OT recommendation  Appreciate case management recommendations

## 2025-04-20 NOTE — ASSESSMENT & PLAN NOTE
Family is concerned that patient may be experiencing failure to thrive  Appreciate nutrition recommendations

## 2025-04-21 ENCOUNTER — APPOINTMENT (INPATIENT)
Dept: NON INVASIVE DIAGNOSTICS | Facility: HOSPITAL | Age: 85
DRG: 391 | End: 2025-04-21
Payer: MEDICARE

## 2025-04-21 PROBLEM — E43 SEVERE PROTEIN-CALORIE MALNUTRITION (HCC): Status: ACTIVE | Noted: 2025-04-21

## 2025-04-21 LAB
25(OH)D3 SERPL-MCNC: 24.8 NG/ML (ref 30–100)
ALBUMIN SERPL BCG-MCNC: 2.9 G/DL (ref 3.5–5)
ALP SERPL-CCNC: 119 U/L (ref 34–104)
ALT SERPL W P-5'-P-CCNC: 15 U/L (ref 7–52)
ANION GAP SERPL CALCULATED.3IONS-SCNC: 6 MMOL/L (ref 4–13)
AORTIC ROOT: 3.1 CM
ASCENDING AORTA: 3.3 CM
AST SERPL W P-5'-P-CCNC: 25 U/L (ref 13–39)
ATRIAL RATE: 58 BPM
ATRIAL RATE: 62 BPM
BILIRUB SERPL-MCNC: 0.37 MG/DL (ref 0.2–1)
BSA FOR ECHO PROCEDURE: 1.34 M2
BUN SERPL-MCNC: 30 MG/DL (ref 5–25)
CALCIUM ALBUM COR SERPL-MCNC: 9.5 MG/DL (ref 8.3–10.1)
CALCIUM SERPL-MCNC: 8.6 MG/DL (ref 8.4–10.2)
CHLORIDE SERPL-SCNC: 106 MMOL/L (ref 96–108)
CO2 SERPL-SCNC: 27 MMOL/L (ref 21–32)
CREAT SERPL-MCNC: 1.31 MG/DL (ref 0.6–1.3)
E WAVE DECELERATION TIME: 144 MS
E/A RATIO: 1.44
ERYTHROCYTE [DISTWIDTH] IN BLOOD BY AUTOMATED COUNT: 17.8 % (ref 11.6–15.1)
EST. AVERAGE GLUCOSE BLD GHB EST-MCNC: 131 MG/DL
FERRITIN SERPL-MCNC: 49 NG/ML (ref 30–307)
FOLATE SERPL-MCNC: >22.3 NG/ML
FRACTIONAL SHORTENING: 29 (ref 28–44)
GFR SERPL CREATININE-BSD FRML MDRD: 37 ML/MIN/1.73SQ M
GLUCOSE SERPL-MCNC: 105 MG/DL (ref 65–140)
GLUCOSE SERPL-MCNC: 124 MG/DL (ref 65–140)
GLUCOSE SERPL-MCNC: 147 MG/DL (ref 65–140)
GLUCOSE SERPL-MCNC: 155 MG/DL (ref 65–140)
GLUCOSE SERPL-MCNC: 92 MG/DL (ref 65–140)
HBA1C MFR BLD: 6.2 %
HCT VFR BLD AUTO: 33 % (ref 34.8–46.1)
HGB BLD-MCNC: 10.1 G/DL (ref 11.5–15.4)
INTERVENTRICULAR SEPTUM IN DIASTOLE (PARASTERNAL SHORT AXIS VIEW): 1.4 CM
INTERVENTRICULAR SEPTUM: 1.4 CM (ref 0.6–1.1)
IRON SATN MFR SERPL: 8 % (ref 15–50)
IRON SERPL-MCNC: 22 UG/DL (ref 50–212)
LAAS-AP2: 19.9 CM2
LAAS-AP4: 17.1 CM2
LEFT ATRIUM SIZE: 4.7 CM
LEFT ATRIUM VOLUME (MOD BIPLANE): 56 ML
LEFT ATRIUM VOLUME INDEX (MOD BIPLANE): 41.8 ML/M2
LEFT INTERNAL DIMENSION IN SYSTOLE: 2.9 CM (ref 2.1–4)
LEFT VENTRICLE DIASTOLIC VOLUME (MOD BIPLANE): 77 ML
LEFT VENTRICLE DIASTOLIC VOLUME INDEX (MOD BIPLANE): 57.5 ML/M2
LEFT VENTRICLE SYSTOLIC VOLUME (MOD BIPLANE): 38 ML
LEFT VENTRICLE SYSTOLIC VOLUME INDEX (MOD BIPLANE): 28.4 ML/M2
LEFT VENTRICULAR INTERNAL DIMENSION IN DIASTOLE: 4.1 CM (ref 3.5–6)
LEFT VENTRICULAR POSTERIOR WALL IN END DIASTOLE: 1.4 CM
LEFT VENTRICULAR STROKE VOLUME: 41 ML
LV EF BIPLANE MOD: 50 %
LV EF US.2D.A4C+ESTIMATED: 52 %
LVSV (TEICH): 41 ML
MAGNESIUM SERPL-MCNC: 2.2 MG/DL (ref 1.9–2.7)
MCH RBC QN AUTO: 27.4 PG (ref 26.8–34.3)
MCHC RBC AUTO-ENTMCNC: 30.6 G/DL (ref 31.4–37.4)
MCV RBC AUTO: 89 FL (ref 82–98)
MV E'TISSUE VEL-SEP: 5 CM/S
MV PEAK A VEL: 0.73 M/S
MV PEAK E VEL: 105 CM/S
MV STENOSIS PRESSURE HALF TIME: 42 MS
MV VALVE AREA P 1/2 METHOD: 5.24
P AXIS: 20 DEGREES
P AXIS: 38 DEGREES
PLATELET # BLD AUTO: 357 THOUSANDS/UL (ref 149–390)
PMV BLD AUTO: 9.5 FL (ref 8.9–12.7)
POTASSIUM SERPL-SCNC: 4.5 MMOL/L (ref 3.5–5.3)
PR INTERVAL: 214 MS
PR INTERVAL: 222 MS
PROT SERPL-MCNC: 5.4 G/DL (ref 6.4–8.4)
QRS AXIS: -33 DEGREES
QRS AXIS: -65 DEGREES
QRSD INTERVAL: 154 MS
QRSD INTERVAL: 160 MS
QT INTERVAL: 476 MS
QT INTERVAL: 480 MS
QTC INTERVAL: 471 MS
QTC INTERVAL: 483 MS
RBC # BLD AUTO: 3.69 MILLION/UL (ref 3.81–5.12)
RIGHT ATRIUM AREA SYSTOLE A4C: 10.4 CM2
RIGHT VENTRICLE ID DIMENSION: 2.9 CM
SL CV LEFT ATRIUM LENGTH A2C: 5.3 CM
SL CV LV EF: 40
SL CV PED ECHO LEFT VENTRICLE DIASTOLIC VOLUME (MOD BIPLANE) 2D: 74 ML
SL CV PED ECHO LEFT VENTRICLE SYSTOLIC VOLUME (MOD BIPLANE) 2D: 33 ML
SODIUM SERPL-SCNC: 139 MMOL/L (ref 135–147)
T WAVE AXIS: 144 DEGREES
T WAVE AXIS: 98 DEGREES
TIBC SERPL-MCNC: 260.4 UG/DL (ref 250–450)
TR MAX PG: 24 MMHG
TR PEAK VELOCITY: 2.5 M/S
TRANSFERRIN SERPL-MCNC: 186 MG/DL (ref 203–362)
TRICUSPID ANNULAR PLANE SYSTOLIC EXCURSION: 1.6 CM
TRICUSPID VALVE PEAK REGURGITATION VELOCITY: 2.46 M/S
UIBC SERPL-MCNC: 238 UG/DL (ref 155–355)
VENTRICULAR RATE: 58 BPM
VENTRICULAR RATE: 62 BPM
VIT B12 SERPL-MCNC: 410 PG/ML (ref 180–914)
WBC # BLD AUTO: 8.16 THOUSAND/UL (ref 4.31–10.16)

## 2025-04-21 PROCEDURE — 93306 TTE W/DOPPLER COMPLETE: CPT

## 2025-04-21 PROCEDURE — 85027 COMPLETE CBC AUTOMATED: CPT

## 2025-04-21 PROCEDURE — 80053 COMPREHEN METABOLIC PANEL: CPT

## 2025-04-21 PROCEDURE — 93306 TTE W/DOPPLER COMPLETE: CPT | Performed by: INTERNAL MEDICINE

## 2025-04-21 PROCEDURE — 82948 REAGENT STRIP/BLOOD GLUCOSE: CPT

## 2025-04-21 PROCEDURE — 83735 ASSAY OF MAGNESIUM: CPT

## 2025-04-21 PROCEDURE — 97163 PT EVAL HIGH COMPLEX 45 MIN: CPT

## 2025-04-21 PROCEDURE — 99232 SBSQ HOSP IP/OBS MODERATE 35: CPT | Performed by: FAMILY MEDICINE

## 2025-04-21 PROCEDURE — 97166 OT EVAL MOD COMPLEX 45 MIN: CPT

## 2025-04-21 PROCEDURE — 93010 ELECTROCARDIOGRAM REPORT: CPT | Performed by: INTERNAL MEDICINE

## 2025-04-21 RX ADMIN — BUSPIRONE HYDROCHLORIDE 5 MG: 5 TABLET ORAL at 21:26

## 2025-04-21 RX ADMIN — ACETAMINOPHEN 650 MG: 325 TABLET ORAL at 17:24

## 2025-04-21 RX ADMIN — CEFTRIAXONE 1000 MG: 1 INJECTION, SOLUTION INTRAVENOUS at 21:30

## 2025-04-21 RX ADMIN — HYDROCHLOROTHIAZIDE 6.25 MG: 12.5 TABLET ORAL at 08:43

## 2025-04-21 RX ADMIN — DICYCLOMINE HYDROCHLORIDE 20 MG: 10 CAPSULE ORAL at 21:26

## 2025-04-21 RX ADMIN — BISOPROLOL FUMARATE 2.5 MG: 5 TABLET ORAL at 08:28

## 2025-04-21 RX ADMIN — HEPARIN SODIUM 5000 UNITS: 5000 INJECTION INTRAVENOUS; SUBCUTANEOUS at 08:11

## 2025-04-21 RX ADMIN — DICYCLOMINE HYDROCHLORIDE 20 MG: 10 CAPSULE ORAL at 16:53

## 2025-04-21 RX ADMIN — SODIUM CHLORIDE, SODIUM LACTATE, POTASSIUM CHLORIDE, AND CALCIUM CHLORIDE 75 ML/HR: .6; .31; .03; .02 INJECTION, SOLUTION INTRAVENOUS at 10:01

## 2025-04-21 RX ADMIN — METRONIDAZOLE 500 MG: 500 INJECTION, SOLUTION INTRAVENOUS at 08:33

## 2025-04-21 RX ADMIN — LISINOPRIL 10 MG: 10 TABLET ORAL at 08:28

## 2025-04-21 RX ADMIN — BUSPIRONE HYDROCHLORIDE 5 MG: 5 TABLET ORAL at 16:53

## 2025-04-21 RX ADMIN — HEPARIN SODIUM 5000 UNITS: 5000 INJECTION INTRAVENOUS; SUBCUTANEOUS at 21:26

## 2025-04-21 RX ADMIN — OXYBUTYNIN 10 MG: 5 TABLET, FILM COATED, EXTENDED RELEASE ORAL at 08:30

## 2025-04-21 RX ADMIN — HEPARIN SODIUM 5000 UNITS: 5000 INJECTION INTRAVENOUS; SUBCUTANEOUS at 14:51

## 2025-04-21 RX ADMIN — LORATADINE 10 MG: 10 TABLET ORAL at 08:29

## 2025-04-21 RX ADMIN — METRONIDAZOLE 500 MG: 500 INJECTION, SOLUTION INTRAVENOUS at 20:22

## 2025-04-21 RX ADMIN — BUSPIRONE HYDROCHLORIDE 5 MG: 5 TABLET ORAL at 08:29

## 2025-04-21 RX ADMIN — DULOXETINE HYDROCHLORIDE 20 MG: 20 CAPSULE, DELAYED RELEASE ORAL at 08:28

## 2025-04-21 RX ADMIN — DICYCLOMINE HYDROCHLORIDE 20 MG: 10 CAPSULE ORAL at 08:51

## 2025-04-21 RX ADMIN — ATORVASTATIN CALCIUM 20 MG: 20 TABLET, FILM COATED ORAL at 08:29

## 2025-04-21 RX ADMIN — ASPIRIN 325 MG ORAL TABLET 325 MG: 325 PILL ORAL at 08:29

## 2025-04-21 RX ADMIN — HYDROXYCHLOROQUINE SULFATE 200 MG: 200 TABLET, FILM COATED ORAL at 08:30

## 2025-04-21 RX ADMIN — FOLIC ACID 1 MG: 1 TABLET ORAL at 08:28

## 2025-04-21 NOTE — CASE MANAGEMENT
Case Management Assessment & Discharge Planning Note    Patient name Cheryle Mcleod  Location /-01 MRN [id number]  : 1940 Date 2025       Current Admission Date: 2025  Current Admission Diagnosis:Colitis   Patient Active Problem List    Diagnosis Date Noted Date Diagnosed    Colitis 2025     Failure to thrive in adult 2025     Hypomagnesemia 2025     Anemia 2025     Type 2 diabetes mellitus with stage 3b chronic kidney disease, without long-term current use of insulin (HCC) 2025     Pain in right wrist 2025     Chronic pain of both knees 2025     Primary osteoarthritis of right knee 2025     Troponin level elevated 2024     Elevated transaminase level 2024     Constipation 2024     Leukocytosis 2024     Acute respiratory failure with hypoxia ([facility]) 2024     Oral thrush 2023     Nonischemic nontraumatic myocardial injury 2023     Urinary incontinence 2023     Calculus of gallbladder without cholecystitis without obstruction 2023     Loss of appetite 2023     Acute bronchitis 2022     Coronary artery disease involving native coronary artery of native heart 2022     COVID 2022     Essential hypertension 2022     COPD (chronic obstructive pulmonary disease) ([facility]) 2022     Rheumatoid arthritis of multiple sites with negative rheumatoid factor ([facility]) 2022     GERD (gastroesophageal reflux disease) 2022     Fatty liver 2022     Generalized weakness 2022     RONALD (acute kidney injury) ([facility]) 2022     Prolonged Q-T interval on ECG 2022       LOS (days): 1  Geometric Mean LOS (GMLOS) (days): 2.5  Days to GMLOS:1.8     OBJECTIVE:    Risk of Unplanned Readmission Score: 19.24         Current admission status: Inpatient  Referral Reason:  (dc planning possible STR)    Preferred Pharmacy:   [id number] -  [place], [place] - [address] [place] [id number]  Phone: [phone] Fax: [phone]    Primary Care Provider: Brian Peraza DO    Primary Insurance: MEDICARE  Secondary Insurance:     CM met with patient at the bedside,baseline information  was obtained. CM discussed the role of CM in helping the patient develop a discharge plan and assist the patient in carry out their plan.      ASSESSMENT:  Active Health Care Proxies    There are no active Health Care Proxies on file.       Advance Directives  Does patient have a Health Care POA?: Yes  Does patient have Advance Directives?:  (unsure)  Primary Contact: Cheryle Cervantes (Daughter)  [phone]         Readmission Root Cause  30 Day Readmission: No    Patient Information  Admitted from:: Home  Mental Status: Alert  During Assessment patient was accompanied by: Not accompanied during assessment  Assessment information provided by:: Patient  Primary Caregiver: Family  Support Systems: Family members  [facility] Residence[place] [facility]  What [facility] do you live in?: [place]  Home entry access options. Select all that apply.: Stairs  Number of steps to enter home.:  (dependent on front door, 15 and back way 4 steps)  Type of Current Residence: Apartment  Floor Level: 1  Upon entering residence, is there a bedroom on the main floor (no further steps)?:  (per pt inside the apartmentp there is 5 steps to get up to her bedroom and bathroom)  Living Arrangements: Lives w/ Daughter, Lives w/ Family members (lives with daughter, son in law, granddaughter and ? GGD)  Is patient a ?: No    Activities of Daily Living Prior to Admission  Functional Status: Assistance  Completes ADLs independently?: No  Level of ADL dependence: Assistance  Ambulates independently?: Yes  Does patient use assisted devices?: Yes  Assisted Devices (DME) used: Rollator  Does patient currently own DME?: Yes  What DME does the patient currently own?:  Rollator  Does patient have a history of Outpatient Therapy (PT/OT)?: No  Does the patient have a history of Short-Term Rehab?: Yes ([facility])  Does patient have a history of HHC?: Yes ([facility])  Does patient currently have HHC?: No         Patient Information Continued  Income Source: Pension/penitentiary  Does patient have prescription coverage?: Yes  Can the patient afford their medications and any related supplies (such as glucometers or test strips)?: Yes  Does patient receive dialysis treatments?: No  Does patient have a history of substance abuse?: No  Does patient have a history of Mental Health Diagnosis?: Yes (anxiety, on medications)  Is patient receiving treatment for mental health?: Yes  Has patient received inpatient treatment related to mental health in the last 2 years?: No         Means of Transportation  Means of Transport to [facility]:: Family transport      Other- patient resides with daughter, Cheryle, her NICK, grandchild.  Pt uses a Rolator for ambulating. Does assist and can make sandwiches for lunch, has cereal for breakfast and then eats supper with her family in the evening.  Had waiver services in the past.  Hx of [facility].  Pt had 6 children, however 1  with COVID.  Patient enquired about STR, questions were answered.  Per patient her daughter, Jeannie was diagnosed with Stage 4 Cancer.  Had 02 in the past, however she gave this back.    DISCHARGE DETAILS:    Discharge planning discussed with:: patient  Freedom of Choice: Yes  Comments - Freedom of Choice: Discussed potentialy dc needs--- pt feels at this time, she may need STR  CM contacted family/caregiver?:  (will follow up with daughter, once therapy evaluation is completed)           Contacts  Patient Contacts: Cheryle  Contact Method: Phone  Reason/Outcome: Discharge Planning          Discharge Destination Plan::  (to be determined)            Will follow up with dc planning, post therapy evaluation and discuss with  daughter and patient, recommendations.

## 2025-04-21 NOTE — ASSESSMENT & PLAN NOTE
Lab Results   Component Value Date    HGB 10.1 (L) 04/21/2025    HGB 10.8 (L) 04/20/2025    HGB 11.0 (L) 09/07/2024    HCT 33.0 (L) 04/21/2025    HCT 35.8 04/20/2025    HCT 35.5 09/07/2024   Hemoglobin on admission 10.8  Iron panel pending

## 2025-04-21 NOTE — OCCUPATIONAL THERAPY NOTE
Occupational Therapy Evaluation     Patient Name: Cheryle Mcleod  Today's Date: 4/21/2025  Problem List  Principal Problem:    Colitis  Active Problems:    Essential hypertension    COPD (chronic obstructive pulmonary disease) (HCC)    GERD (gastroesophageal reflux disease)    Generalized weakness    Coronary artery disease involving native coronary artery of native heart    Troponin level elevated    Failure to thrive in adult    Hypomagnesemia    Anemia    Past Medical History  Past Medical History:   Diagnosis Date    Arthritis     Asthma     COPD (chronic obstructive pulmonary disease) (HCC)     Coronary artery disease     Diabetes mellitus (HCC)     GERD (gastroesophageal reflux disease)     Hypertension     MI, old      Past Surgical History  Past Surgical History:   Procedure Laterality Date    BACK SURGERY      CARDIAC SURGERY      HYSTERECTOMY             04/21/25 1333   OT Last Visit   OT Visit Date 04/21/25   Note Type   Note type Evaluation   Pain Assessment   Pain Assessment Tool FLACC   Pain Score No Pain   Pain Location/Orientation Orientation: Bilateral;Location: Shoulder;Location: Knee  (Pt reports no pain at start of session; however, pt's body language indicates pt has pain.)   Pain Rating: FLACC (Rest) - Face 1   Pain Rating: FLACC (Rest) - Legs 0   Pain Rating: FLACC (Rest) - Activity 0   Pain Rating: FLACC (Rest) - Cry 1   Pain Rating: FLACC (Rest) - Consolability 0   Score: FLACC (Rest) 2   Pain Rating: FLACC (Activity) - Face 1   Pain Rating: FLACC (Activity) - Legs 0   Pain Rating: FLACC (Activity) - Activity 0   Pain Rating: FLACC (Activity) - Cry 1   Pain Rating: FLACC (Activity) - Consolability 0   Score: FLACC (Activity) 2   Restrictions/Precautions   Other Precautions Chair Alarm;Bed Alarm;Visual impairment  (legally blind)   Home Living   Type of Home Apartment   Home Layout Two level;Stairs to enter with rails  (16-17STE c HR from front, 2 +2 RONNELL c HR from back; 5 steps from  "kitchen to dining room/ living room, bedrooms)   Bathroom Shower/Tub Walk-in shower   Bathroom Toilet Standard   Bathroom Equipment Grab bars in shower   Home Equipment Walker;Cane  (2 RWs, one upstairs and one downstairs; rollator (does not use))   Prior Function   Level of [place] Independent with ADLs;Independent with functional mobility;Needs assistance with IADLS   Lives With Daughter  (NICK, great grand daughter)   Receives Help From Family   IADLs Family/Friend/Other provides medication management;Family/Friend/Other provides transportation;Family/Friend/Other provides meals   Falls in the last 6 months 0   Vocational Retired   Subjective   Subjective \"They tell me I should walk more.\"   ADL   Grooming Assistance 5  Supervision/Setup   UB Bathing Assistance 5  Supervision/Setup   LB Bathing Assistance 5  Supervision/Setup   UB Dressing Assistance 5  Supervision/Setup   LB Dressing Assistance 5  Supervision/Setup   Toileting Assistance  5  Supervision/Setup   Additional Comments Given fxl performance skills + medical complexity, therapist suspecting via clinical judgement + skilled analysis; pt currently requires stated assist above to perform each area of ADL d/t limitations including: pain, generalized muscle weakness, sitting/standing balance deficits, fxl activity tolerance limitations, difficulty performing bend/reach-anterior trunk flexion, decreased core strength, instability in stance, safety awareness concerns, and decreased problem solving abilities.   Bed Mobility   Supine to Sit 5  Supervision   Additional items Increased time required;Verbal cues   Additional Comments OOB in chair at end of session, all needs within reach.   Transfers   Sit to Stand 5  Supervision   Additional items Increased time required;Verbal cues   Stand to Sit 5  Supervision   Additional items Increased time required;Verbal cues   Additional Comments RW used   Functional Mobility   Functional Mobility 5  Supervision " "  Additional Comments Pt participates in FM within room/hallway x household distances with use of RW, SPV for safety. Increased time due to slow pace. Pt demo no significant LOB. Pt with c/o R knee pain upon standing. Pt on RA, no c/o dizziness throughout session. HR ranges 60s-80s throughout. BP sittin/98, BP standin/54, BP standing x 3 min: 98/53, BP at conclusion of FM: 123/69. Stair mgmt completed with Min A x 1 to ascend/descend x 4 stairs. Pt appears SOB, SPO2 is WFL on RA.   Additional items Rolling walker   Balance   Static Sitting Good   Dynamic Sitting Fair +   Static Standing Fair   Dynamic Standing Fair -   Activity Tolerance   Activity Tolerance Patient limited by fatigue;Patient limited by pain   Nurse Made Aware CHRISTINE FINLEY Assessment   RUE Assessment WFL  (ROM WFL, strength grossly 3+/5; shoulder strength not tested due to pain B shoulders with ROM)   LUE Assessment   LUE Assessment WFL  (ROM WFL, strength grossly 3+/5; shoulder strength not tested due to pain B shoulders with ROM)   Hand Function   Gross Motor Coordination Functional   Fine Motor Coordination Impaired   Hand Function Comments FMC limited by reported arthritis, pain L hand   Sensation   Light Touch No apparent deficits   Sharp/Dull No apparent deficits   Vision-Basic Assessment   Current Vision Wears glasses all the time  (states \"they are worthless\")   Visual History Other (Comment)  (legally blind)   Psychosocial   Psychosocial (WDL) WDL   Cognition   Overall Cognitive Status WFL   Arousal/Participation Alert;Cooperative   Attention Attends with cues to redirect   Orientation Level Oriented X4   Memory Decreased short term memory   Following Commands Follows one step commands without difficulty   Assessment   Limitation Decreased ADL status;Decreased UE strength;Decreased Safe judgement during ADL;Decreased endurance;Decreased self-care trans;Decreased high-level ADLs   Prognosis Fair   Assessment Pt is a 84 y.o. " female, admitted to   [facility]  2025 d/t experiencing diarrhea, weight loss, weakness. Dx: diarrhea, colitis. Pt with PMHx impacting their performance during ADL tasks, including: COPD, back sx, arthritis. Prior to admission to the hospital Pt was performing ADLs without physical assistance. IADLs with physical assistance. Functional transfers/ambulation without physical assistance. Cognitive status PTA was WFL, A/O x 3. OT order placed to assess Pt's ADLs, cognitive status, and performance during functional tasks in order to maximize safety and independence while making most appropriate d/c recommendations. PT/OT co-evaluation completed at this time d/t medical complexity, significant mobility deficits and safety concerns. Pt's clinical presentation is currently evolving given new onset deficits that affect Pt's occupational performance and ability to safely return to PLOF including decrease activity tolerance, decrease standing balance, decrease sitting balance, decrease performance during ADL tasks, decrease safety awareness , decrease UB MS, decrease activity engagement, and steps to enter home combined with medical complications of  colitis, diarrhea . NoO2 needs at this time. Pt on RA throughout, SPO2 remains WFL. BP sittin/98, BP standin/54, BP standing x 3 min: 98/53, BP at conclusion of FM: 123/69  Personal factors affecting Pt at time of initial evaluation include: step(s) to enter environment, multi-level environment, advanced age, inability to perform IADLs, inability to perform ADLs, and inability to navigate community distances. Pt will benefit from continued skilled OT services to address deficits as defined above and to maximize level independence/participation during ADLs and functional tasks to facilitate return toward PLOF and improved quality of life. From an occupational therapy standpoint, Level III (Minimum Resource Intensity) is recommended upon d/c.   Plan   Treatment  Interventions ADL retraining;UE strengthening/ROM;Functional transfer training;Endurance training;Compensatory technique education;Energy conservation;Activityengagement   Goal Expiration Date 05/05/25   OT Frequency 2-3x/wk   Discharge Recommendation   Rehab Resource Intensity Level, OT III (Minimum Resource Intensity)   AM-PAC Daily Activity Inpatient   Lower Body Dressing 3   Bathing 3   Toileting 3   Upper Body Dressing 3   Grooming 3   Eating 4   Daily Activity Raw Score 19   Daily Activity Standardized Score (Calc for Raw Score >=11) 40.22   AM-PAC Applied Cognition Inpatient   Following a Speech/Presentation 4   Understanding Ordinary Conversation 4   Taking Medications 2   Remembering Where Things Are Placed or Put Away 3   Remembering List of 4-5 Errands 2   Taking Care of Complicated Tasks 2   Applied Cognition Raw Score 17   Applied Cognition Standardized Score 36.52       The patient's raw score on the AM-PAC Daily Activity Inpatient Short Form is 19. A raw score of greater than or equal to 19 suggests the patient may benefit from discharge to home. Please refer to the recommendation of the Occupational Therapist for safe discharge planning.    Pt goals to be met by 5/5/25:  OCCUPATIONAL THERAPY GOALS:  Mod I/I with all UB and LB ADLs with AD prn   Mod I/I with all functional mobility and transfers while demonstrating good safety and judgement  Mod I/I with toileting and clothing management   Increase activity tolerance to 40-45 minutes in order to participate in ADLs and leisure activities   Demonstrate good carryover of body mechanics and energy conservation techniques in order to participate in functional mobility, transfers, ADLs, IADLs, and leisure exploration   Assess DME needs        Pt will benefit from continued OT services in order to maximize (I) c ADL performance, FM c least restrictive AD, and improve overall endurance/strength required to complete functional tasks in preparation for  d/c.    Pt benefited from co-evaluation of skilled OT and PT therapists in order to most appropriately address functional deficits d/t extensive assistance required for safe functional mobility, decreased activity tolerance, and regression from functioning level prior to admission and/or onset of present illness. OT/PT objectives were addressed separately; please see PT note for specific goal areas targeted.    Pt left seated in chair at end of session; all needs within reach; all lines intact; scds connected and turned on.    Marissa Harley, OTR/L

## 2025-04-21 NOTE — ASSESSMENT & PLAN NOTE
Lab Results   Component Value Date    MG 2.2 04/21/2025    MG 1.7 (L) 04/20/2025    MG 1.7 (L) 05/10/2024   Magnesium on admission noted to be 1.7  Received mag sulfate 2 g IV  Trend magnesium

## 2025-04-21 NOTE — PLAN OF CARE
Problem: OCCUPATIONAL THERAPY ADULT  Goal: Performs self-care activities at highest level of function for planned discharge setting.  See evaluation for individualized goals.  Description: Treatment Interventions: ADL retraining, UE strengthening/ROM, Functional transfer training, Endurance training, Compensatory technique education, Energy conservation, Activityengagement          See flowsheet documentation for full assessment, interventions and recommendations.   4/21/2025 1449 by Marissa Harley OT  Outcome: Progressing  Note: Limitation: Decreased ADL status, Decreased UE strength, Decreased Safe judgement during ADL, Decreased endurance, Decreased self-care trans, Decreased high-level ADLs  Prognosis: Fair  Assessment: Pt is a 84 y.o. female, admitted to   [facility]  4/20/2025 d/t experiencing diarrhea, weight loss, weakness. Dx: diarrhea, colitis. Pt with PMHx impacting their performance during ADL tasks, including: COPD, back sx, arthritis. Prior to admission to the hospital Pt was performing ADLs without physical assistance. IADLs with physical assistance. Functional transfers/ambulation without physical assistance. Cognitive status PTA was WFL, A/O x 3. OT order placed to assess Pt's ADLs, cognitive status, and performance during functional tasks in order to maximize safety and independence while making most appropriate d/c recommendations. PT/OT co-evaluation completed at this time d/t medical complexity, significant mobility deficits and safety concerns. Pt's clinical presentation is currently evolving given new onset deficits that affect Pt's occupational performance and ability to safely return to PLOF including decrease activity tolerance, decrease standing balance, decrease sitting balance, decrease performance during ADL tasks, decrease safety awareness , decrease UB MS, decrease activity engagement, and steps to enter home combined with medical complications of  colitis, diarrhea . NoO2 needs at this  time. Pt on RA throughout, SPO2 remains WFL. BP sittin/98, BP standin/54, BP standing x 3 min: 98/53, BP at conclusion of FM: 123/69  Personal factors affecting Pt at time of initial evaluation include: step(s) to enter environment, multi-level environment, advanced age, inability to perform IADLs, inability to perform ADLs, and inability to navigate community distances. Pt will benefit from continued skilled OT services to address deficits as defined above and to maximize level independence/participation during ADLs and functional tasks to facilitate return toward PLOF and improved quality of life. From an occupational therapy standpoint, Level III (Minimum Resource Intensity) is recommended upon d/c.     Rehab Resource Intensity Level, OT: III (Minimum Resource Intensity)       2025 1448 by Marissa Harley OT  Outcome: Progressing  Note: Limitation: Decreased ADL status, Decreased UE strength, Decreased Safe judgement during ADL, Decreased endurance, Decreased self-care trans, Decreased high-level ADLs  Prognosis: Fair  Assessment: Pt is a 84 y.o. female, admitted to   [facility]  2025 d/t experiencing diarrhea, weight loss, weakness. Dx: diarrhea, colitis. Pt with PMHx impacting their performance during ADL tasks, including: COPD, back sx, arthritis. Prior to admission to the hospital Pt was performing ADLs without physical assistance. IADLs with physical assistance. Functional transfers/ambulation without physical assistance. Cognitive status PTA was WFL, A/O x 3. OT order placed to assess Pt's ADLs, cognitive status, and performance during functional tasks in order to maximize safety and independence while making most appropriate d/c recommendations. PT/OT co-evaluation completed at this time d/t medical complexity, significant mobility deficits and safety concerns. Pt's clinical presentation is currently evolving given new onset deficits that affect Pt's occupational performance and ability to  safely return to PLOF including decrease activity tolerance, decrease standing balance, decrease sitting balance, decrease performance during ADL tasks, decrease safety awareness , decrease UB MS, decrease activity engagement, and steps to enter home combined with medical complications of  colitis, diarrhea . NoO2 needs at this time. Pt on RA throughout, SPO2 remains WFL. BP sittin/98, BP standin/54, BP standing x 3 min: 98/53, BP at conclusion of FM: 123/69  Personal factors affecting Pt at time of initial evaluation include: step(s) to enter environment, multi-level environment, advanced age, inability to perform IADLs, inability to perform ADLs, and inability to navigate community distances. Pt will benefit from continued skilled OT services to address deficits as defined above and to maximize level independence/participation during ADLs and functional tasks to facilitate return toward PLOF and improved quality of life. From an occupational therapy standpoint, Level III (Minimum Resource Intensity) is recommended upon d/c.     Rehab Resource Intensity Level, OT: III (Minimum Resource Intensity)

## 2025-04-21 NOTE — CASE MANAGEMENT
Case Management Discharge Planning Note    Patient name Cheryle Mcleod  Location /-01 MRN [id number]  : 1940 Date 2025       Current Admission Date: 2025  Current Admission Diagnosis:Colitis   Patient Active Problem List    Diagnosis Date Noted Date Diagnosed    Severe protein-calorie malnutrition (HCC) 2025     Colitis 2025     Failure to thrive in adult 2025     Hypomagnesemia 2025     Anemia 2025     Type 2 diabetes mellitus with stage 3b chronic kidney disease, without long-term current use of insulin (HCC) 2025     Pain in right wrist 2025     Chronic pain of both knees 2025     Primary osteoarthritis of right knee 2025     Troponin level elevated 2024     Elevated transaminase level 2024     Constipation 2024     Leukocytosis 2024     Acute respiratory failure with hypoxia ([facility]) 2024     Oral thrush 2023     Nonischemic nontraumatic myocardial injury 2023     Urinary incontinence 2023     Calculus of gallbladder without cholecystitis without obstruction 2023     Loss of appetite 2023     Acute bronchitis 2022     Coronary artery disease involving native coronary artery of native heart 2022     COVID 2022     Essential hypertension 2022     COPD (chronic obstructive pulmonary disease) ([facility]) 2022     Rheumatoid arthritis of multiple sites with negative rheumatoid factor ([facility]) 2022     GERD (gastroesophageal reflux disease) 2022     Fatty liver 2022     Generalized weakness 2022     RONALD (acute kidney injury) ([facility]) 2022     Prolonged Q-T interval on ECG 2022       LOS (days): 1  Geometric Mean LOS (GMLOS) (days): 2.5  Days to GMLOS:1.6     OBJECTIVE:  Risk of Unplanned Readmission Score: 19.24         Current admission status: Inpatient   Preferred Pharmacy:   [facility]/pharm[facility] #[id number] - [place] -  [address] [address]  Phone: [phone] Fax: [phone]    Primary Care Provider: Brian Pearza DO    Primary Insurance: MEDICARE  Secondary Insurance:     DISCHARGE DETAILS:        Call placed to Cheryle Cervantes (Daughter) [phone] (M ) to discussed dc planning, await a call back.  Early referral placed to [facility] to see if they can accommodate patient as per request of daughter, per provider.     Received a call back from Cheryle, daughter, we discussed that patient did well with therapy and recommendation is level 3. Cheryle is hopeful to have pt be independent and also in a routine to take her medications and get on a better routine. Per Daughter, choice is to go to [facility].    Referral is pending at this time.

## 2025-04-21 NOTE — PROGRESS NOTES
"Progress Note - Hospitalist   Name: Cheryle Mcleod 84 y.o. female I MRN: [id number]  Unit/Bed#: -01 I Date of Admission: 4/20/2025   Date of Service: 4/21/2025 I Hospital Day: 1    Assessment & Plan  Colitis  Patient presents with general malaise for 1 week and diarrhea for 3 to 4 days.   She reports several days of diarrhea and weight loss over the past week due to decreased appetite.   CT abdomen pelvis: \"Mild to moderate mural thickening of the descending and sigmoid colonic segments concerning for infectious/inflammatory segmental colitis. There is similar mild mural thickening of the mid transverse colon. There is no evidence of bowel obstruction, free intraperitoneal air, or drainable ascites. Large hiatal hernia is again noted.\"  Did not meet sepsis criteria  COVID/flu negative  No fevers, UA negative, lipase normal at 19  Amylase pending  WBC elevated at 10.60, trend  Blood cultures pending  Received Rocephin and Flagyl IV, continue  Start Bentyl  Start LR IVF at 75 mL/hr  On exam, patient denies any abdominal pain, fever, nausea, or vomiting  Monitor fever curve, vital signs, and symptoms  Troponin level elevated  Noted history of chronically elevated troponin  EKG in the ED without ST segment abnormality = NSR with 1st degree AV block, RBBB left anterior fascicular block, bifascicular block. Left ventricular hypertrophy with repolarizing abnormality. Possible lateral infarct.   Obtain serial EKG with any chest pain and/or elevated troponin levels  Troponin: 194 -> 220, trend  BNP elevated at 1769  Patient denies chest pain  Suspected secondary to colitis  Hypomagnesemia  Lab Results   Component Value Date    MG 2.2 04/21/2025    MG 1.7 (L) 04/20/2025    MG 1.7 (L) 05/10/2024   Magnesium on admission noted to be 1.7  Received mag sulfate 2 g IV  Trend magnesium  Generalized weakness  Patient reporting some generalized weakness  Josey questioning whether inpatient rehab and/or long-term care " would be an option at time of discharge  Appreciate PT/OT recommendation  Appreciate case management recommendations  Failure to thrive in adult  Family is concerned that patient may be experiencing failure to thrive  Appreciate nutrition recommendations  Anemia  Lab Results   Component Value Date    HGB 10.1 (L) 04/21/2025    HGB 10.8 (L) 04/20/2025    HGB 11.0 (L) 09/07/2024    HCT 33.0 (L) 04/21/2025    HCT 35.8 04/20/2025    HCT 35.5 09/07/2024   Hemoglobin on admission 10.8  Iron panel pending  Essential hypertension  BP in the /60, current /54  hold home hctz and lisinopril due to diony  Monitor BP  COPD (chronic obstructive pulmonary disease) ([facility])  Noted history of COPD  Not in acute exacerbation  Continue Home albuterol inhaler as needed  GERD (gastroesophageal reflux disease)  Continue home pantoprazole  Coronary artery disease involving native coronary artery of native heart  Noted Hx of CABG -patient reports 4 stents in her heart  Maintained on aspirin  Denies any chest pain  hold home Ziac and lisinopril  Continue home atorvastatin    Dehydration 2/2 diarrhea a/e/b Fatigue, weakness, decreased PO intake requiring IVF, IV Mag sulfate, Cardiac diet and nutrition recommendations   VTE Pharmacologic Prophylaxis: VTE Score: 5 Moderate Risk (Score 3-4) - Pharmacological DVT Prophylaxis Ordered: heparin.    Mobility:   Basic Mobility Inpatient Raw Score: 19  JH-HLM Goal: 6: Walk 10 steps or more  JH-HLM Achieved: 3: Sit at edge of bed  JH-HLM Goal achieved. Continue to encourage appropriate mobility.    Patient Centered Rounds: I performed bedside rounds with nursing staff today.   Discussions with Specialists or Other Care Team Provider: none    Education and Discussions with Family / Patient: will update family    Current Length of Stay: 1 day(s)  Current Patient Status: Inpatient   Certification Statement: The patient will continue to require additional inpatient hospital stay due to acute  colitis  Discharge Plan: Anticipate discharge in 24-48 hrs to home with home services.    Code Status: Level 1 - Full Code    Subjective   Patient states that she is feeling better today denies any abdominal pain or diarrhea today.    Objective :  Temp:  [97 °F (36.1 °C)-98.2 °F (36.8 °C)] 97.8 °F (36.6 °C)  HR:  [58-84] 71  BP: ()/(53-98) 123/69  Resp:  [16-18] 18  SpO2:  [89 %-100 %] 97 %  O2 Device: None (Room air)    Body mass index is 21.64 kg/m².     Input and Output Summary (last 24 hours):     Intake/Output Summary (Last 24 hours) at 4/21/2025 1454  Last data filed at 4/21/2025 0956  Gross per 24 hour   Intake 966.25 ml   Output 300 ml   Net 666.25 ml       Physical Exam  Vitals and nursing note reviewed.   Constitutional:       Appearance: Normal appearance.   HENT:      Head: Normocephalic and atraumatic.      Right Ear: External ear normal.      Left Ear: External ear normal.      Nose: Nose normal.      Mouth/Throat:      Pharynx: Oropharynx is clear.   Cardiovascular:      Rate and Rhythm: Normal rate and regular rhythm.      Heart sounds: Normal heart sounds.   Pulmonary:      Effort: Pulmonary effort is normal.      Breath sounds: Normal breath sounds.   Abdominal:      General: Bowel sounds are normal.      Palpations: Abdomen is soft.      Tenderness: There is no abdominal tenderness.   Musculoskeletal:         General: Normal range of motion.      Cervical back: Normal range of motion and neck supple.   Skin:     General: Skin is warm and dry.      Capillary Refill: Capillary refill takes less than 2 seconds.   Neurological:      General: No focal deficit present.      Mental Status: She is alert and oriented to person, place, and time.   Psychiatric:         Mood and Affect: Mood normal.           Lines/Drains:              Lab Results: I have reviewed the following results:   Results from last 7 days   Lab Units 04/21/25  0623 04/20/25  1627   WBC Thousand/uL 8.16 10.60*   HEMOGLOBIN g/dL  10.1* 10.8*   HEMATOCRIT % 33.0* 35.8   PLATELETS Thousands/uL 357 463*   SEGS PCT %  --  74   LYMPHO PCT %  --  16   MONO PCT %  --  8   EOS PCT %  --  1     Results from last 7 days   Lab Units 04/21/25  0623   SODIUM mmol/L 139   POTASSIUM mmol/L 4.5   CHLORIDE mmol/L 106   CO2 mmol/L 27   BUN mg/dL 30*   CREATININE mg/dL 1.31*   ANION GAP mmol/L 6   CALCIUM mg/dL 8.6   ALBUMIN g/dL 2.9*   TOTAL BILIRUBIN mg/dL 0.37   ALK PHOS U/L 119*   ALT U/L 15   AST U/L 25   GLUCOSE RANDOM mg/dL 105         Results from last 7 days   Lab Units 04/21/25  1134 04/21/25  0759   POC GLUCOSE mg/dl 155* 92     Results from last 7 days   Lab Units 04/20/25  2139   HEMOGLOBIN A1C % 6.2*     Results from last 7 days   Lab Units 04/20/25  2139   LACTIC ACID mmol/L 1.2   PROCALCITONIN ng/ml 0.15       Recent Cultures (last 7 days):   Results from last 7 days   Lab Units 04/20/25  1828   BLOOD CULTURE  Received in Microbiology Lab. Culture in Progress.  Received in Microbiology Lab. Culture in Progress.       Imaging Results Review: I reviewed radiology reports from this admission including: CT abdomen/pelvis.  Other Study Results Review: EKG was reviewed.     Last 24 Hours Medication List:     Current Facility-Administered Medications:     acetaminophen (TYLENOL) tablet 650 mg, Q6H PRN    albuterol (PROVENTIL HFA,VENTOLIN HFA) inhaler 2 puff, Q6H PRN    aspirin tablet 325 mg, Daily    atorvastatin (LIPITOR) tablet 20 mg, Daily    bisoprolol (ZEBETA) tablet 2.5 mg, Daily    busPIRone (BUSPAR) tablet 5 mg, TID    calcium carbonate (TUMS) chewable tablet 1,000 mg, Daily PRN    cefTRIAXone (ROCEPHIN) IVPB (premix in dextrose) 1,000 mg 50 mL, Q24H, Last Rate: 1,000 mg (04/20/25 2221) **AND** metroNIDAZOLE (FLAGYL) IVPB (premix) 500 mg 100 mL, Q12H, Last Rate: Stopped (04/21/25 0956)    dicyclomine (BENTYL) capsule 20 mg, TID    DULoxetine (CYMBALTA) delayed release capsule 20 mg, Daily    folic acid (FOLVITE) tablet 1 mg, Daily    heparin  (porcine) subcutaneous injection 5,000 Units, Q8H HUSSEIN **AND** [CANCELED] Platelet count, Once    hydroxychloroquine (PLAQUENIL) tablet 200 mg, Daily    lactated ringers infusion, Continuous, Last Rate: 75 mL/hr (04/21/25 1001)    loratadine (CLARITIN) tablet 10 mg, Daily    ondansetron (ZOFRAN) injection 4 mg, Q6H PRN    oxybutynin (DITROPAN-XL) 24 hr tablet 10 mg, Daily    Administrative Statements   Today, Patient Was Seen By: Alice Cali MD      **Please Note: This note may have been constructed using a voice recognition system.**

## 2025-04-21 NOTE — PLAN OF CARE
Problem: PHYSICAL THERAPY ADULT  Goal: Performs mobility at highest level of function for planned discharge setting.  See evaluation for individualized goals.  Description: Treatment/Interventions: Functional transfer training, LE strengthening/ROM, Elevations, Therapeutic exercise, Endurance training, Patient/family training, Equipment eval/education, Bed mobility, Gait training, Compensatory technique education, Spoke to nursing, OT          See flowsheet documentation for full assessment, interventions and recommendations.  Note: Prognosis: Good  Problem List: Decreased strength, Decreased endurance, Impaired balance, Decreased mobility, Decreased safety awareness  Assessment: Pt is a 84 y.o. female seen for PT evaluation s/p admission to [facility] on 4/20/2025 with Colitis.  Order placed for PT services.  Upon evaluation: Pt is presenting with impaired functional mobility due to decreased strength, decreased endurance, impaired balance, gait deviations, decreased safety awareness, and fall risk requiring  supervision assistance for bed mobility, transfers, and ambulation with RW . Pt's clinical presentation is currently unstable/unpredictable given the functional mobility deficits above coupled with fall risks as indicated by AM-PAC 6-Clicks: 19/24 as well as impaired balance, polypharmacy, and decreased safety awareness and combined with medical complications of hypertension , pain impacting overall mobility status, abnormal renal lab values, and abnormal H&H.  Pt's PMHx and comorbidities that may affect physical performance and progress include: asthma, CAD, COPD, CKD, DM, HTN, and Rheumatoid arthritis . Personal factors affecting pt at time of IE include: step(s) to enter environment, multi-level environment, advanced age, inability to perform IADLs, and inability to navigate level surfaces without external assistance. Pt will benefit from continued skilled PT services to address  deficits as defined above and to maximize level of functional mobility to facilitate return toward PLOF and improved QOL. From PT/mobility standpoint, recommendation at time of d/c would be Level III (Minimum Resource Intensity) pending progress in order to reduce fall risk and maximize pt's functional independence and consistency with mobility in order to facilitate return to PLOF.  Recommend trial with walker next 1-2 sessions and ther ex next 1-2 sessions.     Barriers to Discharge Comments: Recommend accessing home via 2+2 RONNELL, pt reporting family frequently requests her negotiate 16-17 RONNELL  Rehab Resource Intensity Level, PT: III (Minimum Resource Intensity)    See flowsheet documentation for full assessment.

## 2025-04-21 NOTE — MALNUTRITION/BMI
This medical record reflects one or more clinical indicators suggestive of malnutrition.    Malnutrition Findings:   Adult Malnutrition type: Chronic illness  Adult Degree of Malnutrition: Other severe protein calorie malnutrition  Malnutrition Characteristics: Inadequate energy, Weight loss                360 Statement: Chronic severe malnutrition related to poor appetite, inadequate oral intake as evidenced by < 75% estimated energy intake > 1 mo, 21.2% weight loss x 11 mo (5/10/24 127lb, 4/21/25 100lb). Treated with: Liberalize to regular diet. +Glucerna BID. Recommend daily weights for nutrition monitoring.    BMI Findings:           Body mass index is 21.64 kg/m².     See Nutrition note dated 4/21/25 for additional details.  Completed nutrition assessment is viewable in the nutrition documentation.

## 2025-04-21 NOTE — ASSESSMENT & PLAN NOTE
Noted Hx of CABG -patient reports 4 stents in her heart  Maintained on aspirin  Denies any chest pain  hold home Ziac and lisinopril  Continue home atorvastatin

## 2025-04-21 NOTE — PHYSICAL THERAPY NOTE
PHYSICAL THERAPY EVALUATION  NAME:  Cheryle Mcleod  DATE: 04/21/25    AGE:   84 y.o.  Mrn:   [id number]  ADMIT DX:  Diarrhea [R19.7]  Colitis [K52.9]    Past Medical History:   Diagnosis Date    Arthritis     Asthma     COPD (chronic obstructive pulmonary disease) (HCC)     Coronary artery disease     Diabetes mellitus (HCC)     GERD (gastroesophageal reflux disease)     Hypertension     MI, old      Length Of Stay: 1  Performed at least 2 patient identifiers during session: Name and Birthday  PHYSICAL THERAPY EVALUATION :     Evaluation time: 2746-3700  Evaluation length: 34 min       04/21/25 1334   Note Type   Note type Evaluation   Pain Assessment   Pain Assessment Tool 0-10   Pain Score No Pain   Restrictions/Precautions   Other Precautions Chair Alarm;Bed Alarm;Visual impairment  (legally blind)   Home Living   Type of Home Apartment   Home Layout Two level;Bed/bath upstairs;Stairs to enter with rails  (16-17 RONNELL c HR vs 2 + 2 RONNELL c HR. 5 steps from kitchen to living area/bedrooms)   Bathroom Shower/Tub Walk-in shower   Bathroom Toilet Standard   Bathroom Equipment Grab bars in shower   Home Equipment Walker;Cane  (2 RWs, 1 upstairs/1 downstairs, rollator doesn't use)   Additional Comments Pt reports living with dtr and NICK in condo with 16-17 v 2+2 RONNELL, RW at baseline   Prior Function   Level of [place] Independent with ADLs;Independent with functional mobility;Needs assistance with IADLS   Lives With Daughter  (NICK, great grand daughter)   Receives Help From Family   IADLs Family/Friend/Other provides medication management;Family/Friend/Other provides transportation;Family/Friend/Other provides meals   Falls in the last 6 months 0   Vocational Retired   Comments Pt reports completing ADLs and mobility with RW at mod I, has assistance with IADLs   Cognition   Overall Cognitive Status WFL   Orientation Level Oriented X4   Following Commands Follows one step commands without difficulty   ALICIA  Assessment   RLE Assessment WFL  (4-/5 strength)   LLE Assessment   LLE Assessment WFL  (4-/5 strength)   Light Touch   RLE Light Touch Grossly intact   LLE Light Touch Grossly intact   Bed Mobility   Supine to Sit 5  Supervision   Additional items Increased time required;Verbal cues   Additional Comments HOB nearly flat, supervision and VC for technique. Orthostatic BP obtained   Transfers   Sit to Stand 5  Supervision   Additional items Increased time required;Verbal cues   Stand to Sit 5  Supervision   Additional items Increased time required;Verbal cues   Stand pivot 5  Supervision   Additional items Increased time required;Verbal cues   Additional Comments RW for transfers with with supervision and VC for hand placement and safety, pt denies dizziness/light headedness throughout session. Increased time due to pain with mobility in R hip   Ambulation/Elevation   Gait pattern Improper Weight shift;Decreased foot clearance;Short stride;Excessively slow   Gait Assistance 5  Supervision   Additional items Verbal cues   Assistive Device Rolling walker   Distance 60' with RW and supervision and VC for RW management   Stair Management Assistance 4  Minimal assist   Additional items Assist x 1;Verbal cues   Stair Management Technique Two rails;Step to pattern   Number of Stairs 4   Balance   Static Sitting Good   Dynamic Sitting Fair +   Static Standing Fair   Dynamic Standing Fair -   Ambulatory Fair -   Endurance Deficit   Endurance Deficit Yes   Endurance Deficit Description fatigue   Activity Tolerance   Activity Tolerance Patient limited by fatigue   Medical Staff Made Aware Marissa GARCIA   Nurse Made Aware Jakob KING   Assessment   Prognosis Good   Problem List Decreased strength;Decreased endurance;Impaired balance;Decreased mobility;Decreased safety awareness   Barriers to Discharge Comments Recommend accessing home via 2+2 RONNELL, pt reporting family frequently requests her negotiate 16-17 RONNELL   Goals   Patient Goals  Go home   STG Expiration Date 05/05/25   Plan   Treatment/Interventions Functional transfer training;LE strengthening/ROM;Elevations;Therapeutic exercise;Endurance training;Patient/family training;Equipment eval/education;Bed mobility;Gait training;Compensatory technique education;Spoke to nursing;OT   PT Frequency 3-5x/wk   Discharge Recommendation   Rehab Resource Intensity Level, PT III (Minimum Resource Intensity)   AM-PAC Basic Mobility Inpatient   Turning in Flat Bed Without Bedrails 4   Lying on Back to Sitting on Edge of Flat Bed Without Bedrails 3   Moving Bed to Chair 3   Standing Up From Chair Using Arms 3   Walk in Room 3   Climb 3-5 Stairs With Railing 3   Basic Mobility Inpatient Raw Score 19   Basic Mobility Standardized Score 42.48   [facility] Highest Level Of Mobility   -HLM Goal 6: Walk 10 steps or more   -HLM Achieved 7: Walk 25 feet or more   End of Consult   Patient Position at End of Consult Bedside chair;Bed/Chair alarm activated;All needs within reach     The patient's AM-PAC Basic Mobility Inpatient Short Form Raw Score is 19  . A Raw score of greater than 16 suggests the patient may benefit from discharge to home. Please also refer to the recommendation of the Physical Therapist for safe discharge planning.     04/21/25 1352 04/21/25 1354 04/21/25 1356   Vitals   Pulse 63 66 64   Blood Pressure 116/98 94/54 98/53   MAP (mmHg) 104 67 68   Patient Position - Orthostatic VS Sitting - Orthostatic VS Standing - Orthostatic VS Standing for 3 minutes - Orthostatic VS   Oxygen Therapy   SpO2 98 % 94 % 92 %      04/21/25 1405   Vitals   Pulse 71   Blood Pressure 123/69   MAP (mmHg) 87   Patient Position - Orthostatic VS Sitting  (post gait)   Oxygen Therapy   SpO2 97 %       Pt requires PT /OT co-eval due to pt requires skilled interventions of at least 2 clinicians for care delivery, medical complexity, limited activity tolerance, and cognitive-behavioral impairments.   PT and OT goals  addressed separately.     (Please find full objective findings from PT assessment regarding body systems outlined above).     Assessment: Pt is a 84 y.o. female seen for PT evaluation s/p admission to [facility] on 4/20/2025 with Colitis.  Order placed for PT services.  Upon evaluation: Pt is presenting with impaired functional mobility due to decreased strength, decreased endurance, impaired balance, gait deviations, decreased safety awareness, and fall risk requiring  supervision assistance for bed mobility, transfers, and ambulation with RW . Pt's clinical presentation is currently unstable/unpredictable given the functional mobility deficits above coupled with fall risks as indicated by AM-PAC 6-Clicks: 19/24 as well as impaired balance, polypharmacy, and decreased safety awareness and combined with medical complications of hypertension , pain impacting overall mobility status, abnormal renal lab values, and abnormal H&H.  Pt's PMHx and comorbidities that may affect physical performance and progress include: asthma, CAD, COPD, CKD, DM, HTN, and Rheumatoid arthritis . Personal factors affecting pt at time of IE include: step(s) to enter environment, multi-level environment, advanced age, inability to perform IADLs, and inability to navigate level surfaces without external assistance. Pt will benefit from continued skilled PT services to address deficits as defined above and to maximize level of functional mobility to facilitate return toward PLOF and improved QOL. From PT/mobility standpoint, recommendation at time of d/c would be Level III (Minimum Resource Intensity) pending progress in order to reduce fall risk and maximize pt's functional independence and consistency with mobility in order to facilitate return to PLOF.  Recommend trial with walker next 1-2 sessions and ther ex next 1-2 sessions.      Goals: Pt will: Perform bed mobility tasks with modified I to reposition in bed and prepare  for transfers. Pt will perform transfers with modified I to increase Indep in home environment and prepare for ambulation. Pt will ambulate with RW for >/= 150' with  modified I  to improve gait quality and promote proper use of assistive device and to access home environment. Pt will complete >/= 4 steps with with unilateral handrail with Supervision to return to home with RONNELL. Increase bilateral LE strength 1/2 grade to facilitate independent mobility and Increase all balance 1/2 grade to decrease risk for falls.        Lurdes William, PT,DPT

## 2025-04-22 ENCOUNTER — APPOINTMENT (INPATIENT)
Dept: CT IMAGING | Facility: HOSPITAL | Age: 85
DRG: 391 | End: 2025-04-22
Payer: MEDICARE

## 2025-04-22 LAB
ANION GAP SERPL CALCULATED.3IONS-SCNC: 5 MMOL/L (ref 4–13)
BUN SERPL-MCNC: 28 MG/DL (ref 5–25)
CALCIUM SERPL-MCNC: 8.5 MG/DL (ref 8.4–10.2)
CHLORIDE SERPL-SCNC: 105 MMOL/L (ref 96–108)
CO2 SERPL-SCNC: 28 MMOL/L (ref 21–32)
CREAT SERPL-MCNC: 1.19 MG/DL (ref 0.6–1.3)
ERYTHROCYTE [DISTWIDTH] IN BLOOD BY AUTOMATED COUNT: 17.6 % (ref 11.6–15.1)
GFR SERPL CREATININE-BSD FRML MDRD: 42 ML/MIN/1.73SQ M
GLUCOSE SERPL-MCNC: 101 MG/DL (ref 65–140)
GLUCOSE SERPL-MCNC: 103 MG/DL (ref 65–140)
GLUCOSE SERPL-MCNC: 112 MG/DL (ref 65–140)
GLUCOSE SERPL-MCNC: 117 MG/DL (ref 65–140)
GLUCOSE SERPL-MCNC: 127 MG/DL (ref 65–140)
HCT VFR BLD AUTO: 33.3 % (ref 34.8–46.1)
HGB BLD-MCNC: 10.2 G/DL (ref 11.5–15.4)
MCH RBC QN AUTO: 27.3 PG (ref 26.8–34.3)
MCHC RBC AUTO-ENTMCNC: 30.6 G/DL (ref 31.4–37.4)
MCV RBC AUTO: 89 FL (ref 82–98)
PLATELET # BLD AUTO: 360 THOUSANDS/UL (ref 149–390)
PMV BLD AUTO: 9.5 FL (ref 8.9–12.7)
POTASSIUM SERPL-SCNC: 4.5 MMOL/L (ref 3.5–5.3)
RBC # BLD AUTO: 3.74 MILLION/UL (ref 3.81–5.12)
SODIUM SERPL-SCNC: 138 MMOL/L (ref 135–147)
WBC # BLD AUTO: 9.95 THOUSAND/UL (ref 4.31–10.16)

## 2025-04-22 PROCEDURE — 85027 COMPLETE CBC AUTOMATED: CPT | Performed by: FAMILY MEDICINE

## 2025-04-22 PROCEDURE — 70450 CT HEAD/BRAIN W/O DYE: CPT

## 2025-04-22 PROCEDURE — 99232 SBSQ HOSP IP/OBS MODERATE 35: CPT | Performed by: FAMILY MEDICINE

## 2025-04-22 PROCEDURE — 80048 BASIC METABOLIC PNL TOTAL CA: CPT | Performed by: FAMILY MEDICINE

## 2025-04-22 PROCEDURE — 82948 REAGENT STRIP/BLOOD GLUCOSE: CPT

## 2025-04-22 RX ORDER — MIRTAZAPINE 15 MG/1
7.5 TABLET, FILM COATED ORAL
Status: DISCONTINUED | OUTPATIENT
Start: 2025-04-22 | End: 2025-04-23 | Stop reason: HOSPADM

## 2025-04-22 RX ORDER — ERGOCALCIFEROL 1.25 MG/1
50000 CAPSULE, LIQUID FILLED ORAL WEEKLY
Status: DISCONTINUED | OUTPATIENT
Start: 2025-04-22 | End: 2025-04-23 | Stop reason: HOSPADM

## 2025-04-22 RX ORDER — CEPHALEXIN 500 MG/1
500 CAPSULE ORAL EVERY 8 HOURS SCHEDULED
Status: DISCONTINUED | OUTPATIENT
Start: 2025-04-22 | End: 2025-04-23 | Stop reason: HOSPADM

## 2025-04-22 RX ADMIN — METRONIDAZOLE 500 MG: 500 INJECTION, SOLUTION INTRAVENOUS at 07:51

## 2025-04-22 RX ADMIN — ACETAMINOPHEN 650 MG: 325 TABLET ORAL at 09:59

## 2025-04-22 RX ADMIN — DULOXETINE HYDROCHLORIDE 20 MG: 20 CAPSULE, DELAYED RELEASE ORAL at 09:59

## 2025-04-22 RX ADMIN — MIRTAZAPINE 7.5 MG: 15 TABLET, FILM COATED ORAL at 21:02

## 2025-04-22 RX ADMIN — SODIUM CHLORIDE, SODIUM LACTATE, POTASSIUM CHLORIDE, AND CALCIUM CHLORIDE 50 ML/HR: .6; .31; .03; .02 INJECTION, SOLUTION INTRAVENOUS at 01:42

## 2025-04-22 RX ADMIN — BISOPROLOL FUMARATE 2.5 MG: 5 TABLET ORAL at 09:58

## 2025-04-22 RX ADMIN — FOLIC ACID 1 MG: 1 TABLET ORAL at 09:58

## 2025-04-22 RX ADMIN — CYANOCOBALAMIN TAB 500 MCG 1000 MCG: 500 TAB at 15:10

## 2025-04-22 RX ADMIN — Medication 3 MG: at 21:02

## 2025-04-22 RX ADMIN — METRONIDAZOLE 500 MG: 500 TABLET ORAL at 22:20

## 2025-04-22 RX ADMIN — ERGOCALCIFEROL 50000 UNITS: 1.25 CAPSULE ORAL at 15:42

## 2025-04-22 RX ADMIN — HEPARIN SODIUM 5000 UNITS: 5000 INJECTION INTRAVENOUS; SUBCUTANEOUS at 05:07

## 2025-04-22 RX ADMIN — HEPARIN SODIUM 5000 UNITS: 5000 INJECTION INTRAVENOUS; SUBCUTANEOUS at 21:03

## 2025-04-22 RX ADMIN — DICYCLOMINE HYDROCHLORIDE 20 MG: 10 CAPSULE ORAL at 21:02

## 2025-04-22 RX ADMIN — CEPHALEXIN 500 MG: 500 CAPSULE ORAL at 15:10

## 2025-04-22 RX ADMIN — HYDROXYCHLOROQUINE SULFATE 200 MG: 200 TABLET, FILM COATED ORAL at 09:58

## 2025-04-22 RX ADMIN — HEPARIN SODIUM 5000 UNITS: 5000 INJECTION INTRAVENOUS; SUBCUTANEOUS at 13:45

## 2025-04-22 RX ADMIN — METRONIDAZOLE 500 MG: 500 TABLET ORAL at 16:07

## 2025-04-22 RX ADMIN — DICYCLOMINE HYDROCHLORIDE 20 MG: 10 CAPSULE ORAL at 15:10

## 2025-04-22 RX ADMIN — LORATADINE 10 MG: 10 TABLET ORAL at 09:58

## 2025-04-22 RX ADMIN — ASPIRIN 325 MG ORAL TABLET 325 MG: 325 PILL ORAL at 09:58

## 2025-04-22 RX ADMIN — BUSPIRONE HYDROCHLORIDE 5 MG: 5 TABLET ORAL at 09:58

## 2025-04-22 RX ADMIN — OXYBUTYNIN 10 MG: 5 TABLET, FILM COATED, EXTENDED RELEASE ORAL at 09:58

## 2025-04-22 RX ADMIN — BUSPIRONE HYDROCHLORIDE 5 MG: 5 TABLET ORAL at 15:10

## 2025-04-22 RX ADMIN — CEPHALEXIN 500 MG: 500 CAPSULE ORAL at 21:02

## 2025-04-22 RX ADMIN — DICYCLOMINE HYDROCHLORIDE 20 MG: 10 CAPSULE ORAL at 09:59

## 2025-04-22 RX ADMIN — ATORVASTATIN CALCIUM 20 MG: 20 TABLET, FILM COATED ORAL at 09:59

## 2025-04-22 RX ADMIN — BUSPIRONE HYDROCHLORIDE 5 MG: 5 TABLET ORAL at 21:02

## 2025-04-22 NOTE — PROGRESS NOTES
Patient:    MRN:  [id number]    Aidin Request ID:  [id number]    Level of care reserved:  Skilled Nursing Facility    Partner Reserved:  [facility], [place] [id number] [phone]    Clinical needs requested:    Geography searched:  10 miles around [id number]    Start of Service:    Request sent:  4:09pm EDT on 4/21/2025 by iLsa Lowe    Partner reserved:  11:57am EDT on 4/22/2025 by Lisa Lowe    Choice list shared:  10:21am EDT on 4/22/2025 by Lisa Lowe

## 2025-04-22 NOTE — PLAN OF CARE
Problem: Prexisting or High Potential for Compromised Skin Integrity  Goal: Skin integrity is maintained or improved  Description: INTERVENTIONS:- Identify patients at risk for skin breakdown- Assess and monitor skin integrity- Assess and monitor nutrition and hydration status- Monitor labs - Assess for incontinence - Turn and reposition patient- Assist with mobility/ambulation- Relieve pressure over bony prominences- Avoid friction and shearing- Provide appropriate hygiene as needed including keeping skin clean and dry- Evaluate need for skin moisturizer/barrier cream- Collaborate with interdisciplinary team - Patient/family teaching- Consider wound care consult   Outcome: Progressing     Problem: Nutrition/Hydration-ADULT  Goal: Nutrient/Hydration intake appropriate for improving, restoring or maintaining nutritional needs  Description: Monitor and assess patient's nutrition/hydration status for malnutrition. Collaborate with interdisciplinary team and initiate plan and interventions as ordered.  Monitor patient's weight and dietary intake as ordered or per policy. Utilize nutrition screening tool and intervene as necessary. Determine patient's food preferences and provide high-protein, high-caloric foods as appropriate. INTERVENTIONS:- Monitor oral intake, urinary output, labs, and treatment plans- Assess nutrition and hydration status and recommend course of action- Evaluate amount of meals eaten- Assist patient with eating if necessary - Allow adequate time for meals- Recommend/ encourage appropriate diets, oral nutritional supplements, and vitamin/mineral supplements- Order, calculate, and assess calorie counts as needed- Recommend, monitor, and adjust tube feedings and TPN/PPN based on assessed needs- Assess need for intravenous fluids- Provide specific nutrition/hydration education as appropriate- Include patient/family/caregiver in decisions related to nutrition  Outcome: Progressing     Problem: PAIN -  ADULT  Goal: Verbalizes/displays adequate comfort level or baseline comfort level  Description: Interventions:- Encourage patient to monitor pain and request assistance- Assess pain using appropriate pain scale- Administer analgesics based on type and severity of pain and evaluate response- Implement non-pharmacological measures as appropriate and evaluate response- Consider cultural and social influences on pain and pain management- Notify physician/advanced practitioner if interventions unsuccessful or patient reports new pain  Outcome: Progressing     Problem: INFECTION - ADULT  Goal: Absence or prevention of progression during hospitalization  Description: INTERVENTIONS:- Assess and monitor for signs and symptoms of infection- Monitor lab/diagnostic results- Monitor all insertion sites, i.e. indwelling lines, tubes, and drains- Monitor endotracheal if appropriate and nasal secretions for changes in amount and color- [place] appropriate cooling/warming therapies per order- Administer medications as ordered- Instruct and encourage patient and family to use good hand hygiene technique- Identify and instruct in appropriate isolation precautions for identified infection/condition  Outcome: Progressing  Goal: Absence of fever/infection during neutropenic period  Description: INTERVENTIONS:- Monitor WBC  Outcome: Progressing     Problem: SAFETY ADULT  Goal: Patient will remain free of falls  Description: INTERVENTIONS:- Educate patient/family on patient safety including physical limitations- Instruct patient to call for assistance with activity - Consult OT/PT to assist with strengthening/mobility - Keep Call bell within reach- Keep bed low and locked with side rails adjusted as appropriate- Keep care items and personal belongings within reach- Initiate and maintain comfort rounds- Make Fall Risk Sign visible to staff- Offer Toileting every 2 Hours, in advance of need- Initiate/Maintain bed alarm- Obtain necessary  fall risk management equipment- Apply yellow socks and bracelet for high fall risk patients- Consider moving patient to room near nurses station  INTERVENTIONS:- Educate patient/family on patient safety including physical limitations- Instruct patient to call for assistance with activity - Consult OT/PT to assist with strengthening/mobility - Keep Call bell within reach- Keep bed low and locked with side rails adjusted as appropriate- Keep care items and personal belongings within reach- Initiate and maintain comfort rounds- Make Fall Risk Sign visible to staff- Offer Toileting every 2 Hours, in advance of need- Initiate/Maintain bed alarm- Obtain necessary fall risk management equipment- Apply yellow socks and bracelet for high fall risk patients- Consider moving patient to room near nurses station  Outcome: Progressing  Goal: Maintain or return to baseline ADL function  Description: INTERVENTIONS:-  Assess patient's ability to carry out ADLs; assess patient's baseline for ADL function and identify physical deficits which impact ability to perform ADLs (bathing, care of mouth/teeth, toileting, grooming, dressing, etc.)- Assess/evaluate cause of self-care deficits - Assess range of motion- Assess patient's mobility; develop plan if impaired- Assess patient's need for assistive devices and provide as appropriate- Encourage maximum independence but intervene and supervise when necessary- Involve family in performance of ADLs- Assess for home care needs following discharge - Consider OT consult to assist with ADL evaluation and planning for discharge- Provide patient education as appropriate  Outcome: Progressing  Goal: Maintains/Returns to pre admission functional level  Description: INTERVENTIONS:- Perform AM-PAC 6 Click Basic Mobility/ Daily Activity assessment daily.- Set and communicate daily mobility goal to care team and patient/family/caregiver. - Collaborate with rehabilitation services on mobility goals if  consulted- Reposition patient every 2 hours.- Dangle patient 3 times a day- Stand patient 3 times a day- Ambulate patient 3 times a day- Out of bed to chair 3 times a day - Out of bed for meals 3 times a day- Out of bed for toileting- Record patient progress and toleration of activity level   Outcome: Progressing     Problem: DISCHARGE PLANNING  Goal: Discharge to home or other facility with appropriate resources  Description: INTERVENTIONS:- Identify barriers to discharge w/patient and caregiver- Arrange for needed discharge resources and transportation as appropriate- Identify discharge learning needs (meds, wound care, etc.)- Arrange for interpretive services to assist at discharge as needed- Refer to Case Management Department for coordinating discharge planning if the patient needs post-hospital services based on physician/advanced practitioner order or complex needs related to functional status, cognitive ability, or social support system  Outcome: Progressing     Problem: Knowledge Deficit  Goal: Patient/family/caregiver demonstrates understanding of disease process, treatment plan, medications, and discharge instructions  Description: Complete learning assessment and assess knowledge base.Interventions:- Provide teaching at level of understanding- Provide teaching via preferred learning methods  Outcome: Progressing     Problem: Potential for Falls  Goal: Patient will remain free of falls  Description: INTERVENTIONS:- Educate patient/family on patient safety including physical limitations- Instruct patient to call for assistance with activity - Consult OT/PT to assist with strengthening/mobility - Keep Call bell within reach- Keep bed low and locked with side rails adjusted as appropriate- Keep care items and personal belongings within reach- Initiate and maintain comfort rounds- Make Fall Risk Sign visible to staff- Offer Toileting every 2 Hours, in advance of need- Initiate/Maintain bed alarm- Obtain  necessary fall risk management equipment- Apply yellow socks and bracelet for high fall risk patients- Consider moving patient to room near nurses station  INTERVENTIONS:- Educate patient/family on patient safety including physical limitations- Instruct patient to call for assistance with activity - Consult OT/PT to assist with strengthening/mobility - Keep Call bell within reach- Keep bed low and locked with side rails adjusted as appropriate- Keep care items and personal belongings within reach- Initiate and maintain comfort rounds- Make Fall Risk Sign visible to staff- Offer Toileting every 2 Hours, in advance of need- Initiate/Maintain bed alarm- Obtain necessary fall risk management equipment- Apply yellow socks and bracelet for high fall risk patients- Consider moving patient to room near nurses station  Outcome: Progressing

## 2025-04-22 NOTE — CASE MANAGEMENT
Case Management Discharge Planning Note    Patient name Cheryle Mcleod  Location /-01 MRN [id number]  : 1940 Date 2025       Current Admission Date: 2025  Current Admission Diagnosis:Colitis   Patient Active Problem List    Diagnosis Date Noted Date Diagnosed    Severe protein-calorie malnutrition (HCC) 2025     Colitis 2025     Failure to thrive in adult 2025     Hypomagnesemia 2025     Anemia 2025     Type 2 diabetes mellitus with stage 3b chronic kidney disease, without long-term current use of insulin (HCC) 2025     Pain in right wrist 2025     Chronic pain of both knees 2025     Primary osteoarthritis of right knee 2025     Troponin level elevated 2024     Elevated transaminase level 2024     Constipation 2024     Leukocytosis 2024     Acute respiratory failure with hypoxia ([facility]) 2024     Oral thrush 2023     Nonischemic nontraumatic myocardial injury 2023     Urinary incontinence 2023     Calculus of gallbladder without cholecystitis without obstruction 2023     Loss of appetite 2023     Acute bronchitis 2022     Coronary artery disease involving native coronary artery of native heart 2022     COVID 2022     Essential hypertension 2022     COPD (chronic obstructive pulmonary disease) ([facility]) 2022     Rheumatoid arthritis of multiple sites with negative rheumatoid factor ([facility]) 2022     GERD (gastroesophageal reflux disease) 2022     Fatty liver 2022     Generalized weakness 2022     RONALD (acute kidney injury) ([facility]) 2022     Prolonged Q-T interval on ECG 2022       LOS (days): 2  Geometric Mean LOS (GMLOS) (days): 2.6  Days to GMLOS:0.9     OBJECTIVE:  Risk of Unplanned Readmission Score: 17.54         Current admission status: Inpatient   Preferred Pharmacy:   [facility] #[id number] - [place] -  [address] [address]  Phone: [phone] Fax: [phone]    Primary Care Provider: Brian Peraza DO    Primary Insurance: MEDICARE  Secondary Insurance:     DISCHARGE DETAILS:    Spoke to patient and daughter via phone-- both in agreement for patient to go for STR at [facility].    Discharge planning discussed with:: patient and daughter  Freedom of Choice: Yes  Comments - Freedom of Choice: Choice is to accept [facility]  CM contacted family/caregiver?: Yes  Were Treatment Team discharge recommendations reviewed with patient/caregiver?: Yes  Did patient/caregiver verbalize understanding of patient care needs?: Yes       Contacts  Patient Contacts: Cheryle  Relationship to Patient:: Family  Contact Method: Phone  Phone Number: [phone].  Reason/Outcome: Discharge Planning    Requested Home Health Care         Is the patient interested in C at discharge?: No    DME Referral Provided  Referral made for DME?: No    Other Referral/Resources/Interventions Provided:  Interventions: Short Term Rehab  Referral Comments: Secured [facility] in [place]         Treatment Team Recommendation: Short Term Rehab  Discharge Destination Plan:: Short Term Rehab ([facility])  Transport at Discharge : Wheelchair van      Discussed with daughter that transportation via  is not covered by insurance and patient will be billed for this service. Daughter verbalized understanding.          IMM Given (Date):: 04/22/25  IMM Given to:: Family  Family notified:: Reviewed with daughterJeannie via phone       Accepting Facility Name, City & State : [facility]  Receiving Facility/Agency Phone Number: [phone]  Facility/Agency Fax Number: [phone]       Will coordinate dc with [facility].

## 2025-04-22 NOTE — ASSESSMENT & PLAN NOTE
Malnutrition Findings:   Adult Malnutrition type: Chronic illness  Adult Degree of Malnutrition: Other severe protein calorie malnutrition  Malnutrition Characteristics: Inadequate energy, Weight loss                  360 Statement: Chronic severe malnutrition related to poor appetite, inadequate oral intake as evidenced by < 75% estimated energy intake > 1 mo, 21.2% weight loss x 11 mo (5/10/24 127lb, 4/21/25 100lb). Treated with: Liberalize to regular diet. +Glucerna BID. Recommend daily weights for nutrition monitoring.    BMI Findings:           Body mass index is 21.64 kg/m².   Severe protein-calorie malnutrition 2/2 poor PO intake a/e/b inadequate energy, weight loss requiring Nutrition consult, Regular diet and supplements

## 2025-04-22 NOTE — ASSESSMENT & PLAN NOTE
Patient reporting some generalized weakness  family questioning whether inpatient rehab and/or long-term care would be an option at time of discharge  Appreciate PT/OT recommendation  Appreciate case management recommendations

## 2025-04-22 NOTE — ASSESSMENT & PLAN NOTE
"Patient presents with general malaise for 1 week and diarrhea for 3 to 4 days.   She reports several days of diarrhea and weight loss over the past week due to decreased appetite.   CT abdomen pelvis: \"Mild to moderate mural thickening of the descending and sigmoid colonic segments concerning for infectious/inflammatory segmental colitis. There is similar mild mural thickening of the mid transverse colon. There is no evidence of bowel obstruction, free intraperitoneal air, or drainable ascites. Large hiatal hernia is again noted.\"  Did not meet sepsis criteria  COVID/flu negative  No fevers, UA negative, lipase normal at 19  WBC elevated at 10.60, now improving  Blood cultures pending  Received Rocephin and Flagyl IV, continue oral abx for now as diarrhea has improved  Start Bentyl  Stop iv fluids as she is eating and drinking better  On exam, patient denies any abdominal pain, fever, nausea, or vomiting  Monitor fever curve, vital signs, and symptoms  "

## 2025-04-22 NOTE — PLAN OF CARE
Problem: Prexisting or High Potential for Compromised Skin Integrity  Goal: Skin integrity is maintained or improved  Description: INTERVENTIONS:- Identify patients at risk for skin breakdown- Assess and monitor skin integrity- Assess and monitor nutrition and hydration status- Monitor labs - Assess for incontinence - Turn and reposition patient- Assist with mobility/ambulation- Relieve pressure over bony prominences- Avoid friction and shearing- Provide appropriate hygiene as needed including keeping skin clean and dry- Evaluate need for skin moisturizer/barrier cream- Collaborate with interdisciplinary team - Patient/family teaching- Consider wound care consult   Outcome: Progressing     Problem: Nutrition/Hydration-ADULT  Goal: Nutrient/Hydration intake appropriate for improving, restoring or maintaining nutritional needs  Description: Monitor and assess patient's nutrition/hydration status for malnutrition. Collaborate with interdisciplinary team and initiate plan and interventions as ordered.  Monitor patient's weight and dietary intake as ordered or per policy. Utilize nutrition screening tool and intervene as necessary. Determine patient's food preferences and provide high-protein, high-caloric foods as appropriate. INTERVENTIONS:- Monitor oral intake, urinary output, labs, and treatment plans- Assess nutrition and hydration status and recommend course of action- Evaluate amount of meals eaten- Assist patient with eating if necessary - Allow adequate time for meals- Recommend/ encourage appropriate diets, oral nutritional supplements, and vitamin/mineral supplements- Order, calculate, and assess calorie counts as needed- Recommend, monitor, and adjust tube feedings and TPN/PPN based on assessed needs- Assess need for intravenous fluids- Provide specific nutrition/hydration education as appropriate- Include patient/family/caregiver in decisions related to nutrition  Outcome: Progressing     Problem: PAIN -  ADULT  Goal: Verbalizes/displays adequate comfort level or baseline comfort level  Description: Interventions:- Encourage patient to monitor pain and request assistance- Assess pain using appropriate pain scale- Administer analgesics based on type and severity of pain and evaluate response- Implement non-pharmacological measures as appropriate and evaluate response- Consider cultural and social influences on pain and pain management- Notify physician/advanced practitioner if interventions unsuccessful or patient reports new pain  Outcome: Progressing     Problem: INFECTION - ADULT  Goal: Absence or prevention of progression during hospitalization  Description: INTERVENTIONS:- Assess and monitor for signs and symptoms of infection- Monitor lab/diagnostic results- Monitor all insertion sites, i.e. indwelling lines, tubes, and drains- Monitor endotracheal if appropriate and nasal secretions for changes in amount and color- [place] appropriate cooling/warming therapies per order- Administer medications as ordered- Instruct and encourage patient and family to use good hand hygiene technique- Identify and instruct in appropriate isolation precautions for identified infection/condition  Outcome: Progressing  Goal: Absence of fever/infection during neutropenic period  Description: INTERVENTIONS:- Monitor WBC  Outcome: Progressing     Problem: SAFETY ADULT  Goal: Patient will remain free of falls  Description: INTERVENTIONS:- Educate patient/family on patient safety including physical limitations- Instruct patient to call for assistance with activity - Consult OT/PT to assist with strengthening/mobility - Keep Call bell within reach- Keep bed low and locked with side rails adjusted as appropriate- Keep care items and personal belongings within reach- Initiate and maintain comfort rounds- Make Fall Risk Sign visible to staff- Offer Toileting every 2 Hours, in advance of need- Initiate/Maintain bed alarm- Obtain necessary  fall risk management equipment: walker - Apply yellow socks and bracelet for high fall risk patients- Consider moving patient to room near nurses station  INTERVENTIONS:- Educate patient/family on patient safety including physical limitations- Instruct patient to call for assistance with activity - Consult OT/PT to assist with strengthening/mobility - Keep Call bell within reach- Keep bed low and locked with side rails adjusted as appropriate- Keep care items and personal belongings within reach- Initiate and maintain comfort rounds- Make Fall Risk Sign visible to staff- Offer Toileting every 2 Hours, in advance of need- Initiate/Maintain bed alarm- Obtain necessary fall risk management equipment: walker - Apply yellow socks and bracelet for high fall risk patients- Consider moving patient to room near nurses station  Outcome: Progressing  Goal: Maintain or return to baseline ADL function  Description: INTERVENTIONS:-  Assess patient's ability to carry out ADLs; assess patient's baseline for ADL function and identify physical deficits which impact ability to perform ADLs (bathing, care of mouth/teeth, toileting, grooming, dressing, etc.)- Assess/evaluate cause of self-care deficits - Assess range of motion- Assess patient's mobility; develop plan if impaired- Assess patient's need for assistive devices and provide as appropriate- Encourage maximum independence but intervene and supervise when necessary- Involve family in performance of ADLs- Assess for home care needs following discharge - Consider OT consult to assist with ADL evaluation and planning for discharge- Provide patient education as appropriate  Outcome: Progressing  Goal: Maintains/Returns to pre admission functional level  Description: INTERVENTIONS:- Perform AM-PAC 6 Click Basic Mobility/ Daily Activity assessment daily.- Set and communicate daily mobility goal to care team and patient/family/caregiver. - Collaborate with rehabilitation services on  mobility goals if consulted- Perform Range of Motion 3 times a day.- Reposition patient every 2 hours.- Dangle patient 3 times a day- Stand patient 3 times a day- Ambulate patient 3 times a day- Out of bed to chair 3 times a day - Out of bed for meals 3 times a day- Out of bed for toileting- Record patient progress and toleration of activity level   Outcome: Progressing     Problem: DISCHARGE PLANNING  Goal: Discharge to home or other facility with appropriate resources  Description: INTERVENTIONS:- Identify barriers to discharge w/patient and caregiver- Arrange for needed discharge resources and transportation as appropriate- Identify discharge learning needs (meds, wound care, etc.)- Arrange for interpretive services to assist at discharge as needed- Refer to Case Management Department for coordinating discharge planning if the patient needs post-hospital services based on physician/advanced practitioner order or complex needs related to functional status, cognitive ability, or social support system  Outcome: Progressing     Problem: Knowledge Deficit  Goal: Patient/family/caregiver demonstrates understanding of disease process, treatment plan, medications, and discharge instructions  Description: Complete learning assessment and assess knowledge base.Interventions:- Provide teaching at level of understanding- Provide teaching via preferred learning methods  Outcome: Progressing     Problem: Potential for Falls  Goal: Patient will remain free of falls  Description: INTERVENTIONS:- Educate patient/family on patient safety including physical limitations- Instruct patient to call for assistance with activity - Consult OT/PT to assist with strengthening/mobility - Keep Call bell within reach- Keep bed low and locked with side rails adjusted as appropriate- Keep care items and personal belongings within reach- Initiate and maintain comfort rounds- Make Fall Risk Sign visible to staff- Offer Toileting every 2 Hours, in  advance of need- Initiate/Maintain bed alarm- Obtain necessary fall risk management equipment: walker - Apply yellow socks and bracelet for high fall risk patients- Consider moving patient to room near nurses station  INTERVENTIONS:- Educate patient/family on patient safety including physical limitations- Instruct patient to call for assistance with activity - Consult OT/PT to assist with strengthening/mobility - Keep Call bell within reach- Keep bed low and locked with side rails adjusted as appropriate- Keep care items and personal belongings within reach- Initiate and maintain comfort rounds- Make Fall Risk Sign visible to staff- Offer Toileting every 2 Hours, in advance of need- Initiate/Maintain bed alarm- Obtain necessary fall risk management equipment: walker - Apply yellow socks and bracelet for high fall risk patients- Consider moving patient to room near nurses station  Outcome: Progressing

## 2025-04-22 NOTE — PROGRESS NOTES
Pastoral Care Progress Note  CH responded to consult request from pt. Pt received CH upright in her chair, no family present.    Pt shared about her support system: she has 6 children, 1 is . Her spouse is . Pt lives with her dtr. Pt shared that she has hopes of going to rehab so that she could have a few more weeks of rest and recovery before resuming living with her dtr as she is alone there for some time during the day.    Pt shared about her conflict within her janessa journey and how she struggles at times with her feelings of being abandoned by her god. Pt shared about her coping mechanisms in the face of tragedy: staying busy. Pt shared that recently she has felt less motivated to go out of the house and attend family outings.    CH provided empathetic listening and support.         25 0900   Clinical Encounter Type   Visited With Patient   Referral From Patient

## 2025-04-22 NOTE — ASSESSMENT & PLAN NOTE
Noted history of chronically elevated troponin  EKG in the ED without ST segment abnormality = NSR with 1st degree AV block, RBBB left anterior fascicular block, bifascicular block. Left ventricular hypertrophy with repolarizing abnormality. Possible lateral infarct.   Obtain serial EKG with any chest pain and/or elevated troponin levels  Troponin: 194 -> 220,201  BNP elevated at 1769  Patient denies chest pain  Suspected secondary to colitis and is due to demand ischemia

## 2025-04-23 ENCOUNTER — TELEPHONE (OUTPATIENT)
Dept: PSYCHIATRY | Facility: CLINIC | Age: 85
End: 2025-04-23

## 2025-04-23 VITALS
RESPIRATION RATE: 18 BRPM | HEIGHT: 57 IN | SYSTOLIC BLOOD PRESSURE: 140 MMHG | HEART RATE: 74 BPM | TEMPERATURE: 98 F | DIASTOLIC BLOOD PRESSURE: 77 MMHG | WEIGHT: 100 LBS | OXYGEN SATURATION: 96 % | BODY MASS INDEX: 21.57 KG/M2

## 2025-04-23 PROBLEM — F03.918 DEMENTIA WITH BEHAVIORAL DISTURBANCE (HCC): Status: ACTIVE | Noted: 2025-04-23

## 2025-04-23 LAB
GLUCOSE SERPL-MCNC: 102 MG/DL (ref 65–140)
GLUCOSE SERPL-MCNC: 128 MG/DL (ref 65–140)

## 2025-04-23 PROCEDURE — 97116 GAIT TRAINING THERAPY: CPT

## 2025-04-23 PROCEDURE — 97530 THERAPEUTIC ACTIVITIES: CPT

## 2025-04-23 PROCEDURE — 82948 REAGENT STRIP/BLOOD GLUCOSE: CPT

## 2025-04-23 PROCEDURE — 99239 HOSP IP/OBS DSCHRG MGMT >30: CPT | Performed by: FAMILY MEDICINE

## 2025-04-23 RX ORDER — BISOPROLOL FUMARATE 5 MG/1
2.5 TABLET, FILM COATED ORAL DAILY
Start: 2025-04-24 | End: 2025-05-24

## 2025-04-23 RX ORDER — METRONIDAZOLE 500 MG/1
500 TABLET ORAL EVERY 8 HOURS SCHEDULED
Start: 2025-04-23 | End: 2025-04-28

## 2025-04-23 RX ORDER — CEPHALEXIN 500 MG/1
500 CAPSULE ORAL EVERY 8 HOURS SCHEDULED
Start: 2025-04-23 | End: 2025-04-26

## 2025-04-23 RX ORDER — ERGOCALCIFEROL 1.25 MG/1
50000 CAPSULE, LIQUID FILLED ORAL WEEKLY
Start: 2025-04-29

## 2025-04-23 RX ORDER — MIRTAZAPINE 7.5 MG/1
7.5 TABLET, FILM COATED ORAL
Start: 2025-04-23

## 2025-04-23 RX ORDER — SERTRALINE HYDROCHLORIDE 25 MG/1
25 TABLET, FILM COATED ORAL DAILY
Start: 2025-04-24 | End: 2025-05-24

## 2025-04-23 RX ORDER — SERTRALINE HYDROCHLORIDE 25 MG/1
25 TABLET, FILM COATED ORAL DAILY
Status: DISCONTINUED | OUTPATIENT
Start: 2025-04-23 | End: 2025-04-23 | Stop reason: HOSPADM

## 2025-04-23 RX ADMIN — BUSPIRONE HYDROCHLORIDE 5 MG: 5 TABLET ORAL at 09:26

## 2025-04-23 RX ADMIN — METRONIDAZOLE 500 MG: 500 TABLET ORAL at 13:33

## 2025-04-23 RX ADMIN — CYANOCOBALAMIN TAB 500 MCG 1000 MCG: 500 TAB at 09:26

## 2025-04-23 RX ADMIN — METRONIDAZOLE 500 MG: 500 TABLET ORAL at 05:03

## 2025-04-23 RX ADMIN — SERTRALINE HYDROCHLORIDE 25 MG: 25 TABLET, FILM COATED ORAL at 12:16

## 2025-04-23 RX ADMIN — HYDROXYCHLOROQUINE SULFATE 200 MG: 200 TABLET, FILM COATED ORAL at 09:26

## 2025-04-23 RX ADMIN — FOLIC ACID 1 MG: 1 TABLET ORAL at 09:26

## 2025-04-23 RX ADMIN — BISOPROLOL FUMARATE 2.5 MG: 5 TABLET ORAL at 09:25

## 2025-04-23 RX ADMIN — OXYBUTYNIN 10 MG: 5 TABLET, FILM COATED, EXTENDED RELEASE ORAL at 09:27

## 2025-04-23 RX ADMIN — CEPHALEXIN 500 MG: 500 CAPSULE ORAL at 05:03

## 2025-04-23 RX ADMIN — ATORVASTATIN CALCIUM 20 MG: 20 TABLET, FILM COATED ORAL at 09:26

## 2025-04-23 RX ADMIN — DICYCLOMINE HYDROCHLORIDE 20 MG: 10 CAPSULE ORAL at 09:25

## 2025-04-23 RX ADMIN — DULOXETINE HYDROCHLORIDE 20 MG: 20 CAPSULE, DELAYED RELEASE ORAL at 09:26

## 2025-04-23 RX ADMIN — HEPARIN SODIUM 5000 UNITS: 5000 INJECTION INTRAVENOUS; SUBCUTANEOUS at 13:33

## 2025-04-23 RX ADMIN — LORATADINE 10 MG: 10 TABLET ORAL at 09:26

## 2025-04-23 RX ADMIN — ASPIRIN 325 MG ORAL TABLET 325 MG: 325 PILL ORAL at 09:25

## 2025-04-23 RX ADMIN — HEPARIN SODIUM 5000 UNITS: 5000 INJECTION INTRAVENOUS; SUBCUTANEOUS at 05:03

## 2025-04-23 RX ADMIN — CEPHALEXIN 500 MG: 500 CAPSULE ORAL at 13:33

## 2025-04-23 NOTE — CASE MANAGEMENT
Case Management Discharge Planning Note    Patient name Cheryle Mcleod  Location /-01 MRN [id number]  : 1940 Date 2025       Current Admission Date: 2025  Current Admission Diagnosis:Colitis   Patient Active Problem List    Diagnosis Date Noted Date Diagnosed    Severe protein-calorie malnutrition (HCC) 2025     Colitis 2025     Failure to thrive in adult 2025     Hypomagnesemia 2025     Anemia 2025     Type 2 diabetes mellitus with stage 3b chronic kidney disease, without long-term current use of insulin (HCC) 2025     Pain in right wrist 2025     Chronic pain of both knees 2025     Primary osteoarthritis of right knee 2025     Troponin level elevated 2024     Elevated transaminase level 2024     Constipation 2024     Leukocytosis 2024     Acute respiratory failure with hypoxia ([facility]) 2024     Oral thrush 2023     Nonischemic nontraumatic myocardial injury 2023     Urinary incontinence 2023     Calculus of gallbladder without cholecystitis without obstruction 2023     Loss of appetite 2023     Acute bronchitis 2022     Coronary artery disease involving native coronary artery of native heart 2022     COVID 2022     Essential hypertension 2022     COPD (chronic obstructive pulmonary disease) ([facility]) 2022     Rheumatoid arthritis of multiple sites with negative rheumatoid factor ([facility]) 2022     GERD (gastroesophageal reflux disease) 2022     Fatty liver 2022     Generalized weakness 2022     RONALD (acute kidney injury) ([facility]) 2022     Prolonged Q-T interval on ECG 2022       LOS (days): 3  Geometric Mean LOS (GMLOS) (days): 3.6  Days to GMLOS:0.8     OBJECTIVE:  Risk of Unplanned Readmission Score: 18.39         Current admission status: Inpatient   Preferred Pharmacy:   [facility]/pharm[facility] #[id number] - [place] -  [address] [place] [id number]  Phone: [phone] Fax: [phone]    Primary Care Provider: Brian Peraza DO    Primary Insurance: MEDICARE  Secondary Insurance:     DISCHARGE DETAILS:    Patient is being dc today to [facility], patient, daughter, facility and nursing is aware of transport time. aware.      Discharge planning discussed with:: patient        Contacts  Patient Contacts: Cheryle  Relationship to Patient:: Family  Contact Method: Phone  Phone Number: [phone].  Reason/Outcome: Discharge Planning        Transport at Discharge : Wheelchair van        Transported by (Company and Unit #): [employer]  ETA of Transport (Date): 04/23/25  ETA of Transport (Time): 1500

## 2025-04-23 NOTE — PHYSICAL THERAPY NOTE
"   PHYSICAL THERAPY TREATMENT NOTE  NAME:  Cheryle Mcleod  DATE: 04/23/25    Length Of Stay: 3  Performed at least 2 patient identifiers during session: Name and ID bracelet    TREATMENT FLOWSHEET:    04/23/25 1516   PT Last Visit   PT Visit Date 04/23/25   Note Type   Note Type Treatment   Pain Assessment   Pain Assessment Tool 0-10   Pain Score No Pain   Restrictions/Precautions   Weight Bearing Precautions Per Order No   Other Precautions Chair Alarm;Bed Alarm;Fall Risk;Visual impairment  (legally blind)   General   Chart Reviewed Yes   Response to Previous Treatment Patient unable to report, no changes reported from family or staff   Family/Caregiver Present No   Cognition   Overall Cognitive Status Impaired   Arousal/Participation Alert;Cooperative   Attention Attends with cues to redirect   Orientation Level Oriented to person;Oriented to place   Memory Decreased short term memory   Following Commands Follows one step commands without difficulty   Subjective   Subjective \"[place] to the [place].\"   Bed Mobility   Additional Comments Pt. found sitting OOB in relciner   Transfers   Sit to Stand 4  Minimal assistance   Additional items Assist x 1;Armrests;Increased time required;Verbal cues  (RW)   Stand to Sit 4  Minimal assistance   Additional items Assist x 1;Armrests;Increased time required;Verbal cues   Stand pivot 4  Minimal assistance   Additional items Assist x 1;Armrests;Increased time required;Verbal cues  (RW)   Additional Comments VC's for safety and direction required   Ambulation/Elevation   Gait pattern Improper Weight shift;Decreased foot clearance;Forward Flexion;Narrow ZACK;Inconsistent pia;Short stride;Decreased heel strike;Decreased toe off;Step through pattern;Decreased hip extension;Knees flexed;Excessively slow   Gait Assistance 4  Minimal assist   Additional items Assist x 1;Verbal cues;Tactile cues   Assistive Device Rolling walker   Distance 65 ft x2   Balance   Static Sitting " Fair +   Dynamic Sitting Fair   Static Standing Poor +   Dynamic Standing Poor +   Ambulatory Poor +   Endurance Deficit   Endurance Deficit Yes   Endurance Deficit Description sudden fatigue ans seated rest period required   Activity Tolerance   Activity Tolerance Patient limited by fatigue   Nurse Made Aware yes   Assessment   Prognosis Good   Problem List Decreased strength;Decreased endurance;Impaired balance;Decreased mobility;Decreased safety awareness   Goals   Patient Goals to go to the [facility]   PT Treatment Day 1   Plan   Treatment/Interventions Functional transfer training;LE strengthening/ROM;Endurance training;Cognitive reorientation;Patient/family training;Equipment eval/education;Gait training;Compensatory technique education;Spoke to nursing   Progress Slow progress, decreased activity tolerance   PT Frequency 3-5x/wk   Discharge Recommendation   Rehab Resource Intensity Level, PT II (Moderate Resource Intensity)   Additional Comments Spoke to PT and made aware of change in recommendation due to increased amount of assistance required.  Pt. plans to go to rehab upon DC from hospital.  Transporter arrived in pt.s room to transport pt. to rehab just as PT treatment ended   AM-PAC Basic Mobility Inpatient   Turning in Flat Bed Without Bedrails 4   Lying on Back to Sitting on Edge of Flat Bed Without Bedrails 3   Moving Bed to Chair 3   Standing Up From Chair Using Arms 3   Walk in Room 3   Climb 3-5 Stairs With Railing 2   Basic Mobility Inpatient Raw Score 18   Basic Mobility Standardized Score 41.05   [facility] Highest Level Of Mobility   -HLM Goal 6: Walk 10 steps or more   -HLM Achieved 7: Walk 25 feet or more         The patient's AM-PAC Basic Mobility Inpatient Short Form Raw Score is 18. A raw score greater than 16 suggests the patient may benefit from discharge to home. Please also refer to the recommendation of the Physical Therapist for safe discharge planning.    Pt seen for PT  treatment session this date with interventions consisting of transfer training, gait training, and education provided as needed for safety and direction to improve functional mobility, safety awareness, and activity tolerance. Pt agreeable to PT treatment session upon arrival, pt found sitting OOB in recliner. At end of session, pt left sitting OOB in recliner with RN and transporter present in room and with all needs in reach. In comparison to previous session, pt with improvements in ambulation distance .  Recommend Level II (Moderate Resource Intensity) at time of d/c in order to maximize pt's functional independence and safety w/ mobility. Pt continues to be functioning below baseline level. PT will continue to see pt while here in order to address the deficits listed above and provide interventions consistent w/ POC in effort to achieve STGs.    Chirstina Klein, PTA

## 2025-04-23 NOTE — TELEMEDICINE
TeleConsultation - Behavioral Health   Name: Cheryle Mcleod 84 y.o. female I MRN: [id number]  Unit/Bed#: -01 I Date of Admission: 4/20/2025   Date of Service: 4/23/2025 I Hospital Day: 3    Inpatient consult to Psychiatry  Consult performed by: Adilene Escobar MD  Consult ordered by: Alice Cali MD        Physician Requesting Consult: Alice Cali MD  Principal Problem:Colitis  Reason for Consult: depressive symptoms and problems with memory     Assessment & Plan   Unspecified depressive disorder, unspecified anxiety disorder, mild neurocognitive disorder    TREATMENT PLAN RECOMMENDATIONS:  Medications: Recommend to discontinue Cymbalta and switch to Zoloft 25 mg once daily for depression and anxiety, increase melatonin to 6 mg, continue with Remeron 7.5 mg at bedtime.  Continue with BuSpar 5 mg 3 times daily for anxiety.     Check B-12 level      Informed consent for the above medication has been obtained including discussion of the risks, benefits and alternatives: Yes    Disposition: The patient does not currently meet criteria for inpatient psychiatric hospitalization. They deny thoughts or plans for suicide and denies homicidal thoughts or intent. They are not unable to care for themselves due to a mental illness and/or acute psychosis. They are able to adequately participate in care planning with primary team. No criteria for an involuntary psychiatric commitment exists at this time.    Legal Status Recommendation:  n/a    Multiple Antipsychotic Review: N/A    Psychotherapy/Psychoeducation: N/A to this case.    Other/Medical Work Up and/or treatment modality recommendations: N/A to this case.    Patient Caregiver/Family Education: N/A    Follow-up: Re-consult PRN     Report regarding the above Assessment and Treatment plan was provided to:  Dr. Cali      History of Present Illness      Patient is a 84 y.o. female with a history of Unspecified Depressive Disorder and Anxiety Disorder who was  presented to the hospital due to weakness and diarrhea for 3 days. Psychiatric consultation was requested due to depressive symptoms and problems with memory. Reportedly, patient has been compliant with medications and follow-up care . Over the past few weeks, patient's  reported increase depressive symptoms and she was recently started on cymbalta 20 mg . Depressive symptoms includes: depressed mood most of the time, feeling of hopelessness, low energy and motivation, decrease appetite, decreased sleep, and difficulty going to sleep.  She denied any suicidal ideation.  She reported having problem with her memory at times difficulty recalling people's name, forgetting things.  She was able to tell what year month and day is today, able to tell who is the current president of [facility]. No recent psychotic or manic symptoms was reported. No recent aggressive behavior was reported.  No homicidal ideation was reported.         Psychiatric Review Of Systems:     Sleep changes: yes  appetite changes: yes  weight changes: no  anxiety/panic: yes  pedrito: no  guilty/hopeless: yes  self injurious behavior/risky behavior: no    Historical Information     Past Psychiatric History:     Psychiatric Hospitalizations: No history of past inpatient psychiatric admissions Outpatient Treatment History: Psychotropic medication were prescribe by patient's PCP Suicide Attempts:  No History of Suicidal attempt reported History of self-harm: No History of self injurious behavior was reported Violence History:    Substance Abuse History:    E-Cigarette/Vaping    E-Cigarette Use Never User           Social History       Tobacco History       Smoking Status  Former Smoking Tobacco Type  Cigarettes      Passive Exposure  Past      Smokeless Tobacco Use  Never              Alcohol History       Alcohol Use Status  Not Currently              Drug Use       Drug Use Status  Not Currently              Sexual Activity       Sexually Active  Not  Asked              Other Factors    Not Asked                         Social History:    Social History       Social History     Socioeconomic History    Marital status: /Civil Union     Spouse name: Not on file    Number of children: Not on file    Years of education: Not on file    Highest education level: Not on file   Occupational History    Not on file   Tobacco Use    Smoking status: Former     Types: Cigarettes     Passive exposure: Past    Smokeless tobacco: Never   Vaping Use    Vaping status: Never Used   Substance and Sexual Activity    Alcohol use: Not Currently    Drug use: Not Currently    Sexual activity: Not on file   Other Topics Concern    Not on file   Social History Narrative    Not on file     Social Drivers of Health     Financial Resource Strain: Low Risk  (11/4/2024)    Received from [facility]    Overall Financial Resource Strain (CARDIA)     Difficulty of Paying Living Expenses: Not hard at all   Food Insecurity: No Food Insecurity (4/20/2025)    Nursing - Inadequate Food Risk Classification     Worried About Running Out of Food in the Last Year: Never true     Ran Out of Food in the Last Year: Never true     Ran Out of Food in the Last Year: Never true   Transportation Needs: No Transportation Needs (4/20/2025)    Nursing - Transportation Risk Classification     Lack of Transportation: Not on file     Lack of Transportation: No   Physical Activity: Not on file   Stress: Stress Concern Present (9/20/2023)    Received from [facility], [facility]    [employer] [place] [employer] - Occupational Stress Questionnaire     Feeling of Stress : Very much   Social Connections: Feeling Socially Integrated (11/4/2024)    Received from [facility]    OASIS : Social Isolation     How often do you feel lonely or isolated from those around you?: Never   Intimate Partner Violence: Unknown (4/21/2025)    Nursing IPS      Feels Physically and Emotionally Safe: Not on file     Physically Hurt by Someone: Not on file     Humiliated or Emotionally Abused by Someone: Not on file     Physically Hurt by Someone: No     Hurt or Threatened by Someone: No   Housing Stability: Unknown (2025)    Nursing: Inadequate Housing Risk Classification     Has Housing: Not on file     Worried About Losing Housing: Not on file     Unable to Get Utilities: Not on file     Unable to Pay for Housing in the Last Year: No     Has Housin                   Past Medical History:    Past Medical History:   Diagnosis Date    Arthritis     Asthma     COPD (chronic obstructive pulmonary disease) (HCC)     Coronary artery disease     Diabetes mellitus (HCC)     GERD (gastroesophageal reflux disease)     Hypertension     MI, old           Meds/Allergies     No Known Allergies  all current active meds have been reviewed    Medical Review Of Systems:    Review of Systems      Objective     Vital signs in last 24 hours:  Temp:  [97.9 °F (36.6 °C)-98 °F (36.7 °C)] 98 °F (36.7 °C)  HR:  [65-74] 74  BP: (115-140)/(70-83) 140/77  Resp:  [16-18] 18  SpO2:  [95 %-96 %] 96 %  O2 Device: None (Room air)      Intake/Output Summary (Last 24 hours) at 2025 1042  Last data filed at 2025 0800  Gross per 24 hour   Intake 160 ml   Output 925 ml   Net -765 ml       Mental Status Evaluation::    Appearance age appropriate, casually dressed   Behavior cooperative, calm   Speech normal rate, normal volume, normal pitch   Mood depressed, anxious   Affect normal range and intensity, appropriate   Thought Processes organized, goal directed   Associations intact associations   Thought Content no overt delusions   Perceptual Disturbances: no auditory hallucinations, no visual hallucinations   Abnormal Thoughts  Risk Potential Suicidal ideation - None  Homicidal ideation - None  Potential for aggression - No   Orientation oriented to person, place, time/date, and situation  "  Memory recent and remote memory grossly intact   Consciousness alert and awake   Attention Span Concentration Span attention span and concentration are age appropriate   Intellect appears to be of average intelligence   Insight intact   Judgement intact   Muscle Strength and  Gait No assessed   Motor Activity unable to assess today due to virtual visit   Language no difficulty naming common objects, no difficulty repeating a phrase, no difficulty writing a sentence   Fund of Knowledge adequate knowledge of current events  adequate fund of knowledge regarding past history  adequate fund of knowledge regarding vocabulary                Lab Results:  I have reviewed the following lab results:   .     04/22/25  1112   WBC 9.95   HGB 10.2*   HCT 33.3*             Lipid Profile:   Lab Results   Component Value Date    CHOLESTEROL 124 04/20/2025    HDL 44 (L) 04/20/2025    TRIG 119 04/20/2025    LDLCALC 56 04/20/2025   Thyroid Studies:   Lab Results   Component Value Date    CJE3XFNEUJCW 2.805 04/20/2025     Ammonia: No results found for: \"AMMONIA\"  Drug Levels: No results found for: \"VALPROICTOT\", \"VALPROICACID\", \"LITHIUM\", \"CARBAMAZEPIN\", \"CLOZAPINE\", \"NCLOZIP\"    Imaging Results Review: No pertinent imaging studies reviewed.  Other Study Results Review: No additional pertinent studies reviewed.    Code Status: Level 1 - Full Code  Advance Directive and Living Will:      Power of :    POLST:      Screenings:    1. Nutrition Screening  Nutrition Assessment (completed by Staff): Nutrition  Feeding: Needs set up  Diet Type: Regular/House  Appetite: Good  Diet Supplements: Glucerna    2. Pain Screening  Pain Screening: Pain Assessment  Pain Assessment Tool: 0-10  Pain Score: 5  Pain Location/Orientation: Orientation: Bilateral, Location: Arm  Pain Onset/Description: Onset: Ongoing  Effect of Pain on Daily Activities: comfort  Patient's Stated Pain Goal: No pain  Hospital Pain Intervention(s): Medication (See " MAR)  Pain Rating: FLACC (Rest) - Face: Occasional grimace or frown, withdrawn, disinterested, appears sad or worried  Pain Rating: FLACC (Rest) - Legs: Normal position or relaxed  Pain Rating: FLACC (Rest) - Activity: Lying quietly, normal position, moves easily  Pain Rating: FLACC (Rest) - Cry: Moans or whimpers, occasional complaint, occasional verbal outburst or grunt  Pain Rating: FLACC (Rest) - Consolability: Content, relaxed  Score: FLACC (Rest): 2  Pain Rating: FLACC (Activity) - Face: Occasional grimace or frown, withdrawn, disinterested, appears sad or worried  Pain Rating: FLACC (Activity) - Legs: Normal position or relaxed  Pain Rating: FLACC (Activity) - Activity: Lying quietly, normal position, moves easily  Pain Rating: FLACC (Activity) - Cry: Moans or whimpers, occasional complaint, occasional verbal outburst or grunt  Pain Rating: FLACC (Activity) - Consolability: Content, relaxed  Score: FLACC (Activity): 2    3. Suicide Screening                                                         [place]-SUICIDE SEVERITY RATING SCALE (C-SSRS)                            1. In the last month have you wished you were dead or wished you could go to sleep and not wake up? No  2. In the last month, have you actually had thoughts about killing yourself? No  6. Have you done anything, started to do anything, or prepared to do anything to end your life in the last 3 months? No  Suicide Risk Level : Low     Administrative Statements   VIRTUAL CARE DOCUMENTATION:     1. This service was provided via Telemedicine using [employer] [facility]     2. Parties in the room with patient during teleconsult Patient only    3. Confidentiality My office door was closed     4. Participants No one else was in the room    5. Patient acknowledged consent and understanding of privacy and security of the  Telemedicine consult. I informed the patient that I have reviewed their record in Epic and presented the opportunity for them to ask any  questions regarding the visit today.  The patient agreed to participate.    6. I have spent a total time of 50 minutes in caring for this patient on the day of the visit/encounter including Diagnostic results, Documenting in the medical record, Reviewing/placing orders in the medical record (including tests, medications, and/or procedures), Obtaining or reviewing history  , and Communicating with other healthcare professionals , not including the time spent for establishing the audio/video connection.

## 2025-04-23 NOTE — ASSESSMENT & PLAN NOTE
Patient having episodes of confusion at night or early in the morning and also having difficulty sleeping well.  On further review of last few months at home daughter is expressing that she is starting off with dementia symptoms and confusion as we are observing here as well placed her on Remeron increased melatonin and discontinue Cymbalta and switch to Zoloft and see how she does.  Continue BuSpar for anxiety as prior  Patient is being sent to skilled nursing facility for rehab.  Should have outpatient psychiatry follow-up  CT brain done this admission shows chronic microangiopathic changes.  Discussed with daughter that she is having episodes of confusion secondary to dementia with behavioral disturbance and lack of sleep.  Will need to stay on the current medication regimen for some time before reevaluation to be done.  No agitation reported only confusion reported

## 2025-04-23 NOTE — ASSESSMENT & PLAN NOTE
"Patient presents with general malaise for 1 week and diarrhea for 3 to 4 days.   She reports several days of diarrhea and weight loss over the past week due to decreased appetite.   CT abdomen pelvis: \"Mild to moderate mural thickening of the descending and sigmoid colonic segments concerning for infectious/inflammatory segmental colitis. There is similar mild mural thickening of the mid transverse colon. There is no evidence of bowel obstruction, free intraperitoneal air, or drainable ascites. Large hiatal hernia is again noted.\"  Did not meet sepsis criteria  COVID/flu negative  No fevers, UA negative, lipase normal at 19  WBC elevated at 10.60, now improving  Blood cultures neg for 48 hrs  Received Rocephin and Flagyl IV, continue oral abx for now as diarrhea has improved  Start Bentyl  Stop iv fluids as she is eating and drinking better  On exam, patient denies any abdominal pain, fever, nausea, or vomiting  Monitor fever curve, vital signs, and symptoms  "

## 2025-04-23 NOTE — DISCHARGE SUMMARY
"Discharge Summary - Hospitalist   Name: Cheryle Mcleod 84 y.o. female I MRN: [id number]  Unit/Bed#: -01 I Date of Admission: 4/20/2025   Date of Service: 4/23/2025 I Hospital Day: 3     Assessment & Plan  Colitis  Patient presents with general malaise for 1 week and diarrhea for 3 to 4 days.   She reports several days of diarrhea and weight loss over the past week due to decreased appetite.   CT abdomen pelvis: \"Mild to moderate mural thickening of the descending and sigmoid colonic segments concerning for infectious/inflammatory segmental colitis. There is similar mild mural thickening of the mid transverse colon. There is no evidence of bowel obstruction, free intraperitoneal air, or drainable ascites. Large hiatal hernia is again noted.\"  Did not meet sepsis criteria  COVID/flu negative  No fevers, UA negative, lipase normal at 19  WBC elevated at 10.60, now improving  Blood cultures neg for 48 hrs  Received Rocephin and Flagyl IV, continue oral abx for now as diarrhea has improved  Start Bentyl  Stop iv fluids as she is eating and drinking better  On exam, patient denies any abdominal pain, fever, nausea, or vomiting  Monitor fever curve, vital signs, and symptoms  Troponin level elevated  Noted history of chronically elevated troponin  EKG in the ED without ST segment abnormality = NSR with 1st degree AV block, RBBB left anterior fascicular block, bifascicular block. Left ventricular hypertrophy with repolarizing abnormality. Possible lateral infarct.   Obtain serial EKG with any chest pain and/or elevated troponin levels  Troponin: 194 -> 220,201  BNP elevated at 1769  Patient denies chest pain  Suspected secondary to colitis and is due to demand ischemia  Generalized weakness  Patient reporting some generalized weakness  family questioning whether inpatient rehab and/or long-term care would be an option at time of discharge  Appreciate PT/OT recommendation  Appreciate case management " recommendations  Essential hypertension  BP in the /60, current /54  hold home hctz and lisinopril due to diony  Monitor BP  COPD (chronic obstructive pulmonary disease) (HCC)  Noted history of COPD  Not in acute exacerbation  Continue Home albuterol inhaler as needed  Severe protein-calorie malnutrition (HCC)  Malnutrition Findings:   Adult Malnutrition type: Chronic illness  Adult Degree of Malnutrition: Other severe protein calorie malnutrition  Malnutrition Characteristics: Inadequate energy, Weight loss                  360 Statement: Chronic severe malnutrition related to poor appetite, inadequate oral intake as evidenced by < 75% estimated energy intake > 1 mo, 21.2% weight loss x 11 mo (5/10/24 127lb, 4/21/25 100lb). Treated with: Liberalize to regular diet. +Glucerna BID. Recommend daily weights for nutrition monitoring.    BMI Findings:           Body mass index is 21.64 kg/m².   Severe protein-calorie malnutrition 2/2 poor PO intake a/e/b inadequate energy, weight loss requiring Nutrition consult, Regular diet and supplements   Dementia with behavioral disturbance (HCC)  Patient having episodes of confusion at night or early in the morning and also having difficulty sleeping well.  On further review of last few months at home daughter is expressing that she is starting off with dementia symptoms and confusion as we are observing here as well placed her on Remeron increased melatonin and discontinue Cymbalta and switch to Zoloft and see how she does.  Continue BuSpar for anxiety as prior  Patient is being sent to skilled nursing facility for rehab.  Should have outpatient psychiatry follow-up  CT brain done this admission shows chronic microangiopathic changes.  Discussed with daughter that she is having episodes of confusion secondary to dementia with behavioral disturbance and lack of sleep.  Will need to stay on the current medication regimen for some time before reevaluation to be done.  No  agitation reported only confusion reported      Dehydration POA 2/2 diarrhea a/e/b Fatigue, weakness, decreased PO intake requiring IVF, IV Mag sulfate, Cardiac diet and nutrition recommendations  .  Now resolved  Medical Problems       Resolved Problems  Date Reviewed: 4/3/2025          Resolved    Failure to thrive (child) 4/20/2025     Resolved by  JENNY Castillo          MESSAGE TO PCP (Brian Peraza DO) FOR FOLLOW UP:   Thank you for allowing us to participate in the care of your patient, Cheryle Mcleod, who was hospitalized from 4/20/2025 through 04/23/25 with the admitting diagnosis of colitis    Medication Changes:  See discharge med list  Outpatient testing recommended:  Outpatient follow-up with psychiatry  If you have any additional questions or would like to discuss further, please feel free to contact me.  Alice Cali MD  [facility] Internal Medicine, Hospitalist, [phone]     Admission Date:   Admission Orders (From admission, onward)       Ordered        04/20/25 1827  INPATIENT ADMISSION  Once                          Discharge Date: 04/23/25    Consultations During Hospital Stay:  Behavioral health, PT, OT    Procedures Performed:   none    Significant Findings / Test Results:   CT head wo contrast  Result Date: 4/22/2025  Impression: 1.  No acute intracranial hemorrhage, mass effect or edema. 2.  Moderate, chronic microangiopathy Workstation performed: [id number]     XR chest portable  Result Date: 4/20/2025  Impression: No acute cardiopulmonary disease. Moderate hiatal hernia. Workstation performed: [id number]     CT abdomen pelvis w contrast  Result Date: 4/20/2025  Impression: Mild to moderate mural thickening of the descending and sigmoid colonic segments concerning for infectious/inflammatory segmental colitis. There is similar mild mural thickening of the mid transverse colon. There is no evidence of bowel obstruction, free intraperitoneal air, or drainable ascites.Large  "hiatal hernia is again noted.Additional stable incidental findings as detailed. Workstation performed: [id number]      Incidental Findings:       Test Results Pending at Discharge (will require follow up):   none     Complications:  none    Reason for Admission: Colitis    Hospital Course:   Cheryle Mcleod is a 84 y.o. female patient who originally presented to the hospital on 4/20/2025 due to colitis, dementia with behavioral disturbance and generalized weakness.  Patient CT showed evidence of colitis however she did not have any diarrhea while she was here and white count is improving switch her to a course of oral antibiotics for now however she was noted to have difficulty sleeping and at times having short bouts of confusion.  Discussed with patient and her daughter in regards to this and adjusted her medications including discontinuing Cymbalta or adding Zoloft increasing melatonin and also adding Remeron.  Patient is being discharged to skilled nursing facility for rehab.      Please see above list of diagnoses and related plan for additional information.     Condition at Discharge: good    Discharge Day Visit / Exam:   Subjective: Patient denies any chest pain or shortness of breath she has no confusion currently but did have some confusion early this morning and did not sleep much overnight  Vitals: Blood Pressure: 140/77 (04/23/25 0739)  Pulse: 74 (04/23/25 0739)  Temperature: 98 °F (36.7 °C) (04/23/25 0739)  Temp Source: Oral (04/22/25 0757)  Respirations: 18 (04/23/25 0739)  Height: 4' 9\" (144.8 cm) (04/21/25 1259)  Weight - Scale: 45.4 kg (100 lb) (04/21/25 0710)  SpO2: 96 % (04/23/25 0800)  Physical Exam  Vitals and nursing note reviewed.   Constitutional:       Appearance: Normal appearance.   HENT:      Head: Normocephalic and atraumatic.      Right Ear: External ear normal.      Left Ear: External ear normal.      Nose: Nose normal.      Mouth/Throat:      Pharynx: Oropharynx is clear. "   Cardiovascular:      Rate and Rhythm: Normal rate and regular rhythm.      Heart sounds: Normal heart sounds.   Pulmonary:      Effort: Pulmonary effort is normal.      Breath sounds: Normal breath sounds.   Abdominal:      General: Bowel sounds are normal.      Palpations: Abdomen is soft.      Tenderness: There is no abdominal tenderness.   Musculoskeletal:         General: Normal range of motion.      Cervical back: Normal range of motion and neck supple.   Skin:     General: Skin is warm and dry.      Capillary Refill: Capillary refill takes less than 2 seconds.   Neurological:      General: No focal deficit present.      Mental Status: She is alert and oriented to person, place, and time.   Psychiatric:         Mood and Affect: Mood normal.            Discussion with Family:  daughter    Discharge instructions/Information to patient and family:   See after visit summary for information provided to patient and family.      Provisions for Follow-Up Care:  See after visit summary for information related to follow-up care and any pertinent home health orders.      Mobility at time of Discharge:   Basic Mobility Inpatient Raw Score: 18  JH-HLM Goal: 6: Walk 10 steps or more  JH-HLM Achieved: 6: Walk 10 steps or more  HLM Goal NOT achieved. Continue to encourage mobility in post discharge setting.     Disposition:   Other Skilled Nursing Facility at      Planned Readmission: none    Discharge Medications:  See after visit summary for reconciled discharge medications provided to patient and/or family.      Administrative Statements   Discharge Statement:  I have spent a total time of 35 minutes in caring for this patient on the day of the visit/encounter. .    **Please Note: This note may have been constructed using a voice recognition system**

## 2025-04-23 NOTE — PLAN OF CARE
Problem: PHYSICAL THERAPY ADULT  Goal: Performs mobility at highest level of function for planned discharge setting.  See evaluation for individualized goals.  Description: Treatment/Interventions: Functional transfer training, LE strengthening/ROM, Elevations, Therapeutic exercise, Endurance training, Patient/family training, Equipment eval/education, Bed mobility, Gait training, Compensatory technique education, Spoke to nursing, OT          See flowsheet documentation for full assessment, interventions and recommendations.  Outcome: Progressing  Note: Prognosis: Good  Problem List: Decreased strength, Decreased endurance, Impaired balance, Decreased mobility, Decreased safety awareness  Assessment: Pt seen for PT treatment session this date with interventions consisting of transfer training, gait training, and education provided as needed for safety and direction to improve functional mobility, safety awareness, and activity tolerance. Pt agreeable to PT treatment session upon arrival, pt found sitting OOB in recliner. At end of session, pt left sitting OOB in recliner with RN and transporter present in room and with all needs in reach. In comparison to previous session, pt with improvements in ambulation distance .  Recommend Level II (Moderate Resource Intensity) at time of d/c in order to maximize pt's functional independence and safety w/ mobility. Pt continues to be functioning below baseline level. PT will continue to see pt while here in order to address the deficits listed above and provide interventions consistent w/ POC in effort to achieve STGs.     Barriers to Discharge Comments: Recommend accessing home via 2+2 RONNELL, pt reporting family frequently requests her negotiate 16-17 RONNELL  Rehab Resource Intensity Level, PT: II (Moderate Resource Intensity)    See flowsheet documentation for full assessment.

## 2025-04-23 NOTE — TELEPHONE ENCOUNTER
Consult placed on [facility] queue at 1011 to be seen by an [facility] psychiatrist.

## 2025-04-23 NOTE — PLAN OF CARE
Problem: Prexisting or High Potential for Compromised Skin Integrity  Goal: Skin integrity is maintained or improved  Description: INTERVENTIONS:- Identify patients at risk for skin breakdown- Assess and monitor skin integrity- Assess and monitor nutrition and hydration status- Monitor labs - Assess for incontinence - Turn and reposition patient- Assist with mobility/ambulation- Relieve pressure over bony prominences- Avoid friction and shearing- Provide appropriate hygiene as needed including keeping skin clean and dry- Evaluate need for skin moisturizer/barrier cream- Collaborate with interdisciplinary team - Patient/family teaching- Consider wound care consult   Outcome: Progressing     Problem: Nutrition/Hydration-ADULT  Goal: Nutrient/Hydration intake appropriate for improving, restoring or maintaining nutritional needs  Description: Monitor and assess patient's nutrition/hydration status for malnutrition. Collaborate with interdisciplinary team and initiate plan and interventions as ordered.  Monitor patient's weight and dietary intake as ordered or per policy. Utilize nutrition screening tool and intervene as necessary. Determine patient's food preferences and provide high-protein, high-caloric foods as appropriate. INTERVENTIONS:- Monitor oral intake, urinary output, labs, and treatment plans- Assess nutrition and hydration status and recommend course of action- Evaluate amount of meals eaten- Assist patient with eating if necessary - Allow adequate time for meals- Recommend/ encourage appropriate diets, oral nutritional supplements, and vitamin/mineral supplements- Order, calculate, and assess calorie counts as needed- Recommend, monitor, and adjust tube feedings and TPN/PPN based on assessed needs- Assess need for intravenous fluids- Provide specific nutrition/hydration education as appropriate- Include patient/family/caregiver in decisions related to nutrition  Outcome: Progressing     Problem: PAIN -  ADULT  Goal: Verbalizes/displays adequate comfort level or baseline comfort level  Description: Interventions:- Encourage patient to monitor pain and request assistance- Assess pain using appropriate pain scale- Administer analgesics based on type and severity of pain and evaluate response- Implement non-pharmacological measures as appropriate and evaluate response- Consider cultural and social influences on pain and pain management- Notify physician/advanced practitioner if interventions unsuccessful or patient reports new pain  Outcome: Progressing     Problem: INFECTION - ADULT  Goal: Absence or prevention of progression during hospitalization  Description: INTERVENTIONS:- Assess and monitor for signs and symptoms of infection- Monitor lab/diagnostic results- Monitor all insertion sites, i.e. indwelling lines, tubes, and drains- Monitor endotracheal if appropriate and nasal secretions for changes in amount and color- Haslet appropriate cooling/warming therapies per order- Administer medications as ordered- Instruct and encourage patient and family to use good hand hygiene technique- Identify and instruct in appropriate isolation precautions for identified infection/condition  Outcome: Progressing  Goal: Absence of fever/infection during neutropenic period  Description: INTERVENTIONS:- Monitor WBC  Outcome: Progressing     Problem: SAFETY ADULT  Goal: Patient will remain free of falls  Description: INTERVENTIONS:- Educate patient/family on patient safety including physical limitations- Instruct patient to call for assistance with activity - Consult OT/PT to assist with strengthening/mobility - Keep Call bell within reach- Keep bed low and locked with side rails adjusted as appropriate- Keep care items and personal belongings within reach- Initiate and maintain comfort rounds- Make Fall Risk Sign visible to staff- Offer Toileting every 2 Hours, in advance of need- Initiate/Maintain bed alarm- Obtain necessary  fall risk management equipment- Apply yellow socks and bracelet for high fall risk patients- Consider moving patient to room near nurses station  INTERVENTIONS:- Educate patient/family on patient safety including physical limitations- Instruct patient to call for assistance with activity - Consult OT/PT to assist with strengthening/mobility - Keep Call bell within reach- Keep bed low and locked with side rails adjusted as appropriate- Keep care items and personal belongings within reach- Initiate and maintain comfort rounds- Make Fall Risk Sign visible to staff- Offer Toileting every 2 Hours, in advance of need- Initiate/Maintain bed alarm- Obtain necessary fall risk management equipment- Apply yellow socks and bracelet for high fall risk patients- Consider moving patient to room near nurses station  Outcome: Progressing  Goal: Maintain or return to baseline ADL function  Description: INTERVENTIONS:-  Assess patient's ability to carry out ADLs; assess patient's baseline for ADL function and identify physical deficits which impact ability to perform ADLs (bathing, care of mouth/teeth, toileting, grooming, dressing, etc.)- Assess/evaluate cause of self-care deficits - Assess range of motion- Assess patient's mobility; develop plan if impaired- Assess patient's need for assistive devices and provide as appropriate- Encourage maximum independence but intervene and supervise when necessary- Involve family in performance of ADLs- Assess for home care needs following discharge - Consider OT consult to assist with ADL evaluation and planning for discharge- Provide patient education as appropriate  Outcome: Progressing  Goal: Maintains/Returns to pre admission functional level  Description: INTERVENTIONS:- Perform AM-PAC 6 Click Basic Mobility/ Daily Activity assessment daily.- Set and communicate daily mobility goal to care team and patient/family/caregiver. - Collaborate with rehabilitation services on mobility goals if  consulted- Reposition patient every 2 hours.- Dangle patient 3 times a day- Stand patient 3 times a day- Ambulate patient 3 times a day- Out of bed to chair 3 times a day - Out of bed for meals 3 times a day- Out of bed for toileting- Record patient progress and toleration of activity level   Outcome: Progressing     Problem: DISCHARGE PLANNING  Goal: Discharge to home or other facility with appropriate resources  Description: INTERVENTIONS:- Identify barriers to discharge w/patient and caregiver- Arrange for needed discharge resources and transportation as appropriate- Identify discharge learning needs (meds, wound care, etc.)- Arrange for interpretive services to assist at discharge as needed- Refer to Case Management Department for coordinating discharge planning if the patient needs post-hospital services based on physician/advanced practitioner order or complex needs related to functional status, cognitive ability, or social support system  Outcome: Progressing     Problem: Knowledge Deficit  Goal: Patient/family/caregiver demonstrates understanding of disease process, treatment plan, medications, and discharge instructions  Description: Complete learning assessment and assess knowledge base.Interventions:- Provide teaching at level of understanding- Provide teaching via preferred learning methods  Outcome: Progressing     Problem: Potential for Falls  Goal: Patient will remain free of falls  Description: INTERVENTIONS:- Educate patient/family on patient safety including physical limitations- Instruct patient to call for assistance with activity - Consult OT/PT to assist with strengthening/mobility - Keep Call bell within reach- Keep bed low and locked with side rails adjusted as appropriate- Keep care items and personal belongings within reach- Initiate and maintain comfort rounds- Make Fall Risk Sign visible to staff- Offer Toileting every 2 Hours, in advance of need- Initiate/Maintain bed alarm- Obtain  necessary fall risk management equipment- Apply yellow socks and bracelet for high fall risk patients- Consider moving patient to room near nurses station  INTERVENTIONS:- Educate patient/family on patient safety including physical limitations- Instruct patient to call for assistance with activity - Consult OT/PT to assist with strengthening/mobility - Keep Call bell within reach- Keep bed low and locked with side rails adjusted as appropriate- Keep care items and personal belongings within reach- Initiate and maintain comfort rounds- Make Fall Risk Sign visible to staff- Offer Toileting every 2 Hours, in advance of need- Initiate/Maintain bed alarm- Obtain necessary fall risk management equipment- Apply yellow socks and bracelet for high fall risk patients- Consider moving patient to room near nurses station  Outcome: Progressing

## 2025-04-23 NOTE — CASE MANAGEMENT
Case Management Discharge Planning Note    Patient name Cheryle Mcleod  Location /-01 MRN [id number]  : 1940 Date 2025       Current Admission Date: 2025  Current Admission Diagnosis:Colitis   Patient Active Problem List    Diagnosis Date Noted Date Diagnosed    Severe protein-calorie malnutrition (HCC) 2025     Colitis 2025     Failure to thrive in adult 2025     Hypomagnesemia 2025     Anemia 2025     Type 2 diabetes mellitus with stage 3b chronic kidney disease, without long-term current use of insulin (HCC) 2025     Pain in right wrist 2025     Chronic pain of both knees 2025     Primary osteoarthritis of right knee 2025     Troponin level elevated 2024     Elevated transaminase level 2024     Constipation 2024     Leukocytosis 2024     Acute respiratory failure with hypoxia ([facility]) 2024     Oral thrush 2023     Nonischemic nontraumatic myocardial injury 2023     Urinary incontinence 2023     Calculus of gallbladder without cholecystitis without obstruction 2023     Loss of appetite 2023     Acute bronchitis 2022     Coronary artery disease involving native coronary artery of native heart 2022     COVID 2022     Essential hypertension 2022     COPD (chronic obstructive pulmonary disease) ([facility]) 2022     Rheumatoid arthritis of multiple sites with negative rheumatoid factor ([facility]) 2022     GERD (gastroesophageal reflux disease) 2022     Fatty liver 2022     Generalized weakness 2022     RONALD (acute kidney injury) ([facility]) 2022     Prolonged Q-T interval on ECG 2022       LOS (days): 3  Geometric Mean LOS (GMLOS) (days): 3.6  Days to GMLOS:0.9     OBJECTIVE:  Risk of Unplanned Readmission Score: 18.35         Current admission status: Inpatient   Preferred Pharmacy:   [id number] - [place] -  [address] [place] [id number]  Phone: [phone] Fax: [phone]    Primary Care Provider: Brian Peraza DO    Primary Insurance: MEDICARE  Secondary Insurance:     DISCHARGE DETAILS:        Scheduling transport to [facility] for 3 PM via wheelchair, await confirmation of transport time.

## 2025-04-25 LAB
BACTERIA BLD CULT: NORMAL
BACTERIA BLD CULT: NORMAL

## 2025-05-15 ENCOUNTER — TELEPHONE (OUTPATIENT)
Age: 85
End: 2025-05-15

## 2025-05-15 NOTE — TELEPHONE ENCOUNTER
Contacted Pt. in regards to ROUTINE Referral, pts daughter answered, Communication on file to speak to daughter.    Patient is in a nursing home, daughter is going to speak with home before moving forward b/c they may have it on location